# Patient Record
Sex: MALE | Race: WHITE | Employment: OTHER | URBAN - METROPOLITAN AREA
[De-identification: names, ages, dates, MRNs, and addresses within clinical notes are randomized per-mention and may not be internally consistent; named-entity substitution may affect disease eponyms.]

---

## 2017-01-05 RX ORDER — LANREOTIDE ACETATE 120 MG/.5ML
120 INJECTION SUBCUTANEOUS ONCE
Status: COMPLETED | OUTPATIENT
Start: 2017-01-11 | End: 2017-01-11

## 2017-01-11 ENCOUNTER — HOSPITAL ENCOUNTER (OUTPATIENT)
Dept: INFUSION THERAPY | Age: 72
Discharge: HOME | End: 2017-01-11
Payer: MEDICARE

## 2017-01-11 VITALS
OXYGEN SATURATION: 99 % | DIASTOLIC BLOOD PRESSURE: 79 MMHG | HEART RATE: 71 BPM | TEMPERATURE: 97.3 F | RESPIRATION RATE: 18 BRPM | SYSTOLIC BLOOD PRESSURE: 125 MMHG

## 2017-01-11 LAB
ALBUMIN SERPL BCP-MCNC: 3.6 G/DL (ref 3.5–5)
ALBUMIN/GLOB SERPL: 1.1 {RATIO} (ref 1.1–2.2)
ALP SERPL-CCNC: 55 U/L (ref 45–117)
ALT SERPL-CCNC: 21 U/L (ref 12–78)
ANION GAP BLD CALC-SCNC: 16 MMOL/L (ref 5–15)
AST SERPL W P-5'-P-CCNC: 19 U/L (ref 15–37)
BASOPHILS # BLD AUTO: 0 K/UL (ref 0–0.1)
BASOPHILS # BLD: 1 % (ref 0–1)
BILIRUB DIRECT SERPL-MCNC: 0.2 MG/DL (ref 0–0.2)
BILIRUB SERPL-MCNC: 1.4 MG/DL (ref 0.2–1)
BUN BLD-MCNC: 19 MG/DL (ref 9–20)
CA-I BLD-MCNC: 1.16 MMOL/L (ref 1.12–1.32)
CHLORIDE BLD-SCNC: 105 MMOL/L (ref 98–107)
CO2 BLD-SCNC: 27 MMOL/L (ref 21–32)
CREAT BLD-MCNC: 1.1 MG/DL (ref 0.6–1.3)
EOSINOPHIL # BLD: 0.3 K/UL (ref 0–0.4)
EOSINOPHIL NFR BLD: 5 % (ref 0–7)
ERYTHROCYTE [DISTWIDTH] IN BLOOD BY AUTOMATED COUNT: 13.3 % (ref 11.5–14.5)
GLOBULIN SER CALC-MCNC: 3.3 G/DL (ref 2–4)
GLUCOSE BLD-MCNC: 130 MG/DL (ref 75–110)
HCT VFR BLD AUTO: 43.1 % (ref 36.6–50.3)
HCT VFR BLD CALC: 43 % (ref 36.6–50.3)
HGB BLD-MCNC: 14.6 G/DL (ref 12.1–17)
HGB BLD-MCNC: 14.6 GM/DL (ref 12.1–17)
LYMPHOCYTES # BLD AUTO: 21 % (ref 12–49)
LYMPHOCYTES # BLD: 1.1 K/UL (ref 0.8–3.5)
MCH RBC QN AUTO: 29.6 PG (ref 26–34)
MCHC RBC AUTO-ENTMCNC: 33.9 G/DL (ref 30–36.5)
MCV RBC AUTO: 87.2 FL (ref 80–99)
MONOCYTES # BLD: 0.3 K/UL (ref 0–1)
MONOCYTES NFR BLD AUTO: 5 % (ref 5–13)
NEUTS SEG # BLD: 3.8 K/UL (ref 1.8–8)
NEUTS SEG NFR BLD AUTO: 68 % (ref 32–75)
PLATELET # BLD AUTO: 173 K/UL (ref 150–400)
POTASSIUM BLD-SCNC: 3.8 MMOL/L (ref 3.5–5.1)
PROT SERPL-MCNC: 6.9 G/DL (ref 6.4–8.2)
RBC # BLD AUTO: 4.94 M/UL (ref 4.1–5.7)
SERVICE CMNT-IMP: ABNORMAL
SODIUM BLD-SCNC: 144 MMOL/L (ref 136–145)
WBC # BLD AUTO: 5.5 K/UL (ref 4.1–11.1)

## 2017-01-11 PROCEDURE — 96402 CHEMO HORMON ANTINEOPL SQ/IM: CPT

## 2017-01-11 PROCEDURE — 36415 COLL VENOUS BLD VENIPUNCTURE: CPT | Performed by: INTERNAL MEDICINE

## 2017-01-11 PROCEDURE — 74011250636 HC RX REV CODE- 250/636: Performed by: INTERNAL MEDICINE

## 2017-01-11 PROCEDURE — 85025 COMPLETE CBC W/AUTO DIFF WBC: CPT | Performed by: INTERNAL MEDICINE

## 2017-01-11 PROCEDURE — 80076 HEPATIC FUNCTION PANEL: CPT | Performed by: INTERNAL MEDICINE

## 2017-01-11 PROCEDURE — 80047 BASIC METABLC PNL IONIZED CA: CPT

## 2017-01-11 PROCEDURE — 96401 CHEMO ANTI-NEOPL SQ/IM: CPT

## 2017-01-11 RX ADMIN — DENOSUMAB 120 MG: 120 INJECTION SUBCUTANEOUS at 09:19

## 2017-01-11 RX ADMIN — LANREOTIDE ACETATE 120 MG: 120 INJECTION SUBCUTANEOUS at 09:24

## 2017-01-11 NOTE — PROGRESS NOTES
Pt arrived to [facility] ambulatory in no acute distress at 0850 for Somatuline Depot/Xgeva.  Assessment unremarkable. Labs obtained- ionized calcium 1.16    Visit Vitals    /79 (BP 1 Location: Left arm, BP Patient Position: Sitting)    Pulse 71    Temp 97.3 °F (36.3 °C)    Resp 18    SpO2 99%       The following medications administered:  Xgeva 120 mg SQ injection to L arm  Somatuline Depot 120 mg deep SQ injection to R buttocks    Recent Results (from the past 8 hour(s))   CBC WITH AUTOMATED DIFF    Collection Time: 01/11/17  9:01 AM   Result Value Ref Range    WBC 5.5 4.1 - 11.1 K/uL    RBC 4.94 4.10 - 5.70 M/uL    HGB 14.6 12.1 - 17.0 g/dL    HCT 43.1 36.6 - 50.3 %    MCV 87.2 80.0 - 99.0 FL    MCH 29.6 26.0 - 34.0 PG    MCHC 33.9 30.0 - 36.5 g/dL    RDW 13.3 11.5 - 14.5 %    PLATELET 563 887 - 455 K/uL    NEUTROPHILS 68 32 - 75 %    LYMPHOCYTES 21 12 - 49 %    MONOCYTES 5 5 - 13 %    EOSINOPHILS 5 0 - 7 %    BASOPHILS 1 0 - 1 %    ABS. NEUTROPHILS 3.8 1.8 - 8.0 K/UL    ABS. LYMPHOCYTES 1.1 0.8 - 3.5 K/UL    ABS. MONOCYTES 0.3 0.0 - 1.0 K/UL    ABS. EOSINOPHILS 0.3 0.0 - 0.4 K/UL    ABS. BASOPHILS 0.0 0.0 - 0.1 K/UL   HEPATIC FUNCTION PANEL    Collection Time: 01/11/17  9:01 AM   Result Value Ref Range    Protein, total 6.9 6.4 - 8.2 g/dL    Albumin 3.6 3.5 - 5.0 g/dL    Globulin 3.3 2.0 - 4.0 g/dL    A-G Ratio 1.1 1.1 - 2.2      Bilirubin, total 1.4 (H) 0.2 - 1.0 MG/DL    Bilirubin, direct 0.2 0.0 - 0.2 MG/DL    Alk.  phosphatase 55 45 - 117 U/L    AST 19 15 - 37 U/L    ALT 21 12 - 78 U/L   POC CHEM8    Collection Time: 01/11/17  9:09 AM   Result Value Ref Range    Calcium, ionized (POC) 1.16 1.12 - 1.32 MMOL/L    Sodium (POC) 144 136 - 145 MMOL/L    Potassium (POC) 3.8 3.5 - 5.1 MMOL/L    Chloride (POC) 105 98 - 107 MMOL/L    CO2 (POC) 27 21 - 32 MMOL/L    Anion gap (POC) 16 (H) 5 - 15 mmol/L    Glucose (POC) 130 (H) 75 - 110 MG/DL    BUN (POC) 19 9 - 20 MG/DL    Creatinine (POC) 1.1 0.6 - 1.3 MG/DL    GFR-AA (POC) >60 >60 ml/min/1.73m2    GFR, non-AA (POC) >60 >60 ml/min/1.73m2    Hemoglobin (POC) 14.6 12.1 - 17.0 GM/DL    Hematocrit (POC) 43 36.6 - 50.3 %    Comment Notified RN or MD immediately by          Pt tolerated treatment well. Pt discharged ambulatory in no acute distress at 0930, accompanied by wife.   Next appointment 2/8/17 at 9 AM.

## 2017-01-23 ENCOUNTER — HOSPITAL ENCOUNTER (OUTPATIENT)
Dept: NUCLEAR MEDICINE | Age: 72
Discharge: HOME | End: 2017-01-23
Attending: INTERNAL MEDICINE
Payer: MEDICARE

## 2017-01-23 ENCOUNTER — HOSPITAL ENCOUNTER (OUTPATIENT)
Dept: CT IMAGING | Age: 72
Discharge: HOME | End: 2017-01-23
Attending: INTERNAL MEDICINE
Payer: MEDICARE

## 2017-01-23 DIAGNOSIS — C7A.8 NEUROENDOCRINE CARCINOMA METASTATIC TO BONE (HCC): ICD-10-CM

## 2017-01-23 DIAGNOSIS — C7B.8 NEUROENDOCRINE CARCINOMA METASTATIC TO BONE (HCC): ICD-10-CM

## 2017-01-23 PROCEDURE — 74177 CT ABD & PELVIS W/CONTRAST: CPT

## 2017-01-23 PROCEDURE — 78306 BONE IMAGING WHOLE BODY: CPT

## 2017-01-23 PROCEDURE — 74011636320 HC RX REV CODE- 636/320: Performed by: INTERNAL MEDICINE

## 2017-01-23 PROCEDURE — 74011250636 HC RX REV CODE- 250/636: Performed by: INTERNAL MEDICINE

## 2017-01-23 RX ORDER — SODIUM CHLORIDE 9 MG/ML
50 INJECTION, SOLUTION INTRAVENOUS
Status: COMPLETED | OUTPATIENT
Start: 2017-01-23 | End: 2017-01-23

## 2017-01-23 RX ORDER — SODIUM CHLORIDE 0.9 % (FLUSH) 0.9 %
10 SYRINGE (ML) INJECTION
Status: COMPLETED | OUTPATIENT
Start: 2017-01-23 | End: 2017-01-23

## 2017-01-23 RX ORDER — BARIUM SULFATE 20 MG/ML
900 SUSPENSION ORAL
Status: DISCONTINUED | OUTPATIENT
Start: 2017-01-23 | End: 2017-01-23

## 2017-01-23 RX ADMIN — SODIUM CHLORIDE 50 ML/HR: 900 INJECTION, SOLUTION INTRAVENOUS at 09:27

## 2017-01-23 RX ADMIN — IOPAMIDOL 100 ML: 755 INJECTION, SOLUTION INTRAVENOUS at 09:27

## 2017-01-23 RX ADMIN — Medication 10 ML: at 09:27

## 2017-01-30 ENCOUNTER — OFFICE VISIT (OUTPATIENT)
Dept: ONCOLOGY | Age: 72
End: 2017-01-30

## 2017-01-30 VITALS
OXYGEN SATURATION: 99 % | BODY MASS INDEX: 25.36 KG/M2 | SYSTOLIC BLOOD PRESSURE: 136 MMHG | DIASTOLIC BLOOD PRESSURE: 94 MMHG | TEMPERATURE: 97.6 F | HEART RATE: 100 BPM | RESPIRATION RATE: 18 BRPM | WEIGHT: 187 LBS

## 2017-01-30 DIAGNOSIS — C7B.8 NEUROENDOCRINE CARCINOMA METASTATIC TO BONE (HCC): Primary | ICD-10-CM

## 2017-01-30 DIAGNOSIS — C7A.8 NEUROENDOCRINE CARCINOMA METASTATIC TO BONE (HCC): Primary | ICD-10-CM

## 2017-01-30 NOTE — PROGRESS NOTES
Oncology Follow up Note        Patient: Frankie Pelletier MRN: [id number]  SSN: [id number]    YOB: 1945  Age: 80 y.o. Sex: male        Diagnosis:     1. 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 Somatuline Depot 120 mg SC  2. Xgeva 120mg SC     Subjective:      Frankie Pelletier is a 80 y.o. male with a diagnosis of metastatic neuroendocrine cancer. He started experiencing pain in both groins and the abdomen. A CT of the abdomen showed retroperitoneal adenopathy. CT guided biopsy of the retroperitoneal LN reveals a dx of well differentiated neuroendocrine carcinoma. Bone scan shows disease in the bone. Mr. Shaista Garduno is receiving Somatuline and scans show stable disease. He is here today with his wife and feels well with no complaints.     Review of Systems:    Constitutional: fatigue   Eyes: negative  Ears, Nose, Mouth, Throat, and Face: negative  Respiratory: negative  Cardiovascular: negative  Gastrointestinal: negative  Genitourinary:negative  Integument/Breast: negative  Hematologic/Lymphatic: negative  Musculoskeletal: negative  Neurological: negative      Past Medical History   Diagnosis Date    Adverse effect of anesthesia      low HR and very slow to wakeup    Cancer ([facility] [address] 5/2016     neuroendocrine, retroperitoneal LN involvement    Diarrhea     Frequent urination     Hypertension     Poor appetite     Unspecified adverse effect of anesthesia      \"hard to put to sleep and slow to wake up-heart rate dropped very low\"     Past Surgical History   Procedure Laterality Date    Hx other surgical  09-     excision of scalp cyst     Hx orthopaedic       knee scope left knee    Upper gi endoscopy,biopsy  5/13/2016          Colonoscopy,alejandro moya,snare  5/13/2016          Colonoscopy N/A 5/13/2016     COLONOSCOPY performed by Kaleb Pineda MD at [facility] ENDOSCOPY      Family History   Problem Relation Age of Onset    Cancer Mother    [facility] Stroke Sister      Social History   Substance Use Topics    Smoking status: Former Smoker     Packs/day: 1.00     Years: 8.00     Quit date: 5/12/1972    Smokeless tobacco: Never Used    Alcohol use No      Prior to Admission medications    Medication Sig Start Date End Date Taking? Authorizing Provider   lisinopril (PRINIVIL, ZESTRIL) 10 mg tablet Take  by mouth daily. Yes Historical Provider          No Known Allergies        Objective:     Vitals:    01/30/17 1106   BP: (!) 136/94   Pulse: 100   Resp: 18   Temp: 97.6 °F (36.4 °C)   SpO2: 99%   Weight: 187 lb (84.8 kg)            Physical Exam:    GENERAL: alert, cooperative  EYE: negative  LYMPHATIC: Cervical, supraclavicular, and axillary nodes normal.   THROAT & NECK: normal and no erythema or exudates noted. LUNG: clear to auscultation bilaterally  HEART: regular rate and rhythm  ABDOMEN: soft, non-tender  EXTREMITIES: no cyanosis or edema  SKIN: Normal.  NEUROLOGIC: negative        IMAGING:     I personally reviewed the images. Enlarged and abnormal retroperitoneal adenopathy. Bone metastasis. CT Results (most recent):    Results from Hospital Encounter encounter on 01/23/17   CT ABD PELV W CONT   Narrative INDICATION: metastatic neuroendocrine carcinoma    COMPARISON: 10/18/2016    TECHNIQUE:   Following the uneventful intravenous administration of 100 cc Isovue-370, thin  axial images were obtained through the abdomen and pelvis. Coronal and sagittal  reconstructions were generated. Oral contrast was not administered. CT dose  reduction was achieved through use of a standardized protocol tailored for this  examination and automatic exposure control for dose modulation. FINDINGS:   LUNG BASES: There is slight basilar atelectasis/scarring on the left  INCIDENTALLY IMAGED HEART AND MEDIASTINUM: Unremarkable. LIVER: No mass or biliary dilatation. GALLBLADDER: Unremarkable. SPLEEN: No mass. PANCREAS: No mass or ductal dilatation.   ADRENALS: Unremarkable. KIDNEYS: There is a tiny nonobstructing calculus right kidney. There is a tiny  low-density left kidney most consistent with a tiny cyst.  STOMACH: Unremarkable. SMALL BOWEL: No dilatation or wall thickening. COLON: No dilatation or wall thickening. APPENDIX: Unremarkable. PERITONEUM: There is a mesenteric mass which contains calcifications and  allowing for differences in technique has not changed significantly. RETROPERITONEUM: Retroperitoneal adenopathy is identified. There is a group of  periaortic nodes on the left measuring 2.8 x 2.3 cm which is stable. REPRODUCTIVE ORGANS:  URINARY BLADDER: History of bladder wall is slightly thickened. The prostate  gland is mildly enlarged. BONES: Multiple sclerotic lesions in visualized bony structures are stable. ADDITIONAL COMMENTS: N/A         Impression IMPRESSION:    1. No significant change in the mesenteric mass which contains calcifications  and in the retroperitoneal adenopathy. There is no significant change in the  bony sclerotic lesions. Assessment:     1. 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 Blood counts are acceptable. Results reviewed with the patient    Repeat CT and NM bone scans (1/23/17) show stable disease    Symptom management form reviewed with patient. 2. Bone metastasis    > Reduce Denosumab frequency to every 3 months      Plan:       > Continue Somatuline Depot  > Reduce Denosumab frequency to every 3 months  > Follow-up in 3 months        Signed by: Steven Brown MD                     January 30, 2017        CC.  Armani Beasley MD

## 2017-01-30 NOTE — PROGRESS NOTES
Pt is a 80 yr old Pt here for a follow up for neuroendocrine cancer. Pt is here to get scan results. Pt reports feeling well, eating well, no N/V/D., no pain. Mr. Machelle Guy has a reminder for a \"due or due soon\" health maintenance. I have asked that he contact his primary care provider for follow-up on this health maintenance.

## 2017-02-03 RX ORDER — LANREOTIDE ACETATE 120 MG/.5ML
120 INJECTION SUBCUTANEOUS ONCE
Status: COMPLETED | OUTPATIENT
Start: 2017-02-08 | End: 2017-02-08

## 2017-02-08 ENCOUNTER — HOSPITAL ENCOUNTER (OUTPATIENT)
Dept: INFUSION THERAPY | Age: 72
Discharge: HOME | End: 2017-02-08
Payer: MEDICARE

## 2017-02-08 VITALS
SYSTOLIC BLOOD PRESSURE: 152 MMHG | TEMPERATURE: 97.1 F | RESPIRATION RATE: 18 BRPM | DIASTOLIC BLOOD PRESSURE: 99 MMHG | OXYGEN SATURATION: 99 % | HEART RATE: 69 BPM

## 2017-02-08 LAB
ALBUMIN SERPL BCP-MCNC: 3.7 G/DL (ref 3.5–5)
ALBUMIN/GLOB SERPL: 1.3 {RATIO} (ref 1.1–2.2)
ALP SERPL-CCNC: 54 U/L (ref 45–117)
ALT SERPL-CCNC: 23 U/L (ref 12–78)
ANION GAP BLD CALC-SCNC: 6 MMOL/L (ref 5–15)
AST SERPL W P-5'-P-CCNC: 17 U/L (ref 15–37)
BASOPHILS # BLD AUTO: 0 K/UL (ref 0–0.1)
BASOPHILS # BLD: 1 % (ref 0–1)
BILIRUB SERPL-MCNC: 1 MG/DL (ref 0.2–1)
BUN SERPL-MCNC: 19 MG/DL (ref 6–20)
BUN/CREAT SERPL: 17 (ref 12–20)
CALCIUM SERPL-MCNC: 8.9 MG/DL (ref 8.5–10.1)
CHLORIDE SERPL-SCNC: 108 MMOL/L (ref 97–108)
CO2 SERPL-SCNC: 30 MMOL/L (ref 21–32)
CREAT SERPL-MCNC: 1.11 MG/DL (ref 0.7–1.3)
EOSINOPHIL # BLD: 0.4 K/UL (ref 0–0.4)
EOSINOPHIL NFR BLD: 8 % (ref 0–7)
ERYTHROCYTE [DISTWIDTH] IN BLOOD BY AUTOMATED COUNT: 13.4 % (ref 11.5–14.5)
GLOBULIN SER CALC-MCNC: 2.9 G/DL (ref 2–4)
GLUCOSE SERPL-MCNC: 109 MG/DL (ref 65–100)
HCT VFR BLD AUTO: 40.4 % (ref 36.6–50.3)
HGB BLD-MCNC: 13.4 G/DL (ref 12.1–17)
LYMPHOCYTES # BLD AUTO: 21 % (ref 12–49)
LYMPHOCYTES # BLD: 1 K/UL (ref 0.8–3.5)
MCH RBC QN AUTO: 28.7 PG (ref 26–34)
MCHC RBC AUTO-ENTMCNC: 33.2 G/DL (ref 30–36.5)
MCV RBC AUTO: 86.5 FL (ref 80–99)
MONOCYTES # BLD: 0.4 K/UL (ref 0–1)
MONOCYTES NFR BLD AUTO: 7 % (ref 5–13)
NEUTS SEG # BLD: 3.1 K/UL (ref 1.8–8)
NEUTS SEG NFR BLD AUTO: 63 % (ref 32–75)
PLATELET # BLD AUTO: 162 K/UL (ref 150–400)
POTASSIUM SERPL-SCNC: 3.6 MMOL/L (ref 3.5–5.1)
PROT SERPL-MCNC: 6.6 G/DL (ref 6.4–8.2)
RBC # BLD AUTO: 4.67 M/UL (ref 4.1–5.7)
SODIUM SERPL-SCNC: 144 MMOL/L (ref 136–145)
WBC # BLD AUTO: 4.9 K/UL (ref 4.1–11.1)

## 2017-02-08 PROCEDURE — 74011250636 HC RX REV CODE- 250/636: Performed by: INTERNAL MEDICINE

## 2017-02-08 PROCEDURE — 96402 CHEMO HORMON ANTINEOPL SQ/IM: CPT

## 2017-02-08 PROCEDURE — 36415 COLL VENOUS BLD VENIPUNCTURE: CPT | Performed by: INTERNAL MEDICINE

## 2017-02-08 PROCEDURE — 85025 COMPLETE CBC W/AUTO DIFF WBC: CPT | Performed by: INTERNAL MEDICINE

## 2017-02-08 PROCEDURE — 80053 COMPREHEN METABOLIC PANEL: CPT | Performed by: INTERNAL MEDICINE

## 2017-02-08 RX ADMIN — LANREOTIDE ACETATE 120 MG: 120 INJECTION SUBCUTANEOUS at 09:17

## 2017-02-08 NOTE — PROGRESS NOTES
Pt arrived to [facility] ambulatory in no acute distress at 0902 for Lantreotide.  Assessment unremarkable, pt voiced no complaints. Labs drawn peripherally and coban placed. Clarified with Tia Otero Pharmacist that pt,'s Kushal Pacer has been changed to every 3 months as of 1/11/17. Order to be faxed over today, Spoke with Kemar Mckeon RN at Dr. Antonio Draper office. Visit Vitals    BP (!) 152/99 (BP 1 Location: Left arm, BP Patient Position: At rest;Sitting)    Pulse 69    Temp 97.1 °F (36.2 °C)    Resp 18    SpO2 99%       Labs obtained:   Recent Results (from the past 12 hour(s))   CBC WITH AUTOMATED DIFF    Collection Time: 02/08/17  9:10 AM   Result Value Ref Range    WBC 4.9 4.1 - 11.1 K/uL    RBC 4.67 4.10 - 5.70 M/uL    HGB 13.4 12.1 - 17.0 g/dL    HCT 40.4 36.6 - 50.3 %    MCV 86.5 80.0 - 99.0 FL    MCH 28.7 26.0 - 34.0 PG    MCHC 33.2 30.0 - 36.5 g/dL    RDW 13.4 11.5 - 14.5 %    PLATELET 875 580 - 478 K/uL    NEUTROPHILS 63 32 - 75 %    LYMPHOCYTES 21 12 - 49 %    MONOCYTES 7 5 - 13 %    EOSINOPHILS 8 (H) 0 - 7 %    BASOPHILS 1 0 - 1 %    ABS. NEUTROPHILS 3.1 1.8 - 8.0 K/UL    ABS. LYMPHOCYTES 1.0 0.8 - 3.5 K/UL    ABS. MONOCYTES 0.4 0.0 - 1.0 K/UL    ABS. EOSINOPHILS 0.4 0.0 - 0.4 K/UL    ABS. BASOPHILS 0.0 0.0 - 0.1 K/UL   METABOLIC PANEL, COMPREHENSIVE    Collection Time: 02/08/17  9:10 AM   Result Value Ref Range    Sodium 144 136 - 145 mmol/L    Potassium 3.6 3.5 - 5.1 mmol/L    Chloride 108 97 - 108 mmol/L    CO2 30 21 - 32 mmol/L    Anion gap 6 5 - 15 mmol/L    Glucose 109 (H) 65 - 100 mg/dL    BUN 19 6 - 20 MG/DL    Creatinine 1.11 0.70 - 1.30 MG/DL    BUN/Creatinine ratio 17 12 - 20      GFR est AA >60 >60 ml/min/1.73m2    GFR est non-AA >60 >60 ml/min/1.73m2    Calcium 8.9 8.5 - 10.1 MG/DL    Bilirubin, total 1.0 0.2 - 1.0 MG/DL    ALT (SGPT) 23 12 - 78 U/L    AST (SGOT) 17 15 - 37 U/L    Alk.  phosphatase 54 45 - 117 U/L    Protein, total 6.6 6.4 - 8.2 g/dL    Albumin 3.7 3.5 - 5.0 g/dL Globulin 2.9 2.0 - 4.0 g/dL    A-G Ratio 1.3 1.1 - 2.2         The following medications administered:  Lantreotide 120 mg deep Sub-Q injection in R buttocks     Pt tolerated treatment well. No adverse reaction noted. Pt discharged ambulatory in no acute distress at 0924, accompanied by Spouse.   Next appointment 3/8/17 @ 9 am.

## 2017-03-02 RX ORDER — LANREOTIDE ACETATE 120 MG/.5ML
120 INJECTION SUBCUTANEOUS ONCE
Status: COMPLETED | OUTPATIENT
Start: 2017-03-08 | End: 2017-03-08

## 2017-03-08 ENCOUNTER — HOSPITAL ENCOUNTER (OUTPATIENT)
Dept: INFUSION THERAPY | Age: 72
Discharge: HOME | End: 2017-03-08
Payer: MEDICARE

## 2017-03-08 VITALS
OXYGEN SATURATION: 97 % | WEIGHT: 191.06 LBS | HEIGHT: 72 IN | DIASTOLIC BLOOD PRESSURE: 94 MMHG | RESPIRATION RATE: 18 BRPM | TEMPERATURE: 97.5 F | SYSTOLIC BLOOD PRESSURE: 161 MMHG | HEART RATE: 76 BPM | BODY MASS INDEX: 25.88 KG/M2

## 2017-03-08 LAB
ALBUMIN SERPL BCP-MCNC: 3.7 G/DL (ref 3.5–5)
ALBUMIN/GLOB SERPL: 1.1 {RATIO} (ref 1.1–2.2)
ALP SERPL-CCNC: 55 U/L (ref 45–117)
ALT SERPL-CCNC: 32 U/L (ref 12–78)
ANION GAP BLD CALC-SCNC: 6 MMOL/L (ref 5–15)
AST SERPL W P-5'-P-CCNC: 31 U/L (ref 15–37)
BASOPHILS # BLD AUTO: 0 K/UL (ref 0–0.1)
BASOPHILS # BLD: 1 % (ref 0–1)
BILIRUB SERPL-MCNC: 1.7 MG/DL (ref 0.2–1)
BUN SERPL-MCNC: 18 MG/DL (ref 6–20)
BUN/CREAT SERPL: 16 (ref 12–20)
CALCIUM SERPL-MCNC: 8.6 MG/DL (ref 8.5–10.1)
CHLORIDE SERPL-SCNC: 107 MMOL/L (ref 97–108)
CO2 SERPL-SCNC: 32 MMOL/L (ref 21–32)
CREAT SERPL-MCNC: 1.13 MG/DL (ref 0.7–1.3)
EOSINOPHIL # BLD: 0.3 K/UL (ref 0–0.4)
EOSINOPHIL NFR BLD: 6 % (ref 0–7)
ERYTHROCYTE [DISTWIDTH] IN BLOOD BY AUTOMATED COUNT: 13.3 % (ref 11.5–14.5)
GLOBULIN SER CALC-MCNC: 3.4 G/DL (ref 2–4)
GLUCOSE SERPL-MCNC: 96 MG/DL (ref 65–100)
HCT VFR BLD AUTO: 41.5 % (ref 36.6–50.3)
HGB BLD-MCNC: 14.3 G/DL (ref 12.1–17)
LYMPHOCYTES # BLD AUTO: 19 % (ref 12–49)
LYMPHOCYTES # BLD: 0.9 K/UL (ref 0.8–3.5)
MCH RBC QN AUTO: 29.7 PG (ref 26–34)
MCHC RBC AUTO-ENTMCNC: 34.5 G/DL (ref 30–36.5)
MCV RBC AUTO: 86.1 FL (ref 80–99)
MONOCYTES # BLD: 0.4 K/UL (ref 0–1)
MONOCYTES NFR BLD AUTO: 8 % (ref 5–13)
NEUTS SEG # BLD: 3.2 K/UL (ref 1.8–8)
NEUTS SEG NFR BLD AUTO: 66 % (ref 32–75)
PLATELET # BLD AUTO: 145 K/UL (ref 150–400)
POTASSIUM SERPL-SCNC: 3.6 MMOL/L (ref 3.5–5.1)
PROT SERPL-MCNC: 7.1 G/DL (ref 6.4–8.2)
RBC # BLD AUTO: 4.82 M/UL (ref 4.1–5.7)
SODIUM SERPL-SCNC: 145 MMOL/L (ref 136–145)
WBC # BLD AUTO: 4.8 K/UL (ref 4.1–11.1)

## 2017-03-08 PROCEDURE — 74011250636 HC RX REV CODE- 250/636: Performed by: INTERNAL MEDICINE

## 2017-03-08 PROCEDURE — 36415 COLL VENOUS BLD VENIPUNCTURE: CPT | Performed by: INTERNAL MEDICINE

## 2017-03-08 PROCEDURE — 96402 CHEMO HORMON ANTINEOPL SQ/IM: CPT

## 2017-03-08 PROCEDURE — 80053 COMPREHEN METABOLIC PANEL: CPT | Performed by: INTERNAL MEDICINE

## 2017-03-08 PROCEDURE — 85025 COMPLETE CBC W/AUTO DIFF WBC: CPT | Performed by: INTERNAL MEDICINE

## 2017-03-08 RX ADMIN — LANREOTIDE ACETATE 120 MG: 120 INJECTION SUBCUTANEOUS at 09:33

## 2017-03-08 NOTE — PROGRESS NOTES
Pt arrived to [facility] ambulatory in no acute distress at 0855 for Lanreotide.  Assessment unremarkable pt voiced no complaints. Visit Vitals    BP (!) 161/94 (BP 1 Location: Left arm, BP Patient Position: At rest;Sitting)    Pulse 76    Temp 97.5 °F (36.4 °C)    Resp 18    Ht 6' (1.829 m)    Wt 86.7 kg (191 lb 1 oz)    SpO2 97%    BMI 25.91 kg/m2        Labs obtained,   Recent Results (from the past 12 hour(s))   CBC WITH AUTOMATED DIFF    Collection Time: 03/08/17  9:07 AM   Result Value Ref Range    WBC 4.8 4.1 - 11.1 K/uL    RBC 4.82 4.10 - 5.70 M/uL    HGB 14.3 12.1 - 17.0 g/dL    HCT 41.5 36.6 - 50.3 %    MCV 86.1 80.0 - 99.0 FL    MCH 29.7 26.0 - 34.0 PG    MCHC 34.5 30.0 - 36.5 g/dL    RDW 13.3 11.5 - 14.5 %    PLATELET 275 (L) 507 - 400 K/uL    NEUTROPHILS 66 32 - 75 %    LYMPHOCYTES 19 12 - 49 %    MONOCYTES 8 5 - 13 %    EOSINOPHILS 6 0 - 7 %    BASOPHILS 1 0 - 1 %    ABS. NEUTROPHILS 3.2 1.8 - 8.0 K/UL    ABS. LYMPHOCYTES 0.9 0.8 - 3.5 K/UL    ABS. MONOCYTES 0.4 0.0 - 1.0 K/UL    ABS. EOSINOPHILS 0.3 0.0 - 0.4 K/UL    ABS. BASOPHILS 0.0 0.0 - 0.1 K/UL   METABOLIC PANEL, COMPREHENSIVE    Collection Time: 03/08/17  9:07 AM   Result Value Ref Range    Sodium 145 136 - 145 mmol/L    Potassium 3.6 3.5 - 5.1 mmol/L    Chloride 107 97 - 108 mmol/L    CO2 32 21 - 32 mmol/L    Anion gap 6 5 - 15 mmol/L    Glucose 96 65 - 100 mg/dL    BUN 18 6 - 20 MG/DL    Creatinine 1.13 0.70 - 1.30 MG/DL    BUN/Creatinine ratio 16 12 - 20      GFR est AA >60 >60 ml/min/1.73m2    GFR est non-AA >60 >60 ml/min/1.73m2    Calcium 8.6 8.5 - 10.1 MG/DL    Bilirubin, total 1.7 (H) 0.2 - 1.0 MG/DL    ALT (SGPT) 32 12 - 78 U/L    AST (SGOT) 31 15 - 37 U/L    Alk. phosphatase 55 45 - 117 U/L    Protein, total 7.1 6.4 - 8.2 g/dL    Albumin 3.7 3.5 - 5.0 g/dL    Globulin 3.4 2.0 - 4.0 g/dL    A-G Ratio 1.1 1.1 - 2.2     .     The following medications administered:  Lanreotide 120 mg Sub-Q injection in R Buttocks    Pt tolerated treatment well. No adverse reactions noted. Pt discharged ambulatory in no acute distress at 0940, accompanied by Spouse.   Next appointment 4/5/17 @ 9 am.

## 2017-03-31 RX ORDER — LANREOTIDE ACETATE 120 MG/.5ML
120 INJECTION SUBCUTANEOUS ONCE
Status: COMPLETED | OUTPATIENT
Start: 2017-04-05 | End: 2017-04-05

## 2017-04-05 ENCOUNTER — HOSPITAL ENCOUNTER (OUTPATIENT)
Dept: INFUSION THERAPY | Age: 72
Discharge: HOME | End: 2017-04-05
Payer: MEDICARE

## 2017-04-05 VITALS
RESPIRATION RATE: 18 BRPM | HEART RATE: 76 BPM | WEIGHT: 193.13 LBS | OXYGEN SATURATION: 100 % | BODY MASS INDEX: 26.16 KG/M2 | HEIGHT: 72 IN | TEMPERATURE: 97.1 F | DIASTOLIC BLOOD PRESSURE: 93 MMHG | SYSTOLIC BLOOD PRESSURE: 145 MMHG

## 2017-04-05 LAB
ALBUMIN SERPL BCP-MCNC: 3.7 G/DL (ref 3.5–5)
ALBUMIN/GLOB SERPL: 1.2 {RATIO} (ref 1.1–2.2)
ALP SERPL-CCNC: 51 U/L (ref 45–117)
ALT SERPL-CCNC: 28 U/L (ref 12–78)
ANION GAP BLD CALC-SCNC: 4 MMOL/L (ref 5–15)
AST SERPL W P-5'-P-CCNC: 26 U/L (ref 15–37)
BASO+EOS+MONOS # BLD AUTO: 0.6 K/UL (ref 0.2–1.2)
BASO+EOS+MONOS # BLD AUTO: 11 % (ref 3.2–16.9)
BILIRUB SERPL-MCNC: 1.3 MG/DL (ref 0.2–1)
BUN SERPL-MCNC: 21 MG/DL (ref 6–20)
BUN/CREAT SERPL: 16 (ref 12–20)
CALCIUM SERPL-MCNC: 9.1 MG/DL (ref 8.5–10.1)
CHLORIDE SERPL-SCNC: 109 MMOL/L (ref 97–108)
CO2 SERPL-SCNC: 31 MMOL/L (ref 21–32)
CREAT SERPL-MCNC: 1.29 MG/DL (ref 0.7–1.3)
DIFFERENTIAL METHOD BLD: NORMAL
ERYTHROCYTE [DISTWIDTH] IN BLOOD BY AUTOMATED COUNT: 13.4 % (ref 11.8–15.8)
GLOBULIN SER CALC-MCNC: 3.2 G/DL (ref 2–4)
GLUCOSE SERPL-MCNC: 100 MG/DL (ref 65–100)
HCT VFR BLD AUTO: 40.3 % (ref 36.6–50.3)
HGB BLD-MCNC: 14 G/DL (ref 12.1–17)
LYMPHOCYTES # BLD AUTO: 21 % (ref 12–49)
LYMPHOCYTES # BLD: 1.1 K/UL (ref 0.8–3.5)
MAGNESIUM SERPL-MCNC: 2.3 MG/DL (ref 1.6–2.4)
MCH RBC QN AUTO: 29.7 PG (ref 26–34)
MCHC RBC AUTO-ENTMCNC: 34.7 G/DL (ref 30–36.5)
MCV RBC AUTO: 85.4 FL (ref 80–99)
NEUTS SEG # BLD: 3.4 K/UL (ref 1.8–8)
NEUTS SEG NFR BLD AUTO: 69 % (ref 32–75)
PHOSPHATE SERPL-MCNC: 2.9 MG/DL (ref 2.6–4.7)
PLATELET # BLD AUTO: 175 K/UL (ref 150–400)
POTASSIUM SERPL-SCNC: 3.8 MMOL/L (ref 3.5–5.1)
PROT SERPL-MCNC: 6.9 G/DL (ref 6.4–8.2)
RBC # BLD AUTO: 4.72 M/UL (ref 4.1–5.7)
SODIUM SERPL-SCNC: 144 MMOL/L (ref 136–145)
WBC # BLD AUTO: 5.1 K/UL (ref 4.1–11.1)

## 2017-04-05 PROCEDURE — 74011250636 HC RX REV CODE- 250/636: Performed by: INTERNAL MEDICINE

## 2017-04-05 PROCEDURE — 80053 COMPREHEN METABOLIC PANEL: CPT | Performed by: INTERNAL MEDICINE

## 2017-04-05 PROCEDURE — 96372 THER/PROPH/DIAG INJ SC/IM: CPT

## 2017-04-05 PROCEDURE — 85025 COMPLETE CBC W/AUTO DIFF WBC: CPT | Performed by: INTERNAL MEDICINE

## 2017-04-05 PROCEDURE — 83735 ASSAY OF MAGNESIUM: CPT | Performed by: INTERNAL MEDICINE

## 2017-04-05 PROCEDURE — 84100 ASSAY OF PHOSPHORUS: CPT | Performed by: INTERNAL MEDICINE

## 2017-04-05 PROCEDURE — 96402 CHEMO HORMON ANTINEOPL SQ/IM: CPT

## 2017-04-05 PROCEDURE — 36415 COLL VENOUS BLD VENIPUNCTURE: CPT | Performed by: INTERNAL MEDICINE

## 2017-04-05 RX ADMIN — LANREOTIDE ACETATE 120 MG: 120 INJECTION SUBCUTANEOUS at 09:23

## 2017-04-05 RX ADMIN — DENOSUMAB 120 MG: 120 INJECTION SUBCUTANEOUS at 10:36

## 2017-04-05 NOTE — PROGRESS NOTES
Pt arrived to [facility] ambulatory in no acute distress at 8436 for  Lanreotide/Xgeva.  Assessment unremarkable except pt c/o blurred vision about a month ago, pt will follow up with PCP . Visit Vitals    BP (!) 145/93 (BP 1 Location: Left arm, BP Patient Position: At rest;Sitting)    Pulse 76    Temp 97.1 °F (36.2 °C)    Resp 18    Ht 6' (1.829 m)    Wt 87.6 kg (193 lb 2 oz)    SpO2 100%    BMI 26.19 kg/m2        Labs obtained,   Recent Results (from the past 12 hour(s))   MAGNESIUM    Collection Time: 04/05/17  9:15 AM   Result Value Ref Range    Magnesium 2.3 1.6 - 2.4 mg/dL   PHOSPHORUS    Collection Time: 04/05/17  9:15 AM   Result Value Ref Range    Phosphorus 2.9 2.6 - 4.7 MG/DL   CBC WITH 3 PART DIFF    Collection Time: 04/05/17  9:15 AM   Result Value Ref Range    WBC 5.1 4.1 - 11.1 K/uL    RBC 4.72 4.10 - 5.70 M/uL    HGB 14.0 12.1 - 17.0 g/dL    HCT 40.3 36.6 - 50.3 %    MCV 85.4 80.0 - 99.0 FL    MCH 29.7 26.0 - 34.0 PG    MCHC 34.7 30.0 - 36.5 g/dL    RDW 13.4 11.8 - 15.8 %    PLATELET 324 982 - 958 K/uL    NEUTROPHILS 69 32 - 75 %    MIXED CELLS 11 3.2 - 16.9 %    LYMPHOCYTES 21 12 - 49 %    ABS. NEUTROPHILS 3.4 1.8 - 8.0 K/UL    ABS. MIXED CELLS 0.6 0.2 - 1.2 K/uL    ABS. LYMPHOCYTES 1.1 0.8 - 3.5 K/UL    DF AUTOMATED     METABOLIC PANEL, COMPREHENSIVE    Collection Time: 04/05/17  9:15 AM   Result Value Ref Range    Sodium 144 136 - 145 mmol/L    Potassium 3.8 3.5 - 5.1 mmol/L    Chloride 109 (H) 97 - 108 mmol/L    CO2 31 21 - 32 mmol/L    Anion gap 4 (L) 5 - 15 mmol/L    Glucose 100 65 - 100 mg/dL    BUN 21 (H) 6 - 20 MG/DL    Creatinine 1.29 0.70 - 1.30 MG/DL    BUN/Creatinine ratio 16 12 - 20      GFR est AA >60 >60 ml/min/1.73m2    GFR est non-AA 55 (L) >60 ml/min/1.73m2    Calcium 9.1 8.5 - 10.1 MG/DL    Bilirubin, total 1.3 (H) 0.2 - 1.0 MG/DL    ALT (SGPT) 28 12 - 78 U/L    AST (SGOT) 26 15 - 37 U/L    Alk.  phosphatase 51 45 - 117 U/L    Protein, total 6.9 6.4 - 8.2 g/dL Albumin 3.7 3.5 - 5.0 g/dL    Globulin 3.2 2.0 - 4.0 g/dL    A-G Ratio 1.2 1.1 - 2.2         The following medications administered:  Lanreotide 120 mg Sub-Q injection in R buttocks  Xgeva 120 mg Sub-Q injection in Left arm    Pt tolerated treatment well. No adverse reactions noted. Pt discharged ambulatory in no acute distress at 1040, accompanied by Spouse.   Next appointment 5/3/17 @9 am.

## 2017-04-28 RX ORDER — LANREOTIDE ACETATE 120 MG/.5ML
120 INJECTION SUBCUTANEOUS ONCE
Status: COMPLETED | OUTPATIENT
Start: 2017-05-03 | End: 2017-05-03

## 2017-05-01 ENCOUNTER — OFFICE VISIT (OUTPATIENT)
Dept: ONCOLOGY | Age: 72
End: 2017-05-01

## 2017-05-01 VITALS
RESPIRATION RATE: 18 BRPM | OXYGEN SATURATION: 98 % | SYSTOLIC BLOOD PRESSURE: 142 MMHG | BODY MASS INDEX: 25.87 KG/M2 | TEMPERATURE: 97.7 F | HEIGHT: 72 IN | HEART RATE: 68 BPM | DIASTOLIC BLOOD PRESSURE: 82 MMHG | WEIGHT: 191 LBS

## 2017-05-01 DIAGNOSIS — C7B.8 NEUROENDOCRINE CARCINOMA METASTATIC TO BONE (HCC): Primary | ICD-10-CM

## 2017-05-01 DIAGNOSIS — C79.51 BONE METASTASIS (HCC): ICD-10-CM

## 2017-05-01 DIAGNOSIS — C7A.8 NEUROENDOCRINE CARCINOMA METASTATIC TO BONE (HCC): Primary | ICD-10-CM

## 2017-05-01 NOTE — PROGRESS NOTES
Shelby Blandon is a 80 y.o. male   had concerns including Follow-up. Neuroendocrine mets to bones, c/o mild numbness/tingling,mild headache,diarrhea,mild fatigue,mild cramping/pain #/10 to lower stomach    Mr. Esthela Morales has a reminder for a \"due or due soon\" health maintenance. I have asked that he contact his primary care provider for follow-up on this health maintenance.

## 2017-05-01 NOTE — PROGRESS NOTES
Oncology Follow up Note        Patient: Yakov Hameed MRN: [id number]  SSN: [id number]    YOB: 1945  Age: 80 y.o. Sex: male        Diagnosis:     1. 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 Somatuline Depot 120 mg SC  2. Xgeva 120mg SC     Subjective:      Yakov Hameed is a 80 y.o. male with a diagnosis of metastatic neuroendocrine cancer. He started experiencing pain in both groins and the abdomen. A CT of the abdomen showed retroperitoneal adenopathy. CT guided biopsy of the retroperitoneal LN reveals a dx of well differentiated neuroendocrine carcinoma. Bone scan shows disease in the bone. Mr. Arabella Yost is receiving Somatuline and scans show stable disease. He is here today with his wife and feels well with no complaints. He has been cleaning his koi pond at home.        Review of Systems:    Constitutional: fatigue   Eyes: negative  Ears, Nose, Mouth, Throat, and Face: negative  Respiratory: negative  Cardiovascular: negative  Gastrointestinal: negative  Genitourinary:negative  Integument/Breast: negative  Hematologic/Lymphatic: negative  Musculoskeletal: negative  Neurological: negative      Past Medical History:   Diagnosis Date    Adverse effect of anesthesia     low HR and very slow to wakeup    Cancer ([facility] [address] 5/2016    neuroendocrine, retroperitoneal LN involvement    Diarrhea     Frequent urination     Hypertension     Poor appetite     Unspecified adverse effect of anesthesia     \"hard to put to sleep and slow to wake up-heart rate dropped very low\"     Past Surgical History:   Procedure Laterality Date    COLONOSCOPY N/A 5/13/2016    COLONOSCOPY performed by Cheng Manriquez MD at [address] [place] [address]  5/13/2016         HX ORTHOPAEDIC      knee scope left knee    HX OTHER SURGICAL  09-    excision of scalp cyst     UPPER GI ENDOSCOPY,BIOPSY  5/13/2016           Family History   Problem Relation Age of Onset    Cancer Mother     Stroke Sister      Social History   Substance Use Topics    Smoking status: Former Smoker     Packs/day: 1.00     Years: 8.00     Quit date: 5/12/1972    Smokeless tobacco: Never Used    Alcohol use No      Prior to Admission medications    Medication Sig Start Date End Date Taking? Authorizing Provider   lisinopril (PRINIVIL, ZESTRIL) 10 mg tablet Take  by mouth daily. Yes Historical Provider          No Known Allergies        Objective:     Vitals:    05/01/17 1043   BP: 142/82   Pulse: 68   Resp: 18   Temp: 97.7 °F (36.5 °C)   SpO2: 98%   Weight: 191 lb (86.6 kg)   Height: 6' (1.829 m)            Physical Exam:    GENERAL: alert, cooperative  EYE: negative  LYMPHATIC: Cervical, supraclavicular, and axillary nodes normal.   THROAT & NECK: normal and no erythema or exudates noted. LUNG: clear to auscultation bilaterally  HEART: regular rate and rhythm  ABDOMEN: soft, non-tender  EXTREMITIES: no cyanosis or edema  SKIN: Normal.  NEUROLOGIC: negative        IMAGING:     I personally reviewed the images. Enlarged and abnormal retroperitoneal adenopathy. Bone metastasis. CT Results (most recent):    Results from Hospital Encounter encounter on 01/23/17   CT ABD PELV W CONT   Narrative INDICATION: metastatic neuroendocrine carcinoma    COMPARISON: 10/18/2016    TECHNIQUE:   Following the uneventful intravenous administration of 100 cc Isovue-370, thin  axial images were obtained through the abdomen and pelvis. Coronal and sagittal  reconstructions were generated. Oral contrast was not administered. CT dose  reduction was achieved through use of a standardized protocol tailored for this  examination and automatic exposure control for dose modulation. FINDINGS:   LUNG BASES: There is slight basilar atelectasis/scarring on the left  INCIDENTALLY IMAGED HEART AND MEDIASTINUM: Unremarkable. LIVER: No mass or biliary dilatation. GALLBLADDER: Unremarkable.   SPLEEN: No mass.  PANCREAS: No mass or ductal dilatation. ADRENALS: Unremarkable. KIDNEYS: There is a tiny nonobstructing calculus right kidney. There is a tiny  low-density left kidney most consistent with a tiny cyst.  STOMACH: Unremarkable. SMALL BOWEL: No dilatation or wall thickening. COLON: No dilatation or wall thickening. APPENDIX: Unremarkable. PERITONEUM: There is a mesenteric mass which contains calcifications and  allowing for differences in technique has not changed significantly. RETROPERITONEUM: Retroperitoneal adenopathy is identified. There is a group of  periaortic nodes on the left measuring 2.8 x 2.3 cm which is stable. REPRODUCTIVE ORGANS:  URINARY BLADDER: History of bladder wall is slightly thickened. The prostate  gland is mildly enlarged. BONES: Multiple sclerotic lesions in visualized bony structures are stable. ADDITIONAL COMMENTS: N/A         Impression IMPRESSION:    1. No significant change in the mesenteric mass which contains calcifications  and in the retroperitoneal adenopathy. There is no significant change in the  bony sclerotic lesions. Assessment:     1. 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 Blood counts are acceptable. Results reviewed with the patient  Repeat CT and NM bone scans (1/23/17) show stable disease    Symptom management form reviewed with patient. Some flushing - infrequent      2.  Bone metastasis    > Denosumab every 3 months  > Bone pain is minimal        Plan:       > Continue Somatuline Depot  > Denosumab  > CT abdomen, pelvis in 3 months  > Repeat hormone levels at the [address] [place] [address] visit.   > Follow-up in 3 months        Signed by: Shelia Ley MD                     May 1, 2017        CC. Mary Anne Resendiz MD  CC.  Tavares Delcid MD

## 2017-05-02 DIAGNOSIS — C7A.8 NEUROENDOCRINE CARCINOMA METASTATIC TO BONE (HCC): Primary | ICD-10-CM

## 2017-05-02 DIAGNOSIS — C7B.8 NEUROENDOCRINE CARCINOMA METASTATIC TO BONE (HCC): Primary | ICD-10-CM

## 2017-05-03 ENCOUNTER — HOSPITAL ENCOUNTER (OUTPATIENT)
Dept: INFUSION THERAPY | Age: 72
Discharge: HOME | End: 2017-05-03
Payer: MEDICARE

## 2017-05-03 VITALS
DIASTOLIC BLOOD PRESSURE: 82 MMHG | TEMPERATURE: 97.8 F | SYSTOLIC BLOOD PRESSURE: 132 MMHG | OXYGEN SATURATION: 100 % | HEART RATE: 66 BPM | HEIGHT: 72 IN | RESPIRATION RATE: 18 BRPM | BODY MASS INDEX: 25.95 KG/M2 | WEIGHT: 191.56 LBS

## 2017-05-03 LAB
ALBUMIN SERPL BCP-MCNC: 3.5 G/DL (ref 3.5–5)
ALBUMIN/GLOB SERPL: 1 {RATIO} (ref 1.1–2.2)
ALP SERPL-CCNC: 57 U/L (ref 45–117)
ALT SERPL-CCNC: 22 U/L (ref 12–78)
ANION GAP BLD CALC-SCNC: 8 MMOL/L (ref 5–15)
AST SERPL W P-5'-P-CCNC: 15 U/L (ref 15–37)
BASOPHILS # BLD AUTO: 0 K/UL (ref 0–0.1)
BASOPHILS # BLD: 1 % (ref 0–1)
BILIRUB SERPL-MCNC: 1.1 MG/DL (ref 0.2–1)
BUN SERPL-MCNC: 17 MG/DL (ref 6–20)
BUN/CREAT SERPL: 15 (ref 12–20)
CALCIUM SERPL-MCNC: 8.8 MG/DL (ref 8.5–10.1)
CHLORIDE SERPL-SCNC: 110 MMOL/L (ref 97–108)
CO2 SERPL-SCNC: 27 MMOL/L (ref 21–32)
CREAT SERPL-MCNC: 1.12 MG/DL (ref 0.7–1.3)
EOSINOPHIL # BLD: 0.3 K/UL (ref 0–0.4)
EOSINOPHIL NFR BLD: 5 % (ref 0–7)
ERYTHROCYTE [DISTWIDTH] IN BLOOD BY AUTOMATED COUNT: 13.1 % (ref 11.5–14.5)
GLOBULIN SER CALC-MCNC: 3.5 G/DL (ref 2–4)
GLUCOSE SERPL-MCNC: 104 MG/DL (ref 65–100)
HCT VFR BLD AUTO: 41.4 % (ref 36.6–50.3)
HGB BLD-MCNC: 14.1 G/DL (ref 12.1–17)
LYMPHOCYTES # BLD AUTO: 17 % (ref 12–49)
LYMPHOCYTES # BLD: 1.1 K/UL (ref 0.8–3.5)
MCH RBC QN AUTO: 29.2 PG (ref 26–34)
MCHC RBC AUTO-ENTMCNC: 34.1 G/DL (ref 30–36.5)
MCV RBC AUTO: 85.7 FL (ref 80–99)
MONOCYTES # BLD: 0.6 K/UL (ref 0–1)
MONOCYTES NFR BLD AUTO: 9 % (ref 5–13)
NEUTS SEG # BLD: 4.3 K/UL (ref 1.8–8)
NEUTS SEG NFR BLD AUTO: 68 % (ref 32–75)
PLATELET # BLD AUTO: 145 K/UL (ref 150–400)
POTASSIUM SERPL-SCNC: 3.9 MMOL/L (ref 3.5–5.1)
PROT SERPL-MCNC: 7 G/DL (ref 6.4–8.2)
RBC # BLD AUTO: 4.83 M/UL (ref 4.1–5.7)
SODIUM SERPL-SCNC: 145 MMOL/L (ref 136–145)
WBC # BLD AUTO: 6.2 K/UL (ref 4.1–11.1)

## 2017-05-03 PROCEDURE — 85025 COMPLETE CBC W/AUTO DIFF WBC: CPT | Performed by: INTERNAL MEDICINE

## 2017-05-03 PROCEDURE — 80053 COMPREHEN METABOLIC PANEL: CPT | Performed by: INTERNAL MEDICINE

## 2017-05-03 PROCEDURE — 96402 CHEMO HORMON ANTINEOPL SQ/IM: CPT

## 2017-05-03 PROCEDURE — 74011250636 HC RX REV CODE- 250/636: Performed by: INTERNAL MEDICINE

## 2017-05-03 PROCEDURE — 36415 COLL VENOUS BLD VENIPUNCTURE: CPT | Performed by: INTERNAL MEDICINE

## 2017-05-03 RX ADMIN — LANREOTIDE ACETATE 120 MG: 120 INJECTION SUBCUTANEOUS at 09:28

## 2017-05-03 NOTE — PROGRESS NOTES
0900 Pt arrived at [facility] ambulatory and in no distress for Lanreotide Injection. Assessment unremarkable, no new complaints voiced. Pt given Lab Nabeel slip and instructions for 24 hour urine test.    Patient Vitals for the past 12 hrs:   Temp Pulse Resp BP SpO2   05/03/17 0926 - 66 - 132/82 -   05/03/17 0905 97.8 °F (36.6 °C) 66 18 (!) 150/101 100 %           Medications received:  Lanreotide 120 mg deep Sub-Q injection in Right Gluteus Prudencio    Labs:   Recent Results (from the past 12 hour(s))   CBC WITH AUTOMATED DIFF    Collection Time: 05/03/17  9:20 AM   Result Value Ref Range    WBC 6.2 4.1 - 11.1 K/uL    RBC 4.83 4.10 - 5.70 M/uL    HGB 14.1 12.1 - 17.0 g/dL    HCT 41.4 36.6 - 50.3 %    MCV 85.7 80.0 - 99.0 FL    MCH 29.2 26.0 - 34.0 PG    MCHC 34.1 30.0 - 36.5 g/dL    RDW 13.1 11.5 - 14.5 %    PLATELET 062 (L) 953 - 400 K/uL    NEUTROPHILS 68 32 - 75 %    LYMPHOCYTES 17 12 - 49 %    MONOCYTES 9 5 - 13 %    EOSINOPHILS 5 0 - 7 %    BASOPHILS 1 0 - 1 %    ABS. NEUTROPHILS 4.3 1.8 - 8.0 K/UL    ABS. LYMPHOCYTES 1.1 0.8 - 3.5 K/UL    ABS. MONOCYTES 0.6 0.0 - 1.0 K/UL    ABS. EOSINOPHILS 0.3 0.0 - 0.4 K/UL    ABS. BASOPHILS 0.0 0.0 - 0.1 K/UL   METABOLIC PANEL, COMPREHENSIVE    Collection Time: 05/03/17  9:20 AM   Result Value Ref Range    Sodium 145 136 - 145 mmol/L    Potassium 3.9 3.5 - 5.1 mmol/L    Chloride 110 (H) 97 - 108 mmol/L    CO2 27 21 - 32 mmol/L    Anion gap 8 5 - 15 mmol/L    Glucose 104 (H) 65 - 100 mg/dL    BUN 17 6 - 20 MG/DL    Creatinine 1.12 0.70 - 1.30 MG/DL    BUN/Creatinine ratio 15 12 - 20      GFR est AA >60 >60 ml/min/1.73m2    GFR est non-AA >60 >60 ml/min/1.73m2    Calcium 8.8 8.5 - 10.1 MG/DL    Bilirubin, total 1.1 (H) 0.2 - 1.0 MG/DL    ALT (SGPT) 22 12 - 78 U/L    AST (SGOT) 15 15 - 37 U/L    Alk.  phosphatase 57 45 - 117 U/L    Protein, total 7.0 6.4 - 8.2 g/dL    Albumin 3.5 3.5 - 5.0 g/dL    Globulin 3.5 2.0 - 4.0 g/dL    A-G Ratio 1.0 (L) 1.1 - 2.2          0935 Tolerated treatment well, no adverse reaction noted. D/Cd from [facility] ambulatory and in no distress accompanied by Spouse.   Next appt 5/31/17

## 2017-05-25 RX ORDER — LANREOTIDE ACETATE 120 MG/.5ML
120 INJECTION SUBCUTANEOUS ONCE
Status: COMPLETED | OUTPATIENT
Start: 2017-05-31 | End: 2017-05-31

## 2017-05-31 ENCOUNTER — HOSPITAL ENCOUNTER (OUTPATIENT)
Dept: INFUSION THERAPY | Age: 72
Discharge: HOME | End: 2017-05-31
Payer: MEDICARE

## 2017-05-31 VITALS
OXYGEN SATURATION: 98 % | WEIGHT: 187.9 LBS | BODY MASS INDEX: 25.45 KG/M2 | SYSTOLIC BLOOD PRESSURE: 151 MMHG | HEART RATE: 55 BPM | DIASTOLIC BLOOD PRESSURE: 91 MMHG | RESPIRATION RATE: 18 BRPM | TEMPERATURE: 97.5 F | HEIGHT: 72 IN

## 2017-05-31 LAB
ALBUMIN SERPL BCP-MCNC: 3.5 G/DL (ref 3.5–5)
ALBUMIN/GLOB SERPL: 1.1 {RATIO} (ref 1.1–2.2)
ALP SERPL-CCNC: 51 U/L (ref 45–117)
ALT SERPL-CCNC: 20 U/L (ref 12–78)
ANION GAP BLD CALC-SCNC: 4 MMOL/L (ref 5–15)
AST SERPL W P-5'-P-CCNC: 21 U/L (ref 15–37)
BASOPHILS # BLD AUTO: 0 K/UL (ref 0–0.1)
BASOPHILS # BLD: 1 % (ref 0–1)
BILIRUB SERPL-MCNC: 1.7 MG/DL (ref 0.2–1)
BUN SERPL-MCNC: 20 MG/DL (ref 6–20)
BUN/CREAT SERPL: 17 (ref 12–20)
CALCIUM SERPL-MCNC: 8.7 MG/DL (ref 8.5–10.1)
CHLORIDE SERPL-SCNC: 114 MMOL/L (ref 97–108)
CO2 SERPL-SCNC: 29 MMOL/L (ref 21–32)
CREAT SERPL-MCNC: 1.17 MG/DL (ref 0.7–1.3)
EOSINOPHIL # BLD: 0.3 K/UL (ref 0–0.4)
EOSINOPHIL NFR BLD: 7 % (ref 0–7)
ERYTHROCYTE [DISTWIDTH] IN BLOOD BY AUTOMATED COUNT: 13.2 % (ref 11.5–14.5)
GLOBULIN SER CALC-MCNC: 3.3 G/DL (ref 2–4)
GLUCOSE SERPL-MCNC: 87 MG/DL (ref 65–100)
HCT VFR BLD AUTO: 40.2 % (ref 36.6–50.3)
HGB BLD-MCNC: 13.8 G/DL (ref 12.1–17)
LYMPHOCYTES # BLD AUTO: 22 % (ref 12–49)
LYMPHOCYTES # BLD: 0.9 K/UL (ref 0.8–3.5)
MCH RBC QN AUTO: 29.9 PG (ref 26–34)
MCHC RBC AUTO-ENTMCNC: 34.3 G/DL (ref 30–36.5)
MCV RBC AUTO: 87.2 FL (ref 80–99)
MONOCYTES # BLD: 0.4 K/UL (ref 0–1)
MONOCYTES NFR BLD AUTO: 10 % (ref 5–13)
NEUTS SEG # BLD: 2.5 K/UL (ref 1.8–8)
NEUTS SEG NFR BLD AUTO: 60 % (ref 32–75)
PLATELET # BLD AUTO: 158 K/UL (ref 150–400)
POTASSIUM SERPL-SCNC: 4 MMOL/L (ref 3.5–5.1)
PROT SERPL-MCNC: 6.8 G/DL (ref 6.4–8.2)
RBC # BLD AUTO: 4.61 M/UL (ref 4.1–5.7)
SODIUM SERPL-SCNC: 147 MMOL/L (ref 136–145)
WBC # BLD AUTO: 4 K/UL (ref 4.1–11.1)

## 2017-05-31 PROCEDURE — 36415 COLL VENOUS BLD VENIPUNCTURE: CPT | Performed by: INTERNAL MEDICINE

## 2017-05-31 PROCEDURE — 96402 CHEMO HORMON ANTINEOPL SQ/IM: CPT

## 2017-05-31 PROCEDURE — 85025 COMPLETE CBC W/AUTO DIFF WBC: CPT | Performed by: INTERNAL MEDICINE

## 2017-05-31 PROCEDURE — 74011250636 HC RX REV CODE- 250/636: Performed by: INTERNAL MEDICINE

## 2017-05-31 PROCEDURE — 80053 COMPREHEN METABOLIC PANEL: CPT | Performed by: INTERNAL MEDICINE

## 2017-05-31 RX ADMIN — LANREOTIDE ACETATE 120 MG: 120 INJECTION SUBCUTANEOUS at 09:18

## 2017-05-31 NOTE — PROGRESS NOTES
Pt arrived to [facility] ambulatory for Lantreotide in no acute distress at 0910.  Assessment unremarkable, no new concerns. Pt reports \"good days and bad days. \"  Labs obtained from right [facility] including CBC with diff and CMP. Visit Vitals    BP (!) 151/91 (BP 1 Location: Left arm, BP Patient Position: Sitting)    Pulse (!) 55    Temp 97.5 °F (36.4 °C)    Resp 18    Wt 85.2 kg (187 lb 14.4 oz)    SpO2 98%    BMI 25.48 kg/m2     Recent Results (from the past 12 hour(s))   CBC WITH AUTOMATED DIFF    Collection Time: 05/31/17  9:09 AM   Result Value Ref Range    WBC 4.0 (L) 4.1 - 11.1 K/uL    RBC 4.61 4.10 - 5.70 M/uL    HGB 13.8 12.1 - 17.0 g/dL    HCT 40.2 36.6 - 50.3 %    MCV 87.2 80.0 - 99.0 FL    MCH 29.9 26.0 - 34.0 PG    MCHC 34.3 30.0 - 36.5 g/dL    RDW 13.2 11.5 - 14.5 %    PLATELET 334 891 - 862 K/uL    NEUTROPHILS 60 32 - 75 %    LYMPHOCYTES 22 12 - 49 %    MONOCYTES 10 5 - 13 %    EOSINOPHILS 7 0 - 7 %    BASOPHILS 1 0 - 1 %    ABS. NEUTROPHILS 2.5 1.8 - 8.0 K/UL    ABS. LYMPHOCYTES 0.9 0.8 - 3.5 K/UL    ABS. MONOCYTES 0.4 0.0 - 1.0 K/UL    ABS. EOSINOPHILS 0.3 0.0 - 0.4 K/UL    ABS. BASOPHILS 0.0 0.0 - 0.1 K/UL   METABOLIC PANEL, COMPREHENSIVE    Collection Time: 05/31/17  9:09 AM   Result Value Ref Range    Sodium 147 (H) 136 - 145 mmol/L    Potassium 4.0 3.5 - 5.1 mmol/L    Chloride 114 (H) 97 - 108 mmol/L    CO2 29 21 - 32 mmol/L    Anion gap 4 (L) 5 - 15 mmol/L    Glucose 87 65 - 100 mg/dL    BUN 20 6 - 20 MG/DL    Creatinine 1.17 0.70 - 1.30 MG/DL    BUN/Creatinine ratio 17 12 - 20      GFR est AA >60 >60 ml/min/1.73m2    GFR est non-AA >60 >60 ml/min/1.73m2    Calcium 8.7 8.5 - 10.1 MG/DL    Bilirubin, total 1.7 (H) 0.2 - 1.0 MG/DL    ALT (SGPT) 20 12 - 78 U/L    AST (SGOT) 21 15 - 37 U/L    Alk.  phosphatase 51 45 - 117 U/L    Protein, total 6.8 6.4 - 8.2 g/dL    Albumin 3.5 3.5 - 5.0 g/dL    Globulin 3.3 2.0 - 4.0 g/dL    A-G Ratio 1.1 1.1 - 2.2         The following medications administered:  Lantreotide 120mg SQ to right gluteus solo    Pt tolerated treatment well.  No adverse reaction noted. Pt discharged ambulatory in no acute distress at 0925, accompanied by spouse. Next appointment 6/28/17 @ 0900.

## 2017-06-23 RX ORDER — LANREOTIDE ACETATE 120 MG/.5ML
120 INJECTION SUBCUTANEOUS ONCE
Status: COMPLETED | OUTPATIENT
Start: 2017-06-28 | End: 2017-06-28

## 2017-06-28 ENCOUNTER — HOSPITAL ENCOUNTER (OUTPATIENT)
Dept: INFUSION THERAPY | Age: 72
Discharge: HOME | End: 2017-06-28
Payer: MEDICARE

## 2017-06-28 VITALS
SYSTOLIC BLOOD PRESSURE: 157 MMHG | TEMPERATURE: 97.5 F | DIASTOLIC BLOOD PRESSURE: 98 MMHG | BODY MASS INDEX: 25.06 KG/M2 | HEIGHT: 72 IN | OXYGEN SATURATION: 100 % | RESPIRATION RATE: 18 BRPM | HEART RATE: 63 BPM | WEIGHT: 185 LBS

## 2017-06-28 LAB
ALBUMIN SERPL BCP-MCNC: 3.5 G/DL (ref 3.5–5)
ANION GAP BLD CALC-SCNC: 6 MMOL/L (ref 5–15)
BASO+EOS+MONOS # BLD AUTO: 0.5 K/UL (ref 0.2–1.2)
BASO+EOS+MONOS # BLD AUTO: 10 % (ref 3.2–16.9)
BUN SERPL-MCNC: 20 MG/DL (ref 6–20)
BUN/CREAT SERPL: 18 (ref 12–20)
CALCIUM SERPL-MCNC: 8.6 MG/DL (ref 8.5–10.1)
CHLORIDE SERPL-SCNC: 107 MMOL/L (ref 97–108)
CO2 SERPL-SCNC: 27 MMOL/L (ref 21–32)
CREAT SERPL-MCNC: 1.1 MG/DL (ref 0.7–1.3)
DIFFERENTIAL METHOD BLD: NORMAL
ERYTHROCYTE [DISTWIDTH] IN BLOOD BY AUTOMATED COUNT: 13.5 % (ref 11.8–15.8)
GLUCOSE SERPL-MCNC: 109 MG/DL (ref 65–100)
HCT VFR BLD AUTO: 41.3 % (ref 36.6–50.3)
HGB BLD-MCNC: 14.3 G/DL (ref 12.1–17)
LYMPHOCYTES # BLD AUTO: 20 % (ref 12–49)
LYMPHOCYTES # BLD: 1 K/UL (ref 0.8–3.5)
MAGNESIUM SERPL-MCNC: 2.3 MG/DL (ref 1.6–2.4)
MCH RBC QN AUTO: 29.8 PG (ref 26–34)
MCHC RBC AUTO-ENTMCNC: 34.6 G/DL (ref 30–36.5)
MCV RBC AUTO: 86 FL (ref 80–99)
NEUTS SEG # BLD: 3.6 K/UL (ref 1.8–8)
NEUTS SEG NFR BLD AUTO: 70 % (ref 32–75)
PHOSPHATE SERPL-MCNC: 2.3 MG/DL (ref 2.6–4.7)
PHOSPHATE SERPL-MCNC: 2.4 MG/DL (ref 2.6–4.7)
PLATELET # BLD AUTO: 166 K/UL (ref 150–400)
POTASSIUM SERPL-SCNC: 4 MMOL/L (ref 3.5–5.1)
RBC # BLD AUTO: 4.8 M/UL (ref 4.1–5.7)
SODIUM SERPL-SCNC: 140 MMOL/L (ref 136–145)
WBC # BLD AUTO: 5.1 K/UL (ref 4.1–11.1)

## 2017-06-28 PROCEDURE — 96402 CHEMO HORMON ANTINEOPL SQ/IM: CPT

## 2017-06-28 PROCEDURE — 96372 THER/PROPH/DIAG INJ SC/IM: CPT

## 2017-06-28 PROCEDURE — 96375 TX/PRO/DX INJ NEW DRUG ADDON: CPT

## 2017-06-28 PROCEDURE — 36415 COLL VENOUS BLD VENIPUNCTURE: CPT | Performed by: INTERNAL MEDICINE

## 2017-06-28 PROCEDURE — 83735 ASSAY OF MAGNESIUM: CPT | Performed by: INTERNAL MEDICINE

## 2017-06-28 PROCEDURE — 74011250636 HC RX REV CODE- 250/636: Performed by: INTERNAL MEDICINE

## 2017-06-28 PROCEDURE — 84100 ASSAY OF PHOSPHORUS: CPT | Performed by: INTERNAL MEDICINE

## 2017-06-28 PROCEDURE — 80069 RENAL FUNCTION PANEL: CPT | Performed by: INTERNAL MEDICINE

## 2017-06-28 PROCEDURE — 85025 COMPLETE CBC W/AUTO DIFF WBC: CPT | Performed by: INTERNAL MEDICINE

## 2017-06-28 RX ADMIN — LANREOTIDE ACETATE 120 MG: 120 INJECTION SUBCUTANEOUS at 09:51

## 2017-06-28 RX ADMIN — DENOSUMAB 120 MG: 120 INJECTION SUBCUTANEOUS at 10:13

## 2017-06-28 NOTE — PROGRESS NOTES
Problem: Patient Education: Go to Education Activity  Goal: Patient/Family Education  Outcome: Progressing Towards Goal  Patient is tolerating treatment well. Pt denies any issues or concerns.

## 2017-06-28 NOTE — PROGRESS NOTES
0845 Pt arrived at [facility] ambulatory and in no distress for Lanreotide/Xgeva. Assessment unremarkable, no new complaints voiced. Called Brian ROGERS NP and made her aware that pt's Phosphorus 2.3,verbal order given to tell pt to take OTC Phosphorus 1 tablet daily. Pt is aware     Visit Vitals    BP (!) 157/98 (BP 1 Location: Left arm, BP Patient Position: Sitting)    Pulse 63    Temp 97.5 °F (36.4 °C)    Resp 18    Ht 6' (1.829 m)    Wt 83.9 kg (185 lb)    SpO2 100%    BMI 25.09 kg/m2       Medications received:  Lanreotide 120 mg deep Sub- Q R GM  Xgeva 120 mg Sub-Q injection in L arm    Labs:   Recent Results (from the past 12 hour(s))   RENAL FUNCTION PANEL    Collection Time: 06/28/17  9:10 AM   Result Value Ref Range    Sodium 140 136 - 145 mmol/L    Potassium 4.0 3.5 - 5.1 mmol/L    Chloride 107 97 - 108 mmol/L    CO2 27 21 - 32 mmol/L    Anion gap 6 5 - 15 mmol/L    Glucose 109 (H) 65 - 100 mg/dL    BUN 20 6 - 20 MG/DL    Creatinine 1.10 0.70 - 1.30 MG/DL    BUN/Creatinine ratio 18 12 - 20      GFR est AA >60 >60 ml/min/1.73m2    GFR est non-AA >60 >60 ml/min/1.73m2    Calcium 8.6 8.5 - 10.1 MG/DL    Phosphorus 2.3 (L) 2.6 - 4.7 MG/DL    Albumin 3.5 3.5 - 5.0 g/dL   MAGNESIUM    Collection Time: 06/28/17  9:10 AM   Result Value Ref Range    Magnesium 2.3 1.6 - 2.4 mg/dL   PHOSPHORUS    Collection Time: 06/28/17  9:10 AM   Result Value Ref Range    Phosphorus 2.4 (L) 2.6 - 4.7 MG/DL   CBC WITH 3 PART DIFF    Collection Time: 06/28/17  9:10 AM   Result Value Ref Range    WBC 5.1 4.1 - 11.1 K/uL    RBC 4.80 4. 10 - 5.70 M/uL    HGB 14.3 12.1 - 17.0 g/dL    HCT 41.3 36.6 - 50.3 %    MCV 86.0 80.0 - 99.0 FL    MCH 29.8 26.0 - 34.0 PG    MCHC 34.6 30.0 - 36.5 g/dL    RDW 13.5 11.8 - 15.8 %    PLATELET 768 739 - 253 K/uL    NEUTROPHILS 70 32 - 75 %    MIXED CELLS 10 3.2 - 16.9 %    LYMPHOCYTES 20 12 - 49 %    ABS. NEUTROPHILS 3.6 1.8 - 8.0 K/UL    ABS. MIXED CELLS 0.5 0.2 - 1.2 K/uL    ABS.  LYMPHOCYTES 1.0 0.8 - 3.5 K/UL    DF AUTOMATED       1017 Tolerated treatment well, no adverse reaction noted. D/Cd from [facility] ambulatory and in no distress accompanied by Spouse.   Next appt 7/27/17

## 2017-07-24 RX ORDER — LANREOTIDE ACETATE 120 MG/.5ML
120 INJECTION SUBCUTANEOUS ONCE
Status: COMPLETED | OUTPATIENT
Start: 2017-07-27 | End: 2017-07-27

## 2017-07-26 ENCOUNTER — HOSPITAL ENCOUNTER (OUTPATIENT)
Dept: INFUSION THERAPY | Age: 72
End: 2017-07-26
Payer: MEDICARE

## 2017-07-27 ENCOUNTER — HOSPITAL ENCOUNTER (OUTPATIENT)
Dept: INFUSION THERAPY | Age: 72
Discharge: HOME | End: 2017-07-27
Payer: MEDICARE

## 2017-07-27 VITALS
WEIGHT: 183.31 LBS | DIASTOLIC BLOOD PRESSURE: 90 MMHG | RESPIRATION RATE: 18 BRPM | HEIGHT: 72 IN | TEMPERATURE: 97.6 F | HEART RATE: 71 BPM | BODY MASS INDEX: 24.83 KG/M2 | SYSTOLIC BLOOD PRESSURE: 143 MMHG | OXYGEN SATURATION: 99 %

## 2017-07-27 LAB
ALBUMIN SERPL BCP-MCNC: 3.6 G/DL (ref 3.5–5)
ANION GAP BLD CALC-SCNC: 5 MMOL/L (ref 5–15)
BASOPHILS # BLD AUTO: 0 K/UL (ref 0–0.1)
BASOPHILS # BLD: 1 % (ref 0–1)
BUN SERPL-MCNC: 22 MG/DL (ref 6–20)
BUN/CREAT SERPL: 19 (ref 12–20)
CALCIUM SERPL-MCNC: 8.7 MG/DL (ref 8.5–10.1)
CHLORIDE SERPL-SCNC: 108 MMOL/L (ref 97–108)
CO2 SERPL-SCNC: 28 MMOL/L (ref 21–32)
CREAT SERPL-MCNC: 1.17 MG/DL (ref 0.7–1.3)
EOSINOPHIL # BLD: 0.3 K/UL (ref 0–0.4)
EOSINOPHIL NFR BLD: 5 % (ref 0–7)
ERYTHROCYTE [DISTWIDTH] IN BLOOD BY AUTOMATED COUNT: 13.4 % (ref 11.5–14.5)
GLUCOSE SERPL-MCNC: 81 MG/DL (ref 65–100)
HCT VFR BLD AUTO: 40.6 % (ref 36.6–50.3)
HGB BLD-MCNC: 14 G/DL (ref 12.1–17)
LYMPHOCYTES # BLD AUTO: 16 % (ref 12–49)
LYMPHOCYTES # BLD: 0.9 K/UL (ref 0.8–3.5)
MAGNESIUM SERPL-MCNC: 2.3 MG/DL (ref 1.6–2.4)
MCH RBC QN AUTO: 30.1 PG (ref 26–34)
MCHC RBC AUTO-ENTMCNC: 34.5 G/DL (ref 30–36.5)
MCV RBC AUTO: 87.3 FL (ref 80–99)
MONOCYTES # BLD: 0.5 K/UL (ref 0–1)
MONOCYTES NFR BLD AUTO: 8 % (ref 5–13)
NEUTS SEG # BLD: 4.3 K/UL (ref 1.8–8)
NEUTS SEG NFR BLD AUTO: 70 % (ref 32–75)
PHOSPHATE SERPL-MCNC: 2.5 MG/DL (ref 2.6–4.7)
PLATELET # BLD AUTO: 173 K/UL (ref 150–400)
POTASSIUM SERPL-SCNC: 3.6 MMOL/L (ref 3.5–5.1)
RBC # BLD AUTO: 4.65 M/UL (ref 4.1–5.7)
SODIUM SERPL-SCNC: 141 MMOL/L (ref 136–145)
WBC # BLD AUTO: 6 K/UL (ref 4.1–11.1)

## 2017-07-27 PROCEDURE — 96402 CHEMO HORMON ANTINEOPL SQ/IM: CPT

## 2017-07-27 PROCEDURE — 83735 ASSAY OF MAGNESIUM: CPT | Performed by: INTERNAL MEDICINE

## 2017-07-27 PROCEDURE — 80069 RENAL FUNCTION PANEL: CPT | Performed by: INTERNAL MEDICINE

## 2017-07-27 PROCEDURE — 74011250636 HC RX REV CODE- 250/636: Performed by: INTERNAL MEDICINE

## 2017-07-27 PROCEDURE — 85025 COMPLETE CBC W/AUTO DIFF WBC: CPT | Performed by: INTERNAL MEDICINE

## 2017-07-27 PROCEDURE — 36415 COLL VENOUS BLD VENIPUNCTURE: CPT | Performed by: INTERNAL MEDICINE

## 2017-07-27 RX ADMIN — LANREOTIDE ACETATE 120 MG: 120 INJECTION SUBCUTANEOUS at 09:34

## 2017-07-27 NOTE — PROGRESS NOTES
Pt arrived to [facility] ambulatory in no acute distress at 0908 for Lantreotide .  Assessment unremarkable except pt has a tick bite to the R buttocks. Site of tick bite is red and slightly raised, no bullseye present. Pt denies any pain, pt will monitor site and call physician if he spikes a fever or bite appearance changes. Pt stated he believes he had the tick on him for 2 days. Labs drawn peripherally. Per pt he is has not begun taking OTC Phosphorus supplements and instructed a prior visit, his pharmacist came him a list of foods to eat that are high in phosphorus. Phosphorus level today is 2.5 Casey [place]ond[place] NP aware of level and order given to instruct the patient to began taking OTC phosphorus as previously instructed. Pt is aware and will begin taking OTC Phosphorus.        Visit Vitals    /90 (BP 1 Location: Left arm, BP Patient Position: At rest;Sitting)    Pulse 71    Temp 97.6 °F (36.4 °C)    Resp 18    Ht 6' (1.829 m)    Wt 83.2 kg (183 lb 5 oz)    SpO2 99%    BMI 24.86 kg/m2        Labs obtained,   Recent Results (from the past 12 hour(s))   RENAL FUNCTION PANEL    Collection Time: 07/27/17  9:18 AM   Result Value Ref Range    Sodium 141 136 - 145 mmol/L    Potassium 3.6 3.5 - 5.1 mmol/L    Chloride 108 97 - 108 mmol/L    CO2 28 21 - 32 mmol/L    Anion gap 5 5 - 15 mmol/L    Glucose 81 65 - 100 mg/dL    BUN 22 (H) 6 - 20 MG/DL    Creatinine 1.17 0.70 - 1.30 MG/DL    BUN/Creatinine ratio 19 12 - 20      GFR est AA >60 >60 ml/min/1.73m2    GFR est non-AA >60 >60 ml/min/1.73m2    Calcium 8.7 8.5 - 10.1 MG/DL    Phosphorus 2.5 (L) 2.6 - 4.7 MG/DL    Albumin 3.6 3.5 - 5.0 g/dL   CBC WITH AUTOMATED DIFF    Collection Time: 07/27/17  9:18 AM   Result Value Ref Range    WBC 6.0 4.1 - 11.1 K/uL    RBC 4.65 4.10 - 5.70 M/uL    HGB 14.0 12.1 - 17.0 g/dL    HCT 40.6 36.6 - 50.3 %    MCV 87.3 80.0 - 99.0 FL    MCH 30.1 26.0 - 34.0 PG    MCHC 34.5 30.0 - 36.5 g/dL    RDW 13.4 11.5 - 14.5 %    PLATELET 173 150 - 400 K/uL    NEUTROPHILS 70 32 - 75 %    LYMPHOCYTES 16 12 - 49 %    MONOCYTES 8 5 - 13 %    EOSINOPHILS 5 0 - 7 %    BASOPHILS 1 0 - 1 %    ABS. NEUTROPHILS 4.3 1.8 - 8.0 K/UL    ABS. LYMPHOCYTES 0.9 0.8 - 3.5 K/UL    ABS. MONOCYTES 0.5 0.0 - 1.0 K/UL    ABS. EOSINOPHILS 0.3 0.0 - 0.4 K/UL    ABS. BASOPHILS 0.0 0.0 - 0.1 K/UL   MAGNESIUM    Collection Time: 07/27/17  9:18 AM   Result Value Ref Range    Magnesium 2.3 1.6 - 2.4 mg/dL       The following medications administered:  Lantreotide 120 mg Deep Sub-Q injection in R Gluteal Prudencio    Pt tolerated treatment well. No adverse reactions noted. Pt discharged ambulatory in no acute distress at 0940, accompanied by Spouse.   Next appointment 8/24/17

## 2017-08-18 ENCOUNTER — HOSPITAL ENCOUNTER (OUTPATIENT)
Dept: CT IMAGING | Age: 72
Discharge: HOME | End: 2017-08-18
Attending: INTERNAL MEDICINE
Payer: MEDICARE

## 2017-08-18 DIAGNOSIS — C7B.8 NEUROENDOCRINE CARCINOMA METASTATIC TO BONE (HCC): ICD-10-CM

## 2017-08-18 DIAGNOSIS — C7A.8 NEUROENDOCRINE CARCINOMA METASTATIC TO BONE (HCC): ICD-10-CM

## 2017-08-18 PROCEDURE — 74011636320 HC RX REV CODE- 636/320: Performed by: INTERNAL MEDICINE

## 2017-08-18 PROCEDURE — 74177 CT ABD & PELVIS W/CONTRAST: CPT

## 2017-08-18 PROCEDURE — 74011250636 HC RX REV CODE- 250/636: Performed by: INTERNAL MEDICINE

## 2017-08-18 RX ORDER — BARIUM SULFATE 20 MG/ML
900 SUSPENSION ORAL
Status: DISCONTINUED | OUTPATIENT
Start: 2017-08-18 | End: 2017-08-18

## 2017-08-18 RX ORDER — SODIUM CHLORIDE 0.9 % (FLUSH) 0.9 %
10 SYRINGE (ML) INJECTION
Status: COMPLETED | OUTPATIENT
Start: 2017-08-18 | End: 2017-08-18

## 2017-08-18 RX ORDER — SODIUM CHLORIDE 9 MG/ML
50 INJECTION, SOLUTION INTRAVENOUS
Status: COMPLETED | OUTPATIENT
Start: 2017-08-18 | End: 2017-08-18

## 2017-08-18 RX ADMIN — IOPAMIDOL 100 ML: 755 INJECTION, SOLUTION INTRAVENOUS at 10:50

## 2017-08-18 RX ADMIN — SODIUM CHLORIDE 50 ML/HR: 900 INJECTION, SOLUTION INTRAVENOUS at 10:50

## 2017-08-18 RX ADMIN — Medication 10 ML: at 10:50

## 2017-08-21 RX ORDER — LANREOTIDE ACETATE 120 MG/.5ML
120 INJECTION SUBCUTANEOUS ONCE
Status: COMPLETED | OUTPATIENT
Start: 2017-08-24 | End: 2017-08-24

## 2017-08-24 ENCOUNTER — HOSPITAL ENCOUNTER (OUTPATIENT)
Dept: INFUSION THERAPY | Age: 72
Discharge: HOME | End: 2017-08-24
Payer: MEDICARE

## 2017-08-24 ENCOUNTER — OFFICE VISIT (OUTPATIENT)
Dept: ONCOLOGY | Age: 72
End: 2017-08-24

## 2017-08-24 VITALS
RESPIRATION RATE: 16 BRPM | HEART RATE: 61 BPM | DIASTOLIC BLOOD PRESSURE: 95 MMHG | SYSTOLIC BLOOD PRESSURE: 163 MMHG | HEIGHT: 72 IN | OXYGEN SATURATION: 97 % | TEMPERATURE: 99 F | BODY MASS INDEX: 25.49 KG/M2 | WEIGHT: 188.2 LBS

## 2017-08-24 VITALS
DIASTOLIC BLOOD PRESSURE: 92 MMHG | RESPIRATION RATE: 18 BRPM | SYSTOLIC BLOOD PRESSURE: 160 MMHG | WEIGHT: 185.3 LBS | HEIGHT: 72 IN | HEART RATE: 66 BPM | TEMPERATURE: 97.6 F | BODY MASS INDEX: 25.1 KG/M2 | OXYGEN SATURATION: 99 %

## 2017-08-24 DIAGNOSIS — C7A.8 NEUROENDOCRINE CARCINOMA METASTATIC TO BONE (HCC): Primary | ICD-10-CM

## 2017-08-24 DIAGNOSIS — C7B.8 NEUROENDOCRINE CARCINOMA METASTATIC TO BONE (HCC): Primary | ICD-10-CM

## 2017-08-24 DIAGNOSIS — C79.51 BONE METASTASIS (HCC): ICD-10-CM

## 2017-08-24 LAB
ANION GAP SERPL CALC-SCNC: 4 MMOL/L (ref 5–15)
BASOPHILS # BLD: 0 K/UL (ref 0–0.1)
BASOPHILS NFR BLD: 0 % (ref 0–1)
BUN SERPL-MCNC: 23 MG/DL (ref 6–20)
BUN/CREAT SERPL: 19 (ref 12–20)
CALCIUM SERPL-MCNC: 8.9 MG/DL (ref 8.5–10.1)
CHLORIDE SERPL-SCNC: 107 MMOL/L (ref 97–108)
CO2 SERPL-SCNC: 29 MMOL/L (ref 21–32)
CREAT SERPL-MCNC: 1.18 MG/DL (ref 0.7–1.3)
EOSINOPHIL # BLD: 0.2 K/UL (ref 0–0.4)
EOSINOPHIL NFR BLD: 5 % (ref 0–7)
ERYTHROCYTE [DISTWIDTH] IN BLOOD BY AUTOMATED COUNT: 13.5 % (ref 11.5–14.5)
GLUCOSE SERPL-MCNC: 109 MG/DL (ref 65–100)
HCT VFR BLD AUTO: 40.8 % (ref 36.6–50.3)
HGB BLD-MCNC: 13.6 G/DL (ref 12.1–17)
LYMPHOCYTES # BLD: 1.1 K/UL (ref 0.8–3.5)
LYMPHOCYTES NFR BLD: 21 % (ref 12–49)
MCH RBC QN AUTO: 29.1 PG (ref 26–34)
MCHC RBC AUTO-ENTMCNC: 33.3 G/DL (ref 30–36.5)
MCV RBC AUTO: 87.4 FL (ref 80–99)
MONOCYTES # BLD: 0.3 K/UL (ref 0–1)
MONOCYTES NFR BLD: 6 % (ref 5–13)
NEUTS SEG # BLD: 3.5 K/UL (ref 1.8–8)
NEUTS SEG NFR BLD: 68 % (ref 32–75)
PLATELET # BLD AUTO: 166 K/UL (ref 150–400)
POTASSIUM SERPL-SCNC: 3.9 MMOL/L (ref 3.5–5.1)
RBC # BLD AUTO: 4.67 M/UL (ref 4.1–5.7)
SODIUM SERPL-SCNC: 140 MMOL/L (ref 136–145)
WBC # BLD AUTO: 5.1 K/UL (ref 4.1–11.1)

## 2017-08-24 PROCEDURE — 36415 COLL VENOUS BLD VENIPUNCTURE: CPT | Performed by: INTERNAL MEDICINE

## 2017-08-24 PROCEDURE — 74011250636 HC RX REV CODE- 250/636: Performed by: INTERNAL MEDICINE

## 2017-08-24 PROCEDURE — 85025 COMPLETE CBC W/AUTO DIFF WBC: CPT | Performed by: INTERNAL MEDICINE

## 2017-08-24 PROCEDURE — 96402 CHEMO HORMON ANTINEOPL SQ/IM: CPT

## 2017-08-24 PROCEDURE — 80048 BASIC METABOLIC PNL TOTAL CA: CPT | Performed by: INTERNAL MEDICINE

## 2017-08-24 PROCEDURE — 96372 THER/PROPH/DIAG INJ SC/IM: CPT

## 2017-08-24 RX ADMIN — LANREOTIDE ACETATE 120 MG: 120 INJECTION SUBCUTANEOUS at 09:19

## 2017-08-24 NOTE — PROGRESS NOTES
Oncology Follow up Note        Patient: Troy Dose MRN: [id number]  SSN: [id number]    YOB: 1945  Age: 80 y.o. Sex: male        Diagnosis:     1. 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 Somatuline Depot 120 mg SC  2. Xgeva 120mg SC     Subjective:      Troy Dose is a 80 y.o. male with a diagnosis of metastatic neuroendocrine cancer. He started experiencing pain in both groins and the abdomen. A CT of the abdomen showed retroperitoneal adenopathy. CT guided biopsy of the retroperitoneal LN reveals a dx of well differentiated neuroendocrine carcinoma. Bone scan shows disease in the bone. Mr. Bijal Hernandez is receiving Somatuline and scans show stable disease. He is here today with his wife and says he has lost a few pounds this summer. He says he is intermittently fatigued, but continues to work in his back yard.     Review of Systems:    Constitutional: fatigue   Eyes: negative  Ears, Nose, Mouth, Throat, and Face: negative  Respiratory: negative  Cardiovascular: negative  Gastrointestinal: negative  Genitourinary:negative  Integument/Breast: negative  Hematologic/Lymphatic: negative  Musculoskeletal: negative  Neurological: negative      Past Medical History:   Diagnosis Date    Adverse effect of anesthesia     low HR and very slow to wakeup    Cancer ([facility] [address] 5/2016    neuroendocrine, retroperitoneal LN involvement    Diarrhea     Frequent urination     Hypertension     Poor appetite     Unspecified adverse effect of anesthesia     \"hard to put to sleep and slow to wake up-heart rate dropped very low\"     Past Surgical History:   Procedure Laterality Date    COLONOSCOPY N/A 5/13/2016    COLONOSCOPY performed by Marisabel Mojica MD at [address] [place] [address]  5/13/2016         HX ORTHOPAEDIC      knee scope left knee    HX OTHER SURGICAL  09-    excision of scalp cyst     UPPER GI ENDOSCOPY,BIOPSY 5/13/2016           Family History   Problem Relation Age of Onset    Cancer Mother     Stroke Sister      Social History   Substance Use Topics    Smoking status: Former Smoker     Packs/day: 1.00     Years: 8.00     Quit date: 5/12/1972    Smokeless tobacco: Never Used    Alcohol use No      Prior to Admission medications    Medication Sig Start Date End Date Taking? Authorizing Provider   lisinopril (PRINIVIL, ZESTRIL) 10 mg tablet Take  by mouth daily. Yes Historical Provider          No Known Allergies        Objective:     Vitals:    08/24/17 1304   BP: (!) 163/95   Pulse: 61   Resp: 16   Temp: 99 °F (37.2 °C)   TempSrc: Oral   SpO2: 97%   Weight: 188 lb 3.2 oz (85.4 kg)   Height: 6' (1.829 m)            Physical Exam:    GENERAL: alert, cooperative  EYE: negative  LYMPHATIC: Cervical, supraclavicular, and axillary nodes normal.   THROAT & NECK: normal and no erythema or exudates noted. LUNG: clear to auscultation bilaterally  HEART: regular rate and rhythm  ABDOMEN: soft, non-tender  EXTREMITIES: no cyanosis or edema  SKIN: Normal.  NEUROLOGIC: negative        IMAGING:     I personally reviewed the images. Enlarged and abnormal retroperitoneal adenopathy. Bone metastasis. CT Results (most recent):    Results from Hospital Encounter encounter on 08/18/17   CT ABD PELV W CONT   Narrative INDICATION: restaging disease    COMPARISON: January 23, 2017    TECHNIQUE:   Following the uneventful intravenous administration of 100 cc Isovue-370, thin  axial images were obtained through the abdomen and pelvis. Coronal and sagittal  reconstructions were generated. Oral contrast was not administered. CT dose  reduction was achieved through use of a standardized protocol tailored for this  examination and automatic exposure control for dose modulation. FINDINGS:   LUNG BASES: No abnormality. LIVER: No mass or biliary dilatation. GALLBLADDER: Unremarkable. SPLEEN: No enlargement or lesion.   PANCREAS: No mass or ductal dilatation. ADRENALS: No mass. KIDNEYS: No mass, calculus, or hydronephrosis. GI TRACT:  There is a nonobstructing intussusception involving small bowel loops  in the left midabdomen. No bowel obstruction. Oral contrast was not  administered. PERITONEUM: No free air or free fluid. APPENDIX: Unremarkable. RETROPERITONEUM: Left periaortic lymph nodes have not changed significantly,  with left periaortic lymph node at the level left kidney measuring 2.1 x 1.8 cm,  conglomerate lymph node at the mid left kidney level measuring 2.6 x 2.3 cm, and  distal left para-aortic lymph node measuring 1.9 x 1.6 cm. The partly calcified  mesenteric mass in the central abdomen is without definite change allowing for  slight different scan plane, by my measurement up to 4.3 x 1.6 cm (previously up  to 4.2 x 1.5 cm). ADDITIONAL COMMENTS: N/A.    URINARY BLADDER: Unremarkable. REPRODUCTIVE ORGANS: Unremarkable. LYMPH NODES:  None enlarged. FREE FLUID:  None. BONES: Multiple sclerotic osseous lesions in the spine and pelvis without  significant interval change. No definite new lesion. ADDITIONAL COMMENTS: N/A. Impression IMPRESSION:    1. No significant interval change in partly calcified mesenteric mass and  retroperitoneal adenopathy. 2. Multiple sclerotic bone lesions without significant change. 3. Nonobstructing small bowel intussusception left midabdomen with no evidence  of mass or inflammation likely of no clinical significance. Assessment:     1. 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 Blood counts are acceptable.  Results reviewed with the patient  Repeat NM bone scans (1/23/17) show stable disease  Repeat CT scan shows (8/2017) shows stable disease    Symptom management form reviewed with patient. 2. Bone metastasis    > Denosumab every 3 months  > No Bone pain        Plan:       > Continue Somatuline Depot  > Denosumab  > Repeat hormone levels at the [facility] visit.   > Follow-up in 3 months      I saw the patient in conjunction with NILDA Feliciano. Signed by: Yesy Whitley MD                     August 25, 2017        CC. Pelon Potts MD  CC.  Shea Ragsdale MD

## 2017-08-24 NOTE — PROGRESS NOTES
Taniya Ballesteros is a 80 y.o. male here today for Neuroendocrine carcinoma metastatic to bone f/u. Patient denies pain. Increase temp; pt drinking coffee.

## 2017-09-26 RX ORDER — LANREOTIDE ACETATE 120 MG/.5ML
120 INJECTION SUBCUTANEOUS ONCE
Status: COMPLETED | OUTPATIENT
Start: 2017-09-28 | End: 2017-09-28

## 2017-09-28 ENCOUNTER — HOSPITAL ENCOUNTER (OUTPATIENT)
Dept: INFUSION THERAPY | Age: 72
Discharge: HOME | End: 2017-09-28
Payer: MEDICARE

## 2017-09-28 VITALS
HEART RATE: 66 BPM | TEMPERATURE: 97 F | DIASTOLIC BLOOD PRESSURE: 84 MMHG | SYSTOLIC BLOOD PRESSURE: 128 MMHG | RESPIRATION RATE: 18 BRPM

## 2017-09-28 LAB
ALBUMIN SERPL-MCNC: 3.9 G/DL (ref 3.5–5)
ANION GAP SERPL CALC-SCNC: 6 MMOL/L (ref 5–15)
BASOPHILS # BLD: 0.1 K/UL (ref 0–0.1)
BASOPHILS NFR BLD: 1 % (ref 0–1)
BUN SERPL-MCNC: 29 MG/DL (ref 6–20)
BUN/CREAT SERPL: 22 (ref 12–20)
CALCIUM SERPL-MCNC: 8.8 MG/DL (ref 8.5–10.1)
CHLORIDE SERPL-SCNC: 109 MMOL/L (ref 97–108)
CO2 SERPL-SCNC: 26 MMOL/L (ref 21–32)
CREAT SERPL-MCNC: 1.3 MG/DL (ref 0.7–1.3)
EOSINOPHIL # BLD: 0.3 K/UL (ref 0–0.4)
EOSINOPHIL NFR BLD: 6 % (ref 0–7)
ERYTHROCYTE [DISTWIDTH] IN BLOOD BY AUTOMATED COUNT: 13.3 % (ref 11.5–14.5)
GLUCOSE SERPL-MCNC: 154 MG/DL (ref 65–100)
HCT VFR BLD AUTO: 40.5 % (ref 36.6–50.3)
HGB BLD-MCNC: 13.7 G/DL (ref 12.1–17)
LYMPHOCYTES # BLD: 1.2 K/UL (ref 0.8–3.5)
LYMPHOCYTES NFR BLD: 22 % (ref 12–49)
MAGNESIUM SERPL-MCNC: 2.2 MG/DL (ref 1.6–2.4)
MCH RBC QN AUTO: 29.1 PG (ref 26–34)
MCHC RBC AUTO-ENTMCNC: 33.8 G/DL (ref 30–36.5)
MCV RBC AUTO: 86.2 FL (ref 80–99)
MONOCYTES # BLD: 0.5 K/UL (ref 0–1)
MONOCYTES NFR BLD: 8 % (ref 5–13)
NEUTS SEG # BLD: 3.5 K/UL (ref 1.8–8)
NEUTS SEG NFR BLD: 63 % (ref 32–75)
PHOSPHATE SERPL-MCNC: 3.3 MG/DL (ref 2.6–4.7)
PLATELET # BLD AUTO: 168 K/UL (ref 150–400)
POTASSIUM SERPL-SCNC: 4.1 MMOL/L (ref 3.5–5.1)
RBC # BLD AUTO: 4.7 M/UL (ref 4.1–5.7)
SODIUM SERPL-SCNC: 141 MMOL/L (ref 136–145)
WBC # BLD AUTO: 5.6 K/UL (ref 4.1–11.1)

## 2017-09-28 PROCEDURE — 74011250636 HC RX REV CODE- 250/636: Performed by: INTERNAL MEDICINE

## 2017-09-28 PROCEDURE — 80069 RENAL FUNCTION PANEL: CPT | Performed by: INTERNAL MEDICINE

## 2017-09-28 PROCEDURE — 96402 CHEMO HORMON ANTINEOPL SQ/IM: CPT

## 2017-09-28 PROCEDURE — 85025 COMPLETE CBC W/AUTO DIFF WBC: CPT | Performed by: INTERNAL MEDICINE

## 2017-09-28 PROCEDURE — 36415 COLL VENOUS BLD VENIPUNCTURE: CPT | Performed by: INTERNAL MEDICINE

## 2017-09-28 PROCEDURE — 83735 ASSAY OF MAGNESIUM: CPT | Performed by: INTERNAL MEDICINE

## 2017-09-28 PROCEDURE — 96372 THER/PROPH/DIAG INJ SC/IM: CPT

## 2017-09-28 RX ADMIN — DENOSUMAB 120 MG: 120 INJECTION SUBCUTANEOUS at 09:41

## 2017-09-28 RX ADMIN — LANREOTIDE ACETATE 120 MG: 120 INJECTION SUBCUTANEOUS at 09:00

## 2017-09-28 NOTE — PROGRESS NOTES
Pt arrived to [facility] ambulatory in no acute distress at 0845 for Lanreotide/Xgeva.  Assessment unremarkable. Labs obtained, Renal Panel, Mag, CBC. Visit Vitals    /84 (BP 1 Location: Left arm, BP Patient Position: Sitting)    Pulse 66    Temp 97 °F (36.1 °C)    Resp 18     Recent Results (from the past 12 hour(s))   MAGNESIUM    Collection Time: 09/28/17  8:53 AM   Result Value Ref Range    Magnesium 2.2 1.6 - 2.4 mg/dL   RENAL FUNCTION PANEL    Collection Time: 09/28/17  8:53 AM   Result Value Ref Range    Sodium 141 136 - 145 mmol/L    Potassium 4.1 3.5 - 5.1 mmol/L    Chloride 109 (H) 97 - 108 mmol/L    CO2 26 21 - 32 mmol/L    Anion gap 6 5 - 15 mmol/L    Glucose 154 (H) 65 - 100 mg/dL    BUN 29 (H) 6 - 20 MG/DL    Creatinine 1.30 0.70 - 1.30 MG/DL    BUN/Creatinine ratio 22 (H) 12 - 20      GFR est AA >60 >60 ml/min/1.73m2    GFR est non-AA 55 (L) >60 ml/min/1.73m2    Calcium 8.8 8.5 - 10.1 MG/DL    Phosphorus 3.3 2.6 - 4.7 MG/DL    Albumin 3.9 3.5 - 5.0 g/dL   CBC WITH AUTOMATED DIFF    Collection Time: 09/28/17  8:53 AM   Result Value Ref Range    WBC 5.6 4.1 - 11.1 K/uL    RBC 4.70 4. 10 - 5.70 M/uL    HGB 13.7 12.1 - 17.0 g/dL    HCT 40.5 36.6 - 50.3 %    MCV 86.2 80.0 - 99.0 FL    MCH 29.1 26.0 - 34.0 PG    MCHC 33.8 30.0 - 36.5 g/dL    RDW 13.3 11.5 - 14.5 %    PLATELET 393 973 - 350 K/uL    NEUTROPHILS 63 32 - 75 %    LYMPHOCYTES 22 12 - 49 %    MONOCYTES 8 5 - 13 %    EOSINOPHILS 6 0 - 7 %    BASOPHILS 1 0 - 1 %    ABS. NEUTROPHILS 3.5 1.8 - 8.0 K/UL    ABS. LYMPHOCYTES 1.2 0.8 - 3.5 K/UL    ABS. MONOCYTES 0.5 0.0 - 1.0 K/UL    ABS. EOSINOPHILS 0.3 0.0 - 0.4 K/UL    ABS. BASOPHILS 0.1 0.0 - 0.1 K/UL     The following medications administered:  Lanreotide 120mg SQ in R hip  Xgeva 120mg SQ in L arm    Pt tolerated treatment well. Pt discharged ambulatory in no acute distress at 0945, accompanied by self. Next appointment 10/26/17 at 0900.

## 2017-10-24 RX ORDER — LANREOTIDE ACETATE 120 MG/.5ML
120 INJECTION SUBCUTANEOUS ONCE
Status: COMPLETED | OUTPATIENT
Start: 2017-10-26 | End: 2017-10-26

## 2017-10-26 ENCOUNTER — HOSPITAL ENCOUNTER (OUTPATIENT)
Dept: INFUSION THERAPY | Age: 72
Discharge: HOME | End: 2017-10-26
Payer: MEDICARE

## 2017-10-26 VITALS
HEART RATE: 65 BPM | DIASTOLIC BLOOD PRESSURE: 105 MMHG | TEMPERATURE: 97.5 F | RESPIRATION RATE: 18 BRPM | SYSTOLIC BLOOD PRESSURE: 172 MMHG

## 2017-10-26 LAB
ANION GAP SERPL CALC-SCNC: 6 MMOL/L (ref 5–15)
BASOPHILS # BLD: 0 K/UL (ref 0–0.1)
BASOPHILS NFR BLD: 1 % (ref 0–1)
BUN SERPL-MCNC: 15 MG/DL (ref 6–20)
BUN/CREAT SERPL: 14 (ref 12–20)
CALCIUM SERPL-MCNC: 8.9 MG/DL (ref 8.5–10.1)
CHLORIDE SERPL-SCNC: 106 MMOL/L (ref 97–108)
CO2 SERPL-SCNC: 29 MMOL/L (ref 21–32)
CREAT SERPL-MCNC: 1.06 MG/DL (ref 0.7–1.3)
EOSINOPHIL # BLD: 0.2 K/UL (ref 0–0.4)
EOSINOPHIL NFR BLD: 4 % (ref 0–7)
ERYTHROCYTE [DISTWIDTH] IN BLOOD BY AUTOMATED COUNT: 13.1 % (ref 11.5–14.5)
GLUCOSE SERPL-MCNC: 109 MG/DL (ref 65–100)
HCT VFR BLD AUTO: 44 % (ref 36.6–50.3)
HGB BLD-MCNC: 15 G/DL (ref 12.1–17)
LYMPHOCYTES # BLD: 1.2 K/UL (ref 0.8–3.5)
LYMPHOCYTES NFR BLD: 20 % (ref 12–49)
MCH RBC QN AUTO: 29.6 PG (ref 26–34)
MCHC RBC AUTO-ENTMCNC: 34.1 G/DL (ref 30–36.5)
MCV RBC AUTO: 86.8 FL (ref 80–99)
MONOCYTES # BLD: 0.6 K/UL (ref 0–1)
MONOCYTES NFR BLD: 9 % (ref 5–13)
NEUTS SEG # BLD: 4.1 K/UL (ref 1.8–8)
NEUTS SEG NFR BLD: 66 % (ref 32–75)
PLATELET # BLD AUTO: 184 K/UL (ref 150–400)
POTASSIUM SERPL-SCNC: 4 MMOL/L (ref 3.5–5.1)
RBC # BLD AUTO: 5.07 M/UL (ref 4.1–5.7)
SODIUM SERPL-SCNC: 141 MMOL/L (ref 136–145)
WBC # BLD AUTO: 6.1 K/UL (ref 4.1–11.1)

## 2017-10-26 PROCEDURE — 36415 COLL VENOUS BLD VENIPUNCTURE: CPT | Performed by: INTERNAL MEDICINE

## 2017-10-26 PROCEDURE — 74011250636 HC RX REV CODE- 250/636: Performed by: INTERNAL MEDICINE

## 2017-10-26 PROCEDURE — 80048 BASIC METABOLIC PNL TOTAL CA: CPT | Performed by: INTERNAL MEDICINE

## 2017-10-26 PROCEDURE — 85025 COMPLETE CBC W/AUTO DIFF WBC: CPT | Performed by: INTERNAL MEDICINE

## 2017-10-26 PROCEDURE — 96402 CHEMO HORMON ANTINEOPL SQ/IM: CPT

## 2017-10-26 RX ADMIN — LANREOTIDE ACETATE 120 MG: 120 INJECTION SUBCUTANEOUS at 09:15

## 2017-10-26 NOTE — PROGRESS NOTES
Pt arrived to [facility] for Lantreotide in no acute distress at 0905.  Assessment unremarkable, except elevated BP. No new concerns voiced. Labs obtained- CBC with diff and BMP. Visit Vitals    BP (!) 172/105 (BP 1 Location: Left arm, BP Patient Position: Sitting)    Pulse 65    Temp 97.5 °F (36.4 °C)    Resp 18     Recent Results (from the past 12 hour(s))   METABOLIC PANEL, BASIC    Collection Time: 10/26/17  9:29 AM   Result Value Ref Range    Sodium 141 136 - 145 mmol/L    Potassium 4.0 3.5 - 5.1 mmol/L    Chloride 106 97 - 108 mmol/L    CO2 29 21 - 32 mmol/L    Anion gap 6 5 - 15 mmol/L    Glucose 109 (H) 65 - 100 mg/dL    BUN 15 6 - 20 MG/DL    Creatinine 1.06 0.70 - 1.30 MG/DL    BUN/Creatinine ratio 14 12 - 20      GFR est AA >60 >60 ml/min/1.73m2    GFR est non-AA >60 >60 ml/min/1.73m2    Calcium 8.9 8.5 - 10.1 MG/DL   CBC WITH AUTOMATED DIFF    Collection Time: 10/26/17  9:29 AM   Result Value Ref Range    WBC 6.1 4.1 - 11.1 K/uL    RBC 5.07 4. 10 - 5.70 M/uL    HGB 15.0 12.1 - 17.0 g/dL    HCT 44.0 36.6 - 50.3 %    MCV 86.8 80.0 - 99.0 FL    MCH 29.6 26.0 - 34.0 PG    MCHC 34.1 30.0 - 36.5 g/dL    RDW 13.1 11.5 - 14.5 %    PLATELET 899 713 - 192 K/uL    NEUTROPHILS 66 32 - 75 %    LYMPHOCYTES 20 12 - 49 %    MONOCYTES 9 5 - 13 %    EOSINOPHILS 4 0 - 7 %    BASOPHILS 1 0 - 1 %    ABS. NEUTROPHILS 4.1 1.8 - 8.0 K/UL    ABS. LYMPHOCYTES 1.2 0.8 - 3.5 K/UL    ABS. MONOCYTES 0.6 0.0 - 1.0 K/UL    ABS. EOSINOPHILS 0.2 0.0 - 0.4 K/UL    ABS. BASOPHILS 0.0 0.0 - 0.1 K/UL       The following medications administered:  Lantreotide 120mg SQ in left buttock    Pt tolerated treatment well.  No adverse reaction noted. Pt discharged ambulatory in no acute distress at 0920, accompanied by spouse. Next appointment 11/22/17 @ 0800.

## 2017-11-17 RX ORDER — LANREOTIDE ACETATE 120 MG/.5ML
120 INJECTION SUBCUTANEOUS ONCE
Status: COMPLETED | OUTPATIENT
Start: 2017-11-22 | End: 2017-11-22

## 2017-11-22 ENCOUNTER — HOSPITAL ENCOUNTER (OUTPATIENT)
Dept: INFUSION THERAPY | Age: 72
Discharge: HOME | End: 2017-11-22
Payer: MEDICARE

## 2017-11-22 VITALS
HEART RATE: 69 BPM | OXYGEN SATURATION: 98 % | DIASTOLIC BLOOD PRESSURE: 89 MMHG | RESPIRATION RATE: 18 BRPM | SYSTOLIC BLOOD PRESSURE: 139 MMHG | TEMPERATURE: 97.9 F

## 2017-11-22 LAB
ANION GAP SERPL CALC-SCNC: 7 MMOL/L (ref 5–15)
BASOPHILS # BLD: 0 K/UL (ref 0–0.1)
BASOPHILS NFR BLD: 1 % (ref 0–1)
BUN SERPL-MCNC: 18 MG/DL (ref 6–20)
BUN/CREAT SERPL: 15 (ref 12–20)
CALCIUM SERPL-MCNC: 8.8 MG/DL (ref 8.5–10.1)
CHLORIDE SERPL-SCNC: 108 MMOL/L (ref 97–108)
CO2 SERPL-SCNC: 27 MMOL/L (ref 21–32)
CREAT SERPL-MCNC: 1.18 MG/DL (ref 0.7–1.3)
EOSINOPHIL # BLD: 0.2 K/UL (ref 0–0.4)
EOSINOPHIL NFR BLD: 4 % (ref 0–7)
ERYTHROCYTE [DISTWIDTH] IN BLOOD BY AUTOMATED COUNT: 13.2 % (ref 11.5–14.5)
GLUCOSE SERPL-MCNC: 81 MG/DL (ref 65–100)
HCT VFR BLD AUTO: 40.3 % (ref 36.6–50.3)
HGB BLD-MCNC: 13.5 G/DL (ref 12.1–17)
LYMPHOCYTES # BLD: 1.2 K/UL (ref 0.8–3.5)
LYMPHOCYTES NFR BLD: 17 % (ref 12–49)
MCH RBC QN AUTO: 28.8 PG (ref 26–34)
MCHC RBC AUTO-ENTMCNC: 33.5 G/DL (ref 30–36.5)
MCV RBC AUTO: 86.1 FL (ref 80–99)
MONOCYTES # BLD: 0.5 K/UL (ref 0–1)
MONOCYTES NFR BLD: 7 % (ref 5–13)
NEUTS SEG # BLD: 4.9 K/UL (ref 1.8–8)
NEUTS SEG NFR BLD: 71 % (ref 32–75)
PLATELET # BLD AUTO: 168 K/UL (ref 150–400)
POTASSIUM SERPL-SCNC: 3.7 MMOL/L (ref 3.5–5.1)
RBC # BLD AUTO: 4.68 M/UL (ref 4.1–5.7)
SODIUM SERPL-SCNC: 142 MMOL/L (ref 136–145)
WBC # BLD AUTO: 6.8 K/UL (ref 4.1–11.1)

## 2017-11-22 PROCEDURE — 80048 BASIC METABOLIC PNL TOTAL CA: CPT | Performed by: INTERNAL MEDICINE

## 2017-11-22 PROCEDURE — 36415 COLL VENOUS BLD VENIPUNCTURE: CPT | Performed by: INTERNAL MEDICINE

## 2017-11-22 PROCEDURE — 96402 CHEMO HORMON ANTINEOPL SQ/IM: CPT

## 2017-11-22 PROCEDURE — 85025 COMPLETE CBC W/AUTO DIFF WBC: CPT | Performed by: INTERNAL MEDICINE

## 2017-11-22 PROCEDURE — 74011250636 HC RX REV CODE- 250/636: Performed by: INTERNAL MEDICINE

## 2017-11-22 RX ADMIN — LANREOTIDE ACETATE 120 MG: 120 INJECTION SUBCUTANEOUS at 09:01

## 2017-11-22 NOTE — PROGRESS NOTES
Pt arrived to [facility] ambulatory for Lantreotide in no acute distress at 0810.  Assessment unremarkable, no new concerns voiced. Labs obtained- CBC with diff and BMP. Visit Vitals    /89 (BP 1 Location: Left arm, BP Patient Position: Sitting)    Pulse 69    Temp 97.9 °F (36.6 °C)    Resp 18    SpO2 98%     Recent Results (from the past 12 hour(s))   CBC WITH AUTOMATED DIFF    Collection Time: 11/22/17  8:21 AM   Result Value Ref Range    WBC 6.8 4.1 - 11.1 K/uL    RBC 4.68 4.10 - 5.70 M/uL    HGB 13.5 12.1 - 17.0 g/dL    HCT 40.3 36.6 - 50.3 %    MCV 86.1 80.0 - 99.0 FL    MCH 28.8 26.0 - 34.0 PG    MCHC 33.5 30.0 - 36.5 g/dL    RDW 13.2 11.5 - 14.5 %    PLATELET 258 865 - 504 K/uL    NEUTROPHILS 71 32 - 75 %    LYMPHOCYTES 17 12 - 49 %    MONOCYTES 7 5 - 13 %    EOSINOPHILS 4 0 - 7 %    BASOPHILS 1 0 - 1 %    ABS. NEUTROPHILS 4.9 1.8 - 8.0 K/UL    ABS. LYMPHOCYTES 1.2 0.8 - 3.5 K/UL    ABS. MONOCYTES 0.5 0.0 - 1.0 K/UL    ABS. EOSINOPHILS 0.2 0.0 - 0.4 K/UL    ABS. BASOPHILS 0.0 0.0 - 0.1 K/UL   METABOLIC PANEL, BASIC    Collection Time: 11/22/17  8:21 AM   Result Value Ref Range    Sodium 142 136 - 145 mmol/L    Potassium 3.7 3.5 - 5.1 mmol/L    Chloride 108 97 - 108 mmol/L    CO2 27 21 - 32 mmol/L    Anion gap 7 5 - 15 mmol/L    Glucose 81 65 - 100 mg/dL    BUN 18 6 - 20 MG/DL    Creatinine 1.18 0.70 - 1.30 MG/DL    BUN/Creatinine ratio 15 12 - 20      GFR est AA >60 >60 ml/min/1.73m2    GFR est non-AA >60 >60 ml/min/1.73m2    Calcium 8.8 8.5 - 10.1 MG/DL       The following medications administered:  Lantreotide 120mg SC to right buttock    Pt tolerated treatment well.  No adverse reaction noted. Pt discharged ambulatory in no acute distress at 0905, accompanied by self. Next appointment 12/21/17 @ 0900.

## 2017-11-29 ENCOUNTER — OFFICE VISIT (OUTPATIENT)
Dept: ONCOLOGY | Age: 72
End: 2017-11-29

## 2017-11-29 VITALS
OXYGEN SATURATION: 98 % | HEART RATE: 72 BPM | TEMPERATURE: 98.2 F | HEIGHT: 72 IN | RESPIRATION RATE: 18 BRPM | WEIGHT: 186.4 LBS | SYSTOLIC BLOOD PRESSURE: 142 MMHG | DIASTOLIC BLOOD PRESSURE: 91 MMHG | BODY MASS INDEX: 25.25 KG/M2

## 2017-11-29 DIAGNOSIS — C7B.8 NEUROENDOCRINE CARCINOMA METASTATIC TO BONE (HCC): Primary | ICD-10-CM

## 2017-11-29 DIAGNOSIS — R53.83 FATIGUE, UNSPECIFIED TYPE: ICD-10-CM

## 2017-11-29 DIAGNOSIS — C7A.8 NEUROENDOCRINE CARCINOMA METASTATIC TO BONE (HCC): Primary | ICD-10-CM

## 2017-11-29 DIAGNOSIS — C79.51 BONE METASTASIS (HCC): ICD-10-CM

## 2017-11-29 NOTE — PROGRESS NOTES
Reanna Marquez is a 80 y.o. male here today for met neuroendocrine cancer f/u. Elevated B/P. Patient denies pain. Patient denies N/V/D and constipation. Patient states sometimes he feels like someone is sticking and pricking him with a needle on different parts of his body; pt states it goes away once he rubs the area.

## 2017-11-29 NOTE — PROGRESS NOTES
Oncology Follow up Note        Patient: Sheila Villagomez MRN: [id number]  SSN: [id number]    YOB: 1945  Age: 80 y.o. Sex: male        Diagnosis:     1. 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 Somatuline Depot 120 mg SC  2. Xgeva 120mg SC     Subjective:      Sheila Villagomez is a 80 y.o. male with a diagnosis of metastatic neuroendocrine cancer. He started experiencing pain in both groins and the abdomen. A CT of the abdomen showed retroperitoneal adenopathy. CT guided biopsy of the retroperitoneal LN reveals a dx of well differentiated neuroendocrine carcinoma. Bone scan shows disease in the bone. Mr. Srikanth Jimenez is receiving Somatuline and scans show stable disease. He is here today in follow up and does not verbalize any new complaints.      Review of Systems:    Constitutional: fatigue   Eyes: negative  Ears, Nose, Mouth, Throat, and Face: negative  Respiratory: negative  Cardiovascular: negative  Gastrointestinal: negative  Genitourinary:negative  Integument/Breast: negative  Hematologic/Lymphatic: negative  Musculoskeletal: negative  Neurological: negative      Past Medical History:   Diagnosis Date    Adverse effect of anesthesia     low HR and very slow to wakeup    Cancer ([address] 5/2016    neuroendocrine, retroperitoneal LN involvement    Diarrhea     Frequent urination     Hypertension     Poor appetite     Unspecified adverse effect of anesthesia     \"hard to put to sleep and slow to wake up-heart rate dropped very low\"     Past Surgical History:   Procedure Laterality Date    COLONOSCOPY N/A 5/13/2016    COLONOSCOPY performed by Karen Bryson MD at [address] [place] [address]  5/13/2016         HX ORTHOPAEDIC      knee scope left knee    HX OTHER SURGICAL  09-    excision of scalp cyst     UPPER GI ENDOSCOPY,BIOPSY  5/13/2016           Family History   Problem Relation Age of Onset    Cancer Mother     Stroke Sister      Social History   Substance Use Topics    Smoking status: Former Smoker     Packs/day: 1.00     Years: 8.00     Quit date: 5/12/1972    Smokeless tobacco: Never Used    Alcohol use No      Prior to Admission medications    Medication Sig Start Date End Date Taking? Authorizing Provider   lisinopril (PRINIVIL, ZESTRIL) 10 mg tablet Take  by mouth daily. Yes Historical Provider          No Known Allergies        Objective:     Vitals:    11/29/17 1130   BP: (!) 142/97   Pulse: 72   Resp: 18   Temp: 98.2 °F (36.8 °C)   TempSrc: Oral   SpO2: 98%   Weight: 186 lb 6.4 oz (84.6 kg)   Height: 6' (1.829 m)            Physical Exam:    GENERAL: alert, cooperative  EYE: negative  LYMPHATIC: Cervical, supraclavicular, and axillary nodes normal.   THROAT & NECK: normal and no erythema or exudates noted. LUNG: clear to auscultation bilaterally  HEART: regular rate and rhythm  ABDOMEN: soft, non-tender  EXTREMITIES: no cyanosis or edema  SKIN: Normal.  NEUROLOGIC: negative        IMAGING:     I personally reviewed the images. Enlarged and abnormal retroperitoneal adenopathy. Bone metastasis. CT Results (most recent):    Results from Hospital Encounter encounter on 08/18/17   CT ABD PELV W CONT   Narrative INDICATION: restaging disease    COMPARISON: January 23, 2017    TECHNIQUE:   Following the uneventful intravenous administration of 100 cc Isovue-370, thin  axial images were obtained through the abdomen and pelvis. Coronal and sagittal  reconstructions were generated. Oral contrast was not administered. CT dose  reduction was achieved through use of a standardized protocol tailored for this  examination and automatic exposure control for dose modulation. FINDINGS:   LUNG BASES: No abnormality. LIVER: No mass or biliary dilatation. GALLBLADDER: Unremarkable. SPLEEN: No enlargement or lesion. PANCREAS: No mass or ductal dilatation. ADRENALS: No mass.   KIDNEYS: No mass, calculus, or hydronephrosis. GI TRACT:  There is a nonobstructing intussusception involving small bowel loops  in the left midabdomen. No bowel obstruction. Oral contrast was not  administered. PERITONEUM: No free air or free fluid. APPENDIX: Unremarkable. RETROPERITONEUM: Left periaortic lymph nodes have not changed significantly,  with left periaortic lymph node at the level left kidney measuring 2.1 x 1.8 cm,  conglomerate lymph node at the mid left kidney level measuring 2.6 x 2.3 cm, and  distal left para-aortic lymph node measuring 1.9 x 1.6 cm. The partly calcified  mesenteric mass in the central abdomen is without definite change allowing for  slight different scan plane, by my measurement up to 4.3 x 1.6 cm (previously up  to 4.2 x 1.5 cm). ADDITIONAL COMMENTS: N/A.    URINARY BLADDER: Unremarkable. REPRODUCTIVE ORGANS: Unremarkable. LYMPH NODES:  None enlarged. FREE FLUID:  None. BONES: Multiple sclerotic osseous lesions in the spine and pelvis without  significant interval change. No definite new lesion. ADDITIONAL COMMENTS: N/A. Impression IMPRESSION:    1. No significant interval change in partly calcified mesenteric mass and  retroperitoneal adenopathy. 2. Multiple sclerotic bone lesions without significant change. 3. Nonobstructing small bowel intussusception left midabdomen with no evidence  of mass or inflammation likely of no clinical significance. Assessment:     1. 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 Blood counts are acceptable.  Results reviewed with the patient  Repeat NM bone scans (1/23/17) show stable disease  Repeat CT scan shows (8/2017) shows stable disease    Symptom management form reviewed with patient. 2. Bone metastasis    > Denosumab every 3 months  > No Bone pain        Plan:       > Continue Somatuline Depot  > Continue Denosumab  > repeat NM bone scan in Jan/Feb 2017  > repeat CT abdomen/ pelvis in in Jan/Feb 2017  > Follow-up in Jan/Feb 2017      I saw the patient in conjunction with NILDA Santos. Signed by: Fadia Paz MD                     November 29, 2017        CC. Francine Engel MD  CC.  Daija Pacheco MD

## 2017-12-18 RX ORDER — LANREOTIDE ACETATE 120 MG/.5ML
120 INJECTION SUBCUTANEOUS ONCE
Status: COMPLETED | OUTPATIENT
Start: 2017-12-21 | End: 2017-12-21

## 2017-12-21 ENCOUNTER — HOSPITAL ENCOUNTER (OUTPATIENT)
Dept: INFUSION THERAPY | Age: 72
Discharge: HOME | End: 2017-12-21
Payer: MEDICARE

## 2017-12-21 VITALS
DIASTOLIC BLOOD PRESSURE: 91 MMHG | SYSTOLIC BLOOD PRESSURE: 158 MMHG | TEMPERATURE: 97.6 F | RESPIRATION RATE: 18 BRPM | HEART RATE: 70 BPM

## 2017-12-21 LAB
ANION GAP BLD CALC-SCNC: 19 MMOL/L (ref 5–15)
BASOPHILS # BLD: 0 K/UL (ref 0–0.1)
BASOPHILS NFR BLD: 1 % (ref 0–1)
BUN BLD-MCNC: 17 MG/DL (ref 9–20)
CA-I BLD-MCNC: 1.24 MMOL/L (ref 1.12–1.32)
CHLORIDE BLD-SCNC: 101 MMOL/L (ref 98–107)
CO2 BLD-SCNC: 29 MMOL/L (ref 21–32)
CREAT BLD-MCNC: 1 MG/DL (ref 0.6–1.3)
EOSINOPHIL # BLD: 0.3 K/UL (ref 0–0.4)
EOSINOPHIL NFR BLD: 5 % (ref 0–7)
ERYTHROCYTE [DISTWIDTH] IN BLOOD BY AUTOMATED COUNT: 13.4 % (ref 11.5–14.5)
GLUCOSE BLD-MCNC: 103 MG/DL (ref 65–100)
HCT VFR BLD AUTO: 42.5 % (ref 36.6–50.3)
HCT VFR BLD CALC: 43 % (ref 36.6–50.3)
HGB BLD-MCNC: 14.4 G/DL (ref 12.1–17)
HGB BLD-MCNC: 14.6 GM/DL (ref 12.1–17)
LYMPHOCYTES # BLD: 1.2 K/UL (ref 0.8–3.5)
LYMPHOCYTES NFR BLD: 22 % (ref 12–49)
MAGNESIUM SERPL-MCNC: 2.1 MG/DL (ref 1.6–2.4)
MCH RBC QN AUTO: 29.1 PG (ref 26–34)
MCHC RBC AUTO-ENTMCNC: 33.9 G/DL (ref 30–36.5)
MCV RBC AUTO: 85.9 FL (ref 80–99)
MONOCYTES # BLD: 0.3 K/UL (ref 0–1)
MONOCYTES NFR BLD: 6 % (ref 5–13)
NEUTS SEG # BLD: 3.6 K/UL (ref 1.8–8)
NEUTS SEG NFR BLD: 66 % (ref 32–75)
PHOSPHATE SERPL-MCNC: 3.1 MG/DL (ref 2.6–4.7)
PLATELET # BLD AUTO: 168 K/UL (ref 150–400)
POTASSIUM BLD-SCNC: 3.7 MMOL/L (ref 3.5–5.1)
RBC # BLD AUTO: 4.95 M/UL (ref 4.1–5.7)
SERVICE CMNT-IMP: ABNORMAL
SODIUM BLD-SCNC: 144 MMOL/L (ref 136–145)
WBC # BLD AUTO: 5.5 K/UL (ref 4.1–11.1)

## 2017-12-21 PROCEDURE — 96372 THER/PROPH/DIAG INJ SC/IM: CPT

## 2017-12-21 PROCEDURE — 74011250636 HC RX REV CODE- 250/636: Performed by: INTERNAL MEDICINE

## 2017-12-21 PROCEDURE — 85025 COMPLETE CBC W/AUTO DIFF WBC: CPT | Performed by: INTERNAL MEDICINE

## 2017-12-21 PROCEDURE — 80047 BASIC METABLC PNL IONIZED CA: CPT

## 2017-12-21 PROCEDURE — 84100 ASSAY OF PHOSPHORUS: CPT | Performed by: INTERNAL MEDICINE

## 2017-12-21 PROCEDURE — 96402 CHEMO HORMON ANTINEOPL SQ/IM: CPT

## 2017-12-21 PROCEDURE — 83735 ASSAY OF MAGNESIUM: CPT | Performed by: INTERNAL MEDICINE

## 2017-12-21 PROCEDURE — 36415 COLL VENOUS BLD VENIPUNCTURE: CPT | Performed by: INTERNAL MEDICINE

## 2017-12-21 RX ADMIN — DENOSUMAB 120 MG: 120 INJECTION SUBCUTANEOUS at 09:24

## 2017-12-21 RX ADMIN — LANREOTIDE ACETATE 120 MG: 120 INJECTION SUBCUTANEOUS at 09:27

## 2017-12-21 NOTE — PROGRESS NOTES
Pt arrived to [facility] ambulatory for Xgeva/Somatuline in no acute distress at 0900.  Assessment unremarkable except elevated BP. Labs obtained- CBC with diff, Chem 8 I-stat, Mag and Phos. Visit Vitals    BP (!) 158/91 (BP 1 Location: Left arm, BP Patient Position: Sitting)    Pulse 70    Temp 97.6 °F (36.4 °C)    Resp 18       Recent Results (from the past 12 hour(s))   POC CHEM8    Collection Time: 12/21/17  9:10 AM   Result Value Ref Range    Calcium, ionized (POC) 1.24 1.12 - 1.32 MMOL/L    Sodium (POC) 144 136 - 145 MMOL/L    Potassium (POC) 3.7 3.5 - 5.1 MMOL/L    Chloride (POC) 101 98 - 107 MMOL/L    CO2 (POC) 29 21 - 32 MMOL/L    Anion gap (POC) 19 (H) 5 - 15 mmol/L    Glucose (POC) 103 (H) 65 - 100 MG/DL    BUN (POC) 17 9 - 20 MG/DL    Creatinine (POC) 1.0 0.6 - 1.3 MG/DL    GFRAA, POC >60 >60 ml/min/1.73m2    GFRNA, POC >60 >60 ml/min/1.73m2    Hemoglobin (POC) 14.6 12.1 - 17.0 GM/DL    Hematocrit (POC) 43 36.6 - 50.3 %    Comment Notified RN or MD immediately by      CBC WITH AUTOMATED DIFF    Collection Time: 12/21/17  9:11 AM   Result Value Ref Range    WBC 5.5 4.1 - 11.1 K/uL    RBC 4.95 4.10 - 5.70 M/uL    HGB 14.4 12.1 - 17.0 g/dL    HCT 42.5 36.6 - 50.3 %    MCV 85.9 80.0 - 99.0 FL    MCH 29.1 26.0 - 34.0 PG    MCHC 33.9 30.0 - 36.5 g/dL    RDW 13.4 11.5 - 14.5 %    PLATELET 632 660 - 111 K/uL    NEUTROPHILS 66 32 - 75 %    LYMPHOCYTES 22 12 - 49 %    MONOCYTES 6 5 - 13 %    EOSINOPHILS 5 0 - 7 %    BASOPHILS 1 0 - 1 %    ABS. NEUTROPHILS 3.6 1.8 - 8.0 K/UL    ABS. LYMPHOCYTES 1.2 0.8 - 3.5 K/UL    ABS. MONOCYTES 0.3 0.0 - 1.0 K/UL    ABS. EOSINOPHILS 0.3 0.0 - 0.4 K/UL    ABS.  BASOPHILS 0.0 0.0 - 0.1 K/UL   MAGNESIUM    Collection Time: 12/21/17  9:11 AM   Result Value Ref Range    Magnesium 2.1 1.6 - 2.4 mg/dL   PHOSPHORUS    Collection Time: 12/21/17  9:11 AM   Result Value Ref Range    Phosphorus 3.1 2.6 - 4.7 MG/DL     Ionized Calcium 1.24    The following medications administered:  Xgeva 120mg SQ to left arm  Somatuline 120mg SQ to left buttock    Pt tolerated treatment well. No adverse reaction noted. Pt discharged ambulatory in no acute distress at 0930, accompanied by spouse. Next appointment 1/18/18 @ 0900.

## 2017-12-26 ENCOUNTER — APPOINTMENT (OUTPATIENT)
Dept: GENERAL RADIOLOGY | Age: 72
End: 2017-12-26
Payer: COMMERCIAL

## 2017-12-26 ENCOUNTER — APPOINTMENT (OUTPATIENT)
Dept: CT IMAGING | Age: 72
End: 2017-12-26
Attending: PHYSICIAN ASSISTANT
Payer: COMMERCIAL

## 2017-12-26 ENCOUNTER — HOSPITAL ENCOUNTER (EMERGENCY)
Age: 72
Discharge: HOME | End: 2017-12-26
Attending: EMERGENCY MEDICINE
Payer: COMMERCIAL

## 2017-12-26 VITALS
SYSTOLIC BLOOD PRESSURE: 174 MMHG | RESPIRATION RATE: 16 BRPM | WEIGHT: 186.07 LBS | HEIGHT: 72 IN | OXYGEN SATURATION: 100 % | BODY MASS INDEX: 25.2 KG/M2 | TEMPERATURE: 98 F | HEART RATE: 84 BPM | DIASTOLIC BLOOD PRESSURE: 114 MMHG

## 2017-12-26 DIAGNOSIS — S16.1XXA STRAIN OF NECK MUSCLE, INITIAL ENCOUNTER: ICD-10-CM

## 2017-12-26 DIAGNOSIS — S50.02XA CONTUSION OF LEFT ELBOW, INITIAL ENCOUNTER: ICD-10-CM

## 2017-12-26 DIAGNOSIS — S09.90XA CLOSED HEAD INJURY, INITIAL ENCOUNTER: Primary | ICD-10-CM
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
## 2017-12-26 LAB
APPEARANCE UR: CLEAR
BACTERIA URNS QL MICRO: NEGATIVE /HPF
BILIRUB UR QL: NEGATIVE
COLOR UR: NORMAL
EPITH CASTS URNS QL MICRO: NORMAL /LPF
GLUCOSE UR STRIP.AUTO-MCNC: NEGATIVE MG/DL
HGB UR QL STRIP: NEGATIVE
HYALINE CASTS URNS QL MICRO: NORMAL /LPF (ref 0–5)
KETONES UR QL STRIP.AUTO: NEGATIVE MG/DL
LEUKOCYTE ESTERASE UR QL STRIP.AUTO: NEGATIVE
NITRITE UR QL STRIP.AUTO: NEGATIVE
PH UR STRIP: 5 [PH] (ref 5–8)
PROT UR STRIP-MCNC: NEGATIVE MG/DL
RBC #/AREA URNS HPF: NORMAL /HPF (ref 0–5)
SP GR UR REFRACTOMETRY: 1.01 (ref 1–1.03)
UROBILINOGEN UR QL STRIP.AUTO: 0.2 EU/DL (ref 0.2–1)
WBC URNS QL MICRO: NORMAL /HPF (ref 0–4)

## 2017-12-26 PROCEDURE — 99284 EMERGENCY DEPT VISIT MOD MDM: CPT

## 2017-12-26 PROCEDURE — 73080 X-RAY EXAM OF ELBOW: CPT

## 2017-12-26 PROCEDURE — 81001 URINALYSIS AUTO W/SCOPE: CPT | Performed by: PHYSICIAN ASSISTANT

## 2017-12-26 PROCEDURE — 72125 CT NECK SPINE W/O DYE: CPT

## 2017-12-26 PROCEDURE — 70450 CT HEAD/BRAIN W/O DYE: CPT

## 2017-12-26 PROCEDURE — 74011250637 HC RX REV CODE- 250/637: Performed by: PHYSICIAN ASSISTANT

## 2017-12-26 RX ORDER — LISINOPRIL 5 MG/1
10 TABLET ORAL
Status: COMPLETED | OUTPATIENT
Start: 2017-12-26 | End: 2017-12-26

## 2017-12-26 RX ORDER — METHOCARBAMOL 750 MG/1
750 TABLET, FILM COATED ORAL 4 TIMES DAILY
Qty: 20 TAB | Refills: 0 | Status: SHIPPED | OUTPATIENT
Start: 2017-12-26 | End: 2020-02-06

## 2017-12-26 RX ADMIN — LISINOPRIL 10 MG: 5 TABLET ORAL at 21:49

## 2017-12-27 NOTE — DISCHARGE INSTRUCTIONS
Neck Strain: Care Instructions  Your Care Instructions    You have strained the muscles and ligaments in your neck. A sudden, awkward movement can strain the neck. This often occurs with falls or car accidents or during certain sports. Everyday activities like working on a computer or sleeping can also cause neck strain if they force you to hold your neck in an awkward position for a long time. It is common for neck pain to get worse for a day or two after an injury, but it should start to feel better after that. You may have more pain and stiffness for several days before it gets better. This is expected. It may take a few weeks or longer for it to heal completely. Good home treatment can help you get better faster and avoid future neck problems. Follow-up care is a key part of your treatment and safety. Be sure to make and go to all appointments, and call your doctor if you are having problems. It's also a good idea to know your test results and keep a list of the medicines you take. How can you care for yourself at home? · If you were given a neck brace (cervical collar) to limit neck motion, wear it as instructed for as many days as your doctor tells you to. Do not wear it longer than you were told to. Wearing a brace for too long can make neck stiffness worse and weaken the neck muscles. · You can try using heat or ice to see if it helps. ¨ Try using a heating pad on a low or medium setting for 15 to 20 minutes every 2 to 3 hours. Try a warm shower in place of one session with the heating pad. You can also buy single-use heat wraps that last up to 8 hours. ¨ You can also try an ice pack for 10 to 15 minutes every 2 to 3 hours. · Take pain medicines exactly as directed. ¨ If the doctor gave you a prescription medicine for pain, take it as prescribed. ¨ If you are not taking a prescription pain medicine, ask your doctor if you can take an over-the-counter medicine.   · Gently rub the area to relieve pain and help with blood flow. Do not massage the area if it hurts to do so. · Do not do anything that makes the pain worse. Take it easy for a couple of days. You can do your usual activities if they do not hurt your neck or put it at risk for more stress or injury. · Try sleeping on a special neck pillow. Place it under your neck, not under your head. Placing a tightly rolled-up towel under your neck while you sleep will also work. If you use a neck pillow or rolled towel, do not use your regular pillow at the same time. · To prevent future neck pain, do exercises to stretch and strengthen your neck and back. Learn how to use good posture, safe lifting techniques, and proper body mechanics. When should you call for help? Call 911 anytime you think you may need emergency care. For example, call if:  ? · You are unable to move an arm or a leg at all. ?Call your doctor now or seek immediate medical care if:  ? · You have new or worse symptoms in your arms, legs, chest, belly, or buttocks. Symptoms may include:  ¨ Numbness or tingling. ¨ Weakness. ¨ Pain. ? · You lose bladder or bowel control. ? Watch closely for changes in your health, and be sure to contact your doctor if:  ? · You are not getting better as expected. Where can you learn more? Go to http://mary lou-jimmy.info/. Enter M253 in the search box to learn more about \"Neck Strain: Care Instructions. \"  Current as of: March 21, 2017  Content Version: 11.4  © 8740-3352 [employer]. Care instructions adapted under license by [employer] (which disclaims liability or warranty for this information). If you have questions about a medical condition or this instruction, always ask your healthcare professional. [place] [address] any warranty or liability for your use of this information. Closed Head Injury: After Your Visit  Your Care Instructions  You have had a head injury.  Often, people cannot remember what happened right before or right after a head injury. Some head injuries can make you pass out, or lose consciousness, for a few seconds or minutes right after the injury. You need to have someone watch you closely for the next 24 hours. Contact your regular doctor to discuss follow-up care. Follow-up care is a key part of your treatment and safety. Be sure to make and go to all appointments, and call your doctor if you are having problems. It's also a good idea to know your test results and keep a list of the medicines you take. How can you care for yourself at home? · Have another adult watch you closely for the next 24 hours. That person should check for signs that your head injury is getting worse. · Put ice or a cold pack on the sore area for 10 to 20 minutes at a time. Put a thin cloth between the ice and your skin. · Take an over-the-counter pain medicine, such as acetaminophen (Tylenol), ibuprofen (Advil, Motrin), or naproxen (Aleve). Read and follow all instructions on the label. · You may sleep. If your doctor tells you to, have another adult check you at the suggested times to make sure you are able to wake up, recognize the other adult, and act normally. · Take it easy for the next few days or longer if you are not feeling well. · Do not drink any alcohol for at least the next 24 hours. What is postconcussive syndrome? If you have had a mild concussion, you may have a mild headache or just feel \"not quite right. \" These symptoms are common and usually go away on their own over a few days to 4 weeks. Sometimes after a concussion you may feel as if you are not functioning as well as you did before the injury, and you may develop new symptoms. This is called postconcussive syndrome. You may:  · Have changes in your ability to solve problems, think, concentrate, or remember. · Have headaches.   · Have changes in your sleep patterns, such as not being able to sleep or sleeping all the time. · Have changes in your personality. · Lack interest in your daily activities. · Become easily angered or anxious for no clear reason. · Have changes in your sex drive. · Lose your sense of taste or smell. · Be dizzy, lightheaded, or unsteady and find it hard to stand or walk. When should you call for help? Call 911 anytime you think you may need emergency care. For example, call if:  · You have twitching, jerking, or a seizure. · You suddenly cannot walk or stand. · You passed out (lost consciousness). · You are confused, do not know where you are, or are very sleepy or hard to wake up. Call your doctor now or seek immediate medical care if:  · You continue to vomit after 2 hours, or you have new vomiting. · You have a new watery (not like mucus from a cold) or bloody fluid coming from your nose or ears. · You have new weakness or numbness in any part of your body. · You have trouble walking. · Your headaches get worse. · Your vision changes. Watch closely for changes in your health, and be sure to contact your doctor if:  · You do not get better as expected. Where can you learn more? Go to [employer]  Enter B594 in the search box to learn more about \"Closed Head Injury: After Your Visit. \"   © 1028-7167 [employer]. Care instructions adapted under license by Lalita Treviño (which disclaims liability or warranty for this information). This care instruction is for use with your licensed healthcare professional. If you have questions about a medical condition or this instruction, always ask your healthcare professional. [address] any warranty or liability for your use of this information.   Content Version: 2.4.014229; Last Revised: June 27, 2012
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
## 2017-12-27 NOTE — ED NOTES
Ema Perez PA-C reviewed discharge instructions with the patient. The patient verbalized understanding. All questions and concerns were addressed. The patient declined a wheelchair and is discharged ambulatory in the care of family members with instructions and prescriptions in hand. Pt is alert and oriented x 4. Respirations are clear and unlabored.
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
## 2018-01-18 ENCOUNTER — HOSPITAL ENCOUNTER (OUTPATIENT)
Dept: INFUSION THERAPY | Age: 73
Discharge: HOME | End: 2018-01-18
Payer: MEDICARE

## 2018-01-18 VITALS
OXYGEN SATURATION: 100 % | HEART RATE: 72 BPM | SYSTOLIC BLOOD PRESSURE: 145 MMHG | DIASTOLIC BLOOD PRESSURE: 100 MMHG | TEMPERATURE: 97.5 F | RESPIRATION RATE: 18 BRPM

## 2018-01-18 PROCEDURE — 96402 CHEMO HORMON ANTINEOPL SQ/IM: CPT

## 2018-01-18 PROCEDURE — 74011250636 HC RX REV CODE- 250/636: Performed by: INTERNAL MEDICINE

## 2018-01-18 RX ORDER — LANREOTIDE ACETATE 120 MG/.5ML
120 INJECTION SUBCUTANEOUS ONCE
Status: COMPLETED | OUTPATIENT
Start: 2018-01-18 | End: 2018-01-18

## 2018-01-18 RX ADMIN — LANREOTIDE ACETATE 120 MG: 120 INJECTION SUBCUTANEOUS at 09:29

## 2018-01-18 NOTE — PROGRESS NOTES
Problem: Patient Education:  Go to Education Activity  Goal: Patient/Family Education  Outcome: Progressing Towards Goal  Patient educated on monitoring his blood pressure and to follow up with his physician if his new medication dosage adjustment doesn't help decrease his blood pressure. Pt voices understanding.
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
## 2018-02-12 RX ORDER — LANREOTIDE ACETATE 120 MG/.5ML
120 INJECTION SUBCUTANEOUS ONCE
Status: COMPLETED | OUTPATIENT
Start: 2018-02-15 | End: 2018-02-15

## 2018-02-15 ENCOUNTER — HOSPITAL ENCOUNTER (OUTPATIENT)
Dept: INFUSION THERAPY | Age: 73
Discharge: HOME | End: 2018-02-15
Payer: MEDICARE

## 2018-02-15 VITALS
DIASTOLIC BLOOD PRESSURE: 74 MMHG | TEMPERATURE: 97.8 F | RESPIRATION RATE: 18 BRPM | SYSTOLIC BLOOD PRESSURE: 115 MMHG | OXYGEN SATURATION: 99 % | HEART RATE: 73 BPM

## 2018-02-15 LAB
ANION GAP SERPL CALC-SCNC: 4 MMOL/L (ref 5–15)
BASOPHILS # BLD: 0 K/UL (ref 0–0.1)
BASOPHILS NFR BLD: 1 % (ref 0–1)
BUN SERPL-MCNC: 20 MG/DL (ref 6–20)
BUN/CREAT SERPL: 18 (ref 12–20)
CALCIUM SERPL-MCNC: 9 MG/DL (ref 8.5–10.1)
CHLORIDE SERPL-SCNC: 105 MMOL/L (ref 97–108)
CO2 SERPL-SCNC: 32 MMOL/L (ref 21–32)
CREAT SERPL-MCNC: 1.14 MG/DL (ref 0.7–1.3)
DIFFERENTIAL METHOD BLD: NORMAL
EOSINOPHIL # BLD: 0.3 K/UL (ref 0–0.4)
EOSINOPHIL NFR BLD: 5 % (ref 0–7)
ERYTHROCYTE [DISTWIDTH] IN BLOOD BY AUTOMATED COUNT: 12.7 % (ref 11.5–14.5)
GLUCOSE SERPL-MCNC: 73 MG/DL (ref 65–100)
HCT VFR BLD AUTO: 41 % (ref 36.6–50.3)
HGB BLD-MCNC: 14.1 G/DL (ref 12.1–17)
IMM GRANULOCYTES # BLD: 0 K/UL (ref 0–0.04)
IMM GRANULOCYTES NFR BLD AUTO: 0 % (ref 0–0.5)
LYMPHOCYTES # BLD: 1.1 K/UL (ref 0.8–3.5)
LYMPHOCYTES NFR BLD: 17 % (ref 12–49)
MCH RBC QN AUTO: 29.5 PG (ref 26–34)
MCHC RBC AUTO-ENTMCNC: 34.4 G/DL (ref 30–36.5)
MCV RBC AUTO: 85.8 FL (ref 80–99)
MONOCYTES # BLD: 0.6 K/UL (ref 0–1)
MONOCYTES NFR BLD: 9 % (ref 5–13)
NEUTS SEG # BLD: 4.6 K/UL (ref 1.8–8)
NEUTS SEG NFR BLD: 69 % (ref 32–75)
NRBC # BLD: 0 K/UL (ref 0–0.01)
NRBC BLD-RTO: 0 PER 100 WBC
PLATELET # BLD AUTO: 194 K/UL (ref 150–400)
PMV BLD AUTO: 9.7 FL (ref 8.9–12.9)
POTASSIUM SERPL-SCNC: 4 MMOL/L (ref 3.5–5.1)
RBC # BLD AUTO: 4.78 M/UL (ref 4.1–5.7)
SODIUM SERPL-SCNC: 141 MMOL/L (ref 136–145)
WBC # BLD AUTO: 6.6 K/UL (ref 4.1–11.1)

## 2018-02-15 PROCEDURE — 36415 COLL VENOUS BLD VENIPUNCTURE: CPT | Performed by: INTERNAL MEDICINE

## 2018-02-15 PROCEDURE — 96402 CHEMO HORMON ANTINEOPL SQ/IM: CPT

## 2018-02-15 PROCEDURE — 85025 COMPLETE CBC W/AUTO DIFF WBC: CPT | Performed by: INTERNAL MEDICINE

## 2018-02-15 PROCEDURE — 80048 BASIC METABOLIC PNL TOTAL CA: CPT | Performed by: INTERNAL MEDICINE

## 2018-02-15 PROCEDURE — 74011250636 HC RX REV CODE- 250/636: Performed by: INTERNAL MEDICINE

## 2018-02-15 RX ADMIN — LANREOTIDE ACETATE 120 MG: 120 INJECTION SUBCUTANEOUS at 09:20

## 2018-02-15 NOTE — PROGRESS NOTES
Patient arrived to [place] [address] at 1028 for Lanreotide. Assessment unremarkable except pt c/o of dizziness, hot flashes and generalized fatigue. Visit Vitals    /74 (BP 1 Location: Left arm, BP Patient Position: At rest;Sitting)    Pulse 73    Temp 97.8 °F (36.6 °C)    Resp 18    SpO2 99%       Labs Obtained: Cbc w/ diff and BMP were drawn. Please follow up in [facility] for results    Medications given: Lanreotide 120 mg deep Sub-Q injection in Left GM        Patient tolerated treatment with no adverse reaction noted. Discharged at [facility] accompanied by Spouse.  Next appointment is 3/15/18

## 2018-02-26 ENCOUNTER — HOSPITAL ENCOUNTER (OUTPATIENT)
Dept: NUCLEAR MEDICINE | Age: 73
Discharge: HOME | End: 2018-02-26
Attending: INTERNAL MEDICINE
Payer: MEDICARE

## 2018-02-26 ENCOUNTER — HOSPITAL ENCOUNTER (OUTPATIENT)
Dept: CT IMAGING | Age: 73
Discharge: HOME | End: 2018-02-26
Attending: INTERNAL MEDICINE
Payer: MEDICARE

## 2018-02-26 DIAGNOSIS — C7A.8 NEUROENDOCRINE CARCINOMA METASTATIC TO BONE (HCC): ICD-10-CM

## 2018-02-26 DIAGNOSIS — C7B.8 NEUROENDOCRINE CARCINOMA METASTATIC TO BONE (HCC): ICD-10-CM

## 2018-02-26 PROCEDURE — 74177 CT ABD & PELVIS W/CONTRAST: CPT

## 2018-02-26 PROCEDURE — 78306 BONE IMAGING WHOLE BODY: CPT

## 2018-02-26 PROCEDURE — 74011636320 HC RX REV CODE- 636/320: Performed by: INTERNAL MEDICINE

## 2018-02-26 PROCEDURE — 74011250636 HC RX REV CODE- 250/636: Performed by: INTERNAL MEDICINE

## 2018-02-26 RX ORDER — SODIUM CHLORIDE 9 MG/ML
50 INJECTION, SOLUTION INTRAVENOUS
Status: COMPLETED | OUTPATIENT
Start: 2018-02-26 | End: 2018-02-26

## 2018-02-26 RX ORDER — SODIUM CHLORIDE 0.9 % (FLUSH) 0.9 %
10 SYRINGE (ML) INJECTION
Status: COMPLETED | OUTPATIENT
Start: 2018-02-26 | End: 2018-02-26

## 2018-02-26 RX ORDER — BARIUM SULFATE 20 MG/ML
900 SUSPENSION ORAL
Status: DISCONTINUED | OUTPATIENT
Start: 2018-02-26 | End: 2018-02-26

## 2018-02-26 RX ADMIN — Medication 10 ML: at 09:31

## 2018-02-26 RX ADMIN — IOPAMIDOL 100 ML: 755 INJECTION, SOLUTION INTRAVENOUS at 09:31

## 2018-02-26 RX ADMIN — SODIUM CHLORIDE 50 ML/HR: 900 INJECTION, SOLUTION INTRAVENOUS at 09:31

## 2018-03-07 ENCOUNTER — OFFICE VISIT (OUTPATIENT)
Dept: ONCOLOGY | Age: 73
End: 2018-03-07

## 2018-03-07 VITALS
OXYGEN SATURATION: 98 % | SYSTOLIC BLOOD PRESSURE: 172 MMHG | WEIGHT: 189.2 LBS | RESPIRATION RATE: 16 BRPM | DIASTOLIC BLOOD PRESSURE: 90 MMHG | HEART RATE: 63 BPM | HEIGHT: 72 IN | TEMPERATURE: 97.4 F | BODY MASS INDEX: 25.63 KG/M2

## 2018-03-07 DIAGNOSIS — R23.2 FACIAL FLUSHING: ICD-10-CM

## 2018-03-07 DIAGNOSIS — C79.51 BONE METASTASIS (HCC): ICD-10-CM

## 2018-03-07 DIAGNOSIS — C7B.8 NEUROENDOCRINE CARCINOMA METASTATIC TO BONE (HCC): Primary | ICD-10-CM

## 2018-03-07 DIAGNOSIS — C7A.8 NEUROENDOCRINE CARCINOMA METASTATIC TO BONE (HCC): Primary | ICD-10-CM

## 2018-03-07 RX ORDER — LISINOPRIL 20 MG/1
TABLET ORAL
COMMUNITY
Start: 2018-02-16 | End: 2019-06-19 | Stop reason: DRUGHIGH

## 2018-03-07 NOTE — PROGRESS NOTES
Oncology Follow up Note        Patient: Cayla Villa MRN: [id number]  SSN: [id number]    YOB: 1945  Age: 71 y.o. Sex: male        Diagnosis:     1. 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 Somatuline Depot 120 mg SC  2. Xgeva 120mg SC     Subjective:      Cayla Villa is a 71 y.o. male with a diagnosis of metastatic neuroendocrine cancer. He started experiencing pain in both groins and the abdomen. A CT of the abdomen showed retroperitoneal adenopathy. CT guided biopsy of the retroperitoneal LN reveals a dx of well differentiated neuroendocrine carcinoma. Bone scan shows disease in the bone. Mr. Adela Comer is receiving Somatuline and scans show stable disease. He is here today with his wife to discuss his scan results. He does not have any new complaints except facial flushing which is not frequent. He also experienced occasional abdominal cramps.         Review of Systems:    Constitutional: fatigue, facial flushing  Eyes: negative  Ears, Nose, Mouth, Throat, and Face: negative  Respiratory: negative  Cardiovascular: negative  Gastrointestinal: negative  Genitourinary:negative  Integument/Breast: negative  Hematologic/Lymphatic: negative  Musculoskeletal: negative  Neurological: negative      Past Medical History:   Diagnosis Date    Adverse effect of anesthesia     low HR and very slow to wakeup    Cancer ([facility] [address] 5/2016    neuroendocrine, retroperitoneal LN involvement    Diarrhea     Frequent urination     Hypertension     Poor appetite     Unspecified adverse effect of anesthesia     \"hard to put to sleep and slow to wake up-heart rate dropped very low\"     Past Surgical History:   Procedure Laterality Date    COLONOSCOPY N/A 5/13/2016    COLONOSCOPY performed by Claudeen Dana, MD at [address] [place] [address]  5/13/2016         HX ORTHOPAEDIC      knee scope left knee    HX OTHER SURGICAL 09-    excision of scalp cyst     UPPER GI ENDOSCOPY,BIOPSY  5/13/2016           Family History   Problem Relation Age of Onset    Cancer Mother     Stroke Sister      Social History   Substance Use Topics    Smoking status: Former Smoker     Packs/day: 1.00     Years: 8.00     Quit date: 5/12/1972    Smokeless tobacco: Never Used    Alcohol use No      Prior to Admission medications    Medication Sig Start Date End Date Taking? Authorizing Provider   lisinopril (PRINIVIL, ZESTRIL) 20 mg tablet  2/16/18  Yes Historical Provider   methocarbamol (ROBAXIN-750) 750 mg tablet Take 1 Tab by mouth four (4) times daily. 12/26/17   HUMA Santa   lisinopril (PRINIVIL, ZESTRIL) 10 mg tablet Take 20 mg by mouth daily. Historical Provider          No Known Allergies        Objective:     Vitals:    03/07/18 1039   BP: 172/90   Pulse: 63   Resp: 16   Temp: 97.4 °F (36.3 °C)   TempSrc: Oral   SpO2: 98%   Weight: 189 lb 3.2 oz (85.8 kg)   Height: 6' (1.829 m)            Physical Exam:    GENERAL: alert, cooperative  EYE: negative  LYMPHATIC: Cervical, supraclavicular, and axillary nodes normal.   THROAT & NECK: normal and no erythema or exudates noted. LUNG: clear to auscultation bilaterally  HEART: regular rate and rhythm  ABDOMEN: soft, non-tender  EXTREMITIES: no cyanosis or edema  SKIN: Normal.  NEUROLOGIC: negative        IMAGING:     I personally reviewed the images. Enlarged and abnormal retroperitoneal adenopathy. Bone metastasis. CT Results (most recent):    Results from Hospital Encounter encounter on 02/26/18   CT ABD PELV W CONT   Narrative EXAM:  CT ABD PELV W CONT    INDICATION: neuroendocrine carcinoma    COMPARISON: 8/18/2017    CONTRAST:  100 mL of Isovue-370. TECHNIQUE:   Following the uneventful intravenous administration of contrast, thin axial  images were obtained through the abdomen and pelvis. Coronal and sagittal  reconstructions were generated.  Oral contrast was not administered. CT dose  reduction was achieved through use of a standardized protocol tailored for this  examination and automatic exposure control for dose modulation. FINDINGS:    Liver remains normal. Pancreas and adrenals and spleen are unremarkable. Kidneys  are normal.    Multiple retroperitoneal enlarged lymph nodes appear stable. The calcified mass at the root of the mesentery remains stable. There is no pelvic mass or adenopathy. There is chronic thickening of the wall  of the urinary bladder. Lung bases clear. Scattered sclerotic lesions in bones are stable. Impression IMPRESSION:  1. Stable retroperitoneal adenopathy. 2. Stable partially calcified mass at the root of the mesentery. 3. Stable sclerotic lesions in the skeleton. Assessment:     1. 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 Somatostatin analogues should continue to help. Tolerating treatment very well  A detailed system by system evaluation of side effect was performed to assess chemotherapy related toxicity. Blood counts are acceptable. Results reviewed with the patient  Repeat NM bone scans (2/26/2018) show stable disease  Repeat CT scan shows (2/26/2018 shows stable disease    Symptom management form reviewed with patient. 2. Bone metastasis    > Denosumab every 3 months  > No Bone pain        Plan:       > Continue Somatuline Depot  > Continue Denosumab  > Follow-up in 3/4 months      I saw the patient in conjunction with NILDA Oliva. Signed by: Balbir Barrow MD                     March 7, 2018        CC. You Villaseñor MD  CC.  Kim Narayan MD

## 2018-03-07 NOTE — PROGRESS NOTES
Wen Kang is a 71 y.o. male here today for Met. Neuroendocrine Cancer f/u. Elevated B/P; pt seeing cardiologist. Patient denies pain at this time; pt has intermittent stomach lower stomach pain; pt states pain subside on it's own. Patient states she has a H/A everyday; pt denies taking pain medication; pt states the pain subside on it's own. Patient denies N/V/D and constipation. Patient denies numbness and tingling. Patient denies falls. Visit Vitals    /90 (BP 1 Location: Left arm, BP Patient Position: Sitting)    Pulse 63    Temp 97.4 °F (36.3 °C) (Oral)    Resp 16    Ht 6' (1.829 m)    Wt 189 lb 3.2 oz (85.8 kg)    SpO2 98%    BMI 25.66 kg/m2     Health Maintenance Review: Patient reminded of \"due or due soon\" health maintenance. I have asked the patient to contact his/her primary care provider (PCP) for follow-up on his/her health maintenance.

## 2018-03-13 RX ORDER — LANREOTIDE ACETATE 120 MG/.5ML
120 INJECTION SUBCUTANEOUS ONCE
Status: COMPLETED | OUTPATIENT
Start: 2018-03-15 | End: 2018-03-15

## 2018-03-15 ENCOUNTER — HOSPITAL ENCOUNTER (OUTPATIENT)
Dept: INFUSION THERAPY | Age: 73
Discharge: HOME | End: 2018-03-15
Payer: MEDICARE

## 2018-03-15 VITALS
SYSTOLIC BLOOD PRESSURE: 146 MMHG | HEART RATE: 69 BPM | RESPIRATION RATE: 18 BRPM | TEMPERATURE: 97.6 F | OXYGEN SATURATION: 99 % | DIASTOLIC BLOOD PRESSURE: 95 MMHG

## 2018-03-15 LAB
ANION GAP BLD CALC-SCNC: 17 MMOL/L (ref 10–20)
BASOPHILS # BLD: 0 K/UL (ref 0–0.1)
BASOPHILS NFR BLD: 0 % (ref 0–1)
BUN BLD-MCNC: 20 MG/DL (ref 9–20)
CA-I BLD-MCNC: 1.19 MMOL/L (ref 1.12–1.32)
CHLORIDE BLD-SCNC: 104 MMOL/L (ref 98–107)
CO2 BLD-SCNC: 27 MMOL/L (ref 21–32)
CREAT BLD-MCNC: 1.2 MG/DL (ref 0.6–1.3)
DIFFERENTIAL METHOD BLD: NORMAL
EOSINOPHIL # BLD: 0.2 K/UL (ref 0–0.4)
EOSINOPHIL NFR BLD: 5 % (ref 0–7)
ERYTHROCYTE [DISTWIDTH] IN BLOOD BY AUTOMATED COUNT: 13 % (ref 11.5–14.5)
GLUCOSE BLD-MCNC: 156 MG/DL (ref 65–100)
HCT VFR BLD AUTO: 41.2 % (ref 36.6–50.3)
HCT VFR BLD CALC: 41 % (ref 36.6–50.3)
HGB BLD-MCNC: 13.7 G/DL (ref 12.1–17)
IMM GRANULOCYTES # BLD: 0 K/UL (ref 0–0.04)
IMM GRANULOCYTES NFR BLD AUTO: 0 % (ref 0–0.5)
LYMPHOCYTES # BLD: 1 K/UL (ref 0.8–3.5)
LYMPHOCYTES NFR BLD: 20 % (ref 12–49)
MAGNESIUM SERPL-MCNC: 2 MG/DL (ref 1.6–2.4)
MCH RBC QN AUTO: 28.9 PG (ref 26–34)
MCHC RBC AUTO-ENTMCNC: 33.3 G/DL (ref 30–36.5)
MCV RBC AUTO: 86.9 FL (ref 80–99)
MONOCYTES # BLD: 0.3 K/UL (ref 0–1)
MONOCYTES NFR BLD: 7 % (ref 5–13)
NEUTS SEG # BLD: 3.2 K/UL (ref 1.8–8)
NEUTS SEG NFR BLD: 67 % (ref 32–75)
NRBC # BLD: 0 K/UL (ref 0–0.01)
NRBC BLD-RTO: 0 PER 100 WBC
PHOSPHATE SERPL-MCNC: 2.9 MG/DL (ref 2.6–4.7)
PLATELET # BLD AUTO: 166 K/UL (ref 150–400)
PMV BLD AUTO: 10 FL (ref 8.9–12.9)
POTASSIUM BLD-SCNC: 4 MMOL/L (ref 3.5–5.1)
RBC # BLD AUTO: 4.74 M/UL (ref 4.1–5.7)
SERVICE CMNT-IMP: ABNORMAL
SODIUM BLD-SCNC: 144 MMOL/L (ref 136–145)
WBC # BLD AUTO: 4.8 K/UL (ref 4.1–11.1)

## 2018-03-15 PROCEDURE — 96372 THER/PROPH/DIAG INJ SC/IM: CPT

## 2018-03-15 PROCEDURE — 85025 COMPLETE CBC W/AUTO DIFF WBC: CPT | Performed by: INTERNAL MEDICINE

## 2018-03-15 PROCEDURE — 84100 ASSAY OF PHOSPHORUS: CPT | Performed by: INTERNAL MEDICINE

## 2018-03-15 PROCEDURE — 83735 ASSAY OF MAGNESIUM: CPT | Performed by: INTERNAL MEDICINE

## 2018-03-15 PROCEDURE — 74011250636 HC RX REV CODE- 250/636: Performed by: INTERNAL MEDICINE

## 2018-03-15 PROCEDURE — 80047 BASIC METABLC PNL IONIZED CA: CPT

## 2018-03-15 PROCEDURE — 36415 COLL VENOUS BLD VENIPUNCTURE: CPT | Performed by: INTERNAL MEDICINE

## 2018-03-15 RX ADMIN — DENOSUMAB 120 MG: 120 INJECTION SUBCUTANEOUS at 09:19

## 2018-03-15 RX ADMIN — LANREOTIDE ACETATE 120 MG: 120 INJECTION SUBCUTANEOUS at 09:19

## 2018-03-15 NOTE — PROGRESS NOTES
Pt arrived to [facility] ambulatory in no acute distress at 0900 for Somatuline Depot/Xgeva.  Assessment unremarkable except mild nausea. Labs obtained, CBC, Mag, Phos, Chem8. Visit Vitals    BP (!) 146/95 (BP 1 Location: Left arm, BP Patient Position: Sitting)    Pulse 69    Temp 97.6 °F (36.4 °C)    Resp 18    SpO2 99%     Recent Results (from the past 12 hour(s))   POC CHEM8    Collection Time: 03/15/18  9:09 AM   Result Value Ref Range    Calcium, ionized (POC) 1.19 1.12 - 1.32 MMOL/L    Sodium (POC) 144 136 - 145 MMOL/L    Potassium (POC) 4.0 3.5 - 5.1 MMOL/L    Chloride (POC) 104 98 - 107 MMOL/L    CO2 (POC) 27 21 - 32 MMOL/L    Anion gap (POC) 17 10 - 20 mmol/L    Glucose (POC) 156 (H) 65 - 100 MG/DL    BUN (POC) 20 9 - 20 MG/DL    Creatinine (POC) 1.2 0.6 - 1.3 MG/DL    GFRAA, POC >60 >60 ml/min/1.73m2    GFRNA, POC 60 (L) >60 ml/min/1.73m2    Hematocrit (POC) 41 36.6 - 50.3 %    Comment Comment Not Indicated. See [facility] for pending results. The following medications administered:  Xgeva 120mg SQ in L arm  Somatuline Depot 120mg SQ in R GM    Pt tolerated treatment well. Pt discharged ambulatory in no acute distress at 0925, accompanied by spouse. Next appointment 4/12/19 at 0900.

## 2018-04-09 RX ORDER — LANREOTIDE ACETATE 120 MG/.5ML
120 INJECTION SUBCUTANEOUS ONCE
Status: COMPLETED | OUTPATIENT
Start: 2018-04-12 | End: 2018-04-12

## 2018-04-12 ENCOUNTER — HOSPITAL ENCOUNTER (OUTPATIENT)
Dept: INFUSION THERAPY | Age: 73
Discharge: HOME | End: 2018-04-12
Payer: MEDICARE

## 2018-04-12 VITALS
TEMPERATURE: 98.5 F | HEART RATE: 108 BPM | SYSTOLIC BLOOD PRESSURE: 147 MMHG | DIASTOLIC BLOOD PRESSURE: 91 MMHG | RESPIRATION RATE: 18 BRPM

## 2018-04-12 LAB
ANION GAP SERPL CALC-SCNC: 4 MMOL/L (ref 5–15)
BASOPHILS # BLD: 0.1 K/UL (ref 0–0.1)
BASOPHILS NFR BLD: 1 % (ref 0–1)
BUN SERPL-MCNC: 17 MG/DL (ref 6–20)
BUN/CREAT SERPL: 14 (ref 12–20)
CALCIUM SERPL-MCNC: 8.7 MG/DL (ref 8.5–10.1)
CHLORIDE SERPL-SCNC: 109 MMOL/L (ref 97–108)
CO2 SERPL-SCNC: 29 MMOL/L (ref 21–32)
CREAT SERPL-MCNC: 1.2 MG/DL (ref 0.7–1.3)
DIFFERENTIAL METHOD BLD: NORMAL
EOSINOPHIL # BLD: 0.3 K/UL (ref 0–0.4)
EOSINOPHIL NFR BLD: 5 % (ref 0–7)
ERYTHROCYTE [DISTWIDTH] IN BLOOD BY AUTOMATED COUNT: 13.2 % (ref 11.5–14.5)
GLUCOSE SERPL-MCNC: 101 MG/DL (ref 65–100)
HCT VFR BLD AUTO: 42.7 % (ref 36.6–50.3)
HGB BLD-MCNC: 14.2 G/DL (ref 12.1–17)
IMM GRANULOCYTES # BLD: 0 K/UL (ref 0–0.04)
IMM GRANULOCYTES NFR BLD AUTO: 0 % (ref 0–0.5)
LYMPHOCYTES # BLD: 0.9 K/UL (ref 0.8–3.5)
LYMPHOCYTES NFR BLD: 17 % (ref 12–49)
MCH RBC QN AUTO: 29.2 PG (ref 26–34)
MCHC RBC AUTO-ENTMCNC: 33.3 G/DL (ref 30–36.5)
MCV RBC AUTO: 87.9 FL (ref 80–99)
MONOCYTES # BLD: 0.4 K/UL (ref 0–1)
MONOCYTES NFR BLD: 7 % (ref 5–13)
NEUTS SEG # BLD: 3.9 K/UL (ref 1.8–8)
NEUTS SEG NFR BLD: 70 % (ref 32–75)
NRBC # BLD: 0 K/UL (ref 0–0.01)
NRBC BLD-RTO: 0 PER 100 WBC
PLATELET # BLD AUTO: 166 K/UL (ref 150–400)
PMV BLD AUTO: 10 FL (ref 8.9–12.9)
POTASSIUM SERPL-SCNC: 4 MMOL/L (ref 3.5–5.1)
RBC # BLD AUTO: 4.86 M/UL (ref 4.1–5.7)
SODIUM SERPL-SCNC: 142 MMOL/L (ref 136–145)
WBC # BLD AUTO: 5.5 K/UL (ref 4.1–11.1)

## 2018-04-12 PROCEDURE — 36415 COLL VENOUS BLD VENIPUNCTURE: CPT | Performed by: INTERNAL MEDICINE

## 2018-04-12 PROCEDURE — 80048 BASIC METABOLIC PNL TOTAL CA: CPT | Performed by: INTERNAL MEDICINE

## 2018-04-12 PROCEDURE — 96402 CHEMO HORMON ANTINEOPL SQ/IM: CPT

## 2018-04-12 PROCEDURE — 85025 COMPLETE CBC W/AUTO DIFF WBC: CPT | Performed by: INTERNAL MEDICINE

## 2018-04-12 PROCEDURE — 74011250636 HC RX REV CODE- 250/636: Performed by: INTERNAL MEDICINE

## 2018-04-12 RX ADMIN — LANREOTIDE ACETATE 120 MG: 120 INJECTION SUBCUTANEOUS at 09:13

## 2018-04-12 NOTE — PROGRESS NOTES
Pt arrived to [facility] ambulatory for Lantreotide in no acute distress at 0900.  Assessment unremarkable except pt reports more frequent hot flashes. Labs obtained- CBC with diff and BMP. Visit Vitals    BP (!) 147/91 (BP 1 Location: Right arm, BP Patient Position: Sitting)    Pulse (!) 108    Temp 98.5 °F (36.9 °C)    Resp 18       Recent Results (from the past 12 hour(s))   CBC WITH AUTOMATED DIFF    Collection Time: 04/12/18  9:17 AM   Result Value Ref Range    WBC 5.5 4.1 - 11.1 K/uL    RBC 4.86 4.10 - 5.70 M/uL    HGB 14.2 12.1 - 17.0 g/dL    HCT 42.7 36.6 - 50.3 %    MCV 87.9 80.0 - 99.0 FL    MCH 29.2 26.0 - 34.0 PG    MCHC 33.3 30.0 - 36.5 g/dL    RDW 13.2 11.5 - 14.5 %    PLATELET 928 421 - 427 K/uL    MPV 10.0 8.9 - 12.9 FL    NRBC 0.0 0  WBC    ABSOLUTE NRBC 0.00 0.00 - 0.01 K/uL    NEUTROPHILS 70 32 - 75 %    LYMPHOCYTES 17 12 - 49 %    MONOCYTES 7 5 - 13 %    EOSINOPHILS 5 0 - 7 %    BASOPHILS 1 0 - 1 %    IMMATURE GRANULOCYTES 0 0.0 - 0.5 %    ABS. NEUTROPHILS 3.9 1.8 - 8.0 K/UL    ABS. LYMPHOCYTES 0.9 0.8 - 3.5 K/UL    ABS. MONOCYTES 0.4 0.0 - 1.0 K/UL    ABS. EOSINOPHILS 0.3 0.0 - 0.4 K/UL    ABS. BASOPHILS 0.1 0.0 - 0.1 K/UL    ABS. IMM. GRANS. 0.0 0.00 - 0.04 K/UL    DF AUTOMATED     METABOLIC PANEL, BASIC    Collection Time: 04/12/18  9:17 AM   Result Value Ref Range    Sodium 142 136 - 145 mmol/L    Potassium 4.0 3.5 - 5.1 mmol/L    Chloride 109 (H) 97 - 108 mmol/L    CO2 29 21 - 32 mmol/L    Anion gap 4 (L) 5 - 15 mmol/L    Glucose 101 (H) 65 - 100 mg/dL    BUN 17 6 - 20 MG/DL    Creatinine 1.20 0.70 - 1.30 MG/DL    BUN/Creatinine ratio 14 12 - 20      GFR est AA >60 >60 ml/min/1.73m2    GFR est non-AA 60 (L) >60 ml/min/1.73m2    Calcium 8.7 8.5 - 10.1 MG/DL       The following medications administered:  Lantreotide 120 mg SQ in left buttock    Pt tolerated treatment well.  No adverse reaction noted.   Pt discharged ambulatory in no acute distress at 0915, accompanied by spouse. Next appointment 5/10/18 @ 0900.

## 2018-05-08 RX ORDER — LANREOTIDE ACETATE 120 MG/.5ML
120 INJECTION SUBCUTANEOUS ONCE
Status: COMPLETED | OUTPATIENT
Start: 2018-05-10 | End: 2018-05-10

## 2018-05-10 ENCOUNTER — HOSPITAL ENCOUNTER (OUTPATIENT)
Dept: INFUSION THERAPY | Age: 73
Discharge: HOME | End: 2018-05-10
Payer: MEDICARE

## 2018-05-10 VITALS
DIASTOLIC BLOOD PRESSURE: 85 MMHG | HEART RATE: 64 BPM | RESPIRATION RATE: 18 BRPM | SYSTOLIC BLOOD PRESSURE: 133 MMHG | TEMPERATURE: 97.7 F | OXYGEN SATURATION: 99 %

## 2018-05-10 LAB
ANION GAP BLD CALC-SCNC: 17 MMOL/L (ref 10–20)
BASOPHILS # BLD: 0 K/UL (ref 0–0.1)
BASOPHILS NFR BLD: 1 % (ref 0–1)
BUN BLD-MCNC: 20 MG/DL (ref 9–20)
CA-I BLD-MCNC: 1.19 MMOL/L (ref 1.12–1.32)
CHLORIDE BLD-SCNC: 104 MMOL/L (ref 98–107)
CO2 BLD-SCNC: 26 MMOL/L (ref 21–32)
CREAT BLD-MCNC: 1.2 MG/DL (ref 0.6–1.3)
DIFFERENTIAL METHOD BLD: NORMAL
EOSINOPHIL # BLD: 0.3 K/UL (ref 0–0.4)
EOSINOPHIL NFR BLD: 6 % (ref 0–7)
ERYTHROCYTE [DISTWIDTH] IN BLOOD BY AUTOMATED COUNT: 13.1 % (ref 11.5–14.5)
GLUCOSE BLD-MCNC: 137 MG/DL (ref 65–100)
HCT VFR BLD AUTO: 43.3 % (ref 36.6–50.3)
HCT VFR BLD CALC: 43 % (ref 36.6–50.3)
HGB BLD-MCNC: 14.6 G/DL (ref 12.1–17)
IMM GRANULOCYTES # BLD: 0 K/UL (ref 0–0.04)
IMM GRANULOCYTES NFR BLD AUTO: 0 % (ref 0–0.5)
LYMPHOCYTES # BLD: 0.8 K/UL (ref 0.8–3.5)
LYMPHOCYTES NFR BLD: 17 % (ref 12–49)
MAGNESIUM SERPL-MCNC: 2.2 MG/DL (ref 1.6–2.4)
MCH RBC QN AUTO: 29.6 PG (ref 26–34)
MCHC RBC AUTO-ENTMCNC: 33.7 G/DL (ref 30–36.5)
MCV RBC AUTO: 87.7 FL (ref 80–99)
MONOCYTES # BLD: 0.4 K/UL (ref 0–1)
MONOCYTES NFR BLD: 7 % (ref 5–13)
NEUTS SEG # BLD: 3.5 K/UL (ref 1.8–8)
NEUTS SEG NFR BLD: 69 % (ref 32–75)
NRBC # BLD: 0 K/UL (ref 0–0.01)
NRBC BLD-RTO: 0 PER 100 WBC
PHOSPHATE SERPL-MCNC: 2.7 MG/DL (ref 2.6–4.7)
PLATELET # BLD AUTO: 169 K/UL (ref 150–400)
PMV BLD AUTO: 10.2 FL (ref 8.9–12.9)
POTASSIUM BLD-SCNC: 4 MMOL/L (ref 3.5–5.1)
RBC # BLD AUTO: 4.94 M/UL (ref 4.1–5.7)
SERVICE CMNT-IMP: ABNORMAL
SODIUM BLD-SCNC: 142 MMOL/L (ref 136–145)
WBC # BLD AUTO: 5 K/UL (ref 4.1–11.1)

## 2018-05-10 PROCEDURE — 74011250636 HC RX REV CODE- 250/636: Performed by: INTERNAL MEDICINE

## 2018-05-10 PROCEDURE — 96402 CHEMO HORMON ANTINEOPL SQ/IM: CPT

## 2018-05-10 PROCEDURE — 83735 ASSAY OF MAGNESIUM: CPT | Performed by: INTERNAL MEDICINE

## 2018-05-10 PROCEDURE — 84100 ASSAY OF PHOSPHORUS: CPT | Performed by: INTERNAL MEDICINE

## 2018-05-10 PROCEDURE — 36415 COLL VENOUS BLD VENIPUNCTURE: CPT | Performed by: INTERNAL MEDICINE

## 2018-05-10 PROCEDURE — 85025 COMPLETE CBC W/AUTO DIFF WBC: CPT | Performed by: INTERNAL MEDICINE

## 2018-05-10 PROCEDURE — 80047 BASIC METABLC PNL IONIZED CA: CPT

## 2018-05-10 PROCEDURE — 96372 THER/PROPH/DIAG INJ SC/IM: CPT

## 2018-05-10 RX ADMIN — DENOSUMAB 120 MG: 120 INJECTION SUBCUTANEOUS at 09:25

## 2018-05-10 RX ADMIN — LANREOTIDE ACETATE 120 MG: 120 INJECTION SUBCUTANEOUS at 09:27

## 2018-05-10 NOTE — PROGRESS NOTES
Pt arrived to [facility] ambulatory for Xgeva/Lantreotide in no acute distress at 0905.  Assessment unremarkable except increasing hot flashes. Labs obtained- CBC, Mag, Phos, and Chem 8 I-stat. Visit Vitals    /85 (BP 1 Location: Left arm, BP Patient Position: Sitting)    Pulse 64    Temp 97.7 °F (36.5 °C)    Resp 18    SpO2 99%     Recent Results (from the past 12 hour(s))   CBC WITH AUTOMATED DIFF    Collection Time: 05/10/18  9:10 AM   Result Value Ref Range    WBC 5.0 4.1 - 11.1 K/uL    RBC 4.94 4.10 - 5.70 M/uL    HGB 14.6 12.1 - 17.0 g/dL    HCT 43.3 36.6 - 50.3 %    MCV 87.7 80.0 - 99.0 FL    MCH 29.6 26.0 - 34.0 PG    MCHC 33.7 30.0 - 36.5 g/dL    RDW 13.1 11.5 - 14.5 %    PLATELET 522 187 - 603 K/uL    MPV 10.2 8.9 - 12.9 FL    NRBC 0.0 0  WBC    ABSOLUTE NRBC 0.00 0.00 - 0.01 K/uL    NEUTROPHILS 69 32 - 75 %    LYMPHOCYTES 17 12 - 49 %    MONOCYTES 7 5 - 13 %    EOSINOPHILS 6 0 - 7 %    BASOPHILS 1 0 - 1 %    IMMATURE GRANULOCYTES 0 0.0 - 0.5 %    ABS. NEUTROPHILS 3.5 1.8 - 8.0 K/UL    ABS. LYMPHOCYTES 0.8 0.8 - 3.5 K/UL    ABS. MONOCYTES 0.4 0.0 - 1.0 K/UL    ABS. EOSINOPHILS 0.3 0.0 - 0.4 K/UL    ABS. BASOPHILS 0.0 0.0 - 0.1 K/UL    ABS. IMM.  GRANS. 0.0 0.00 - 0.04 K/UL    DF AUTOMATED     PHOSPHORUS    Collection Time: 05/10/18  9:10 AM   Result Value Ref Range    Phosphorus 2.7 2.6 - 4.7 MG/DL   MAGNESIUM    Collection Time: 05/10/18  9:10 AM   Result Value Ref Range    Magnesium 2.2 1.6 - 2.4 mg/dL   POC CHEM8    Collection Time: 05/10/18  9:15 AM   Result Value Ref Range    Calcium, ionized (POC) 1.19 1.12 - 1.32 mmol/L    Sodium (POC) 142 136 - 145 mmol/L    Potassium (POC) 4.0 3.5 - 5.1 mmol/L    Chloride (POC) 104 98 - 107 mmol/L    CO2 (POC) 26 21 - 32 mmol/L    Anion gap (POC) 17 10 - 20 mmol/L    Glucose (POC) 137 (H) 65 - 100 mg/dL    BUN (POC) 20 9 - 20 mg/dL    Creatinine (POC) 1.2 0.6 - 1.3 mg/dL    GFRAA, POC >60 >60 ml/min/1.73m2    GFRNA, POC 60 (L) >60 ml/min/1.73m2    Hematocrit (POC) 43 36.6 - 50.3 %    Comment Notified RN or MD immediately by        Ionized Calcium 1.19, within range for Xgeva today. The following medications administered:  Xgeva 120 mg SQ in left arm  Lantreotide 120 mg SQ in right buttock    Pt tolerated treatment well.  No adverse reaction noted. Pt discharged ambulatory in no acute distress at 0930, accompanied by spouse. Next appointment 6/7/18 @ 0900.

## 2018-06-04 RX ORDER — LANREOTIDE ACETATE 120 MG/.5ML
120 INJECTION SUBCUTANEOUS ONCE
Status: COMPLETED | OUTPATIENT
Start: 2018-06-07 | End: 2018-06-07

## 2018-06-07 ENCOUNTER — HOSPITAL ENCOUNTER (OUTPATIENT)
Dept: INFUSION THERAPY | Age: 73
Discharge: HOME | End: 2018-06-07
Payer: MEDICARE

## 2018-06-07 VITALS
OXYGEN SATURATION: 99 % | RESPIRATION RATE: 18 BRPM | TEMPERATURE: 97.4 F | SYSTOLIC BLOOD PRESSURE: 142 MMHG | HEART RATE: 61 BPM | DIASTOLIC BLOOD PRESSURE: 98 MMHG

## 2018-06-07 LAB
ANION GAP SERPL CALC-SCNC: 5 MMOL/L (ref 5–15)
BASOPHILS # BLD: 0 K/UL (ref 0–0.1)
BASOPHILS NFR BLD: 1 % (ref 0–1)
BUN SERPL-MCNC: 15 MG/DL (ref 6–20)
BUN/CREAT SERPL: 14 (ref 12–20)
CALCIUM SERPL-MCNC: 8.9 MG/DL (ref 8.5–10.1)
CHLORIDE SERPL-SCNC: 109 MMOL/L (ref 97–108)
CO2 SERPL-SCNC: 29 MMOL/L (ref 21–32)
CREAT SERPL-MCNC: 1.11 MG/DL (ref 0.7–1.3)
DIFFERENTIAL METHOD BLD: ABNORMAL
EOSINOPHIL # BLD: 0.3 K/UL (ref 0–0.4)
EOSINOPHIL NFR BLD: 6 % (ref 0–7)
ERYTHROCYTE [DISTWIDTH] IN BLOOD BY AUTOMATED COUNT: 12.9 % (ref 11.5–14.5)
GLUCOSE SERPL-MCNC: 106 MG/DL (ref 65–100)
HCT VFR BLD AUTO: 41 % (ref 36.6–50.3)
HGB BLD-MCNC: 14 G/DL (ref 12.1–17)
IMM GRANULOCYTES # BLD: 0 K/UL (ref 0–0.04)
IMM GRANULOCYTES NFR BLD AUTO: 1 % (ref 0–0.5)
LYMPHOCYTES # BLD: 0.8 K/UL (ref 0.8–3.5)
LYMPHOCYTES NFR BLD: 19 % (ref 12–49)
MCH RBC QN AUTO: 30 PG (ref 26–34)
MCHC RBC AUTO-ENTMCNC: 34.1 G/DL (ref 30–36.5)
MCV RBC AUTO: 88 FL (ref 80–99)
MONOCYTES # BLD: 0.4 K/UL (ref 0–1)
MONOCYTES NFR BLD: 9 % (ref 5–13)
NEUTS SEG # BLD: 2.9 K/UL (ref 1.8–8)
NEUTS SEG NFR BLD: 66 % (ref 32–75)
NRBC # BLD: 0 K/UL (ref 0–0.01)
NRBC BLD-RTO: 0 PER 100 WBC
PLATELET # BLD AUTO: 159 K/UL (ref 150–400)
PMV BLD AUTO: 10 FL (ref 8.9–12.9)
POTASSIUM SERPL-SCNC: 3.8 MMOL/L (ref 3.5–5.1)
RBC # BLD AUTO: 4.66 M/UL (ref 4.1–5.7)
SODIUM SERPL-SCNC: 143 MMOL/L (ref 136–145)
WBC # BLD AUTO: 4.4 K/UL (ref 4.1–11.1)

## 2018-06-07 PROCEDURE — 36415 COLL VENOUS BLD VENIPUNCTURE: CPT | Performed by: INTERNAL MEDICINE

## 2018-06-07 PROCEDURE — 80048 BASIC METABOLIC PNL TOTAL CA: CPT | Performed by: INTERNAL MEDICINE

## 2018-06-07 PROCEDURE — 85025 COMPLETE CBC W/AUTO DIFF WBC: CPT | Performed by: INTERNAL MEDICINE

## 2018-06-07 PROCEDURE — 96402 CHEMO HORMON ANTINEOPL SQ/IM: CPT

## 2018-06-07 PROCEDURE — 74011250636 HC RX REV CODE- 250/636: Performed by: INTERNAL MEDICINE

## 2018-06-07 RX ADMIN — LANREOTIDE ACETATE 120 MG: 120 INJECTION SUBCUTANEOUS at 09:13

## 2018-06-07 NOTE — PROGRESS NOTES
0900 Pt arrived at [facility] ambulatory and in no distress for Lantreotide. Assessment completed, no new complaints voiced. Pt's BP elevated- pt reports taking BP medication 30 minutes before appointment. Visit Vitals    BP (!) 142/98 (BP 1 Location: Right arm, BP Patient Position: Sitting)    Pulse 61    Temp 97.4 °F (36.3 °C)    Resp 18    SpO2 99%     CBC with diff and BMP drawn from right AC. Recent Results (from the past 12 hour(s))   CBC WITH AUTOMATED DIFF    Collection Time: 06/07/18  9:10 AM   Result Value Ref Range    WBC 4.4 4.1 - 11.1 K/uL    RBC 4.66 4.10 - 5.70 M/uL    HGB 14.0 12.1 - 17.0 g/dL    HCT 41.0 36.6 - 50.3 %    MCV 88.0 80.0 - 99.0 FL    MCH 30.0 26.0 - 34.0 PG    MCHC 34.1 30.0 - 36.5 g/dL    RDW 12.9 11.5 - 14.5 %    PLATELET 435 135 - 588 K/uL    MPV 10.0 8.9 - 12.9 FL    NRBC 0.0 0  WBC    ABSOLUTE NRBC 0.00 0.00 - 0.01 K/uL    NEUTROPHILS 66 32 - 75 %    LYMPHOCYTES 19 12 - 49 %    MONOCYTES 9 5 - 13 %    EOSINOPHILS 6 0 - 7 %    BASOPHILS 1 0 - 1 %    IMMATURE GRANULOCYTES 1 (H) 0.0 - 0.5 %    ABS. NEUTROPHILS 2.9 1.8 - 8.0 K/UL    ABS. LYMPHOCYTES 0.8 0.8 - 3.5 K/UL    ABS. MONOCYTES 0.4 0.0 - 1.0 K/UL    ABS. EOSINOPHILS 0.3 0.0 - 0.4 K/UL    ABS. BASOPHILS 0.0 0.0 - 0.1 K/UL    ABS. IMM. GRANS. 0.0 0.00 - 0.04 K/UL    DF AUTOMATED       BMP pending at time of note. See [facility] for results. Medications received:  Lantreotide 120 mg SQ to left buttock    0915 Tolerated treatment well, no adverse reaction noted. D/Cd from [facility] ambulatory and in no distress accompanied by spouse. Next appt 7/5/18 @ 0900.

## 2018-07-05 ENCOUNTER — HOSPITAL ENCOUNTER (OUTPATIENT)
Dept: INFUSION THERAPY | Age: 73
Discharge: HOME | End: 2018-07-05
Payer: MEDICARE

## 2018-07-05 VITALS
HEART RATE: 61 BPM | RESPIRATION RATE: 18 BRPM | SYSTOLIC BLOOD PRESSURE: 149 MMHG | DIASTOLIC BLOOD PRESSURE: 93 MMHG | OXYGEN SATURATION: 99 % | TEMPERATURE: 97.4 F

## 2018-07-05 LAB
ANION GAP SERPL CALC-SCNC: 5 MMOL/L (ref 5–15)
BASOPHILS # BLD: 0 K/UL (ref 0–0.1)
BASOPHILS NFR BLD: 1 % (ref 0–1)
BUN SERPL-MCNC: 20 MG/DL (ref 6–20)
BUN/CREAT SERPL: 18 (ref 12–20)
CALCIUM SERPL-MCNC: 8.6 MG/DL (ref 8.5–10.1)
CHLORIDE SERPL-SCNC: 108 MMOL/L (ref 97–108)
CO2 SERPL-SCNC: 29 MMOL/L (ref 21–32)
CREAT SERPL-MCNC: 1.14 MG/DL (ref 0.7–1.3)
DIFFERENTIAL METHOD BLD: NORMAL
EOSINOPHIL # BLD: 0.2 K/UL (ref 0–0.4)
EOSINOPHIL NFR BLD: 5 % (ref 0–7)
ERYTHROCYTE [DISTWIDTH] IN BLOOD BY AUTOMATED COUNT: 12.8 % (ref 11.5–14.5)
GLUCOSE SERPL-MCNC: 103 MG/DL (ref 65–100)
HCT VFR BLD AUTO: 41.7 % (ref 36.6–50.3)
HGB BLD-MCNC: 14.1 G/DL (ref 12.1–17)
IMM GRANULOCYTES # BLD: 0 K/UL (ref 0–0.04)
IMM GRANULOCYTES NFR BLD AUTO: 0 % (ref 0–0.5)
LYMPHOCYTES # BLD: 1 K/UL (ref 0.8–3.5)
LYMPHOCYTES NFR BLD: 19 % (ref 12–49)
MCH RBC QN AUTO: 29.7 PG (ref 26–34)
MCHC RBC AUTO-ENTMCNC: 33.8 G/DL (ref 30–36.5)
MCV RBC AUTO: 87.8 FL (ref 80–99)
MONOCYTES # BLD: 0.4 K/UL (ref 0–1)
MONOCYTES NFR BLD: 9 % (ref 5–13)
NEUTS SEG # BLD: 3.4 K/UL (ref 1.8–8)
NEUTS SEG NFR BLD: 67 % (ref 32–75)
NRBC # BLD: 0 K/UL (ref 0–0.01)
NRBC BLD-RTO: 0 PER 100 WBC
PLATELET # BLD AUTO: 152 K/UL (ref 150–400)
PMV BLD AUTO: 10.2 FL (ref 8.9–12.9)
POTASSIUM SERPL-SCNC: 3.8 MMOL/L (ref 3.5–5.1)
RBC # BLD AUTO: 4.75 M/UL (ref 4.1–5.7)
SODIUM SERPL-SCNC: 142 MMOL/L (ref 136–145)
WBC # BLD AUTO: 5.1 K/UL (ref 4.1–11.1)

## 2018-07-05 PROCEDURE — 80048 BASIC METABOLIC PNL TOTAL CA: CPT | Performed by: INTERNAL MEDICINE

## 2018-07-05 PROCEDURE — 74011250636 HC RX REV CODE- 250/636: Performed by: INTERNAL MEDICINE

## 2018-07-05 PROCEDURE — 85025 COMPLETE CBC W/AUTO DIFF WBC: CPT | Performed by: INTERNAL MEDICINE

## 2018-07-05 PROCEDURE — 36415 COLL VENOUS BLD VENIPUNCTURE: CPT | Performed by: INTERNAL MEDICINE

## 2018-07-05 PROCEDURE — 96402 CHEMO HORMON ANTINEOPL SQ/IM: CPT

## 2018-07-05 RX ORDER — LANREOTIDE ACETATE 120 MG/.5ML
120 INJECTION SUBCUTANEOUS ONCE
Status: COMPLETED | OUTPATIENT
Start: 2018-07-05 | End: 2018-07-05

## 2018-07-05 RX ADMIN — LANREOTIDE ACETATE 120 MG: 120 INJECTION SUBCUTANEOUS at 09:05

## 2018-07-05 NOTE — PROGRESS NOTES
0830 Pt arrived at [facility] ambulatory and in no distress for Lantreotide. Assessment completed. Pt reports increased tingling in hands/feet. Visit Vitals    BP (!) 149/93 (BP 1 Location: Right arm, BP Patient Position: Sitting)    Pulse 61    Temp 97.4 °F (36.3 °C)    Resp 18    SpO2 99%     CBC with diff and BMP drawn from right AC. Recent Results (from the past 12 hour(s))   CBC WITH AUTOMATED DIFF    Collection Time: 07/05/18  8:42 AM   Result Value Ref Range    WBC 5.1 4.1 - 11.1 K/uL    RBC 4.75 4.10 - 5.70 M/uL    HGB 14.1 12.1 - 17.0 g/dL    HCT 41.7 36.6 - 50.3 %    MCV 87.8 80.0 - 99.0 FL    MCH 29.7 26.0 - 34.0 PG    MCHC 33.8 30.0 - 36.5 g/dL    RDW 12.8 11.5 - 14.5 %    PLATELET 753 407 - 566 K/uL    MPV 10.2 8.9 - 12.9 FL    NRBC 0.0 0  WBC    ABSOLUTE NRBC 0.00 0.00 - 0.01 K/uL    NEUTROPHILS 67 32 - 75 %    LYMPHOCYTES 19 12 - 49 %    MONOCYTES 9 5 - 13 %    EOSINOPHILS 5 0 - 7 %    BASOPHILS 1 0 - 1 %    IMMATURE GRANULOCYTES 0 0.0 - 0.5 %    ABS. NEUTROPHILS 3.4 1.8 - 8.0 K/UL    ABS. LYMPHOCYTES 1.0 0.8 - 3.5 K/UL    ABS. MONOCYTES 0.4 0.0 - 1.0 K/UL    ABS. EOSINOPHILS 0.2 0.0 - 0.4 K/UL    ABS. BASOPHILS 0.0 0.0 - 0.1 K/UL    ABS. IMM. GRANS. 0.0 0.00 - 0.04 K/UL    DF AUTOMATED     METABOLIC PANEL, BASIC    Collection Time: 07/05/18  8:42 AM   Result Value Ref Range    Sodium 142 136 - 145 mmol/L    Potassium 3.8 3.5 - 5.1 mmol/L    Chloride 108 97 - 108 mmol/L    CO2 29 21 - 32 mmol/L    Anion gap 5 5 - 15 mmol/L    Glucose 103 (H) 65 - 100 mg/dL    BUN 20 6 - 20 MG/DL    Creatinine 1.14 0.70 - 1.30 MG/DL    BUN/Creatinine ratio 18 12 - 20      GFR est AA >60 >60 ml/min/1.73m2    GFR est non-AA >60 >60 ml/min/1.73m2    Calcium 8.6 8.5 - 10.1 MG/DL       Medications received:  Lantreotide 120 mg SQ to right buttock    0910 Tolerated treatment well, no adverse reaction noted. D/Cd from [facility] ambulatory and in no distress accompanied by spouse. Next appt 8/2/18 @ 0900.

## 2018-07-13 ENCOUNTER — OFFICE VISIT (OUTPATIENT)
Dept: ONCOLOGY | Age: 73
End: 2018-07-13

## 2018-07-13 VITALS
WEIGHT: 190 LBS | RESPIRATION RATE: 16 BRPM | OXYGEN SATURATION: 95 % | HEART RATE: 73 BPM | SYSTOLIC BLOOD PRESSURE: 145 MMHG | HEIGHT: 72 IN | DIASTOLIC BLOOD PRESSURE: 82 MMHG | TEMPERATURE: 97.6 F | BODY MASS INDEX: 25.73 KG/M2

## 2018-07-13 DIAGNOSIS — C79.51 BONE METASTASIS (HCC): ICD-10-CM

## 2018-07-13 DIAGNOSIS — C7A.8 NEUROENDOCRINE CARCINOMA METASTATIC TO BONE (HCC): Primary | ICD-10-CM

## 2018-07-13 DIAGNOSIS — C7B.8 NEUROENDOCRINE CARCINOMA METASTATIC TO BONE (HCC): Primary | ICD-10-CM

## 2018-07-13 NOTE — PROGRESS NOTES
Oncology Follow up Note        Patient: Samantha Ortiz MRN: [id number]  SSN: [id number]    YOB: 1945  Age: 71 y.o. Sex: male        Diagnosis:     1. 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 Somatuline Depot 120 mg SC  2. Xgeva 120mg SC     Subjective:      Samantha Ortiz is a 71 y.o. male with a diagnosis of metastatic neuroendocrine cancer. He started experiencing pain in both groins and the abdomen. A CT of the abdomen showed retroperitoneal adenopathy. CT guided biopsy of the retroperitoneal LN reveals a dx of well differentiated neuroendocrine carcinoma. Bone scan shows disease in the bone. Mr. Amaury Foley is receiving Somatuline and scans show stable disease. He continues to complain about side effects of fatigue + diarrhea and swelling in his hands. Denies any significant bone pains.       Review of Systems:    Constitutional: fatigue, facial flushing  Eyes: negative  Ears, Nose, Mouth, Throat, and Face: negative  Respiratory: negative  Cardiovascular: negative  Gastrointestinal: diarrhea  Genitourinary:negative  Integument/Breast: negative  Hematologic/Lymphatic: negative  Musculoskeletal: negative  Neurological: negative      Past Medical History:   Diagnosis Date    Adverse effect of anesthesia     low HR and very slow to wakeup    Cancer ([facility] [address] 5/2016    neuroendocrine, retroperitoneal LN involvement    Diarrhea     Frequent urination     Hypertension     Poor appetite     Unspecified adverse effect of anesthesia     \"hard to put to sleep and slow to wake up-heart rate dropped very low\"     Past Surgical History:   Procedure Laterality Date    COLONOSCOPY N/A 5/13/2016    COLONOSCOPY performed by Terrence Franz MD at [address] [place] [address]  5/13/2016         HX ORTHOPAEDIC      knee scope left knee    HX OTHER SURGICAL  09-    excision of scalp cyst     UPPER GI ENDOSCOPY,BIOPSY 5/13/2016           Family History   Problem Relation Age of Onset    Cancer Mother     Stroke Sister      Social History   Substance Use Topics    Smoking status: Former Smoker     Packs/day: 1.00     Years: 8.00     Quit date: 5/12/1972    Smokeless tobacco: Never Used    Alcohol use No      Prior to Admission medications    Medication Sig Start Date End Date Taking? Authorizing Provider   lisinopril (PRINIVIL, ZESTRIL) 20 mg tablet  2/16/18  Yes Historical Provider   methocarbamol (ROBAXIN-750) 750 mg tablet Take 1 Tab by mouth four (4) times daily. 12/26/17   HUMA Cooper   lisinopril (PRINIVIL, ZESTRIL) 10 mg tablet Take 20 mg by mouth daily. Historical Provider          No Known Allergies        Objective:     Vitals:    07/13/18 1048   BP: 145/82   Pulse: 73   Resp: 16   Temp: 97.6 °F (36.4 °C)   TempSrc: Oral   SpO2: 95%   Weight: 190 lb (86.2 kg)   Height: 6' (1.829 m)            Physical Exam:    GENERAL: alert, cooperative  EYE: negative  LYMPHATIC: Cervical, supraclavicular, and axillary nodes normal.   THROAT & NECK: normal and no erythema or exudates noted. LUNG: clear to auscultation bilaterally  HEART: regular rate and rhythm  ABDOMEN: soft, non-tender  EXTREMITIES: no cyanosis or edema  SKIN: Normal.  NEUROLOGIC: negative        IMAGING:     I personally reviewed the images. Enlarged and abnormal retroperitoneal adenopathy. Bone metastasis. CT Results (most recent):    Results from Hospital Encounter encounter on 02/26/18   CT ABD PELV W CONT   Narrative EXAM:  CT ABD PELV W CONT    INDICATION: neuroendocrine carcinoma    COMPARISON: 8/18/2017    CONTRAST:  100 mL of Isovue-370. TECHNIQUE:   Following the uneventful intravenous administration of contrast, thin axial  images were obtained through the abdomen and pelvis. Coronal and sagittal  reconstructions were generated. Oral contrast was not administered.  CT dose  reduction was achieved through use of a standardized protocol tailored for this  examination and automatic exposure control for dose modulation. FINDINGS:    Liver remains normal. Pancreas and adrenals and spleen are unremarkable. Kidneys  are normal.    Multiple retroperitoneal enlarged lymph nodes appear stable. The calcified mass at the root of the mesentery remains stable. There is no pelvic mass or adenopathy. There is chronic thickening of the wall  of the urinary bladder. Lung bases clear. Scattered sclerotic lesions in bones are stable. Impression IMPRESSION:  1. Stable retroperitoneal adenopathy. 2. Stable partially calcified mass at the root of the mesentery. 3. Stable sclerotic lesions in the skeleton. Assessment:     1. 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 Tolerating treatment   A detailed system by system evaluation of side effect was performed to assess chemotherapy related toxicity. Blood counts are acceptable. Results reviewed with the patient  Repeat NM bone scans (2/26/2018) show stable disease  Repeat CT scan shows (2/26/2018 shows stable disease    Patient would like to try Sandostatin to see if he has improvements in his side effects. Plan to discontinue Somatuline and order Sandostatin  Symptom management form reviewed with patient. 2. Bone metastasis    > Denosumab every 3 months  > No Bone pain        Plan:       > discontinue Somatuline Depot -d/t side effects   > Plan to start Sandostatin LAR  > Continue Denosumab  > Plan to rescan in Feb 2019  > Follow-up in 3/4 months        Signed by: Caleb Medeiros MD                     July 13, 2018        CC. Diego Carias MD  CC.  Nano Sibley MD

## 2018-07-13 NOTE — PROGRESS NOTES
Mandie Cheng is a 71 y.o. male here today for metastatic neuroendocrine carcinoma f/u. 7/5/18 labs WNL. Somatuline Depot 120 mg SC. Pateint getting Xgeva 120mg SC. VS stable. Patient denies pain. Good appetite. Patient denies N/V and constipation. Patient has diarrhea at times; pt denies taking anything for the diarrhea. Patient states he has redness, swelling, numbness and tingling in his hands. Patient denies mouth ulcers. Patient denies cough. Patient denies SOB. Patient denies falls. Patient states sometimes in the morning he has a rapid heart rate; HR WNL today. Hot flashes.      Visit Vitals    /82 (BP 1 Location: Right arm, BP Patient Position: Sitting)    Pulse 73    Temp 97.6 °F (36.4 °C) (Oral)    Resp 16    Ht 6' (1.829 m)    Wt 190 lb (86.2 kg)    SpO2 95%    BMI 25.77 kg/m2

## 2018-08-02 ENCOUNTER — APPOINTMENT (OUTPATIENT)
Dept: INFUSION THERAPY | Age: 73
End: 2018-08-02
Payer: MEDICARE

## 2018-08-30 ENCOUNTER — HOSPITAL ENCOUNTER (OUTPATIENT)
Dept: INFUSION THERAPY | Age: 73
Discharge: HOME | End: 2018-08-30
Payer: MEDICARE

## 2018-08-30 VITALS
SYSTOLIC BLOOD PRESSURE: 115 MMHG | RESPIRATION RATE: 16 BRPM | HEART RATE: 60 BPM | BODY MASS INDEX: 24.57 KG/M2 | WEIGHT: 181.38 LBS | HEIGHT: 72 IN | DIASTOLIC BLOOD PRESSURE: 64 MMHG | TEMPERATURE: 97.5 F | OXYGEN SATURATION: 100 %

## 2018-08-30 DIAGNOSIS — C7B.8 NEUROENDOCRINE CARCINOMA METASTATIC TO BONE (HCC): ICD-10-CM

## 2018-08-30 DIAGNOSIS — C7A.8 NEUROENDOCRINE CARCINOMA METASTATIC TO BONE (HCC): ICD-10-CM

## 2018-08-30 LAB
ALBUMIN SERPL-MCNC: 3.6 G/DL (ref 3.5–5)
ALBUMIN/GLOB SERPL: 1.2 {RATIO} (ref 1.1–2.2)
ALP SERPL-CCNC: 55 U/L (ref 45–117)
ALT SERPL-CCNC: 25 U/L (ref 12–78)
ANION GAP SERPL CALC-SCNC: 9 MMOL/L (ref 5–15)
AST SERPL-CCNC: 22 U/L (ref 15–37)
BASOPHILS # BLD: 0 K/UL (ref 0–0.1)
BASOPHILS NFR BLD: 1 % (ref 0–1)
BILIRUB SERPL-MCNC: 1.2 MG/DL (ref 0.2–1)
BUN SERPL-MCNC: 22 MG/DL (ref 6–20)
BUN/CREAT SERPL: 21 (ref 12–20)
CALCIUM SERPL-MCNC: 8.4 MG/DL (ref 8.5–10.1)
CHLORIDE SERPL-SCNC: 108 MMOL/L (ref 97–108)
CO2 SERPL-SCNC: 27 MMOL/L (ref 21–32)
CREAT SERPL-MCNC: 1.07 MG/DL (ref 0.7–1.3)
DIFFERENTIAL METHOD BLD: NORMAL
EOSINOPHIL # BLD: 0.2 K/UL (ref 0–0.4)
EOSINOPHIL NFR BLD: 3 % (ref 0–7)
ERYTHROCYTE [DISTWIDTH] IN BLOOD BY AUTOMATED COUNT: 13 % (ref 11.5–14.5)
GLOBULIN SER CALC-MCNC: 3 G/DL (ref 2–4)
GLUCOSE SERPL-MCNC: 94 MG/DL (ref 65–100)
HCT VFR BLD AUTO: 40.1 % (ref 36.6–50.3)
HGB BLD-MCNC: 13.5 G/DL (ref 12.1–17)
IMM GRANULOCYTES # BLD: 0 K/UL (ref 0–0.04)
IMM GRANULOCYTES NFR BLD AUTO: 0 % (ref 0–0.5)
LYMPHOCYTES # BLD: 0.8 K/UL (ref 0.8–3.5)
LYMPHOCYTES NFR BLD: 14 % (ref 12–49)
MCH RBC QN AUTO: 29.3 PG (ref 26–34)
MCHC RBC AUTO-ENTMCNC: 33.7 G/DL (ref 30–36.5)
MCV RBC AUTO: 87.2 FL (ref 80–99)
MONOCYTES # BLD: 0.5 K/UL (ref 0–1)
MONOCYTES NFR BLD: 9 % (ref 5–13)
NEUTS SEG # BLD: 4.3 K/UL (ref 1.8–8)
NEUTS SEG NFR BLD: 73 % (ref 32–75)
NRBC # BLD: 0 K/UL (ref 0–0.01)
NRBC BLD-RTO: 0 PER 100 WBC
PLATELET # BLD AUTO: 162 K/UL (ref 150–400)
PMV BLD AUTO: 10.2 FL (ref 8.9–12.9)
POTASSIUM SERPL-SCNC: 4.2 MMOL/L (ref 3.5–5.1)
PROT SERPL-MCNC: 6.6 G/DL (ref 6.4–8.2)
RBC # BLD AUTO: 4.6 M/UL (ref 4.1–5.7)
SODIUM SERPL-SCNC: 144 MMOL/L (ref 136–145)
WBC # BLD AUTO: 5.8 K/UL (ref 4.1–11.1)

## 2018-08-30 PROCEDURE — 96375 TX/PRO/DX INJ NEW DRUG ADDON: CPT

## 2018-08-30 PROCEDURE — 36415 COLL VENOUS BLD VENIPUNCTURE: CPT | Performed by: INTERNAL MEDICINE

## 2018-08-30 PROCEDURE — 80053 COMPREHEN METABOLIC PANEL: CPT | Performed by: INTERNAL MEDICINE

## 2018-08-30 PROCEDURE — 85025 COMPLETE CBC W/AUTO DIFF WBC: CPT | Performed by: INTERNAL MEDICINE

## 2018-08-30 PROCEDURE — 74011636637 HC RX REV CODE- 636/637: Performed by: INTERNAL MEDICINE

## 2018-08-30 PROCEDURE — 96372 THER/PROPH/DIAG INJ SC/IM: CPT

## 2018-08-30 PROCEDURE — 74011250636 HC RX REV CODE- 250/636: Performed by: INTERNAL MEDICINE

## 2018-08-30 RX ADMIN — DENOSUMAB 120 MG: 120 INJECTION SUBCUTANEOUS at 10:03

## 2018-08-30 RX ADMIN — OCTREOTIDE ACETATE 30 MG: KIT at 09:51

## 2018-08-30 NOTE — PROGRESS NOTES
0900 Pt arrived at [facility] ambulatory and in no distress for 0900 for Sandostatin/Xgeva. Assessment unremarkable except pt c/o of hot flashes and fatigue. Calcium 8.7 and Albumin 3.6. Corrected Calcium 8.7, ok for treatment. Visit Vitals  /64 (BP 1 Location: Left arm, BP Patient Position: At rest)  Pulse 60  Temp 97.5 °F (36.4 °C)  Resp 16  
 Ht 6' (1.829 m)  Wt 82.3 kg (181 lb 6 oz)  SpO2 100%  BMI 24.6 kg/m2 Labs:  
Recent Results (from the past 12 hour(s)) CBC WITH AUTOMATED DIFF Collection Time: 08/30/18  9:11 AM  
Result Value Ref Range WBC 5.8 4.1 - 11.1 K/uL  
 RBC 4.60 4.10 - 5.70 M/uL  
 HGB 13.5 12.1 - 17.0 g/dL HCT 40.1 36.6 - 50.3 % MCV 87.2 80.0 - 99.0 FL  
 MCH 29.3 26.0 - 34.0 PG  
 MCHC 33.7 30.0 - 36.5 g/dL  
 RDW 13.0 11.5 - 14.5 % PLATELET 083 642 - 487 K/uL MPV 10.2 8.9 - 12.9 FL  
 NRBC 0.0 0  WBC ABSOLUTE NRBC 0.00 0.00 - 0.01 K/uL NEUTROPHILS 73 32 - 75 % LYMPHOCYTES 14 12 - 49 % MONOCYTES 9 5 - 13 % EOSINOPHILS 3 0 - 7 % BASOPHILS 1 0 - 1 % IMMATURE GRANULOCYTES 0 0.0 - 0.5 % ABS. NEUTROPHILS 4.3 1.8 - 8.0 K/UL  
 ABS. LYMPHOCYTES 0.8 0.8 - 3.5 K/UL  
 ABS. MONOCYTES 0.5 0.0 - 1.0 K/UL  
 ABS. EOSINOPHILS 0.2 0.0 - 0.4 K/UL  
 ABS. BASOPHILS 0.0 0.0 - 0.1 K/UL  
 ABS. IMM. GRANS. 0.0 0.00 - 0.04 K/UL  
 DF AUTOMATED METABOLIC PANEL, COMPREHENSIVE Collection Time: 08/30/18  9:11 AM  
Result Value Ref Range Sodium 144 136 - 145 mmol/L Potassium 4.2 3.5 - 5.1 mmol/L Chloride 108 97 - 108 mmol/L  
 CO2 27 21 - 32 mmol/L Anion gap 9 5 - 15 mmol/L Glucose 94 65 - 100 mg/dL BUN 22 (H) 6 - 20 MG/DL Creatinine 1.07 0.70 - 1.30 MG/DL  
 BUN/Creatinine ratio 21 (H) 12 - 20 GFR est AA >60 >60 ml/min/1.73m2 GFR est non-AA >60 >60 ml/min/1.73m2 Calcium 8.4 (L) 8.5 - 10.1 MG/DL  Bilirubin, total 1.2 (H) 0.2 - 1.0 MG/DL  
 ALT (SGPT) 25 12 - 78 U/L  
 AST (SGOT) 22 15 - 37 U/L  
 Alk. phosphatase 55 45 - 117 U/L Protein, total 6.6 6.4 - 8.2 g/dL Albumin 3.6 3.5 - 5.0 g/dL Globulin 3.0 2.0 - 4.0 g/dL A-G Ratio 1.2 1.1 - 2.2 Medications received: 
Xgeva 120 mg Sub-Q injection in Left arm Sandostatin LAR Depot 30 mg IM injection in Left GM 
 
1007 Tolerated treatment well, no adverse reaction noted. D/Cd from [facility] ambulatory and in no distress accompanied by Spouse.   Next appt 9/27/18 @ 9 am.

## 2018-09-06 ENCOUNTER — APPOINTMENT (OUTPATIENT)
Dept: INFUSION THERAPY | Age: 73
End: 2018-09-06
Payer: MEDICARE

## 2018-09-27 ENCOUNTER — HOSPITAL ENCOUNTER (OUTPATIENT)
Dept: INFUSION THERAPY | Age: 73
Discharge: HOME | End: 2018-09-27
Payer: MEDICARE

## 2018-09-27 VITALS
SYSTOLIC BLOOD PRESSURE: 118 MMHG | RESPIRATION RATE: 18 BRPM | TEMPERATURE: 97.3 F | WEIGHT: 179.06 LBS | HEIGHT: 72 IN | HEART RATE: 67 BPM | BODY MASS INDEX: 24.25 KG/M2 | DIASTOLIC BLOOD PRESSURE: 74 MMHG

## 2018-09-27 DIAGNOSIS — C7B.8 NEUROENDOCRINE CARCINOMA METASTATIC TO BONE (HCC): ICD-10-CM

## 2018-09-27 DIAGNOSIS — C7A.8 NEUROENDOCRINE CARCINOMA METASTATIC TO BONE (HCC): ICD-10-CM

## 2018-09-27 LAB
ALBUMIN SERPL-MCNC: 3.7 G/DL (ref 3.5–5)
ALBUMIN/GLOB SERPL: 1.1 {RATIO} (ref 1.1–2.2)
ALP SERPL-CCNC: 56 U/L (ref 45–117)
ALT SERPL-CCNC: 18 U/L (ref 12–78)
ANION GAP SERPL CALC-SCNC: 8 MMOL/L (ref 5–15)
AST SERPL-CCNC: 14 U/L (ref 15–37)
BASOPHILS # BLD: 0.1 K/UL (ref 0–0.1)
BASOPHILS NFR BLD: 1 % (ref 0–1)
BILIRUB SERPL-MCNC: 1.7 MG/DL (ref 0.2–1)
BUN SERPL-MCNC: 20 MG/DL (ref 6–20)
BUN/CREAT SERPL: 18 (ref 12–20)
CALCIUM SERPL-MCNC: 8.6 MG/DL (ref 8.5–10.1)
CHLORIDE SERPL-SCNC: 107 MMOL/L (ref 97–108)
CO2 SERPL-SCNC: 28 MMOL/L (ref 21–32)
CREAT SERPL-MCNC: 1.14 MG/DL (ref 0.7–1.3)
DIFFERENTIAL METHOD BLD: ABNORMAL
EOSINOPHIL # BLD: 0.6 K/UL (ref 0–0.4)
EOSINOPHIL NFR BLD: 9 % (ref 0–7)
ERYTHROCYTE [DISTWIDTH] IN BLOOD BY AUTOMATED COUNT: 12.9 % (ref 11.5–14.5)
GLOBULIN SER CALC-MCNC: 3.3 G/DL (ref 2–4)
GLUCOSE SERPL-MCNC: 103 MG/DL (ref 65–100)
HCT VFR BLD AUTO: 41 % (ref 36.6–50.3)
HGB BLD-MCNC: 13.8 G/DL (ref 12.1–17)
IMM GRANULOCYTES # BLD: 0 K/UL (ref 0–0.04)
IMM GRANULOCYTES NFR BLD AUTO: 0 % (ref 0–0.5)
LYMPHOCYTES # BLD: 1 K/UL (ref 0.8–3.5)
LYMPHOCYTES NFR BLD: 16 % (ref 12–49)
MCH RBC QN AUTO: 29.4 PG (ref 26–34)
MCHC RBC AUTO-ENTMCNC: 33.7 G/DL (ref 30–36.5)
MCV RBC AUTO: 87.2 FL (ref 80–99)
MONOCYTES # BLD: 0.5 K/UL (ref 0–1)
MONOCYTES NFR BLD: 8 % (ref 5–13)
NEUTS SEG # BLD: 4.1 K/UL (ref 1.8–8)
NEUTS SEG NFR BLD: 66 % (ref 32–75)
NRBC # BLD: 0 K/UL (ref 0–0.01)
NRBC BLD-RTO: 0 PER 100 WBC
PLATELET # BLD AUTO: 164 K/UL (ref 150–400)
PMV BLD AUTO: 10 FL (ref 8.9–12.9)
POTASSIUM SERPL-SCNC: 3.8 MMOL/L (ref 3.5–5.1)
PROT SERPL-MCNC: 7 G/DL (ref 6.4–8.2)
RBC # BLD AUTO: 4.7 M/UL (ref 4.1–5.7)
SODIUM SERPL-SCNC: 143 MMOL/L (ref 136–145)
WBC # BLD AUTO: 6.2 K/UL (ref 4.1–11.1)

## 2018-09-27 PROCEDURE — 85025 COMPLETE CBC W/AUTO DIFF WBC: CPT | Performed by: NURSE PRACTITIONER

## 2018-09-27 PROCEDURE — 96372 THER/PROPH/DIAG INJ SC/IM: CPT

## 2018-09-27 PROCEDURE — 36415 COLL VENOUS BLD VENIPUNCTURE: CPT | Performed by: NURSE PRACTITIONER

## 2018-09-27 PROCEDURE — 74011636637 HC RX REV CODE- 636/637: Performed by: INTERNAL MEDICINE

## 2018-09-27 PROCEDURE — 80053 COMPREHEN METABOLIC PANEL: CPT | Performed by: NURSE PRACTITIONER

## 2018-09-27 RX ADMIN — OCTREOTIDE ACETATE 30 MG: KIT at 09:46

## 2018-09-27 NOTE — PROGRESS NOTES
Pt arrived to [facility] in no acute distress at 0900 for Sandostatin.  Assessment unremarkable no new complaints or concerns voiced. Labs obtained peripherally. Visit Vitals    /74 (BP 1 Location: Left arm, BP Patient Position: At rest)    Pulse 67    Temp 97.3 °F (36.3 °C)    Resp 18    Ht 6' (1.829 m)    Wt 81.2 kg (179 lb 1 oz)    BMI 24.29 kg/m2       Labs obtained:   Recent Results (from the past 12 hour(s))   CBC WITH AUTOMATED DIFF    Collection Time: 09/27/18  9:13 AM   Result Value Ref Range    WBC 6.2 4.1 - 11.1 K/uL    RBC 4.70 4. 10 - 5.70 M/uL    HGB 13.8 12.1 - 17.0 g/dL    HCT 41.0 36.6 - 50.3 %    MCV 87.2 80.0 - 99.0 FL    MCH 29.4 26.0 - 34.0 PG    MCHC 33.7 30.0 - 36.5 g/dL    RDW 12.9 11.5 - 14.5 %    PLATELET 709 348 - 145 K/uL    MPV 10.0 8.9 - 12.9 FL    NRBC 0.0 0  WBC    ABSOLUTE NRBC 0.00 0.00 - 0.01 K/uL    NEUTROPHILS 66 32 - 75 %    LYMPHOCYTES 16 12 - 49 %    MONOCYTES 8 5 - 13 %    EOSINOPHILS 9 (H) 0 - 7 %    BASOPHILS 1 0 - 1 %    IMMATURE GRANULOCYTES 0 0.0 - 0.5 %    ABS. NEUTROPHILS 4.1 1.8 - 8.0 K/UL    ABS. LYMPHOCYTES 1.0 0.8 - 3.5 K/UL    ABS. MONOCYTES 0.5 0.0 - 1.0 K/UL    ABS. EOSINOPHILS 0.6 (H) 0.0 - 0.4 K/UL    ABS. BASOPHILS 0.1 0.0 - 0.1 K/UL    ABS. IMM. GRANS. 0.0 0.00 - 0.04 K/UL    DF AUTOMATED     METABOLIC PANEL, COMPREHENSIVE    Collection Time: 09/27/18  9:13 AM   Result Value Ref Range    Sodium 143 136 - 145 mmol/L    Potassium 3.8 3.5 - 5.1 mmol/L    Chloride 107 97 - 108 mmol/L    CO2 28 21 - 32 mmol/L    Anion gap 8 5 - 15 mmol/L    Glucose 103 (H) 65 - 100 mg/dL    BUN 20 6 - 20 MG/DL    Creatinine 1.14 0.70 - 1.30 MG/DL    BUN/Creatinine ratio 18 12 - 20      GFR est AA >60 >60 ml/min/1.73m2    GFR est non-AA >60 >60 ml/min/1.73m2    Calcium 8.6 8.5 - 10.1 MG/DL    Bilirubin, total 1.7 (H) 0.2 - 1.0 MG/DL    ALT (SGPT) 18 12 - 78 U/L    AST (SGOT) 14 (L) 15 - 37 U/L    Alk.  phosphatase 56 45 - 117 U/L    Protein, total 7.0 6.4 - 8.2 g/dL    Albumin 3.7 3.5 - 5.0 g/dL    Globulin 3.3 2.0 - 4.0 g/dL    A-G Ratio 1.1 1.1 - 2.2         The following medications administered:  Sandostatin LAR Depot 30 mg IM injection in R GM    Pt tolerated treatment well. No adverse reactions noted. Pt discharged ambulatory in no acute distress at 0952, accompanied by Spouse.   Next appointment 10/25/18 @ 9 am.

## 2018-10-04 ENCOUNTER — APPOINTMENT (OUTPATIENT)
Dept: INFUSION THERAPY | Age: 73
End: 2018-10-04
Payer: MEDICARE

## 2018-10-25 ENCOUNTER — TELEPHONE (OUTPATIENT)
Dept: ONCOLOGY | Age: 73
End: 2018-10-25

## 2018-10-25 ENCOUNTER — HOSPITAL ENCOUNTER (OUTPATIENT)
Dept: INFUSION THERAPY | Age: 73
Discharge: HOME | End: 2018-10-25
Payer: MEDICARE

## 2018-10-25 VITALS
OXYGEN SATURATION: 97 % | RESPIRATION RATE: 18 BRPM | TEMPERATURE: 97.4 F | SYSTOLIC BLOOD PRESSURE: 130 MMHG | DIASTOLIC BLOOD PRESSURE: 80 MMHG | HEART RATE: 56 BPM

## 2018-10-25 DIAGNOSIS — C7A.8 NEUROENDOCRINE CARCINOMA METASTATIC TO BONE (HCC): Primary | ICD-10-CM

## 2018-10-25 DIAGNOSIS — C7B.8 NEUROENDOCRINE CARCINOMA METASTATIC TO BONE (HCC): Primary | ICD-10-CM

## 2018-10-25 LAB
ALBUMIN SERPL-MCNC: 3.8 G/DL (ref 3.5–5)
ALBUMIN/GLOB SERPL: 1.1 {RATIO} (ref 1.1–2.2)
ALP SERPL-CCNC: 51 U/L (ref 45–117)
ALT SERPL-CCNC: 23 U/L (ref 12–78)
ANION GAP SERPL CALC-SCNC: 4 MMOL/L (ref 5–15)
AST SERPL-CCNC: 18 U/L (ref 15–37)
BASOPHILS # BLD: 0.1 K/UL (ref 0–0.1)
BASOPHILS NFR BLD: 1 % (ref 0–1)
BILIRUB SERPL-MCNC: 1.2 MG/DL (ref 0.2–1)
BUN SERPL-MCNC: 18 MG/DL (ref 6–20)
BUN/CREAT SERPL: 16 (ref 12–20)
CALCIUM SERPL-MCNC: 8.9 MG/DL (ref 8.5–10.1)
CHLORIDE SERPL-SCNC: 107 MMOL/L (ref 97–108)
CO2 SERPL-SCNC: 30 MMOL/L (ref 21–32)
CREAT SERPL-MCNC: 1.1 MG/DL (ref 0.7–1.3)
DIFFERENTIAL METHOD BLD: ABNORMAL
EOSINOPHIL # BLD: 0.8 K/UL (ref 0–0.4)
EOSINOPHIL NFR BLD: 13 % (ref 0–7)
ERYTHROCYTE [DISTWIDTH] IN BLOOD BY AUTOMATED COUNT: 13.1 % (ref 11.5–14.5)
GLOBULIN SER CALC-MCNC: 3.4 G/DL (ref 2–4)
GLUCOSE SERPL-MCNC: 124 MG/DL (ref 65–100)
HCT VFR BLD AUTO: 42.3 % (ref 36.6–50.3)
HGB BLD-MCNC: 14.4 G/DL (ref 12.1–17)
IMM GRANULOCYTES # BLD: 0 K/UL (ref 0–0.04)
IMM GRANULOCYTES NFR BLD AUTO: 0 % (ref 0–0.5)
LYMPHOCYTES # BLD: 1.1 K/UL (ref 0.8–3.5)
LYMPHOCYTES NFR BLD: 18 % (ref 12–49)
MCH RBC QN AUTO: 29.6 PG (ref 26–34)
MCHC RBC AUTO-ENTMCNC: 34 G/DL (ref 30–36.5)
MCV RBC AUTO: 86.9 FL (ref 80–99)
MONOCYTES # BLD: 0.4 K/UL (ref 0–1)
MONOCYTES NFR BLD: 7 % (ref 5–13)
NEUTS SEG # BLD: 3.9 K/UL (ref 1.8–8)
NEUTS SEG NFR BLD: 62 % (ref 32–75)
NRBC # BLD: 0 K/UL (ref 0–0.01)
NRBC BLD-RTO: 0 PER 100 WBC
PLATELET # BLD AUTO: 165 K/UL (ref 150–400)
PMV BLD AUTO: 10.1 FL (ref 8.9–12.9)
POTASSIUM SERPL-SCNC: 4.3 MMOL/L (ref 3.5–5.1)
PROT SERPL-MCNC: 7.2 G/DL (ref 6.4–8.2)
RBC # BLD AUTO: 4.87 M/UL (ref 4.1–5.7)
SODIUM SERPL-SCNC: 141 MMOL/L (ref 136–145)
WBC # BLD AUTO: 6.3 K/UL (ref 4.1–11.1)

## 2018-10-25 PROCEDURE — 85025 COMPLETE CBC W/AUTO DIFF WBC: CPT | Performed by: NURSE PRACTITIONER

## 2018-10-25 PROCEDURE — 74011250636 HC RX REV CODE- 250/636: Performed by: INTERNAL MEDICINE

## 2018-10-25 PROCEDURE — 36415 COLL VENOUS BLD VENIPUNCTURE: CPT | Performed by: NURSE PRACTITIONER

## 2018-10-25 PROCEDURE — 96372 THER/PROPH/DIAG INJ SC/IM: CPT

## 2018-10-25 PROCEDURE — 80053 COMPREHEN METABOLIC PANEL: CPT | Performed by: NURSE PRACTITIONER

## 2018-10-25 RX ADMIN — OCTREOTIDE ACETATE 30 MG: KIT at 09:28

## 2018-10-25 NOTE — TELEPHONE ENCOUNTER
Patient was switched to a different injection due to side effects. Patient is experiencing side effects with current injection that are worse than the original injection. Pt is wondering could he switch back to the original injection.   Please call

## 2018-10-25 NOTE — PROGRESS NOTES
0905 Pt arrived at [facility] ambulatory and in no distress for Sandostatin. Assessment completed- pt reports nausea, joint pain, hot flashes, and urinary leakage. CBC with diff and CMP drawn. Visit Vitals  /80 (BP 1 Location: Left arm, BP Patient Position: Sitting)   Pulse (!) 56   Temp 97.4 °F (36.3 °C)   Resp 18   SpO2 97%     Recent Results (from the past 12 hour(s))   CBC WITH AUTOMATED DIFF    Collection Time: 10/25/18  9:13 AM   Result Value Ref Range    WBC 6.3 4.1 - 11.1 K/uL    RBC 4.87 4.10 - 5.70 M/uL    HGB 14.4 12.1 - 17.0 g/dL    HCT 42.3 36.6 - 50.3 %    MCV 86.9 80.0 - 99.0 FL    MCH 29.6 26.0 - 34.0 PG    MCHC 34.0 30.0 - 36.5 g/dL    RDW 13.1 11.5 - 14.5 %    PLATELET 567 122 - 397 K/uL    MPV 10.1 8.9 - 12.9 FL    NRBC 0.0 0  WBC    ABSOLUTE NRBC 0.00 0.00 - 0.01 K/uL    NEUTROPHILS 62 32 - 75 %    LYMPHOCYTES 18 12 - 49 %    MONOCYTES 7 5 - 13 %    EOSINOPHILS 13 (H) 0 - 7 %    BASOPHILS 1 0 - 1 %    IMMATURE GRANULOCYTES 0 0.0 - 0.5 %    ABS. NEUTROPHILS 3.9 1.8 - 8.0 K/UL    ABS. LYMPHOCYTES 1.1 0.8 - 3.5 K/UL    ABS. MONOCYTES 0.4 0.0 - 1.0 K/UL    ABS. EOSINOPHILS 0.8 (H) 0.0 - 0.4 K/UL    ABS. BASOPHILS 0.1 0.0 - 0.1 K/UL    ABS. IMM. GRANS. 0.0 0.00 - 0.04 K/UL    DF AUTOMATED     METABOLIC PANEL, COMPREHENSIVE    Collection Time: 10/25/18  9:13 AM   Result Value Ref Range    Sodium 141 136 - 145 mmol/L    Potassium 4.3 3.5 - 5.1 mmol/L    Chloride 107 97 - 108 mmol/L    CO2 30 21 - 32 mmol/L    Anion gap 4 (L) 5 - 15 mmol/L    Glucose 124 (H) 65 - 100 mg/dL    BUN 18 6 - 20 MG/DL    Creatinine 1.10 0.70 - 1.30 MG/DL    BUN/Creatinine ratio 16 12 - 20      GFR est AA >60 >60 ml/min/1.73m2    GFR est non-AA >60 >60 ml/min/1.73m2    Calcium 8.9 8.5 - 10.1 MG/DL    Bilirubin, total 1.2 (H) 0.2 - 1.0 MG/DL    ALT (SGPT) 23 12 - 78 U/L    AST (SGOT) 18 15 - 37 U/L    Alk.  phosphatase 51 45 - 117 U/L    Protein, total 7.2 6.4 - 8.2 g/dL    Albumin 3.8 3.5 - 5.0 g/dL    Globulin 3.4 2.0 - 4.0 g/dL    A-G Ratio 1.1 1.1 - 2.2         Medications received:  Sandostatin 30 mg IM to left gluteus solo    0935 Tolerated treatment well, no adverse reaction noted. D/Cd from [facility] ambulatory and in no distress accompanied by spouse. Next appt 11/21/18 @ 0900.

## 2018-10-31 RX ORDER — LANREOTIDE ACETATE 120 MG/.5ML
120 INJECTION SUBCUTANEOUS ONCE
Status: CANCELLED
Start: 2018-11-21 | End: 2018-11-21

## 2018-11-16 ENCOUNTER — OFFICE VISIT (OUTPATIENT)
Dept: ONCOLOGY | Age: 73
End: 2018-11-16

## 2018-11-16 VITALS
SYSTOLIC BLOOD PRESSURE: 152 MMHG | OXYGEN SATURATION: 97 % | TEMPERATURE: 97.5 F | DIASTOLIC BLOOD PRESSURE: 82 MMHG | HEIGHT: 72 IN | HEART RATE: 70 BPM | RESPIRATION RATE: 18 BRPM | WEIGHT: 180 LBS | BODY MASS INDEX: 24.38 KG/M2

## 2018-11-16 DIAGNOSIS — C7A.8 NEUROENDOCRINE CARCINOMA METASTATIC TO BONE (HCC): Primary | ICD-10-CM

## 2018-11-16 DIAGNOSIS — C7B.8 NEUROENDOCRINE CARCINOMA METASTATIC TO BONE (HCC): Primary | ICD-10-CM

## 2018-11-16 DIAGNOSIS — C79.51 BONE METASTASIS (HCC): ICD-10-CM

## 2018-11-16 NOTE — PROGRESS NOTES
Pt is a 71 yr old here for Met Neuroendocrine Ca, he is currently on Octreotide, he is wanting to switch back to Somatuline. He reports not feeling well, lots of hot flashes and night sweats. He reports not having much apatite, but is trying to eat anyway. He reports nausea first thing in the AM that gets better as the day goes on. Pt is here with his wife and daughter today. Blood pressure 152/82, pulse 70, temperature 97.5 °F (36.4 °C), resp. rate 18, height 6' (1.829 m), weight 180 lb (81.6 kg), SpO2 97 %.

## 2018-11-16 NOTE — PROGRESS NOTES
[address], [address] [place] [address], [address] [facility]  [phone]       Oncology Follow up Note        Patient: Beulah Chanel MRN: [id number]  SSN: [id number]    YOB: 1945  Age: 71 y.o. Sex: male        Diagnosis:     1. 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 Somatuline Depot 120 mg SC - discontinued d/t side effects - 11/16/2018  2. Xgeva 120mg SC     Subjective:      Beulah Chanel is a 71 y.o. male with a diagnosis of metastatic neuroendocrine cancer. He started experiencing pain in both groins and the abdomen. A CT of the abdomen showed retroperitoneal adenopathy. CT guided biopsy of the retroperitoneal LN reveals a dx of well differentiated neuroendocrine carcinoma. Bone scan shows disease in the bone. Mr. King Rodriguez has had numerous side effects of Somatuline. He remains fatigued and continues to have hot flashes and flushing. He is planning on going on an extended vacation in February to [facility].        Review of Systems:    Constitutional: fatigue, hot flashes  Eyes: negative  Ears, Nose, Mouth, Throat, and Face: negative  Respiratory: negative  Cardiovascular: negative  Gastrointestinal: diarrhea  Genitourinary:negative  Integument/Breast: negative  Hematologic/Lymphatic: negative  Musculoskeletal: negative  Neurological: negative      Past Medical History:   Diagnosis Date    Adverse effect of anesthesia     low HR and very slow to wakeup    Cancer ([address] 5/2016    neuroendocrine, retroperitoneal LN involvement    Diarrhea     Frequent urination     Hypertension     Poor appetite     Unspecified adverse effect of anesthesia     \"hard to put to sleep and slow to wake up-heart rate dropped very low\"     Past Surgical History:   Procedure Laterality Date   Elizabeth Marie  5/13/2016         HX ORTHOPAEDIC      knee scope left knee    HX OTHER SURGICAL  2013    excision of scalp cyst     UPPER GI ENDOSCOPY,BIOPSY  2016           Family History   Problem Relation Age of Onset    Cancer Mother     Stroke Sister      Social History     Tobacco Use    Smoking status: Former Smoker     Packs/day: 1.00     Years: 8.00     Pack years: 8.00     Last attempt to quit: 1972     Years since quittin.5    Smokeless tobacco: Never Used   Substance Use Topics    Alcohol use: No      Prior to Admission medications    Medication Sig Start Date End Date Taking? Authorizing Provider   lisinopril (PRINIVIL, ZESTRIL) 20 mg tablet  18   Provider, Historical   methocarbamol (ROBAXIN-750) 750 mg tablet Take 1 Tab by mouth four (4) times daily. 17   HUMA Avila   lisinopril (PRINIVIL, ZESTRIL) 10 mg tablet Take 20 mg by mouth daily. Provider, Historical          No Known Allergies        Objective:     Vitals:    18 0911   BP: 152/82   Pulse: 70   Resp: 18   Temp: 97.5 °F (36.4 °C)   SpO2: 97%   Weight: 180 lb (81.6 kg)   Height: 6' (1.829 m)          Physical Exam:    GENERAL: alert, cooperative  EYE: negative  LYMPHATIC: Cervical, supraclavicular, and axillary nodes normal.   THROAT & NECK: normal and no erythema or exudates noted. LUNG: clear to auscultation bilaterally  HEART: regular rate and rhythm  ABDOMEN: soft, non-tender  EXTREMITIES: no cyanosis or edema  SKIN: Normal.  NEUROLOGIC: negative        IMAGING:     I personally reviewed the images. Enlarged and abnormal retroperitoneal adenopathy. Bone metastasis. CT Results (most recent):  Results from Hospital Encounter encounter on 18   CT ABD PELV W CONT    Narrative EXAM:  CT ABD PELV W CONT    INDICATION: neuroendocrine carcinoma    COMPARISON: 2017    CONTRAST:  100 mL of Isovue-370.     TECHNIQUE:   Following the uneventful intravenous administration of contrast, thin axial  images were obtained through the abdomen and pelvis. Coronal and sagittal  reconstructions were generated. Oral contrast was not administered. CT dose  reduction was achieved through use of a standardized protocol tailored for this  examination and automatic exposure control for dose modulation. FINDINGS:    Liver remains normal. Pancreas and adrenals and spleen are unremarkable. Kidneys  are normal.    Multiple retroperitoneal enlarged lymph nodes appear stable. The calcified mass at the root of the mesentery remains stable. There is no pelvic mass or adenopathy. There is chronic thickening of the wall  of the urinary bladder. Lung bases clear. Scattered sclerotic lesions in bones are stable. Impression IMPRESSION:  1. Stable retroperitoneal adenopathy. 2. Stable partially calcified mass at the root of the mesentery. 3. Stable sclerotic lesions in the skeleton. Assessment:     1. 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 Since there is no OS benefit with SSA, and the fact that he is having numerous side effects, I recommend he d/c SSA. Upon disease progression or development of symptoms, we shall resume therapy. Plan to hold off at this time restarting any therapy    A detailed system by system evaluation of side effect was performed to assess chemotherapy related toxicity. Blood counts are acceptable. Results reviewed with the patient  Repeat NM bone scans (2/26/2018) show stable disease  Repeat CT scan shows (2/26/2018 shows stable disease      2.  Bone metastasis    > Denosumab every 3 months  > No Bone pain      Plan:       > Continue Denosumab  > Plan to restaging CT and bone scan in Feb 2019  > Follow-up in February after scans        Signed by: Wilmer Reddy MD                     November 16, 2018          CC. Carmelo Reilly MD  CC.  Mary Sibley MD

## 2018-11-21 ENCOUNTER — HOSPITAL ENCOUNTER (OUTPATIENT)
Dept: INFUSION THERAPY | Age: 73
Discharge: HOME | End: 2018-11-21
Payer: MEDICARE

## 2018-11-21 VITALS
DIASTOLIC BLOOD PRESSURE: 88 MMHG | SYSTOLIC BLOOD PRESSURE: 148 MMHG | RESPIRATION RATE: 18 BRPM | HEART RATE: 68 BPM | TEMPERATURE: 97.5 F

## 2018-11-21 DIAGNOSIS — C7A.8 NEUROENDOCRINE CARCINOMA METASTATIC TO BONE (HCC): Primary | ICD-10-CM

## 2018-11-21 DIAGNOSIS — C7B.8 NEUROENDOCRINE CARCINOMA METASTATIC TO BONE (HCC): Primary | ICD-10-CM

## 2018-11-21 LAB
ALBUMIN SERPL-MCNC: 3.8 G/DL (ref 3.5–5)
ALBUMIN/GLOB SERPL: 1.2 {RATIO} (ref 1.1–2.2)
ALP SERPL-CCNC: 55 U/L (ref 45–117)
ALT SERPL-CCNC: 26 U/L (ref 12–78)
ANION GAP BLD CALC-SCNC: 17 MMOL/L (ref 10–20)
AST SERPL-CCNC: 22 U/L (ref 15–37)
BASOPHILS # BLD: 0.1 K/UL (ref 0–0.1)
BASOPHILS NFR BLD: 1 % (ref 0–1)
BILIRUB DIRECT SERPL-MCNC: 0.2 MG/DL (ref 0–0.2)
BILIRUB SERPL-MCNC: 1.3 MG/DL (ref 0.2–1)
BUN BLD-MCNC: 17 MG/DL (ref 9–20)
CA-I BLD-MCNC: 1.21 MMOL/L (ref 1.12–1.32)
CHLORIDE BLD-SCNC: 104 MMOL/L (ref 98–107)
CO2 BLD-SCNC: 27 MMOL/L (ref 21–32)
CREAT BLD-MCNC: 1 MG/DL (ref 0.6–1.3)
DIFFERENTIAL METHOD BLD: ABNORMAL
EOSINOPHIL # BLD: 0.7 K/UL (ref 0–0.4)
EOSINOPHIL NFR BLD: 12 % (ref 0–7)
ERYTHROCYTE [DISTWIDTH] IN BLOOD BY AUTOMATED COUNT: 13.1 % (ref 11.5–14.5)
GLOBULIN SER CALC-MCNC: 3.2 G/DL (ref 2–4)
GLUCOSE BLD-MCNC: 94 MG/DL (ref 65–100)
HCT VFR BLD AUTO: 42.2 % (ref 36.6–50.3)
HCT VFR BLD CALC: 43 % (ref 36.6–50.3)
HGB BLD-MCNC: 14.3 G/DL (ref 12.1–17)
IMM GRANULOCYTES # BLD: 0 K/UL (ref 0–0.04)
IMM GRANULOCYTES NFR BLD AUTO: 0 % (ref 0–0.5)
LYMPHOCYTES # BLD: 1.2 K/UL (ref 0.8–3.5)
LYMPHOCYTES NFR BLD: 19 % (ref 12–49)
MCH RBC QN AUTO: 29.9 PG (ref 26–34)
MCHC RBC AUTO-ENTMCNC: 33.9 G/DL (ref 30–36.5)
MCV RBC AUTO: 88.1 FL (ref 80–99)
MONOCYTES # BLD: 0.5 K/UL (ref 0–1)
MONOCYTES NFR BLD: 7 % (ref 5–13)
NEUTS SEG # BLD: 3.8 K/UL (ref 1.8–8)
NEUTS SEG NFR BLD: 61 % (ref 32–75)
NRBC # BLD: 0 K/UL (ref 0–0.01)
NRBC BLD-RTO: 0 PER 100 WBC
PLATELET # BLD AUTO: 177 K/UL (ref 150–400)
PMV BLD AUTO: 10.5 FL (ref 8.9–12.9)
POTASSIUM BLD-SCNC: 3.8 MMOL/L (ref 3.5–5.1)
PROT SERPL-MCNC: 7 G/DL (ref 6.4–8.2)
RBC # BLD AUTO: 4.79 M/UL (ref 4.1–5.7)
SERVICE CMNT-IMP: NORMAL
SODIUM BLD-SCNC: 144 MMOL/L (ref 136–145)
WBC # BLD AUTO: 6.3 K/UL (ref 4.1–11.1)

## 2018-11-21 PROCEDURE — 96372 THER/PROPH/DIAG INJ SC/IM: CPT

## 2018-11-21 PROCEDURE — 36415 COLL VENOUS BLD VENIPUNCTURE: CPT

## 2018-11-21 PROCEDURE — 80076 HEPATIC FUNCTION PANEL: CPT

## 2018-11-21 PROCEDURE — 80047 BASIC METABLC PNL IONIZED CA: CPT

## 2018-11-21 PROCEDURE — 85025 COMPLETE CBC W/AUTO DIFF WBC: CPT

## 2018-11-21 PROCEDURE — 74011250636 HC RX REV CODE- 250/636: Performed by: INTERNAL MEDICINE

## 2018-11-21 RX ADMIN — DENOSUMAB 120 MG: 120 INJECTION SUBCUTANEOUS at 09:10

## 2018-11-23 ENCOUNTER — APPOINTMENT (OUTPATIENT)
Dept: INFUSION THERAPY | Age: 73
End: 2018-11-23
Payer: MEDICARE

## 2018-12-20 ENCOUNTER — APPOINTMENT (OUTPATIENT)
Dept: INFUSION THERAPY | Age: 73
End: 2018-12-20
Payer: MEDICARE

## 2019-01-17 ENCOUNTER — APPOINTMENT (OUTPATIENT)
Dept: INFUSION THERAPY | Age: 74
End: 2019-01-17
Payer: MEDICARE

## 2019-02-25 ENCOUNTER — HOSPITAL ENCOUNTER (OUTPATIENT)
Dept: NUCLEAR MEDICINE | Age: 74
Discharge: HOME | End: 2019-02-25
Attending: INTERNAL MEDICINE
Payer: MEDICARE

## 2019-02-25 ENCOUNTER — HOSPITAL ENCOUNTER (OUTPATIENT)
Dept: CT IMAGING | Age: 74
Discharge: HOME | End: 2019-02-25
Attending: INTERNAL MEDICINE
Payer: MEDICARE

## 2019-02-25 DIAGNOSIS — C7A.8 NEUROENDOCRINE CARCINOMA METASTATIC TO BONE (HCC): ICD-10-CM

## 2019-02-25 DIAGNOSIS — C7B.8 NEUROENDOCRINE CARCINOMA METASTATIC TO BONE (HCC): ICD-10-CM

## 2019-02-25 LAB — CREAT BLD-MCNC: 1.1 MG/DL (ref 0.6–1.3)

## 2019-02-25 PROCEDURE — 74011636320 HC RX REV CODE- 636/320: Performed by: INTERNAL MEDICINE

## 2019-02-25 PROCEDURE — 74177 CT ABD & PELVIS W/CONTRAST: CPT

## 2019-02-25 PROCEDURE — 82565 ASSAY OF CREATININE: CPT

## 2019-02-25 PROCEDURE — 78306 BONE IMAGING WHOLE BODY: CPT

## 2019-02-25 RX ORDER — SODIUM CHLORIDE 0.9 % (FLUSH) 0.9 %
10 SYRINGE (ML) INJECTION
Status: COMPLETED | OUTPATIENT
Start: 2019-02-25 | End: 2019-02-25

## 2019-02-25 RX ADMIN — IOPAMIDOL 100 ML: 755 INJECTION, SOLUTION INTRAVENOUS at 10:06

## 2019-02-25 RX ADMIN — Medication 10 ML: at 10:06

## 2019-02-25 RX ADMIN — IOHEXOL 20 ML: 240 INJECTION, SOLUTION INTRATHECAL; INTRAVASCULAR; INTRAVENOUS; ORAL at 10:06

## 2019-02-26 NOTE — PROGRESS NOTES
Appt scheduled for 2/27; pt will be made aware of progression and the need to go back on therapy at appt w/ provider.

## 2019-02-27 ENCOUNTER — OFFICE VISIT (OUTPATIENT)
Dept: ONCOLOGY | Age: 74
End: 2019-02-27

## 2019-02-27 VITALS
OXYGEN SATURATION: 97 % | RESPIRATION RATE: 18 BRPM | DIASTOLIC BLOOD PRESSURE: 70 MMHG | BODY MASS INDEX: 24.24 KG/M2 | TEMPERATURE: 98.5 F | WEIGHT: 179 LBS | HEIGHT: 72 IN | SYSTOLIC BLOOD PRESSURE: 130 MMHG | HEART RATE: 81 BPM

## 2019-02-27 DIAGNOSIS — C7B.8 NEUROENDOCRINE CARCINOMA METASTATIC TO BONE (HCC): Primary | ICD-10-CM

## 2019-02-27 DIAGNOSIS — K13.79 MOUTH SORES: ICD-10-CM

## 2019-02-27 DIAGNOSIS — C7A.8 NEUROENDOCRINE CARCINOMA METASTATIC TO BONE (HCC): Primary | ICD-10-CM

## 2019-02-27 DIAGNOSIS — C79.51 BONE METASTASIS (HCC): ICD-10-CM

## 2019-02-27 RX ORDER — DEXAMETHASONE 0.5 MG/5ML
1 ELIXIR ORAL 4 TIMES DAILY
Qty: 500 ML | Refills: 6 | Status: SHIPPED | OUTPATIENT
Start: 2019-02-27 | End: 2019-03-29

## 2019-02-27 NOTE — PROGRESS NOTES
Chief Complaint   Patient presents with    Cancer     follow up     1. Have you been to the ER, urgent care clinic since your last visit? No   Hospitalized since your last visit? No     2. Have you seen or consulted any other health care providers outside of the [address] [place] [address] since your last visit?    No

## 2019-02-27 NOTE — PROGRESS NOTES
====Isaac Hdz Invitation====    Patient was invited to [facility] on this date and given the information folder for review. Recommended appointment with Isaac Hdz facilitator for ACP conversation regarding advance directives. [] Yes  [] No  Referral sent to [facility] Choices team member or Coordinator for follow-up    [] Yes  [x] No  Patient scheduled an appointment. Site of Referral (type here):  SW will follow up at pt next appt.

## 2019-02-27 NOTE — PROGRESS NOTES
[facility]  Social Work Navigator Encounter     Patient Name:  Sang Titus    Medical History: dx neuroendocrine cancer to bone     Advance Directives:  Not on File, SW inquired about knowledge of a medical adv directive. SW went over the form and provided JORGE Cherry. SW will f/u with pt. Narrative: PT and wife present and pt a little nervous as he took a 3 month break from treatment. Barriers to Care:     Plan:   1. Continue to provide support as needed.

## 2019-02-28 ENCOUNTER — HOSPITAL ENCOUNTER (OUTPATIENT)
Dept: INFUSION THERAPY | Age: 74
Discharge: HOME | End: 2019-02-28
Payer: MEDICARE

## 2019-02-28 VITALS
DIASTOLIC BLOOD PRESSURE: 80 MMHG | HEART RATE: 75 BPM | RESPIRATION RATE: 18 BRPM | TEMPERATURE: 97.9 F | OXYGEN SATURATION: 97 % | SYSTOLIC BLOOD PRESSURE: 124 MMHG

## 2019-02-28 DIAGNOSIS — C7A.8 NEUROENDOCRINE CARCINOMA METASTATIC TO BONE (HCC): Primary | ICD-10-CM

## 2019-02-28 DIAGNOSIS — C7B.8 NEUROENDOCRINE CARCINOMA METASTATIC TO BONE (HCC): Primary | ICD-10-CM

## 2019-02-28 LAB
ALBUMIN SERPL-MCNC: 3.7 G/DL (ref 3.5–5)
ALBUMIN/GLOB SERPL: 1.2 {RATIO} (ref 1.1–2.2)
ALP SERPL-CCNC: 77 U/L (ref 45–117)
ALT SERPL-CCNC: 27 U/L (ref 12–78)
ANION GAP BLD CALC-SCNC: 16 MMOL/L (ref 10–20)
AST SERPL-CCNC: 23 U/L (ref 15–37)
BASOPHILS # BLD: 0 K/UL (ref 0–0.1)
BASOPHILS NFR BLD: 1 % (ref 0–1)
BILIRUB DIRECT SERPL-MCNC: 0.2 MG/DL (ref 0–0.2)
BILIRUB SERPL-MCNC: 1 MG/DL (ref 0.2–1)
BUN BLD-MCNC: 14 MG/DL (ref 9–20)
CA-I BLD-MCNC: 1.14 MMOL/L (ref 1.12–1.32)
CHLORIDE BLD-SCNC: 101 MMOL/L (ref 98–107)
CO2 BLD-SCNC: 28 MMOL/L (ref 21–32)
CREAT BLD-MCNC: 1.1 MG/DL (ref 0.6–1.3)
DIFFERENTIAL METHOD BLD: ABNORMAL
EOSINOPHIL # BLD: 0.4 K/UL (ref 0–0.4)
EOSINOPHIL NFR BLD: 9 % (ref 0–7)
ERYTHROCYTE [DISTWIDTH] IN BLOOD BY AUTOMATED COUNT: 12.5 % (ref 11.5–14.5)
GLOBULIN SER CALC-MCNC: 3.1 G/DL (ref 2–4)
GLUCOSE BLD-MCNC: 110 MG/DL (ref 65–100)
HCT VFR BLD AUTO: 40.4 % (ref 36.6–50.3)
HCT VFR BLD CALC: 40 % (ref 36.6–50.3)
HGB BLD-MCNC: 13.7 G/DL (ref 12.1–17)
IMM GRANULOCYTES # BLD AUTO: 0 K/UL (ref 0–0.04)
IMM GRANULOCYTES NFR BLD AUTO: 0 % (ref 0–0.5)
LYMPHOCYTES # BLD: 0.7 K/UL (ref 0.8–3.5)
LYMPHOCYTES NFR BLD: 16 % (ref 12–49)
MAGNESIUM SERPL-MCNC: 2.2 MG/DL (ref 1.6–2.4)
MCH RBC QN AUTO: 29 PG (ref 26–34)
MCHC RBC AUTO-ENTMCNC: 33.9 G/DL (ref 30–36.5)
MCV RBC AUTO: 85.6 FL (ref 80–99)
MONOCYTES # BLD: 0.5 K/UL (ref 0–1)
MONOCYTES NFR BLD: 12 % (ref 5–13)
NEUTS SEG # BLD: 2.6 K/UL (ref 1.8–8)
NEUTS SEG NFR BLD: 62 % (ref 32–75)
NRBC # BLD: 0 K/UL (ref 0–0.01)
NRBC BLD-RTO: 0 PER 100 WBC
PHOSPHATE SERPL-MCNC: 3.1 MG/DL (ref 2.6–4.7)
PLATELET # BLD AUTO: 149 K/UL (ref 150–400)
PMV BLD AUTO: 9.8 FL (ref 8.9–12.9)
POTASSIUM BLD-SCNC: 3.3 MMOL/L (ref 3.5–5.1)
PROT SERPL-MCNC: 6.8 G/DL (ref 6.4–8.2)
RBC # BLD AUTO: 4.72 M/UL (ref 4.1–5.7)
RBC MORPH BLD: ABNORMAL
SERVICE CMNT-IMP: ABNORMAL
SODIUM BLD-SCNC: 140 MMOL/L (ref 136–145)
WBC # BLD AUTO: 4.2 K/UL (ref 4.1–11.1)

## 2019-02-28 PROCEDURE — 74011250636 HC RX REV CODE- 250/636: Performed by: INTERNAL MEDICINE

## 2019-02-28 PROCEDURE — 84100 ASSAY OF PHOSPHORUS: CPT

## 2019-02-28 PROCEDURE — 80076 HEPATIC FUNCTION PANEL: CPT

## 2019-02-28 PROCEDURE — 80047 BASIC METABLC PNL IONIZED CA: CPT

## 2019-02-28 PROCEDURE — 83735 ASSAY OF MAGNESIUM: CPT

## 2019-02-28 PROCEDURE — 96372 THER/PROPH/DIAG INJ SC/IM: CPT

## 2019-02-28 PROCEDURE — 85025 COMPLETE CBC W/AUTO DIFF WBC: CPT

## 2019-02-28 PROCEDURE — 36415 COLL VENOUS BLD VENIPUNCTURE: CPT

## 2019-02-28 RX ORDER — LANREOTIDE ACETATE 120 MG/.5ML
120 INJECTION SUBCUTANEOUS ONCE
Status: CANCELLED
Start: 2019-05-30 | End: 2019-05-30

## 2019-02-28 RX ORDER — LANREOTIDE ACETATE 120 MG/.5ML
120 INJECTION SUBCUTANEOUS ONCE
Status: CANCELLED
Start: 2019-03-07 | End: 2019-03-07

## 2019-02-28 RX ORDER — LANREOTIDE ACETATE 120 MG/.5ML
120 INJECTION SUBCUTANEOUS ONCE
Status: CANCELLED
Start: 2019-05-02 | End: 2019-05-02

## 2019-02-28 RX ORDER — LANREOTIDE ACETATE 120 MG/.5ML
120 INJECTION SUBCUTANEOUS ONCE
Status: CANCELLED
Start: 2019-04-04 | End: 2019-04-04

## 2019-02-28 RX ADMIN — DENOSUMAB 120 MG: 120 INJECTION SUBCUTANEOUS at 10:55

## 2019-02-28 NOTE — PROGRESS NOTES
7899 Pt arrived at [facility] ambulatory and in no distress for Xgeva. Assessment completed, no new complaints voiced. Labs drawn. Patient Vitals for the past 12 hrs:   Temp Pulse Resp BP SpO2   02/28/19 0905 97.9 °F (36.6 °C) 75 18 124/80 97 %     Recent Results (from the past 12 hour(s))   CBC WITH AUTOMATED DIFF    Collection Time: 02/28/19  9:14 AM   Result Value Ref Range    WBC 4.2 4.1 - 11.1 K/uL    RBC 4.72 4.10 - 5.70 M/uL    HGB 13.7 12.1 - 17.0 g/dL    HCT 40.4 36.6 - 50.3 %    MCV 85.6 80.0 - 99.0 FL    MCH 29.0 26.0 - 34.0 PG    MCHC 33.9 30.0 - 36.5 g/dL    RDW 12.5 11.5 - 14.5 %    PLATELET 015 (L) 554 - 400 K/uL    MPV 9.8 8.9 - 12.9 FL    NRBC 0.0 0  WBC    ABSOLUTE NRBC 0.00 0.00 - 0.01 K/uL    NEUTROPHILS 62 32 - 75 %    LYMPHOCYTES 16 12 - 49 %    MONOCYTES 12 5 - 13 %    EOSINOPHILS 9 (H) 0 - 7 %    BASOPHILS 1 0 - 1 %    IMMATURE GRANULOCYTES 0 0.0 - 0.5 %    ABS. NEUTROPHILS 2.6 1.8 - 8.0 K/UL    ABS. LYMPHOCYTES 0.7 (L) 0.8 - 3.5 K/UL    ABS. MONOCYTES 0.5 0.0 - 1.0 K/UL    ABS. EOSINOPHILS 0.4 0.0 - 0.4 K/UL    ABS. BASOPHILS 0.0 0.0 - 0.1 K/UL    ABS. IMM. GRANS. 0.0 0.00 - 0.04 K/UL    DF AUTOMATED      RBC COMMENTS NORMOCYTIC, NORMOCHROMIC     HEPATIC FUNCTION PANEL    Collection Time: 02/28/19  9:14 AM   Result Value Ref Range    Protein, total 6.8 6.4 - 8.2 g/dL    Albumin 3.7 3.5 - 5.0 g/dL    Globulin 3.1 2.0 - 4.0 g/dL    A-G Ratio 1.2 1.1 - 2.2      Bilirubin, total 1.0 0.2 - 1.0 MG/DL    Bilirubin, direct 0.2 0.0 - 0.2 MG/DL    Alk.  phosphatase 77 45 - 117 U/L    AST (SGOT) 23 15 - 37 U/L    ALT (SGPT) 27 12 - 78 U/L   POC CHEM8    Collection Time: 02/28/19  9:23 AM   Result Value Ref Range    Calcium, ionized (POC) 1.14 1.12 - 1.32 mmol/L    Sodium (POC) 140 136 - 145 mmol/L    Potassium (POC) 3.3 (L) 3.5 - 5.1 mmol/L    Chloride (POC) 101 98 - 107 mmol/L    CO2 (POC) 28 21 - 32 mmol/L    Anion gap (POC) 16 10 - 20 mmol/L    Glucose (POC) 110 (H) 65 - 100 mg/dL    BUN (POC) 14 9 - 20 mg/dL    Creatinine (POC) 1.1 0.6 - 1.3 mg/dL    GFRAA, POC >60 >60 ml/min/1.73m2    GFRNA, POC >60 >60 ml/min/1.73m2    Hematocrit (POC) 40 36.6 - 50.3 %    Comment Notified RN or MD immediately by        Mag and Phos added. Results pending. See [facility]. Medications received:  Xgeva  Lanreotide order added today but not authorized. Med held. Pt returning next Thursday for this. 1055 Tolerated treatment well, no adverse reaction noted. D/Cd from [place] ambulatory and in no distress accompanied by self. Next appt 3/7.

## 2019-03-05 DIAGNOSIS — C7B.8 NEUROENDOCRINE CARCINOMA METASTATIC TO BONE (HCC): Primary | ICD-10-CM

## 2019-03-05 DIAGNOSIS — C7A.8 NEUROENDOCRINE CARCINOMA METASTATIC TO BONE (HCC): Primary | ICD-10-CM

## 2019-03-05 RX ORDER — EVEROLIMUS 10 MG/1
10 TABLET ORAL DAILY
Qty: 30 TAB | Refills: 6 | Status: SHIPPED | OUTPATIENT
Start: 2019-03-05 | End: 2020-02-03 | Stop reason: SDUPTHER

## 2019-03-07 ENCOUNTER — HOSPITAL ENCOUNTER (OUTPATIENT)
Dept: INFUSION THERAPY | Age: 74
Discharge: HOME | End: 2019-03-07
Payer: MEDICARE

## 2019-03-07 VITALS
SYSTOLIC BLOOD PRESSURE: 123 MMHG | TEMPERATURE: 97.7 F | RESPIRATION RATE: 18 BRPM | OXYGEN SATURATION: 98 % | HEART RATE: 73 BPM | DIASTOLIC BLOOD PRESSURE: 76 MMHG

## 2019-03-07 DIAGNOSIS — C7B.8 NEUROENDOCRINE CARCINOMA METASTATIC TO BONE (HCC): Primary | ICD-10-CM

## 2019-03-07 DIAGNOSIS — C7A.8 NEUROENDOCRINE CARCINOMA METASTATIC TO BONE (HCC): Primary | ICD-10-CM

## 2019-03-07 PROCEDURE — 96372 THER/PROPH/DIAG INJ SC/IM: CPT

## 2019-03-07 PROCEDURE — 74011250636 HC RX REV CODE- 250/636: Performed by: INTERNAL MEDICINE

## 2019-03-07 RX ORDER — LANREOTIDE ACETATE 120 MG/.5ML
120 INJECTION SUBCUTANEOUS ONCE
Status: COMPLETED | OUTPATIENT
Start: 2019-03-07 | End: 2019-03-07

## 2019-03-07 RX ADMIN — LANREOTIDE ACETATE 120 MG: 120 INJECTION SUBCUTANEOUS at 09:08

## 2019-03-07 NOTE — PROGRESS NOTES
0900 Pt arrived at [facility] ambulatory and in no distress for Lanreotide. Assessment completed, no new complaints voiced. Visit Vitals  /76 (BP 1 Location: Left arm, BP Patient Position: Sitting)   Pulse 73   Temp 97.7 °F (36.5 °C)   Resp 18   SpO2 98%       Medications received:  Lantreotide 120 mg SQ to right GM    0915 Tolerated treatment well, no adverse reaction noted. D/Cd from [facility] ambulatory and in no distress accompanied by spouse. Next appt 4/4/19 @ 0900.

## 2019-03-11 ENCOUNTER — DOCUMENTATION ONLY (OUTPATIENT)
Dept: ONCOLOGY | Age: 74
End: 2019-03-11

## 2019-03-11 NOTE — PROGRESS NOTES
Received call from [employer] about patient's PA. PA was approved, but copay for medication is over $2000. No drake funding available for patient's type of disease, so [employer] Patient assistance application was completed. Contacted patient to get financial information, and faxed to Dr Buster Salinas office. Patient to come and sign papers in office on 03/11/2019. Dr Russell Branch also to sign.

## 2019-03-27 ENCOUNTER — OFFICE VISIT (OUTPATIENT)
Dept: ONCOLOGY | Age: 74
End: 2019-03-27

## 2019-03-27 VITALS
BODY MASS INDEX: 24.24 KG/M2 | DIASTOLIC BLOOD PRESSURE: 88 MMHG | WEIGHT: 179 LBS | HEART RATE: 63 BPM | RESPIRATION RATE: 16 BRPM | HEIGHT: 72 IN | SYSTOLIC BLOOD PRESSURE: 142 MMHG | TEMPERATURE: 97.5 F

## 2019-03-27 DIAGNOSIS — C79.51 BONE METASTASIS (HCC): ICD-10-CM

## 2019-03-27 DIAGNOSIS — N39.498 OTHER URINARY INCONTINENCE: ICD-10-CM

## 2019-03-27 DIAGNOSIS — C7A.8 NEUROENDOCRINE CARCINOMA METASTATIC TO BONE (HCC): Primary | ICD-10-CM

## 2019-03-27 DIAGNOSIS — C7B.8 NEUROENDOCRINE CARCINOMA METASTATIC TO BONE (HCC): Primary | ICD-10-CM

## 2019-03-27 DIAGNOSIS — R23.2 FACIAL FLUSHING: ICD-10-CM

## 2019-03-27 NOTE — PROGRESS NOTES
[address], [address] [place] [address] Domo Mullins, [address] [facility]  [phone]       Oncology Follow up Note        Patient: Rikki Claude MRN: [id number]  SSN: [id number]    YOB: 1945  Age: 68 y.o. Sex: male        Diagnosis:     1. 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 Somatuline Depot 120 mg SC - 05/2016 - 11/2018 Restarted 3/4/2019 -   (treatment held per patient request since11/16/2018)  2. Xgeva 120mg SC    Subjective:      Rikki Claude is a 68 y.o. male with a diagnosis of metastatic neuroendocrine cancer. He started experiencing pain in both groins and the abdomen. A CT of the abdomen showed retroperitoneal adenopathy. CT guided biopsy of the retroperitoneal LN reveals a dx of well differentiated neuroendocrine carcinoma. Bone scan shows disease in the bone. Mr. Harsha Mueller had numerous but mild side effects of Somatuline. He remained fatigued. He took a break from therapy in Nov 2018. He went on an extended vacation the beginning of February to [facility]. He returns today to discuss treatment options. CT shows progression of disease in the nodes. Mr. Harsha Mueller resumed therapy with Somatuline. He is waiting to receive Afinitor. Bone pains are better. Appetite has improved. Diarrhea and flushing has also improved. He has been experiencing intermittent urinary incontinence over the past few weeks.        Review of Systems:    Constitutional: fatigue, hot flashes, weight loss  Eyes: negative  Ears, Nose, Mouth, Throat, and Face: negative  Respiratory: negative  Cardiovascular: negative  Gastrointestinal: diarrhea, nausea  Genitourinary: urinary incontinence  Integument/Breast: negative  Hematologic/Lymphatic: negative  Musculoskeletal: back pain  Neurological: negative      Past Medical History:   Diagnosis Date    Adverse effect of anesthesia     low HR and very slow to wakeup    [address] [address] 2016    neuroendocrine, retroperitoneal LN involvement    Diarrhea     Frequent urination     Hypertension     Poor appetite     Unspecified adverse effect of anesthesia     \"hard to put to sleep and slow to wake up-heart rate dropped very low\"     Past Surgical History:   Procedure Laterality Date    COLONOSCOPY N/A 2016    COLONOSCOPY performed by aTye Jaime MD at [address] [place] [address]  2016         HX ORTHOPAEDIC      knee scope left knee    HX OTHER SURGICAL  2013    excision of scalp cyst     UPPER GI ENDOSCOPY,BIOPSY  2016           Family History   Problem Relation Age of Onset    Cancer Mother     Stroke Sister      Social History     Tobacco Use    Smoking status: Former Smoker     Packs/day: 1.00     Years: 8.00     Pack years: 8.00     Last attempt to quit: 1972     Years since quittin.9    Smokeless tobacco: Never Used   Substance Use Topics    Alcohol use: No      Prior to Admission medications    Medication Sig Start Date End Date Taking? Authorizing Provider   lisinopril (PRINIVIL, ZESTRIL) 20 mg tablet  18  Yes Provider, Historical   lisinopril (PRINIVIL, ZESTRIL) 10 mg tablet Take 20 mg by mouth daily. Yes Provider, Historical   everolimus (AFINITOR) 10 mg tab Take 1 Tab by mouth daily. 3/5/19   Jossue Hong, NP   dexamethasone (DECADRON) 0.5 mg/5 mL elixir Take 10 mL by mouth four (4) times daily for 30 days. 2/27/19 3/29/19  Chapo Godoy, SARAH   methocarbamol (ROBAXIN-750) 750 mg tablet Take 1 Tab by mouth four (4) times daily.  17   HUMA Ronquillo          No Known Allergies        Objective:     Vitals:    19 0933   BP: 142/88   Pulse: 63   Resp: 16   Temp: 97.5 °F (36.4 °C)   TempSrc: Oral   Weight: 179 lb (81.2 kg)   Height: 6' (1.829 m)      Pain Scale: 0 - No pain/10      Physical Exam:    GENERAL: alert, cooperative  EYE: negative  LYMPHATIC: Cervical, supraclavicular, and axillary nodes normal.   THROAT & NECK: normal and no erythema or exudates noted. LUNG: clear to auscultation bilaterally  HEART: regular rate and rhythm  ABDOMEN: soft, non-tender  EXTREMITIES: no cyanosis or edema  SKIN: Normal.  NEUROLOGIC: negative        IMAGING:     I personally reviewed the images. Enlarged and abnormal retroperitoneal adenopathy. Bone metastasis. CT Results (most recent):  Results from Hospital Encounter encounter on 02/25/19   CT ABD PELV W CONT    Narrative CT ABDOMEN AND PELVIS WITH CONTRAST. 2/25/2019 10:03 AM     INDICATION: Metastatic neuroendocrine carcinoma. HISTORY (per electronic medical record): Unknown primary, likely GI tract. Diagnosed in 2016 by retroperitoneal lymph node biopsy. Osseous metastases. COMPARISON: 2/26/2018, 4/27/2016. TECHNIQUE: CT of the abdomen and pelvis was performed after the administration  100 cc IV Isovue-370 and oral contrast. CT dose reduction was achieved through  use of a standardized protocol tailored for this examination and automatic  exposure control for dose modulation. FINDINGS:  Neuroendocrine carcinoma: A right para-aortic lymph node in the inferior  posterior mediastinum has increased slightly, and now measures 11 mm in short  axis, versus 9 mm in short axis on 2/26/2018. Left para-aortic, retroperitoneal  lymph nodes extend from the left diaphragmatic pleura to the left common iliac  artery. These have increased. The largest now measures 22 mm in short axis on  image 2-30, versus 18 mm on 2/26/2018. Left retroperitoneal adenopathy has  become more confluent in the interval as well: compare image [id number] today to  image [id number] on 2/26/2018. Though difficult to measure, the infiltrative soft tissue mass with  calcifications in the right upper quadrant root of the mesentery has probably  increased from 2/26/2018.     Diffuse sclerotic osseous metastases are stable in size and number. Abdomen: Aside from subsegmental atelectasis in the right base, the lung bases  are clear. Incidental note is made of a noncalcified gallstone and focal fatty  infiltration along the fissure for the falciform ligament. No concerning hepatic  masses. Subcentimeter hypodense bilateral renal lesions are too small to  characterize, but likely represent cysts. The distal esophagus, stomach,  duodenum, gallbladder, pancreas, spleen, adrenals, and kidneys are otherwise  normal.    The heart size is normal. The lack of a celiac axis is a normal variant; the  splenic and left hepatic arteries arise directly from the aorta. The left  gastric artery arises from the splenic artery. The right hepatic artery is  replaced from the superior mesenteric artery. Pelvis: The prostate is mildly enlarged and indents the bladder base. The  bladder is dilated, diverticulated, and trabeculated. Descending and sigmoid  diverticulosis are mild. The small bowel, ileocecal junction, appendix, and  colon are otherwise normal. No free air or fluid. Impression IMPRESSION:   1. Increased retroperitoneal and inferior mediastinal lori metastases. 2. Probably increased mesenteric mass. 3. Stable osseous metastases. I reviewed the imaging personally. Disease progression in the abdomen. Assessment:     1. 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 Had excellent disease control. He experienced a number of side effects including hot flashes, weight loss etc  He decided to take a break from therapy. Recent CT shows disease progression in retroperitoneum and abdomen. Restarted Somatuline   Flushing, appetite, diarrhea, back pain are all better. I will add Everolimus. I will plan on keeping it on for 3 months or so.    I am hoping to improve response with addition of Everolimus. He received assistance from [employer] and will receive his Everolimus on Friday 3/29. Start mouth rinse at that time. He understands the potential side effects including ILD, infections, diarrhea, abnormal LFT, etc. He is agreeable to taking the medicine. 2. Bone metastasis    > Denosumab monthly      3. Urinary incontinence    Over the past few weeks he has experienced urinary incontinence   Referral to Dr. Laure Woodard:       > Continue Denosumab  > Continue Somatuline  > Start Afinitor 10 mg once he receives the medication on Friday 3/29 - labs next week while in infusion  > Follow up with urology regarding urinary incontinence  > will add labs to be done in the [facility] next week  > Follow up on 5/30 at 9:30 am        Signed by: Silvia Chahal MD                     March 27, 2019        CC. Patel Garcia MD  CC. Tia Griffin MD  CC.  Alexandre Reed MD

## 2019-03-27 NOTE — PROGRESS NOTES
Richard Dyson is a 68 y.o. male here today for Metastatic neuroendocrine carcinoma f/u. VS stable. Patient denies pain. Good appetite. Patient denies N/V/D and constipation. Patient denies numbness and tingling. Patient denies mouth ulcers. Patient denies cough. Patient denies SOB. Patient will not receive the Afinitor until Friday 3/29 per pt. Visit Vitals  /88 (BP 1 Location: Left arm, BP Patient Position: Sitting)   Pulse 63   Temp 97.5 °F (36.4 °C) (Oral)   Resp 16   Ht 6' (1.829 m)   Wt 179 lb (81.2 kg)   BMI 24.28 kg/m²       Pain Scale: 0 - No pain/10  Pain Location:     Health Maintenance Review: Patient reminded of \"due or due soon\" health maintenance. I have asked the patient to contact his/her primary care provider (PCP) for follow-up on his/her health maintenance.

## 2019-04-01 ENCOUNTER — TELEPHONE (OUTPATIENT)
Dept: ONCOLOGY | Age: 74
End: 2019-04-01

## 2019-04-01 NOTE — TELEPHONE ENCOUNTER
Pt states he was told to give us a call a call when he started his Afinitor.      Would like a call back at [phone]

## 2019-04-04 ENCOUNTER — HOSPITAL ENCOUNTER (OUTPATIENT)
Dept: INFUSION THERAPY | Age: 74
Discharge: HOME | End: 2019-04-04
Payer: MEDICARE

## 2019-04-04 VITALS
SYSTOLIC BLOOD PRESSURE: 147 MMHG | OXYGEN SATURATION: 100 % | TEMPERATURE: 97.1 F | HEART RATE: 69 BPM | DIASTOLIC BLOOD PRESSURE: 86 MMHG | RESPIRATION RATE: 18 BRPM

## 2019-04-04 DIAGNOSIS — C7A.8 NEUROENDOCRINE CARCINOMA METASTATIC TO BONE (HCC): Primary | ICD-10-CM

## 2019-04-04 DIAGNOSIS — C7B.8 NEUROENDOCRINE CARCINOMA METASTATIC TO BONE (HCC): Primary | ICD-10-CM

## 2019-04-04 LAB
ALBUMIN SERPL-MCNC: 3.7 G/DL (ref 3.5–5)
ALBUMIN/GLOB SERPL: 1.1 {RATIO} (ref 1.1–2.2)
ALP SERPL-CCNC: 56 U/L (ref 45–117)
ALT SERPL-CCNC: 24 U/L (ref 12–78)
ANION GAP BLD CALC-SCNC: 16 MMOL/L (ref 10–20)
APPEARANCE UR: CLEAR
AST SERPL-CCNC: 21 U/L (ref 15–37)
BACTERIA URNS QL MICRO: NEGATIVE /HPF
BASOPHILS # BLD: 0.1 K/UL (ref 0–0.1)
BASOPHILS NFR BLD: 1 % (ref 0–1)
BILIRUB DIRECT SERPL-MCNC: 0.3 MG/DL (ref 0–0.2)
BILIRUB SERPL-MCNC: 1.6 MG/DL (ref 0.2–1)
BILIRUB UR QL: NEGATIVE
BUN BLD-MCNC: 17 MG/DL (ref 9–20)
CA-I BLD-MCNC: 1.16 MMOL/L (ref 1.12–1.32)
CHLORIDE BLD-SCNC: 103 MMOL/L (ref 98–107)
CHOLEST SERPL-MCNC: 178 MG/DL
CO2 BLD-SCNC: 29 MMOL/L (ref 21–32)
COLOR UR: NORMAL
CREAT BLD-MCNC: 1 MG/DL (ref 0.6–1.3)
DIFFERENTIAL METHOD BLD: NORMAL
EOSINOPHIL # BLD: 0.3 K/UL (ref 0–0.4)
EOSINOPHIL NFR BLD: 6 % (ref 0–7)
EPITH CASTS URNS QL MICRO: NORMAL /LPF
ERYTHROCYTE [DISTWIDTH] IN BLOOD BY AUTOMATED COUNT: 13.7 % (ref 11.5–14.5)
EST. AVERAGE GLUCOSE BLD GHB EST-MCNC: 126 MG/DL
GLOBULIN SER CALC-MCNC: 3.5 G/DL (ref 2–4)
GLUCOSE BLD-MCNC: 120 MG/DL (ref 65–100)
GLUCOSE UR STRIP.AUTO-MCNC: NEGATIVE MG/DL
HBA1C MFR BLD: 6 % (ref 4.2–6.3)
HCT VFR BLD AUTO: 42 % (ref 36.6–50.3)
HCT VFR BLD CALC: 42 % (ref 36.6–50.3)
HDLC SERPL-MCNC: 48 MG/DL
HDLC SERPL: 3.7 {RATIO} (ref 0–5)
HGB BLD-MCNC: 14 G/DL (ref 12.1–17)
HGB UR QL STRIP: NEGATIVE
HYALINE CASTS URNS QL MICRO: NORMAL /LPF (ref 0–5)
IMM GRANULOCYTES # BLD AUTO: 0 K/UL (ref 0–0.04)
IMM GRANULOCYTES NFR BLD AUTO: 0 % (ref 0–0.5)
KETONES UR QL STRIP.AUTO: NEGATIVE MG/DL
LDLC SERPL CALC-MCNC: 101.6 MG/DL (ref 0–100)
LEUKOCYTE ESTERASE UR QL STRIP.AUTO: NEGATIVE
LIPID PROFILE,FLP: ABNORMAL
LYMPHOCYTES # BLD: 0.9 K/UL (ref 0.8–3.5)
LYMPHOCYTES NFR BLD: 22 % (ref 12–49)
MAGNESIUM SERPL-MCNC: 2.2 MG/DL (ref 1.6–2.4)
MCH RBC QN AUTO: 29.2 PG (ref 26–34)
MCHC RBC AUTO-ENTMCNC: 33.3 G/DL (ref 30–36.5)
MCV RBC AUTO: 87.7 FL (ref 80–99)
MONOCYTES # BLD: 0.4 K/UL (ref 0–1)
MONOCYTES NFR BLD: 8 % (ref 5–13)
NEUTS SEG # BLD: 2.7 K/UL (ref 1.8–8)
NEUTS SEG NFR BLD: 63 % (ref 32–75)
NITRITE UR QL STRIP.AUTO: NEGATIVE
NRBC # BLD: 0 K/UL (ref 0–0.01)
NRBC BLD-RTO: 0 PER 100 WBC
PH UR STRIP: 6 [PH] (ref 5–8)
PHOSPHATE SERPL-MCNC: 3.1 MG/DL (ref 2.6–4.7)
PLATELET # BLD AUTO: 167 K/UL (ref 150–400)
PMV BLD AUTO: 10.1 FL (ref 8.9–12.9)
POTASSIUM BLD-SCNC: 3.5 MMOL/L (ref 3.5–5.1)
PROT SERPL-MCNC: 7.2 G/DL (ref 6.4–8.2)
PROT UR STRIP-MCNC: NEGATIVE MG/DL
RBC # BLD AUTO: 4.79 M/UL (ref 4.1–5.7)
RBC #/AREA URNS HPF: NORMAL /HPF (ref 0–5)
SERVICE CMNT-IMP: ABNORMAL
SODIUM BLD-SCNC: 143 MMOL/L (ref 136–145)
SP GR UR REFRACTOMETRY: 1.01 (ref 1–1.03)
TRIGL SERPL-MCNC: 142 MG/DL (ref ?–150)
UA: UC IF INDICATED,UAUC: NORMAL
UROBILINOGEN UR QL STRIP.AUTO: 0.2 EU/DL (ref 0.2–1)
VLDLC SERPL CALC-MCNC: 28.4 MG/DL
WBC # BLD AUTO: 4.3 K/UL (ref 4.1–11.1)
WBC URNS QL MICRO: NORMAL /HPF (ref 0–4)

## 2019-04-04 PROCEDURE — 83036 HEMOGLOBIN GLYCOSYLATED A1C: CPT

## 2019-04-04 PROCEDURE — 80047 BASIC METABLC PNL IONIZED CA: CPT

## 2019-04-04 PROCEDURE — 80076 HEPATIC FUNCTION PANEL: CPT

## 2019-04-04 PROCEDURE — 36415 COLL VENOUS BLD VENIPUNCTURE: CPT

## 2019-04-04 PROCEDURE — 80061 LIPID PANEL: CPT

## 2019-04-04 PROCEDURE — 74011250636 HC RX REV CODE- 250/636: Performed by: INTERNAL MEDICINE

## 2019-04-04 PROCEDURE — 83735 ASSAY OF MAGNESIUM: CPT

## 2019-04-04 PROCEDURE — 84100 ASSAY OF PHOSPHORUS: CPT

## 2019-04-04 PROCEDURE — 81001 URINALYSIS AUTO W/SCOPE: CPT

## 2019-04-04 PROCEDURE — 96372 THER/PROPH/DIAG INJ SC/IM: CPT

## 2019-04-04 PROCEDURE — 85025 COMPLETE CBC W/AUTO DIFF WBC: CPT

## 2019-04-04 RX ORDER — LANREOTIDE ACETATE 120 MG/.5ML
120 INJECTION SUBCUTANEOUS ONCE
Status: COMPLETED | OUTPATIENT
Start: 2019-04-04 | End: 2019-04-04

## 2019-04-04 RX ADMIN — LANREOTIDE ACETATE 120 MG: 120 INJECTION SUBCUTANEOUS at 09:26

## 2019-04-04 RX ADMIN — DENOSUMAB 120 MG: 120 INJECTION SUBCUTANEOUS at 09:25

## 2019-04-04 NOTE — PROGRESS NOTES
0900 Pt arrived at [facility] ambulatory and in no distress for Lanreotide/Xgeva/labs. Assessment completed- pt reports fatigue and hot flashes. Pt started PO chemo tablets on 4/1/19. Visit Vitals /86 (BP 1 Location: Left arm, BP Patient Position: Sitting) Pulse 69 Temp 97.1 °F (36.2 °C) Resp 18 SpO2 100% Labs obtained- CBC with diff, Hepatic Function Panel, Chem 8 I-stat, Magnesium, Phosphorus, Lipid Profile, HgbA1C, and UA. Recent Results (from the past 12 hour(s)) POC CHEM8 Collection Time: 04/04/19  9:16 AM  
Result Value Ref Range Calcium, ionized (POC) 1.16 1.12 - 1.32 mmol/L Sodium (POC) 143 136 - 145 mmol/L Potassium (POC) 3.5 3.5 - 5.1 mmol/L Chloride (POC) 103 98 - 107 mmol/L  
 CO2 (POC) 29 21 - 32 mmol/L Anion gap (POC) 16 10 - 20 mmol/L Glucose (POC) 120 (H) 65 - 100 mg/dL BUN (POC) 17 9 - 20 mg/dL Creatinine (POC) 1.0 0.6 - 1.3 mg/dL GFRAA, POC >60 >60 ml/min/1.73m2 GFRNA, POC >60 >60 ml/min/1.73m2 Hematocrit (POC) 42 36.6 - 50.3 % Comment Notified RN or MD immediately by  See [facility] for pending lab results. Ionized Calcium 1.16 Medications received: 
Xgeva 120 mg SQ in left upper arm Lanreotide 120 mg SQ in left buttock 0940 Tolerated treatment well, no adverse reaction noted. D/Cd from [facility] ambulatory and in no distress accompanied by spouse. Next appt 5/2/19 @ 0900.

## 2019-05-02 ENCOUNTER — OFFICE VISIT (OUTPATIENT)
Dept: ONCOLOGY | Age: 74
End: 2019-05-02

## 2019-05-02 ENCOUNTER — HOSPITAL ENCOUNTER (OUTPATIENT)
Dept: INFUSION THERAPY | Age: 74
Discharge: HOME | End: 2019-05-02
Payer: MEDICARE

## 2019-05-02 VITALS
RESPIRATION RATE: 16 BRPM | DIASTOLIC BLOOD PRESSURE: 90 MMHG | SYSTOLIC BLOOD PRESSURE: 121 MMHG | OXYGEN SATURATION: 98 % | HEART RATE: 71 BPM | TEMPERATURE: 97.8 F

## 2019-05-02 VITALS
HEIGHT: 72 IN | OXYGEN SATURATION: 96 % | TEMPERATURE: 97 F | BODY MASS INDEX: 24.16 KG/M2 | RESPIRATION RATE: 18 BRPM | HEART RATE: 75 BPM | SYSTOLIC BLOOD PRESSURE: 142 MMHG | DIASTOLIC BLOOD PRESSURE: 90 MMHG | WEIGHT: 178.4 LBS

## 2019-05-02 DIAGNOSIS — C7B.8 NEUROENDOCRINE CARCINOMA METASTATIC TO BONE (HCC): Primary | ICD-10-CM

## 2019-05-02 DIAGNOSIS — C7A.8 NEUROENDOCRINE CARCINOMA METASTATIC TO BONE (HCC): Primary | ICD-10-CM

## 2019-05-02 DIAGNOSIS — C79.51 BONE METASTASIS (HCC): ICD-10-CM

## 2019-05-02 DIAGNOSIS — R53.83 FATIGUE, UNSPECIFIED TYPE: ICD-10-CM

## 2019-05-02 LAB
ALBUMIN SERPL-MCNC: 3.6 G/DL (ref 3.5–5)
ALBUMIN/GLOB SERPL: 1.1 {RATIO} (ref 1.1–2.2)
ALP SERPL-CCNC: 60 U/L (ref 45–117)
ALT SERPL-CCNC: 24 U/L (ref 12–78)
ANION GAP BLD CALC-SCNC: 19 MMOL/L (ref 10–20)
AST SERPL-CCNC: 26 U/L (ref 15–37)
BASOPHILS # BLD: 0 K/UL (ref 0–0.1)
BASOPHILS NFR BLD: 1 % (ref 0–1)
BILIRUB DIRECT SERPL-MCNC: 0.2 MG/DL (ref 0–0.2)
BILIRUB SERPL-MCNC: 1.2 MG/DL (ref 0.2–1)
BUN BLD-MCNC: 15 MG/DL (ref 9–20)
CA-I BLD-MCNC: 1.14 MMOL/L (ref 1.12–1.32)
CHLORIDE BLD-SCNC: 104 MMOL/L (ref 98–107)
CO2 BLD-SCNC: 25 MMOL/L (ref 21–32)
CREAT BLD-MCNC: 1.1 MG/DL (ref 0.6–1.3)
DIFFERENTIAL METHOD BLD: ABNORMAL
EOSINOPHIL # BLD: 0.2 K/UL (ref 0–0.4)
EOSINOPHIL NFR BLD: 5 % (ref 0–7)
ERYTHROCYTE [DISTWIDTH] IN BLOOD BY AUTOMATED COUNT: 12.8 % (ref 11.5–14.5)
GLOBULIN SER CALC-MCNC: 3.2 G/DL (ref 2–4)
GLUCOSE BLD-MCNC: 118 MG/DL (ref 65–100)
HCT VFR BLD AUTO: 38.1 % (ref 36.6–50.3)
HCT VFR BLD CALC: 36 % (ref 36.6–50.3)
HGB BLD-MCNC: 12.7 G/DL (ref 12.1–17)
IMM GRANULOCYTES # BLD AUTO: 0 K/UL (ref 0–0.04)
IMM GRANULOCYTES NFR BLD AUTO: 0 % (ref 0–0.5)
LYMPHOCYTES # BLD: 0.7 K/UL (ref 0.8–3.5)
LYMPHOCYTES NFR BLD: 19 % (ref 12–49)
MAGNESIUM SERPL-MCNC: 2.1 MG/DL (ref 1.6–2.4)
MCH RBC QN AUTO: 27.7 PG (ref 26–34)
MCHC RBC AUTO-ENTMCNC: 33.3 G/DL (ref 30–36.5)
MCV RBC AUTO: 83.2 FL (ref 80–99)
MONOCYTES # BLD: 0.4 K/UL (ref 0–1)
MONOCYTES NFR BLD: 10 % (ref 5–13)
NEUTS SEG # BLD: 2.5 K/UL (ref 1.8–8)
NEUTS SEG NFR BLD: 65 % (ref 32–75)
NRBC # BLD: 0 K/UL (ref 0–0.01)
NRBC BLD-RTO: 0 PER 100 WBC
PHOSPHATE SERPL-MCNC: 2.9 MG/DL (ref 2.6–4.7)
PLATELET # BLD AUTO: 128 K/UL (ref 150–400)
PMV BLD AUTO: 10.5 FL (ref 8.9–12.9)
POTASSIUM BLD-SCNC: 3.2 MMOL/L (ref 3.5–5.1)
PROT SERPL-MCNC: 6.8 G/DL (ref 6.4–8.2)
RBC # BLD AUTO: 4.58 M/UL (ref 4.1–5.7)
RBC MORPH BLD: ABNORMAL
SERVICE CMNT-IMP: ABNORMAL
SODIUM BLD-SCNC: 144 MMOL/L (ref 136–145)
WBC # BLD AUTO: 3.8 K/UL (ref 4.1–11.1)

## 2019-05-02 PROCEDURE — 36415 COLL VENOUS BLD VENIPUNCTURE: CPT

## 2019-05-02 PROCEDURE — 83735 ASSAY OF MAGNESIUM: CPT

## 2019-05-02 PROCEDURE — 96402 CHEMO HORMON ANTINEOPL SQ/IM: CPT

## 2019-05-02 PROCEDURE — 80047 BASIC METABLC PNL IONIZED CA: CPT

## 2019-05-02 PROCEDURE — 74011250636 HC RX REV CODE- 250/636: Performed by: INTERNAL MEDICINE

## 2019-05-02 PROCEDURE — 84100 ASSAY OF PHOSPHORUS: CPT

## 2019-05-02 PROCEDURE — 85025 COMPLETE CBC W/AUTO DIFF WBC: CPT

## 2019-05-02 PROCEDURE — 96372 THER/PROPH/DIAG INJ SC/IM: CPT

## 2019-05-02 PROCEDURE — 80076 HEPATIC FUNCTION PANEL: CPT

## 2019-05-02 RX ORDER — LANREOTIDE ACETATE 120 MG/.5ML
120 INJECTION SUBCUTANEOUS ONCE
Status: COMPLETED | OUTPATIENT
Start: 2019-05-02 | End: 2019-05-02

## 2019-05-02 RX ORDER — TAMSULOSIN HYDROCHLORIDE 0.4 MG/1
0.4 CAPSULE ORAL DAILY
COMMUNITY
End: 2021-01-01

## 2019-05-02 RX ADMIN — DENOSUMAB 120 MG: 120 INJECTION SUBCUTANEOUS at 09:29

## 2019-05-02 RX ADMIN — LANREOTIDE ACETATE 120 MG: 120 INJECTION SUBCUTANEOUS at 09:30

## 2019-05-02 NOTE — PROGRESS NOTES
0855 Pt arrived at [facility] ambulatory and in no distress for Lanreotide/Xgeva. Assessment unremarkable, no new complaints voiced. Labs drawn peripherally. Visit Vitals /90 (BP 1 Location: Left arm, BP Patient Position: At rest) Pulse 71 Temp 97.8 °F (36.6 °C) Resp 16 SpO2 98% Labs:  
Recent Results (from the past 12 hour(s)) CBC WITH AUTOMATED DIFF Collection Time: 05/02/19  9:16 AM  
Result Value Ref Range WBC 3.8 (L) 4.1 - 11.1 K/uL  
 RBC 4.58 4.10 - 5.70 M/uL  
 HGB 12.7 12.1 - 17.0 g/dL HCT 38.1 36.6 - 50.3 % MCV 83.2 80.0 - 99.0 FL  
 MCH 27.7 26.0 - 34.0 PG  
 MCHC 33.3 30.0 - 36.5 g/dL  
 RDW 12.8 11.5 - 14.5 % PLATELET 930 (L) 340 - 400 K/uL MPV 10.5 8.9 - 12.9 FL  
 NRBC 0.0 0  WBC ABSOLUTE NRBC 0.00 0.00 - 0.01 K/uL NEUTROPHILS 65 32 - 75 % LYMPHOCYTES 19 12 - 49 % MONOCYTES 10 5 - 13 % EOSINOPHILS 5 0 - 7 % BASOPHILS 1 0 - 1 % IMMATURE GRANULOCYTES 0 0.0 - 0.5 % ABS. NEUTROPHILS 2.5 1.8 - 8.0 K/UL  
 ABS. LYMPHOCYTES 0.7 (L) 0.8 - 3.5 K/UL  
 ABS. MONOCYTES 0.4 0.0 - 1.0 K/UL  
 ABS. EOSINOPHILS 0.2 0.0 - 0.4 K/UL  
 ABS. BASOPHILS 0.0 0.0 - 0.1 K/UL  
 ABS. IMM. GRANS. 0.0 0.00 - 0.04 K/UL  
 DF AUTOMATED    
 RBC COMMENTS NORMOCYTIC, NORMOCHROMIC MAGNESIUM Collection Time: 05/02/19  9:16 AM  
Result Value Ref Range Magnesium 2.1 1.6 - 2.4 mg/dL PHOSPHORUS Collection Time: 05/02/19  9:16 AM  
Result Value Ref Range Phosphorus 2.9 2.6 - 4.7 MG/DL  
HEPATIC FUNCTION PANEL Collection Time: 05/02/19  9:16 AM  
Result Value Ref Range Protein, total 6.8 6.4 - 8.2 g/dL Albumin 3.6 3.5 - 5.0 g/dL Globulin 3.2 2.0 - 4.0 g/dL A-G Ratio 1.1 1.1 - 2.2 Bilirubin, total 1.2 (H) 0.2 - 1.0 MG/DL Bilirubin, direct 0.2 0.0 - 0.2 MG/DL Alk. phosphatase 60 45 - 117 U/L  
 AST (SGOT) 26 15 - 37 U/L  
 ALT (SGPT) 24 12 - 78 U/L  
POC CHEM8 Collection Time: 05/02/19  9:20 AM  
Result Value Ref Range Calcium, ionized (POC) 1.14 1.12 - 1.32 mmol/L Sodium (POC) 144 136 - 145 mmol/L Potassium (POC) 3.2 (L) 3.5 - 5.1 mmol/L Chloride (POC) 104 98 - 107 mmol/L  
 CO2 (POC) 25 21 - 32 mmol/L Anion gap (POC) 19 10 - 20 mmol/L Glucose (POC) 118 (H) 65 - 100 mg/dL BUN (POC) 15 9 - 20 mg/dL Creatinine (POC) 1.1 0.6 - 1.3 mg/dL GFRAA, POC >60 >60 ml/min/1.73m2 GFRNA, POC >60 >60 ml/min/1.73m2 Hematocrit (POC) 36 (L) 36.6 - 50.3 % Comment Notified RN or MD immediately by  Medications received: 
Lanreotide 120 mg Sub-Q injection to R GM Xgeva 120 mg Sub-Q injection in L arm 
 
0940 Tolerated treatment well, no adverse reaction noted. D/Cd from [facility] ambulatory and in no distress accompanied by Spouse.   Next appt 5/30/19 @ 9 am.

## 2019-05-02 NOTE — PROGRESS NOTES
[address], [address] [place] [address], 200 [facility]  [phone]       Oncology Follow up Note        Patient: Ronal White MRN: [id number]  SSN: [id number]    YOB: 1945  Age: 68 y.o. Sex: male        Diagnosis:     1. 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 Afinitor 10 mg + Somatuline Depot 120 mg SC - started Afinitor 3/30/2019  1. Somatuline Depot 120 mg SC - 05/2016 - 11/2018 Restarted 3/4/2019 -   (treatment held per patient request since11/16/2018)   2. Xgeva 120mg SC    Subjective:      Ronal White is a 68 y.o. male with a diagnosis of metastatic neuroendocrine cancer. He started experiencing pain in both groins and the abdomen. A CT of the abdomen showed retroperitoneal adenopathy. CT guided biopsy of the retroperitoneal LN reveals a dx of well differentiated neuroendocrine carcinoma. Bone scan shows disease in the bone. Mr. Bharat Calderón had numerous but mild side effects of Somatuline. He remained fatigued. He took a break from therapy in Nov 2018. He went on an extended vacation the beginning of February to [facility]. He returns today to discuss treatment options. CT shows progression of disease in the nodes. Mr. Bharat Calderón resumed therapy with Somatuline with the addition of Afinitor. Bone pains are better. Appetite has improved. Diarrhea and flushing has also improved.     Review of Systems:    Constitutional: fatigue, hot flashes, weight loss  Eyes: negative  Ears, Nose, Mouth, Throat, and Face: negative  Respiratory: negative  Cardiovascular: negative  Gastrointestinal: diarrhea, nausea  Genitourinary: urinary incontinence  Integument/Breast: negative  Hematologic/Lymphatic: negative  Musculoskeletal: back pain  Neurological: negative      Past Medical History:   Diagnosis Date    Adverse effect of anesthesia low HR and very slow to wakeup    Cancer ([facility] [address] 2016    neuroendocrine, retroperitoneal LN involvement    Diarrhea     Frequent urination     Hypertension     Poor appetite     Unspecified adverse effect of anesthesia     \"hard to put to sleep and slow to wake up-heart rate dropped very low\"     Past Surgical History:   Procedure Laterality Date    COLONOSCOPY N/A 2016    COLONOSCOPY performed by Vilinda Duverney, MD at [address] [place] [address]  2016         HX ORTHOPAEDIC      knee scope left knee    HX OTHER SURGICAL  2013    excision of scalp cyst     UPPER GI ENDOSCOPY,BIOPSY  2016           Family History   Problem Relation Age of Onset    Cancer Mother     Stroke Sister      Social History     Tobacco Use    Smoking status: Former Smoker     Packs/day: 1.00     Years: 8.00     Pack years: 8.00     Last attempt to quit: 1972     Years since quittin.0    Smokeless tobacco: Never Used   Substance Use Topics    Alcohol use: No      Prior to Admission medications    Medication Sig Start Date End Date Taking? Authorizing Provider   everolimus (AFINITOR) 10 mg tab Take 1 Tab by mouth daily. 3/5/19  Yes Amairani ROGERS NP   lisinopril (PRINIVIL, ZESTRIL) 20 mg tablet  18  Yes Provider, Historical   lisinopril (PRINIVIL, ZESTRIL) 10 mg tablet Take 20 mg by mouth daily. Yes Provider, Historical   tamsulosin (FLOMAX) 0.4 mg capsule Take 0.4 mg by mouth daily. Provider, Historical   methocarbamol (ROBAXIN-750) 750 mg tablet Take 1 Tab by mouth four (4) times daily.  17   HUMA Barragan          No Known Allergies        Objective:     Vitals:    19 1327   BP: 142/90   Pulse: 75   Resp: 18   Temp: 97 °F (36.1 °C)   TempSrc: Oral   SpO2: 96%   Weight: 178 lb 6.4 oz (80.9 kg)   Height: 6' (1.829 m)      Pain Scale: 0 - No pain/10      Physical Exam:    GENERAL: alert, cooperative  EYE: negative  LYMPHATIC: Cervical, supraclavicular, and axillary nodes normal.   THROAT & NECK: normal and no erythema or exudates noted. LUNG: clear to auscultation bilaterally  HEART: regular rate and rhythm  ABDOMEN: soft, non-tender  EXTREMITIES: no cyanosis or edema  SKIN: Normal.  NEUROLOGIC: negative      IMAGING:     I personally reviewed the images. Enlarged and abnormal retroperitoneal adenopathy. Bone metastasis. CT Results (most recent):  Results from Hospital Encounter encounter on 02/25/19   CT ABD PELV W CONT    Narrative CT ABDOMEN AND PELVIS WITH CONTRAST. 2/25/2019 10:03 AM     INDICATION: Metastatic neuroendocrine carcinoma. HISTORY (per electronic medical record): Unknown primary, likely GI tract. Diagnosed in 2016 by retroperitoneal lymph node biopsy. Osseous metastases. COMPARISON: 2/26/2018, 4/27/2016. TECHNIQUE: CT of the abdomen and pelvis was performed after the administration  100 cc IV Isovue-370 and oral contrast. CT dose reduction was achieved through  use of a standardized protocol tailored for this examination and automatic  exposure control for dose modulation. FINDINGS:  Neuroendocrine carcinoma: A right para-aortic lymph node in the inferior  posterior mediastinum has increased slightly, and now measures 11 mm in short  axis, versus 9 mm in short axis on 2/26/2018. Left para-aortic, retroperitoneal  lymph nodes extend from the left diaphragmatic pleura to the left common iliac  artery. These have increased. The largest now measures 22 mm in short axis on  image 2-30, versus 18 mm on 2/26/2018. Left retroperitoneal adenopathy has  become more confluent in the interval as well: compare image [id number] today to  image [id number] on 2/26/2018. Though difficult to measure, the infiltrative soft tissue mass with  calcifications in the right upper quadrant root of the mesentery has probably  increased from 2/26/2018. Diffuse sclerotic osseous metastases are stable in size and number.     Abdomen: Aside from subsegmental atelectasis in the right base, the lung bases  are clear. Incidental note is made of a noncalcified gallstone and focal fatty  infiltration along the fissure for the falciform ligament. No concerning hepatic  masses. Subcentimeter hypodense bilateral renal lesions are too small to  characterize, but likely represent cysts. The distal esophagus, stomach,  duodenum, gallbladder, pancreas, spleen, adrenals, and kidneys are otherwise  normal.    The heart size is normal. The lack of a celiac axis is a normal variant; the  splenic and left hepatic arteries arise directly from the aorta. The left  gastric artery arises from the splenic artery. The right hepatic artery is  replaced from the superior mesenteric artery. Pelvis: The prostate is mildly enlarged and indents the bladder base. The  bladder is dilated, diverticulated, and trabeculated. Descending and sigmoid  diverticulosis are mild. The small bowel, ileocecal junction, appendix, and  colon are otherwise normal. No free air or fluid. Impression IMPRESSION:   1. Increased retroperitoneal and inferior mediastinal lori metastases. 2. Probably increased mesenteric mass. 3. Stable osseous metastases. I reviewed the imaging personally. Disease progression in the abdomen.        Lab Results   Component Value Date/Time    WBC 3.8 (L) 05/02/2019 09:16 AM    Hemoglobin (POC) 14.6 12/21/2017 09:10 AM    HGB 12.7 05/02/2019 09:16 AM    Hematocrit (POC) 36 (L) 05/02/2019 09:20 AM    HCT 38.1 05/02/2019 09:16 AM    PLATELET 339 (L) 29/98/1835 09:16 AM    MCV 83.2 05/02/2019 09:16 AM       Lab Results   Component Value Date/Time    Sodium 141 10/25/2018 09:13 AM    Potassium 4.3 10/25/2018 09:13 AM    Chloride 107 10/25/2018 09:13 AM    CO2 30 10/25/2018 09:13 AM    Anion gap 4 (L) 10/25/2018 09:13 AM    Glucose 124 (H) 10/25/2018 09:13 AM    BUN 18 10/25/2018 09:13 AM    Creatinine 1.10 10/25/2018 09:13 AM    BUN/Creatinine ratio 16 10/25/2018 09:13 AM    GFR est AA >60 10/25/2018 09:13 AM    GFR est non-AA >60 10/25/2018 09:13 AM    Calcium 8.9 10/25/2018 09:13 AM    Bilirubin, total 1.2 (H) 05/02/2019 09:16 AM    AST (SGOT) 26 05/02/2019 09:16 AM    Alk. phosphatase 60 05/02/2019 09:16 AM    Protein, total 6.8 05/02/2019 09:16 AM    Albumin 3.6 05/02/2019 09:16 AM    Globulin 3.2 05/02/2019 09:16 AM    A-G Ratio 1.1 05/02/2019 09:16 AM    ALT (SGPT) 24 05/02/2019 09:16 AM           Assessment:     1. 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 Had excellent disease control. He experienced a number of side effects including hot flashes, weight loss etc  He decided to take a break from therapy. Recent CT shows disease progression in retroperitoneum and abdomen. Restarted Somatuline   Flushing, appetite, diarrhea, back pain are all better. Currently on Everolimus 10 mg in addition to Somatuline. Tolerating treatment   A detailed system by system evaluation of side effect was performed to assess chemotherapy related toxicity. Blood counts are acceptable. Results reviewed with the patient. Symptom management form reviewed and scanned into the EMR under Media. 2. Bone metastasis    > Denosumab monthly      3. Urinary incontinence    BPH  Managed by Dr. Wilbur Pyle:       > Continue Denosumab  > Continue Somatuline  > Continue Afinitor 10 mg   > follow up in 2 months        Signed by: Shelli Eng MD                     May 3, 2019        CC. Julio Irving MD  CC. Thomes Spatz, MD  CC.  Cali Long MD

## 2019-05-02 NOTE — PROGRESS NOTES
Chief Complaint   Patient presents with    Cancer     Patient states he has some of the side effects of the medications that he would like to discuss, questioning if he is going to be put on Flomax. 1. Have you been to the ER, urgent care clinic since your last visit? Hospitalized since your last visit? No    2. Have you seen or consulted any other health care providers outside of the [address] [place] [address] since your last visit? Include any pap smears or colon screening.   Yes Dr. Tanisha Wagner Urologist 4/29/2019    Visit Vitals  /90 (BP 1 Location: Left arm, BP Patient Position: Sitting)   Pulse 75   Temp 97 °F (36.1 °C) (Oral)   Resp 18   Ht 6' (1.829 m)   Wt 178 lb 6.4 oz (80.9 kg)   SpO2 96%   BMI 24.20 kg/m²

## 2019-05-23 ENCOUNTER — APPOINTMENT (OUTPATIENT)
Dept: INFUSION THERAPY | Age: 74
End: 2019-05-23
Payer: MEDICARE

## 2019-05-30 ENCOUNTER — HOSPITAL ENCOUNTER (OUTPATIENT)
Dept: INFUSION THERAPY | Age: 74
Discharge: HOME | End: 2019-05-30
Payer: MEDICARE

## 2019-05-30 VITALS
HEART RATE: 64 BPM | RESPIRATION RATE: 18 BRPM | DIASTOLIC BLOOD PRESSURE: 78 MMHG | SYSTOLIC BLOOD PRESSURE: 134 MMHG | BODY MASS INDEX: 23.72 KG/M2 | OXYGEN SATURATION: 100 % | TEMPERATURE: 97.9 F | WEIGHT: 175.13 LBS | HEIGHT: 72 IN

## 2019-05-30 DIAGNOSIS — C7B.8 NEUROENDOCRINE CARCINOMA METASTATIC TO BONE (HCC): Primary | ICD-10-CM

## 2019-05-30 DIAGNOSIS — C7A.8 NEUROENDOCRINE CARCINOMA METASTATIC TO BONE (HCC): Primary | ICD-10-CM

## 2019-05-30 LAB
ALBUMIN SERPL-MCNC: 3.3 G/DL (ref 3.5–5)
ALBUMIN SERPL-MCNC: 3.3 G/DL (ref 3.5–5)
ALBUMIN/GLOB SERPL: 0.9 {RATIO} (ref 1.1–2.2)
ALP SERPL-CCNC: 81 U/L (ref 45–117)
ALT SERPL-CCNC: 23 U/L (ref 12–78)
ANION GAP BLD CALC-SCNC: 15 MMOL/L (ref 10–20)
ANION GAP SERPL CALC-SCNC: 4 MMOL/L (ref 5–15)
AST SERPL-CCNC: 24 U/L (ref 15–37)
BASOPHILS # BLD: 0 K/UL (ref 0–0.1)
BASOPHILS NFR BLD: 0 % (ref 0–1)
BILIRUB DIRECT SERPL-MCNC: 0.2 MG/DL (ref 0–0.2)
BILIRUB SERPL-MCNC: 1 MG/DL (ref 0.2–1)
BUN BLD-MCNC: 18 MG/DL (ref 9–20)
BUN SERPL-MCNC: 14 MG/DL (ref 6–20)
BUN/CREAT SERPL: 13 (ref 12–20)
CA-I BLD-MCNC: 1.1 MMOL/L (ref 1.12–1.32)
CALCIUM SERPL-MCNC: 8.1 MG/DL (ref 8.5–10.1)
CHLORIDE BLD-SCNC: 105 MMOL/L (ref 98–107)
CHLORIDE SERPL-SCNC: 108 MMOL/L (ref 97–108)
CO2 BLD-SCNC: 26 MMOL/L (ref 21–32)
CO2 SERPL-SCNC: 28 MMOL/L (ref 21–32)
CREAT BLD-MCNC: 1 MG/DL (ref 0.6–1.3)
CREAT SERPL-MCNC: 1.07 MG/DL (ref 0.7–1.3)
DIFFERENTIAL METHOD BLD: ABNORMAL
EOSINOPHIL # BLD: 0.4 K/UL (ref 0–0.4)
EOSINOPHIL NFR BLD: 8 % (ref 0–7)
ERYTHROCYTE [DISTWIDTH] IN BLOOD BY AUTOMATED COUNT: 12.8 % (ref 11.5–14.5)
GLOBULIN SER CALC-MCNC: 3.8 G/DL (ref 2–4)
GLUCOSE BLD-MCNC: 150 MG/DL (ref 65–100)
GLUCOSE SERPL-MCNC: 154 MG/DL (ref 65–100)
HCT VFR BLD AUTO: 37.7 % (ref 36.6–50.3)
HCT VFR BLD CALC: 37 % (ref 36.6–50.3)
HGB BLD-MCNC: 12.6 G/DL (ref 12.1–17)
IMM GRANULOCYTES # BLD AUTO: 0 K/UL (ref 0–0.04)
IMM GRANULOCYTES NFR BLD AUTO: 0 % (ref 0–0.5)
LYMPHOCYTES # BLD: 0.5 K/UL (ref 0.8–3.5)
LYMPHOCYTES NFR BLD: 10 % (ref 12–49)
MAGNESIUM SERPL-MCNC: 1.9 MG/DL (ref 1.6–2.4)
MCH RBC QN AUTO: 27.1 PG (ref 26–34)
MCHC RBC AUTO-ENTMCNC: 33.4 G/DL (ref 30–36.5)
MCV RBC AUTO: 81.1 FL (ref 80–99)
MONOCYTES # BLD: 0.5 K/UL (ref 0–1)
MONOCYTES NFR BLD: 11 % (ref 5–13)
NEUTS BAND NFR BLD MANUAL: 7 %
NEUTS SEG # BLD: 3.3 K/UL (ref 1.8–8)
NEUTS SEG NFR BLD: 64 % (ref 32–75)
NRBC # BLD: 0 K/UL (ref 0–0.01)
NRBC BLD-RTO: 0 PER 100 WBC
PHOSPHATE SERPL-MCNC: 2.2 MG/DL (ref 2.6–4.7)
PLATELET # BLD AUTO: 161 K/UL (ref 150–400)
PMV BLD AUTO: 9.9 FL (ref 8.9–12.9)
POTASSIUM BLD-SCNC: 4.2 MMOL/L (ref 3.5–5.1)
POTASSIUM SERPL-SCNC: 3.3 MMOL/L (ref 3.5–5.1)
PROT SERPL-MCNC: 7.1 G/DL (ref 6.4–8.2)
RBC # BLD AUTO: 4.65 M/UL (ref 4.1–5.7)
RBC MORPH BLD: ABNORMAL
SERVICE CMNT-IMP: ABNORMAL
SODIUM BLD-SCNC: 141 MMOL/L (ref 136–145)
SODIUM SERPL-SCNC: 140 MMOL/L (ref 136–145)
WBC # BLD AUTO: 4.7 K/UL (ref 4.1–11.1)

## 2019-05-30 PROCEDURE — 96374 THER/PROPH/DIAG INJ IV PUSH: CPT

## 2019-05-30 PROCEDURE — 80047 BASIC METABLC PNL IONIZED CA: CPT

## 2019-05-30 PROCEDURE — 96402 CHEMO HORMON ANTINEOPL SQ/IM: CPT

## 2019-05-30 PROCEDURE — 85025 COMPLETE CBC W/AUTO DIFF WBC: CPT

## 2019-05-30 PROCEDURE — 74011000258 HC RX REV CODE- 258: Performed by: INTERNAL MEDICINE

## 2019-05-30 PROCEDURE — 74011250636 HC RX REV CODE- 250/636: Performed by: INTERNAL MEDICINE

## 2019-05-30 PROCEDURE — 74011250637 HC RX REV CODE- 250/637: Performed by: NURSE PRACTITIONER

## 2019-05-30 PROCEDURE — 36415 COLL VENOUS BLD VENIPUNCTURE: CPT

## 2019-05-30 PROCEDURE — 83735 ASSAY OF MAGNESIUM: CPT

## 2019-05-30 PROCEDURE — 74011250636 HC RX REV CODE- 250/636: Performed by: NURSE PRACTITIONER

## 2019-05-30 PROCEDURE — 80076 HEPATIC FUNCTION PANEL: CPT

## 2019-05-30 PROCEDURE — 96375 TX/PRO/DX INJ NEW DRUG ADDON: CPT

## 2019-05-30 PROCEDURE — 80069 RENAL FUNCTION PANEL: CPT

## 2019-05-30 RX ORDER — SODIUM CHLORIDE 0.9 % (FLUSH) 0.9 %
10-40 SYRINGE (ML) INJECTION AS NEEDED
Status: DISCONTINUED | OUTPATIENT
Start: 2019-05-30 | End: 2019-05-31 | Stop reason: HOSPADM

## 2019-05-30 RX ORDER — ZOLEDRONIC ACID 4 MG/100ML
4 SOLUTION INTRAVENOUS ONCE
Status: CANCELLED
Start: 2019-05-30

## 2019-05-30 RX ORDER — SODIUM CHLORIDE 9 MG/ML
25 INJECTION, SOLUTION INTRAVENOUS AS NEEDED
Status: DISCONTINUED | OUTPATIENT
Start: 2019-05-30 | End: 2019-05-31 | Stop reason: HOSPADM

## 2019-05-30 RX ORDER — LANREOTIDE ACETATE 120 MG/.5ML
120 INJECTION SUBCUTANEOUS ONCE
Status: COMPLETED | OUTPATIENT
Start: 2019-05-30 | End: 2019-05-30

## 2019-05-30 RX ORDER — POTASSIUM CHLORIDE 750 MG/1
20 TABLET, FILM COATED, EXTENDED RELEASE ORAL
Status: COMPLETED | OUTPATIENT
Start: 2019-05-30 | End: 2019-05-30

## 2019-05-30 RX ORDER — LANREOTIDE ACETATE 120 MG/.5ML
INJECTION SUBCUTANEOUS
Status: DISPENSED
Start: 2019-05-30 | End: 2019-05-30

## 2019-05-30 RX ORDER — POTASSIUM CHLORIDE 750 MG/1
TABLET, FILM COATED, EXTENDED RELEASE ORAL
Status: DISPENSED
Start: 2019-05-30 | End: 2019-05-30

## 2019-05-30 RX ORDER — ZOLEDRONIC ACID 4 MG/100ML
4 SOLUTION INTRAVENOUS ONCE
Status: COMPLETED | OUTPATIENT
Start: 2019-05-30 | End: 2019-05-30

## 2019-05-30 RX ADMIN — SODIUM CHLORIDE 25 ML/HR: 900 INJECTION, SOLUTION INTRAVENOUS at 10:43

## 2019-05-30 RX ADMIN — POTASSIUM CHLORIDE 20 MEQ: 750 TABLET, FILM COATED, EXTENDED RELEASE ORAL at 10:43

## 2019-05-30 RX ADMIN — ZOLEDRONIC ACID 4 MG: 4 SOLUTION INTRAVENOUS at 10:48

## 2019-05-30 RX ADMIN — LANREOTIDE ACETATE 120 MG: 120 INJECTION SUBCUTANEOUS at 10:35

## 2019-05-30 RX ADMIN — Medication 10 ML: at 11:14

## 2019-05-30 RX ADMIN — Medication 10 ML: at 09:22

## 2019-05-30 NOTE — PROGRESS NOTES
Pt arrived to [facility] ambulatory in no acute distress at 0900 for Lanreotide/Zometa.  Assessment unremarkable no complaints or concerns voiced. IV established in R AC site WNL and positive blood return noted.      Visit Vitals  /78 (BP 1 Location: Left arm, BP Patient Position: At rest)   Pulse 64   Temp 97.9 °F (36.6 °C)   Resp 18   Ht 6' (1.829 m)   Wt 79.4 kg (175 lb 2 oz)   SpO2 100%   BMI 23.75 kg/m²     Labs obtained:   Recent Results (from the past 12 hour(s))   CBC WITH AUTOMATED DIFF    Collection Time: 05/30/19  9:21 AM   Result Value Ref Range    WBC 4.7 4.1 - 11.1 K/uL    RBC 4.65 4.10 - 5.70 M/uL    HGB 12.6 12.1 - 17.0 g/dL    HCT 37.7 36.6 - 50.3 %    MCV 81.1 80.0 - 99.0 FL    MCH 27.1 26.0 - 34.0 PG    MCHC 33.4 30.0 - 36.5 g/dL    RDW 12.8 11.5 - 14.5 %    PLATELET 910 166 - 448 K/uL    MPV 9.9 8.9 - 12.9 FL    NRBC 0.0 0  WBC    ABSOLUTE NRBC 0.00 0.00 - 0.01 K/uL    NEUTROPHILS 64 32 - 75 %    BAND NEUTROPHILS 7 %    LYMPHOCYTES 10 (L) 12 - 49 %    MONOCYTES 11 5 - 13 %    EOSINOPHILS 8 (H) 0 - 7 %    BASOPHILS 0 0 - 1 %    IMMATURE GRANULOCYTES 0 0.0 - 0.5 %    ABS. NEUTROPHILS 3.3 1.8 - 8.0 K/UL    ABS. LYMPHOCYTES 0.5 (L) 0.8 - 3.5 K/UL    ABS. MONOCYTES 0.5 0.0 - 1.0 K/UL    ABS. EOSINOPHILS 0.4 0.0 - 0.4 K/UL    ABS. BASOPHILS 0.0 0.0 - 0.1 K/UL    ABS. IMM. GRANS. 0.0 0.00 - 0.04 K/UL    DF MANUAL      RBC COMMENTS NORMOCYTIC, NORMOCHROMIC     MAGNESIUM    Collection Time: 05/30/19  9:21 AM   Result Value Ref Range    Magnesium 1.9 1.6 - 2.4 mg/dL   HEPATIC FUNCTION PANEL    Collection Time: 05/30/19  9:21 AM   Result Value Ref Range    Protein, total 7.1 6.4 - 8.2 g/dL    Albumin 3.3 (L) 3.5 - 5.0 g/dL    Globulin 3.8 2.0 - 4.0 g/dL    A-G Ratio 0.9 (L) 1.1 - 2.2      Bilirubin, total 1.0 0.2 - 1.0 MG/DL    Bilirubin, direct 0.2 0.0 - 0.2 MG/DL    Alk.  phosphatase 81 45 - 117 U/L    AST (SGOT) 24 15 - 37 U/L    ALT (SGPT) 23 12 - 78 U/L   RENAL FUNCTION PANEL Collection Time: 05/30/19  9:21 AM   Result Value Ref Range    Sodium 140 136 - 145 mmol/L    Potassium 3.3 (L) 3.5 - 5.1 mmol/L    Chloride 108 97 - 108 mmol/L    CO2 28 21 - 32 mmol/L    Anion gap 4 (L) 5 - 15 mmol/L    Glucose 154 (H) 65 - 100 mg/dL    BUN 14 6 - 20 MG/DL    Creatinine 1.07 0.70 - 1.30 MG/DL    BUN/Creatinine ratio 13 12 - 20      GFR est AA >60 >60 ml/min/1.73m2    GFR est non-AA >60 >60 ml/min/1.73m2    Calcium 8.1 (L) 8.5 - 10.1 MG/DL    Phosphorus 2.2 (L) 2.6 - 4.7 MG/DL    Albumin 3.3 (L) 3.5 - 5.0 g/dL   POC CHEM8    Collection Time: 05/30/19  9:26 AM   Result Value Ref Range    Calcium, ionized (POC) 1.10 (L) 1.12 - 1.32 mmol/L    Sodium (POC) 141 136 - 145 mmol/L    Potassium (POC) 4.2 3.5 - 5.1 mmol/L    Chloride (POC) 105 98 - 107 mmol/L    CO2 (POC) 26 21 - 32 mmol/L    Anion gap (POC) 15 10 - 20 mmol/L    Glucose (POC) 150 (H) 65 - 100 mg/dL    BUN (POC) 18 9 - 20 mg/dL    Creatinine (POC) 1.0 0.6 - 1.3 mg/dL    GFRAA, POC >60 >60 ml/min/1.73m2    GFRNA, POC >60 >60 ml/min/1.73m2    Hematocrit (POC) 37 36.6 - 50.3 %    Comment Notified RN or MD immediately by          The following medications administered:  NS KVO  Zometa 4 mg IVP  NS Flush  Lanreotide 120 mg Sub-Q injection to Left GM     Pt tolerated treatment well. No adverse reactions noted. IV flushed per policy and removed, 2x2 and coban placed.  Pt discharged ambulatory in no acute distress at 1115, accompanied by Spouse.   Next appointment 6/27/19 @ 9 am.

## 2019-05-30 NOTE — DISCHARGE INSTRUCTIONS
OUTPATIENT INFUSION CENTER    DISCHARGE INSTRUCTIONS FOR:  ZOMETA (Zoledronic Acid):    1. Be sure to inform your Dentist that you are taking this drug prior to invasive dental   procedures. You should avoid major dental work while you are being treated with this     medicine. Report any signs/symptoms of jaw pain to your physician immediately. 2.  This medicine needs to be taken on a fixed schedule. If you miss a dose, make sure your doctor is informed. You will also need to have frequent lab work done; try to keep all of your appointments. Your doctor may also prescribe Vitamin D and calcium supplements. 3.  Make sure your doctor knows if you are taking fluid pills, arthritis medicines or antibiotics,  or if you have a history of problems with your gums, mouth or teeth. 4. Drink some extra fluids during the 12-hour period before and after you receive Zometa. Report any changes in urinary patterns (example: a decrease in how often you urinate). Also report rapid weight gain, swelling of the hands, ankles or feet, unusual tiredness or weakness or unusual bleeding or bruising. 5.  You may resume your normal activities after your infusion. Some people may have mild side effects from Zometa. These side effects usually improve after the first infusion. They may include:  Mild nausea, diarrhea, constipation or upset stomach; Red or irritated eyes;  redness or itching at IV site;  Mild skin rash, mild numbness, tingling, or burning in extremities; Headache, dizziness, mild muscle or joint pain, trouble sleeping;  Nasal congestion, runny nose, sneezing    6. Signs/Symptoms of an allergic reaction may require immediate medical attention. These are rare, but may include one or more of the following:     Skin - Swelling, blistering, weeping, crusting, rash, itching or;   Wheezing, cough, sore throat, chest tightness, chest pain or shortness of breath, irregular heart beat;  Swelling of the face, eyelids, lips, tongue, or throat; dry mouth; Severe red (bloodshot), itchy, swollen, watery or painful eyes;  Stomach pain, loss of appetite, nausea, vomiting, or bloody diarrhea;  Severe headache, sleepiness or changes in personality;  Numbness, tingling or pain around the mouth, teeth, or jaw. Contact your physician if you have questions or concerns, or experience any of the above symptoms. Thai Hameed, Signature: _______________________________ 5/30/2019  Reid Antonio RN    Patient Education      Zoledronic Acid (Novaplus Zoledronic Acid, Reclast, Zometa) - (By injection)   Why this medicine is used:   Treats high blood calcium levels, osteoporosis, and bone damage caused by Paget disease, multiple myeloma, and bone cancers. Contact a nurse or doctor right away if you have:  · Decrease in how much or how often you urinate, blood in the urine, burning or painful urination  · Lower back or side pain  · Severe muscle, bone, or joint pain (especially in your thigh, groin, or hip)  · Pain, swelling, or numbness in the mouth or jaw, loose teeth or other teeth problems  · Muscle, itching, numbness, tingling in your fingers, feet, or around your mouth     Common side effects:  · Fever, chills, cough, sore throat, body aches  · Mild nausea, constipation, diarrhea, stomach pain or upset  · Headache  © 2017 [facility] [place][note 2] Information is for End User's use only and may not be sold, redistributed or otherwise used for commercial purposes.

## 2019-06-04 ENCOUNTER — TELEPHONE (OUTPATIENT)
Dept: ONCOLOGY | Age: 74
End: 2019-06-04

## 2019-06-04 NOTE — TELEPHONE ENCOUNTER
Was getting a shot (couldn't remember the name) but insurance company said they wanted him to try and get it in his IV. Was supposed to let us know if he was having any symptoms. Said hewa having Nausea, Headaches hand swelling and other symptom.      Please call     6/3/19 kelley

## 2019-06-06 NOTE — TELEPHONE ENCOUNTER
Return call placed to pt. Patient report he continues to have symptoms from the injection he received; H/A, nauseous in the morning, lower stomach cramping, diarrhea x 3 in 2 hours; pt denies taking Imodium. Provider notified. Provider advised pt to stop the Afinitor; pt wants to continue the Afinitor and spot the shot he received or start back taking the other shot Shantie Lambert). Patient advised to make an appt w/ provider to discuss. Patient transferred to [facility] to make an appt.

## 2019-06-06 NOTE — TELEPHONE ENCOUNTER
Patient called and stated that he is still having bad side effects and needs to know what can be done.     # [phone]

## 2019-06-10 ENCOUNTER — OFFICE VISIT (OUTPATIENT)
Dept: ONCOLOGY | Age: 74
End: 2019-06-10

## 2019-06-10 VITALS
HEIGHT: 72 IN | HEART RATE: 69 BPM | TEMPERATURE: 98.7 F | SYSTOLIC BLOOD PRESSURE: 124 MMHG | BODY MASS INDEX: 23.57 KG/M2 | DIASTOLIC BLOOD PRESSURE: 78 MMHG | WEIGHT: 174 LBS | OXYGEN SATURATION: 96 % | RESPIRATION RATE: 18 BRPM

## 2019-06-10 DIAGNOSIS — C7A.8 NEUROENDOCRINE CARCINOMA METASTATIC TO BONE (HCC): Primary | ICD-10-CM

## 2019-06-10 DIAGNOSIS — C7B.8 NEUROENDOCRINE CARCINOMA METASTATIC TO BONE (HCC): Primary | ICD-10-CM

## 2019-06-10 DIAGNOSIS — C79.51 BONE METASTASIS (HCC): ICD-10-CM

## 2019-06-10 NOTE — PROGRESS NOTES
Identified pt with two pt identifiers(name and ). Reviewed record in preparation for visit and have obtained necessary documentation. Chief Complaint   Patient presents with    Other     Neuroendocrine mets ro bone      Visit Vitals  /78 (BP 1 Location: Left arm, BP Patient Position: Sitting)   Pulse 69   Temp 98.7 °F (37.1 °C) (Oral)   Resp 18   Ht 6' (1.829 m)   Wt 174 lb (78.9 kg)   SpO2 96%   BMI 23.60 kg/m²       Health Maintenance Due   Topic    Hepatitis C Screening     DTaP/Tdap/Td series (1 - Tdap)    Shingrix Vaccine Age 50> (1 of 2)    FOBT Q 1 YEAR AGE 54-65     GLAUCOMA SCREENING Q2Y     AAA Screening 73-69 YO Male Smoking Patients     Pneumococcal 65+ years (1 of 2 - PCV13)    MEDICARE YEARLY EXAM        Coordination of Care Questionnaire:  :   1) Have you been to an emergency room, urgent care, or hospitalized since your last visit? If yes, where when, and reason for visit? no       2. Have seen or consulted any other health care provider since your last visit? If yes, where when, and reason for visit? NO      3) Do you have an Advanced Directive/ Living Will in place? NO  If yes, do we have a copy on file NO  If no, would you like information NO    Patient is accompanied by wife I have received verbal consent from Genie Beavers to discuss any/all medical information while they are present in the room.

## 2019-06-15 NOTE — PROGRESS NOTES
2001 [facility]  [address], [address] [facility], [address] [place] [address]  [phone]       Oncology Follow up Note        Patient: Alanson Apley MRN: [id number]  SSN: [id number]    YOB: 1945  Age: 68 y.o. Sex: male        Diagnosis:     1. 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 Afinitor 10 mg + Somatuline Depot 120 mg SC - started Afinitor 3/30/2019  1. Somatuline Depot 120 mg SC - 05/2016 - 11/2018 Restarted 3/4/2019 -   (treatment held per patient request since11/16/2018)   2. Zoledronic acid (per insurance preference), previously received Xgeva 120mg SC     Subjective:      Alanson Apley is a 68 y.o. male with a diagnosis of metastatic neuroendocrine cancer. He started experiencing pain in both groins and the abdomen. A CT of the abdomen showed retroperitoneal adenopathy. CT guided biopsy of the retroperitoneal LN reveals a dx of well differentiated neuroendocrine carcinoma. Bone scan shows disease in the bone. Mr. Rosario Chandler received Somatuline and had good control of disease. He took a break from therapy in Nov 2018. He went on an extended vacation until the beginning of February to [facility]. CT showed progression of disease in the nodes. Mr. Rosario Chandler resumed therapy with Somatuline with the addition of Afinitor. Symptoms such as bone pains, diarrhea and fatigue improved  Due to insurance preference we had to switch zoledronic acid from denosumab. After the first infusion of zoledronic acid he developed bone pains, fatigue etc. The symptoms are better but persisting.          Review of Systems:    Constitutional: fatigue, hot flashes, weight loss  Eyes: negative  Ears, Nose, Mouth, Throat, and Face: negative  Respiratory: negative  Cardiovascular: negative  Gastrointestinal: diarrhea, nausea  Genitourinary: urinary incontinence  Integument/Breast: negative  Hematologic/Lymphatic: negative  Musculoskeletal: back pain  Neurological: negative      Past Medical History:   Diagnosis Date    Adverse effect of anesthesia     low HR and very slow to wakeup    Cancer ([address] 2016    neuroendocrine, retroperitoneal LN involvement    Diarrhea     Frequent urination     Hypertension     Poor appetite     Unspecified adverse effect of anesthesia     \"hard to put to sleep and slow to wake up-heart rate dropped very low\"     Past Surgical History:   Procedure Laterality Date    COLONOSCOPY N/A 2016    COLONOSCOPY performed by Belle Morse MD at [address] [place] [address]  2016         HX ORTHOPAEDIC      knee scope left knee    HX OTHER SURGICAL  2013    excision of scalp cyst     UPPER GI ENDOSCOPY,BIOPSY  2016           Family History   Problem Relation Age of Onset    Cancer Mother     Stroke Sister      Social History     Tobacco Use    Smoking status: Former Smoker     Packs/day: 1.00     Years: 8.00     Pack years: 8.00     Last attempt to quit: 1972     Years since quittin.1    Smokeless tobacco: Never Used   Substance Use Topics    Alcohol use: No      Prior to Admission medications    Medication Sig Start Date End Date Taking? Authorizing Provider   tamsulosin (FLOMAX) 0.4 mg capsule Take 0.4 mg by mouth daily. Yes Provider, Historical   everolimus (AFINITOR) 10 mg tab Take 1 Tab by mouth daily. 3/5/19  Yes Savana ROGERS NP   lisinopril (PRINIVIL, ZESTRIL) 20 mg tablet  18  Yes Provider, Historical   methocarbamol (ROBAXIN-750) 750 mg tablet Take 1 Tab by mouth four (4) times daily. 17  Yes HUMA Mata   lisinopril (PRINIVIL, ZESTRIL) 10 mg tablet Take 20 mg by mouth daily.    Yes Provider, Historical          No Known Allergies        Objective:     Vitals:    06/10/19 1302   BP: 124/78   Pulse: 69   Resp: 18   Temp: 98.7 °F (37.1 °C)   TempSrc: Oral   SpO2: 96%   Weight: 174 lb (78.9 kg)   Height: 6' (1.829 m)      Pain Scale: 5/10  Generalized bone pains      Physical Exam:    GENERAL: alert, cooperative  EYE: negative  LYMPHATIC: Cervical, supraclavicular, and axillary nodes normal.   THROAT & NECK: normal and no erythema or exudates noted. LUNG: clear to auscultation bilaterally  HEART: regular rate and rhythm  ABDOMEN: soft, non-tender  EXTREMITIES: no cyanosis or edema  SKIN: Normal.  NEUROLOGIC: negative      IMAGING:     I personally reviewed the images. Enlarged and abnormal retroperitoneal adenopathy. Bone metastasis. CT Results (most recent):  Results from Hospital Encounter encounter on 02/25/19   CT ABD PELV W CONT    Narrative CT ABDOMEN AND PELVIS WITH CONTRAST. 2/25/2019 10:03 AM     INDICATION: Metastatic neuroendocrine carcinoma. HISTORY (per electronic medical record): Unknown primary, likely GI tract. Diagnosed in 2016 by retroperitoneal lymph node biopsy. Osseous metastases. COMPARISON: 2/26/2018, 4/27/2016. TECHNIQUE: CT of the abdomen and pelvis was performed after the administration  100 cc IV Isovue-370 and oral contrast. CT dose reduction was achieved through  use of a standardized protocol tailored for this examination and automatic  exposure control for dose modulation. FINDINGS:  Neuroendocrine carcinoma: A right para-aortic lymph node in the inferior  posterior mediastinum has increased slightly, and now measures 11 mm in short  axis, versus 9 mm in short axis on 2/26/2018. Left para-aortic, retroperitoneal  lymph nodes extend from the left diaphragmatic pleura to the left common iliac  artery. These have increased. The largest now measures 22 mm in short axis on  image 2-30, versus 18 mm on 2/26/2018. Left retroperitoneal adenopathy has  become more confluent in the interval as well: compare image [id number] today to  image [id number] on 2/26/2018.     Though difficult to measure, the infiltrative soft tissue mass with  calcifications in the right upper quadrant root of the mesentery has probably  increased from 2/26/2018. Diffuse sclerotic osseous metastases are stable in size and number. Abdomen: Aside from subsegmental atelectasis in the right base, the lung bases  are clear. Incidental note is made of a noncalcified gallstone and focal fatty  infiltration along the fissure for the falciform ligament. No concerning hepatic  masses. Subcentimeter hypodense bilateral renal lesions are too small to  characterize, but likely represent cysts. The distal esophagus, stomach,  duodenum, gallbladder, pancreas, spleen, adrenals, and kidneys are otherwise  normal.    The heart size is normal. The lack of a celiac axis is a normal variant; the  splenic and left hepatic arteries arise directly from the aorta. The left  gastric artery arises from the splenic artery. The right hepatic artery is  replaced from the superior mesenteric artery. Pelvis: The prostate is mildly enlarged and indents the bladder base. The  bladder is dilated, diverticulated, and trabeculated. Descending and sigmoid  diverticulosis are mild. The small bowel, ileocecal junction, appendix, and  colon are otherwise normal. No free air or fluid. Impression IMPRESSION:   1. Increased retroperitoneal and inferior mediastinal lori metastases. 2. Probably increased mesenteric mass. 3. Stable osseous metastases. I reviewed the imaging personally. Disease progression in the abdomen.        Lab Results   Component Value Date/Time    WBC 4.7 05/30/2019 09:21 AM    Hemoglobin (POC) 14.6 12/21/2017 09:10 AM    HGB 12.6 05/30/2019 09:21 AM    Hematocrit (POC) 37 05/30/2019 09:26 AM    HCT 37.7 05/30/2019 09:21 AM    PLATELET 054 46/62/3598 09:21 AM    MCV 81.1 05/30/2019 09:21 AM       Lab Results   Component Value Date/Time    Sodium 140 05/30/2019 09:21 AM    Potassium 3.3 (L) 05/30/2019 09:21 AM    Chloride 108 05/30/2019 09:21 AM    CO2 28 05/30/2019 09:21 AM    Anion gap 4 (L) 05/30/2019 09:21 AM    Glucose 154 (H) 05/30/2019 09:21 AM    BUN 14 05/30/2019 09:21 AM    Creatinine 1.07 05/30/2019 09:21 AM    BUN/Creatinine ratio 13 05/30/2019 09:21 AM    GFR est AA >60 05/30/2019 09:21 AM    GFR est non-AA >60 05/30/2019 09:21 AM    Calcium 8.1 (L) 05/30/2019 09:21 AM    Bilirubin, total 1.0 05/30/2019 09:21 AM    AST (SGOT) 24 05/30/2019 09:21 AM    Alk. phosphatase 81 05/30/2019 09:21 AM    Protein, total 7.1 05/30/2019 09:21 AM    Albumin 3.3 (L) 05/30/2019 09:21 AM    Albumin 3.3 (L) 05/30/2019 09:21 AM    Globulin 3.8 05/30/2019 09:21 AM    A-G Ratio 0.9 (L) 05/30/2019 09:21 AM    ALT (SGPT) 23 05/30/2019 09:21 AM           Assessment:     1. 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 Had excellent disease control. He experienced a number of side effects including hot flashes, weight loss etc  He decided to take a break from therapy. Last CT showed disease progression in retroperitoneum and abdomen. Restarted Somatuline   Flushing, appetite, diarrhea, back pain are all better. Currently on Everolimus 10 mg in addition to Somatuline. Tolerating treatment   A detailed system by system evaluation of side effect was performed to assess chemotherapy related toxicity. Blood counts are acceptable. Results reviewed with the patient. Symptom management form reviewed and scanned into the EMR under Media. 2. Bone metastasis    > Last month he received Zoledronic acid per insurance company preference. He mentioned a plethora of side effects including bone pains, fatigue etc  D/C zoledronic acid  Resume Denosumab      3. Urinary incontinence    BPH  Managed by Dr. Srikanth Hodges        Plan:       > Hold Zometa.  Resume Denosumab  > Continue Somatuline  > Continue Afinitor 10 mg   > follow up in 3 months  > Imaging before the next visit. Signed by: Josie Matute MD                     Maxine 15, 2019        CC. Josh White MD  CC. Chon Moore MD  CC.  Jonah Poole MD

## 2019-06-17 DIAGNOSIS — C7B.8 NEUROENDOCRINE CARCINOMA METASTATIC TO BONE (HCC): Primary | ICD-10-CM

## 2019-06-17 DIAGNOSIS — C7A.8 NEUROENDOCRINE CARCINOMA METASTATIC TO BONE (HCC): Primary | ICD-10-CM

## 2019-06-19 RX ORDER — LISINOPRIL 20 MG/1
20 TABLET ORAL DAILY
Qty: 30 TAB | Refills: 0 | Status: SHIPPED | OUTPATIENT
Start: 2019-06-19 | End: 2021-01-01

## 2019-06-19 NOTE — TELEPHONE ENCOUNTER
PER REVA FROM DR MICHAEL LISINOPRIL 20 MG TAB. TAKE 1- 20 MG TAB BY MOUTH DAILY.  DISP 30 REFILL 0    PATIENT NEEDS TO SEE HIS PCP TO MANAGE THIS MEDICATION AFTER THIS REFILL PER PROVIDERS

## 2019-06-25 RX ORDER — LANREOTIDE ACETATE 120 MG/.5ML
120 INJECTION SUBCUTANEOUS ONCE
Status: CANCELLED
Start: 2019-06-27

## 2019-06-27 ENCOUNTER — HOSPITAL ENCOUNTER (OUTPATIENT)
Dept: INFUSION THERAPY | Age: 74
Discharge: HOME | End: 2019-06-27
Payer: MEDICARE

## 2019-06-27 VITALS
SYSTOLIC BLOOD PRESSURE: 127 MMHG | DIASTOLIC BLOOD PRESSURE: 62 MMHG | RESPIRATION RATE: 18 BRPM | TEMPERATURE: 97.5 F | HEART RATE: 66 BPM | HEIGHT: 72 IN | WEIGHT: 174.44 LBS | BODY MASS INDEX: 23.63 KG/M2 | OXYGEN SATURATION: 100 %

## 2019-06-27 DIAGNOSIS — C7A.8 NEUROENDOCRINE CARCINOMA METASTATIC TO BONE (HCC): Primary | ICD-10-CM

## 2019-06-27 DIAGNOSIS — E87.6 HYPOKALEMIA: Primary | ICD-10-CM

## 2019-06-27 DIAGNOSIS — C7B.8 NEUROENDOCRINE CARCINOMA METASTATIC TO BONE (HCC): Primary | ICD-10-CM

## 2019-06-27 LAB
ALBUMIN SERPL-MCNC: 3.7 G/DL (ref 3.5–5)
ALBUMIN/GLOB SERPL: 1 {RATIO} (ref 1.1–2.2)
ALP SERPL-CCNC: 78 U/L (ref 45–117)
ALT SERPL-CCNC: 28 U/L (ref 12–78)
ANION GAP BLD CALC-SCNC: 16 MMOL/L (ref 10–20)
ANION GAP SERPL CALC-SCNC: 6 MMOL/L (ref 5–15)
AST SERPL-CCNC: 28 U/L (ref 15–37)
BASOPHILS # BLD: 0.1 K/UL (ref 0–0.1)
BASOPHILS NFR BLD: 1 % (ref 0–1)
BILIRUB SERPL-MCNC: 1.4 MG/DL (ref 0.2–1)
BUN BLD-MCNC: 18 MG/DL (ref 9–20)
BUN SERPL-MCNC: 18 MG/DL (ref 6–20)
BUN/CREAT SERPL: 14 (ref 12–20)
CA-I BLD-MCNC: 1.16 MMOL/L (ref 1.12–1.32)
CALCIUM SERPL-MCNC: 8.4 MG/DL (ref 8.5–10.1)
CHLORIDE BLD-SCNC: 104 MMOL/L (ref 98–107)
CHLORIDE SERPL-SCNC: 108 MMOL/L (ref 97–108)
CHOLEST SERPL-MCNC: 249 MG/DL
CO2 BLD-SCNC: 27 MMOL/L (ref 21–32)
CO2 SERPL-SCNC: 28 MMOL/L (ref 21–32)
CREAT BLD-MCNC: 1.2 MG/DL (ref 0.6–1.3)
CREAT SERPL-MCNC: 1.26 MG/DL (ref 0.7–1.3)
DIFFERENTIAL METHOD BLD: ABNORMAL
EOSINOPHIL # BLD: 0.3 K/UL (ref 0–0.4)
EOSINOPHIL NFR BLD: 5 % (ref 0–7)
ERYTHROCYTE [DISTWIDTH] IN BLOOD BY AUTOMATED COUNT: 13.2 % (ref 11.5–14.5)
EST. AVERAGE GLUCOSE BLD GHB EST-MCNC: 143 MG/DL
GLOBULIN SER CALC-MCNC: 3.6 G/DL (ref 2–4)
GLUCOSE BLD-MCNC: 132 MG/DL (ref 65–100)
GLUCOSE SERPL-MCNC: 133 MG/DL (ref 65–100)
HBA1C MFR BLD: 6.6 % (ref 4.2–6.3)
HCT VFR BLD AUTO: 37.2 % (ref 36.6–50.3)
HCT VFR BLD CALC: 37 % (ref 36.6–50.3)
HDLC SERPL-MCNC: 50 MG/DL
HDLC SERPL: 5 {RATIO} (ref 0–5)
HGB BLD-MCNC: 12.4 G/DL (ref 12.1–17)
IMM GRANULOCYTES # BLD AUTO: 0 K/UL (ref 0–0.04)
IMM GRANULOCYTES NFR BLD AUTO: 0 % (ref 0–0.5)
LDLC SERPL CALC-MCNC: 158 MG/DL (ref 0–100)
LIPID PROFILE,FLP: ABNORMAL
LYMPHOCYTES # BLD: 0.8 K/UL (ref 0.8–3.5)
LYMPHOCYTES NFR BLD: 16 % (ref 12–49)
MAGNESIUM SERPL-MCNC: 2 MG/DL (ref 1.6–2.4)
MCH RBC QN AUTO: 26.7 PG (ref 26–34)
MCHC RBC AUTO-ENTMCNC: 33.3 G/DL (ref 30–36.5)
MCV RBC AUTO: 80 FL (ref 80–99)
MONOCYTES # BLD: 0.5 K/UL (ref 0–1)
MONOCYTES NFR BLD: 9 % (ref 5–13)
NEUTS SEG # BLD: 3.3 K/UL (ref 1.8–8)
NEUTS SEG NFR BLD: 69 % (ref 32–75)
NRBC # BLD: 0 K/UL (ref 0–0.01)
NRBC BLD-RTO: 0 PER 100 WBC
PHOSPHATE SERPL-MCNC: 2.6 MG/DL (ref 2.6–4.7)
PLATELET # BLD AUTO: 144 K/UL (ref 150–400)
PMV BLD AUTO: 10.6 FL (ref 8.9–12.9)
POTASSIUM BLD-SCNC: 3.1 MMOL/L (ref 3.5–5.1)
POTASSIUM SERPL-SCNC: 3.1 MMOL/L (ref 3.5–5.1)
PROT SERPL-MCNC: 7.3 G/DL (ref 6.4–8.2)
PROT UR-MCNC: 14 MG/DL (ref 0–11.9)
RBC # BLD AUTO: 4.65 M/UL (ref 4.1–5.7)
RBC MORPH BLD: ABNORMAL
SERVICE CMNT-IMP: ABNORMAL
SODIUM BLD-SCNC: 143 MMOL/L (ref 136–145)
SODIUM SERPL-SCNC: 142 MMOL/L (ref 136–145)
TRIGL SERPL-MCNC: 205 MG/DL (ref ?–150)
VLDLC SERPL CALC-MCNC: 41 MG/DL
WBC # BLD AUTO: 5 K/UL (ref 4.1–11.1)

## 2019-06-27 PROCEDURE — 36415 COLL VENOUS BLD VENIPUNCTURE: CPT

## 2019-06-27 PROCEDURE — 84100 ASSAY OF PHOSPHORUS: CPT

## 2019-06-27 PROCEDURE — 80053 COMPREHEN METABOLIC PANEL: CPT

## 2019-06-27 PROCEDURE — 85025 COMPLETE CBC W/AUTO DIFF WBC: CPT

## 2019-06-27 PROCEDURE — 74011250636 HC RX REV CODE- 250/636: Performed by: NURSE PRACTITIONER

## 2019-06-27 PROCEDURE — 80047 BASIC METABLC PNL IONIZED CA: CPT

## 2019-06-27 PROCEDURE — 84156 ASSAY OF PROTEIN URINE: CPT

## 2019-06-27 PROCEDURE — 96402 CHEMO HORMON ANTINEOPL SQ/IM: CPT

## 2019-06-27 PROCEDURE — 83036 HEMOGLOBIN GLYCOSYLATED A1C: CPT

## 2019-06-27 PROCEDURE — 80061 LIPID PANEL: CPT

## 2019-06-27 PROCEDURE — 83735 ASSAY OF MAGNESIUM: CPT

## 2019-06-27 PROCEDURE — 96372 THER/PROPH/DIAG INJ SC/IM: CPT

## 2019-06-27 RX ORDER — LANREOTIDE ACETATE 120 MG/.5ML
120 INJECTION SUBCUTANEOUS ONCE
Status: COMPLETED | OUTPATIENT
Start: 2019-06-27 | End: 2019-06-27

## 2019-06-27 RX ORDER — POTASSIUM CHLORIDE 750 MG/1
10 TABLET, EXTENDED RELEASE ORAL 2 TIMES DAILY
Qty: 60 TAB | Refills: 2 | Status: SHIPPED | OUTPATIENT
Start: 2019-06-27 | End: 2020-02-06

## 2019-06-27 RX ADMIN — LANREOTIDE ACETATE 120 MG: 120 INJECTION SUBCUTANEOUS at 09:47

## 2019-06-27 RX ADMIN — DENOSUMAB 120 MG: 120 INJECTION SUBCUTANEOUS at 09:47

## 2019-06-27 NOTE — PROGRESS NOTES
0900Pt arrived at [facility] ambulatory and in no distress for Lanreotide/Xgeva. Assessment unremarkable except pt reports extreme joint pain post first Zometa injection, no new complaints voiced. Pt Potassium  3.1 Jada Cancel NP notified per [place] a prescription for Potassium will be called into pt's pharmacy. Pt and spouse aware of prescription. All order labs were sent, at the time of this note some labs have not yet resulted. Visit Vitals  /62 (BP 1 Location: Left arm, BP Patient Position: At rest;Sitting)   Pulse 66   Temp 97.5 °F (36.4 °C)   Resp 18   Ht 6' (1.829 m)   Wt 79.1 kg (174 lb 7 oz)   SpO2 100%   BMI 23.66 kg/m²     Labs:   Recent Results (from the past 12 hour(s))   CBC WITH AUTOMATED DIFF    Collection Time: 06/27/19  9:09 AM   Result Value Ref Range    WBC 5.0 4.1 - 11.1 K/uL    RBC 4.65 4.10 - 5.70 M/uL    HGB 12.4 12.1 - 17.0 g/dL    HCT 37.2 36.6 - 50.3 %    MCV 80.0 80.0 - 99.0 FL    MCH 26.7 26.0 - 34.0 PG    MCHC 33.3 30.0 - 36.5 g/dL    RDW 13.2 11.5 - 14.5 %    PLATELET 540 (L) 511 - 400 K/uL    MPV 10.6 8.9 - 12.9 FL    NRBC 0.0 0  WBC    ABSOLUTE NRBC 0.00 0.00 - 0.01 K/uL    NEUTROPHILS 69 32 - 75 %    LYMPHOCYTES 16 12 - 49 %    MONOCYTES 9 5 - 13 %    EOSINOPHILS 5 0 - 7 %    BASOPHILS 1 0 - 1 %    IMMATURE GRANULOCYTES 0 0.0 - 0.5 %    ABS. NEUTROPHILS 3.3 1.8 - 8.0 K/UL    ABS. LYMPHOCYTES 0.8 0.8 - 3.5 K/UL    ABS. MONOCYTES 0.5 0.0 - 1.0 K/UL    ABS. EOSINOPHILS 0.3 0.0 - 0.4 K/UL    ABS. BASOPHILS 0.1 0.0 - 0.1 K/UL    ABS. IMM.  GRANS. 0.0 0.00 - 0.04 K/UL    DF AUTOMATED      RBC COMMENTS NORMOCYTIC, NORMOCHROMIC     METABOLIC PANEL, COMPREHENSIVE    Collection Time: 06/27/19  9:09 AM   Result Value Ref Range    Sodium 142 136 - 145 mmol/L    Potassium 3.1 (L) 3.5 - 5.1 mmol/L    Chloride 108 97 - 108 mmol/L    CO2 28 21 - 32 mmol/L    Anion gap 6 5 - 15 mmol/L    Glucose 133 (H) 65 - 100 mg/dL    BUN 18 6 - 20 MG/DL    Creatinine 1.26 0.70 - 1.30 MG/DL    BUN/Creatinine ratio 14 12 - 20      GFR est AA >60 >60 ml/min/1.73m2    GFR est non-AA 56 (L) >60 ml/min/1.73m2    Calcium 8.4 (L) 8.5 - 10.1 MG/DL    Bilirubin, total 1.4 (H) 0.2 - 1.0 MG/DL    ALT (SGPT) 28 12 - 78 U/L    AST (SGOT) 28 15 - 37 U/L    Alk.  phosphatase 78 45 - 117 U/L    Protein, total 7.3 6.4 - 8.2 g/dL    Albumin 3.7 3.5 - 5.0 g/dL    Globulin 3.6 2.0 - 4.0 g/dL    A-G Ratio 1.0 (L) 1.1 - 2.2     MAGNESIUM    Collection Time: 06/27/19  9:09 AM   Result Value Ref Range    Magnesium 2.0 1.6 - 2.4 mg/dL   PHOSPHORUS    Collection Time: 06/27/19  9:09 AM   Result Value Ref Range    Phosphorus 2.6 2.6 - 4.7 MG/DL   PROTEIN URINE, RANDOM    Collection Time: 06/27/19  9:09 AM   Result Value Ref Range    Protein, urine random 14 (H) 0.0 - 11.9 mg/dL   HEMOGLOBIN A1C WITH EAG    Collection Time: 06/27/19  9:09 AM   Result Value Ref Range    Hemoglobin A1c 6.6 (H) 4.2 - 6.3 %    Est. average glucose 143 mg/dL   LIPID PANEL    Collection Time: 06/27/19  9:09 AM   Result Value Ref Range    LIPID PROFILE          Cholesterol, total 249 (H) <200 MG/DL    Triglyceride 205 (H) <150 MG/DL    HDL Cholesterol 50 MG/DL    LDL, calculated 158 (H) 0 - 100 MG/DL    VLDL, calculated 41 MG/DL    CHOL/HDL Ratio 5.0 0.0 - 5.0     POC CHEM8    Collection Time: 06/27/19  9:20 AM   Result Value Ref Range    Calcium, ionized (POC) 1.16 1.12 - 1.32 mmol/L    Sodium (POC) 143 136 - 145 mmol/L    Potassium (POC) 3.1 (L) 3.5 - 5.1 mmol/L    Chloride (POC) 104 98 - 107 mmol/L    CO2 (POC) 27 21 - 32 mmol/L    Anion gap (POC) 16 10 - 20 mmol/L    Glucose (POC) 132 (H) 65 - 100 mg/dL    BUN (POC) 18 9 - 20 mg/dL    Creatinine (POC) 1.2 0.6 - 1.3 mg/dL    GFRAA, POC >60 >60 ml/min/1.73m2    GFRNA, POC 60 (L) >60 ml/min/1.73m2    Hematocrit (POC) 37 36.6 - 50.3 %    Comment Notified RN or MD immediately by        Medications received:  Xgeva Sub-Q injection to Left arm  Lanreotide Sub-Q injection to R GM    1000 Tolerated treatment well, no adverse reaction noted. D/Cd from [facility] ambulatory and in no distress accompanied by Spouse.   Next appt 7/25/19 @ 9 am.

## 2019-07-02 ENCOUNTER — HOSPITAL ENCOUNTER (OUTPATIENT)
Dept: NUCLEAR MEDICINE | Age: 74
Discharge: HOME | End: 2019-07-02
Attending: INTERNAL MEDICINE
Payer: MEDICARE

## 2019-07-02 ENCOUNTER — HOSPITAL ENCOUNTER (OUTPATIENT)
Dept: CT IMAGING | Age: 74
Discharge: HOME | End: 2019-07-02
Attending: INTERNAL MEDICINE
Payer: MEDICARE

## 2019-07-02 DIAGNOSIS — C7A.8 NEUROENDOCRINE CARCINOMA METASTATIC TO BONE (HCC): ICD-10-CM

## 2019-07-02 DIAGNOSIS — C7B.8 NEUROENDOCRINE CARCINOMA METASTATIC TO BONE (HCC): ICD-10-CM

## 2019-07-02 PROCEDURE — 74177 CT ABD & PELVIS W/CONTRAST: CPT

## 2019-07-02 PROCEDURE — 74011636320 HC RX REV CODE- 636/320: Performed by: INTERNAL MEDICINE

## 2019-07-02 PROCEDURE — 78306 BONE IMAGING WHOLE BODY: CPT

## 2019-07-02 RX ORDER — SODIUM CHLORIDE 0.9 % (FLUSH) 0.9 %
10 SYRINGE (ML) INJECTION
Status: DISPENSED | OUTPATIENT
Start: 2019-07-02 | End: 2019-07-02

## 2019-07-02 RX ADMIN — IOPAMIDOL 100 ML: 755 INJECTION, SOLUTION INTRAVENOUS at 12:19

## 2019-07-02 NOTE — PROGRESS NOTES
Improving disease. He needs to have his Urologist review the film as well. They can access this through the [facility].

## 2019-07-23 RX ORDER — LANREOTIDE ACETATE 120 MG/.5ML
120 INJECTION SUBCUTANEOUS ONCE
Status: CANCELLED
Start: 2019-07-25

## 2019-07-23 RX ORDER — LANREOTIDE ACETATE 120 MG/.5ML
120 INJECTION SUBCUTANEOUS ONCE
Status: CANCELLED
Start: 2019-09-19

## 2019-07-23 RX ORDER — LANREOTIDE ACETATE 120 MG/.5ML
120 INJECTION SUBCUTANEOUS ONCE
Status: CANCELLED
Start: 2019-08-22

## 2019-07-25 ENCOUNTER — HOSPITAL ENCOUNTER (OUTPATIENT)
Dept: INFUSION THERAPY | Age: 74
Discharge: HOME | End: 2019-07-25
Payer: MEDICARE

## 2019-07-25 VITALS
TEMPERATURE: 98 F | HEART RATE: 69 BPM | DIASTOLIC BLOOD PRESSURE: 94 MMHG | OXYGEN SATURATION: 100 % | RESPIRATION RATE: 20 BRPM | SYSTOLIC BLOOD PRESSURE: 154 MMHG

## 2019-07-25 DIAGNOSIS — C7B.8 NEUROENDOCRINE CARCINOMA METASTATIC TO BONE (HCC): Primary | ICD-10-CM

## 2019-07-25 DIAGNOSIS — C7A.8 NEUROENDOCRINE CARCINOMA METASTATIC TO BONE (HCC): Primary | ICD-10-CM

## 2019-07-25 LAB
ALBUMIN SERPL-MCNC: 3.5 G/DL (ref 3.5–5)
ALBUMIN/GLOB SERPL: 1.1 {RATIO} (ref 1.1–2.2)
ALP SERPL-CCNC: 70 U/L (ref 45–117)
ALT SERPL-CCNC: 26 U/L (ref 12–78)
ANION GAP BLD CALC-SCNC: 15 MMOL/L (ref 10–20)
AST SERPL-CCNC: 30 U/L (ref 15–37)
BASOPHILS # BLD: 0 K/UL (ref 0–0.1)
BASOPHILS NFR BLD: 1 % (ref 0–1)
BILIRUB DIRECT SERPL-MCNC: 0.2 MG/DL (ref 0–0.2)
BILIRUB SERPL-MCNC: 1 MG/DL (ref 0.2–1)
BUN BLD-MCNC: 17 MG/DL (ref 9–20)
CA-I BLD-MCNC: 1.17 MMOL/L (ref 1.12–1.32)
CHLORIDE BLD-SCNC: 107 MMOL/L (ref 98–107)
CO2 BLD-SCNC: 25 MMOL/L (ref 21–32)
CREAT BLD-MCNC: 1.1 MG/DL (ref 0.6–1.3)
DIFFERENTIAL METHOD BLD: ABNORMAL
EOSINOPHIL # BLD: 0.2 K/UL (ref 0–0.4)
EOSINOPHIL NFR BLD: 5 % (ref 0–7)
ERYTHROCYTE [DISTWIDTH] IN BLOOD BY AUTOMATED COUNT: 13.3 % (ref 11.5–14.5)
GLOBULIN SER CALC-MCNC: 3.2 G/DL (ref 2–4)
GLUCOSE BLD-MCNC: 136 MG/DL (ref 65–100)
HCT VFR BLD AUTO: 34.7 % (ref 36.6–50.3)
HCT VFR BLD CALC: 34 % (ref 36.6–50.3)
HGB BLD-MCNC: 11.3 G/DL (ref 12.1–17)
IMM GRANULOCYTES # BLD AUTO: 0 K/UL (ref 0–0.04)
IMM GRANULOCYTES NFR BLD AUTO: 0 % (ref 0–0.5)
LYMPHOCYTES # BLD: 0.6 K/UL (ref 0.8–3.5)
LYMPHOCYTES NFR BLD: 14 % (ref 12–49)
MAGNESIUM SERPL-MCNC: 2.2 MG/DL (ref 1.6–2.4)
MCH RBC QN AUTO: 25.8 PG (ref 26–34)
MCHC RBC AUTO-ENTMCNC: 32.6 G/DL (ref 30–36.5)
MCV RBC AUTO: 79.2 FL (ref 80–99)
MONOCYTES # BLD: 0.5 K/UL (ref 0–1)
MONOCYTES NFR BLD: 10 % (ref 5–13)
NEUTS SEG # BLD: 3.3 K/UL (ref 1.8–8)
NEUTS SEG NFR BLD: 70 % (ref 32–75)
NRBC # BLD: 0 K/UL (ref 0–0.01)
NRBC BLD-RTO: 0 PER 100 WBC
PHOSPHATE SERPL-MCNC: 2.6 MG/DL (ref 2.6–4.7)
PLATELET # BLD AUTO: 144 K/UL (ref 150–400)
PMV BLD AUTO: 10.6 FL (ref 8.9–12.9)
POTASSIUM BLD-SCNC: 3.6 MMOL/L (ref 3.5–5.1)
PROT SERPL-MCNC: 6.7 G/DL (ref 6.4–8.2)
RBC # BLD AUTO: 4.38 M/UL (ref 4.1–5.7)
RBC MORPH BLD: ABNORMAL
SERVICE CMNT-IMP: ABNORMAL
SODIUM BLD-SCNC: 143 MMOL/L (ref 136–145)
WBC # BLD AUTO: 4.6 K/UL (ref 4.1–11.1)

## 2019-07-25 PROCEDURE — 80047 BASIC METABLC PNL IONIZED CA: CPT

## 2019-07-25 PROCEDURE — 96372 THER/PROPH/DIAG INJ SC/IM: CPT

## 2019-07-25 PROCEDURE — 80076 HEPATIC FUNCTION PANEL: CPT

## 2019-07-25 PROCEDURE — 84100 ASSAY OF PHOSPHORUS: CPT

## 2019-07-25 PROCEDURE — 85025 COMPLETE CBC W/AUTO DIFF WBC: CPT

## 2019-07-25 PROCEDURE — 96402 CHEMO HORMON ANTINEOPL SQ/IM: CPT

## 2019-07-25 PROCEDURE — 74011250636 HC RX REV CODE- 250/636: Performed by: INTERNAL MEDICINE

## 2019-07-25 PROCEDURE — 96401 CHEMO ANTI-NEOPL SQ/IM: CPT

## 2019-07-25 PROCEDURE — 83735 ASSAY OF MAGNESIUM: CPT

## 2019-07-25 PROCEDURE — 36415 COLL VENOUS BLD VENIPUNCTURE: CPT

## 2019-07-25 RX ORDER — LANREOTIDE ACETATE 120 MG/.5ML
120 INJECTION SUBCUTANEOUS ONCE
Status: COMPLETED | OUTPATIENT
Start: 2019-07-25 | End: 2019-07-25

## 2019-07-25 RX ADMIN — LANREOTIDE ACETATE 120 MG: 120 INJECTION SUBCUTANEOUS at 09:15

## 2019-07-25 RX ADMIN — DENOSUMAB 120 MG: 120 INJECTION SUBCUTANEOUS at 09:18

## 2019-07-25 NOTE — PROGRESS NOTES
[facility] Visit Note    0900 Pt arrived at [facility] ambulatory and in no distress for xgeva/ lanreotide. Assessment completed, no new complaints voiced. Labs obtained via right arm. Ica 1.17     Visit Vitals  BP (!) 154/94 (BP 1 Location: Left arm, BP Patient Position: Sitting)   Pulse 69   Temp 98 °F (36.7 °C)   Resp 20   SpO2 100%       Medications received:  xgeva 120 mg SQ SARA  Lanreotide 120 mg SQ left hip    0920 Tolerated treatment well, no adverse reaction noted. D/Cd from [facility] ambulatory and in no distress accompanied by wife. Next appt 8/22/19    Recent Results (from the past 8 hour(s))   CBC WITH AUTOMATED DIFF    Collection Time: 07/25/19  9:10 AM   Result Value Ref Range    WBC 4.6 4.1 - 11.1 K/uL    RBC 4.38 4.10 - 5.70 M/uL    HGB 11.3 (L) 12.1 - 17.0 g/dL    HCT 34.7 (L) 36.6 - 50.3 %    MCV 79.2 (L) 80.0 - 99.0 FL    MCH 25.8 (L) 26.0 - 34.0 PG    MCHC 32.6 30.0 - 36.5 g/dL    RDW 13.3 11.5 - 14.5 %    PLATELET 544 (L) 546 - 400 K/uL    MPV 10.6 8.9 - 12.9 FL    NRBC 0.0 0  WBC    ABSOLUTE NRBC 0.00 0.00 - 0.01 K/uL    NEUTROPHILS 70 32 - 75 %    LYMPHOCYTES 14 12 - 49 %    MONOCYTES 10 5 - 13 %    EOSINOPHILS 5 0 - 7 %    BASOPHILS 1 0 - 1 %    IMMATURE GRANULOCYTES 0 0.0 - 0.5 %    ABS. NEUTROPHILS 3.3 1.8 - 8.0 K/UL    ABS. LYMPHOCYTES 0.6 (L) 0.8 - 3.5 K/UL    ABS. MONOCYTES 0.5 0.0 - 1.0 K/UL    ABS. EOSINOPHILS 0.2 0.0 - 0.4 K/UL    ABS. BASOPHILS 0.0 0.0 - 0.1 K/UL    ABS. IMM.  GRANS. 0.0 0.00 - 0.04 K/UL    DF AUTOMATED      RBC COMMENTS NORMOCYTIC, NORMOCHROMIC     POC CHEM8    Collection Time: 07/25/19  9:16 AM   Result Value Ref Range    Calcium, ionized (POC) 1.17 1.12 - 1.32 mmol/L    Sodium (POC) 143 136 - 145 mmol/L    Potassium (POC) 3.6 3.5 - 5.1 mmol/L    Chloride (POC) 107 98 - 107 mmol/L    CO2 (POC) 25 21 - 32 mmol/L    Anion gap (POC) 15 10 - 20 mmol/L    Glucose (POC) 136 (H) 65 - 100 mg/dL    BUN (POC) 17 9 - 20 mg/dL    Creatinine (POC) 1.1 0.6 - 1.3 mg/dL    GFRAA, POC >60 >60 ml/min/1.73m2    GFRNA, POC >60 >60 ml/min/1.73m2    Hematocrit (POC) 34 (L) 36.6 - 50.3 %    Comment Notified RN or MD immediately by

## 2019-08-22 ENCOUNTER — HOSPITAL ENCOUNTER (OUTPATIENT)
Dept: INFUSION THERAPY | Age: 74
Discharge: HOME | End: 2019-08-22
Payer: MEDICARE

## 2019-08-22 VITALS
RESPIRATION RATE: 18 BRPM | WEIGHT: 175.6 LBS | TEMPERATURE: 97.9 F | SYSTOLIC BLOOD PRESSURE: 134 MMHG | HEART RATE: 71 BPM | HEIGHT: 72 IN | BODY MASS INDEX: 23.78 KG/M2 | DIASTOLIC BLOOD PRESSURE: 83 MMHG | OXYGEN SATURATION: 99 %

## 2019-08-22 DIAGNOSIS — C7B.8 NEUROENDOCRINE CARCINOMA METASTATIC TO BONE (HCC): Primary | ICD-10-CM

## 2019-08-22 DIAGNOSIS — C7A.8 NEUROENDOCRINE CARCINOMA METASTATIC TO BONE (HCC): Primary | ICD-10-CM

## 2019-08-22 LAB
ALBUMIN SERPL-MCNC: 3.5 G/DL (ref 3.5–5)
ALBUMIN/GLOB SERPL: 1 {RATIO} (ref 1.1–2.2)
ALP SERPL-CCNC: 71 U/L (ref 45–117)
ALT SERPL-CCNC: 36 U/L (ref 12–78)
ANION GAP BLD CALC-SCNC: 16 MMOL/L (ref 10–20)
AST SERPL-CCNC: 30 U/L (ref 15–37)
BASOPHILS # BLD: 0 K/UL (ref 0–0.1)
BASOPHILS NFR BLD: 1 % (ref 0–1)
BILIRUB DIRECT SERPL-MCNC: 0.2 MG/DL (ref 0–0.2)
BILIRUB SERPL-MCNC: 1.1 MG/DL (ref 0.2–1)
BUN BLD-MCNC: 23 MG/DL (ref 9–20)
CA-I BLD-MCNC: 1.2 MMOL/L (ref 1.12–1.32)
CHLORIDE BLD-SCNC: 104 MMOL/L (ref 98–107)
CHOLEST SERPL-MCNC: 225 MG/DL
CO2 BLD-SCNC: 27 MMOL/L (ref 21–32)
CREAT BLD-MCNC: 1.2 MG/DL (ref 0.6–1.3)
DIFFERENTIAL METHOD BLD: ABNORMAL
EOSINOPHIL # BLD: 0.3 K/UL (ref 0–0.4)
EOSINOPHIL NFR BLD: 6 % (ref 0–7)
ERYTHROCYTE [DISTWIDTH] IN BLOOD BY AUTOMATED COUNT: 14 % (ref 11.5–14.5)
EST. AVERAGE GLUCOSE BLD GHB EST-MCNC: 128 MG/DL
GLOBULIN SER CALC-MCNC: 3.4 G/DL (ref 2–4)
GLUCOSE BLD-MCNC: 144 MG/DL (ref 65–100)
HBA1C MFR BLD: 6.1 % (ref 4.2–6.3)
HCT VFR BLD AUTO: 36.4 % (ref 36.6–50.3)
HCT VFR BLD CALC: 34 % (ref 36.6–50.3)
HDLC SERPL-MCNC: 48 MG/DL
HDLC SERPL: 4.7 {RATIO} (ref 0–5)
HGB BLD-MCNC: 11.5 G/DL (ref 12.1–17)
IMM GRANULOCYTES # BLD AUTO: 0 K/UL (ref 0–0.04)
IMM GRANULOCYTES NFR BLD AUTO: 0 % (ref 0–0.5)
LDLC SERPL CALC-MCNC: 131 MG/DL (ref 0–100)
LIPID PROFILE,FLP: ABNORMAL
LYMPHOCYTES # BLD: 0.7 K/UL (ref 0.8–3.5)
LYMPHOCYTES NFR BLD: 17 % (ref 12–49)
MAGNESIUM SERPL-MCNC: 2.2 MG/DL (ref 1.6–2.4)
MCH RBC QN AUTO: 25.1 PG (ref 26–34)
MCHC RBC AUTO-ENTMCNC: 31.6 G/DL (ref 30–36.5)
MCV RBC AUTO: 79.5 FL (ref 80–99)
MONOCYTES # BLD: 0.3 K/UL (ref 0–1)
MONOCYTES NFR BLD: 8 % (ref 5–13)
NEUTS SEG # BLD: 3 K/UL (ref 1.8–8)
NEUTS SEG NFR BLD: 68 % (ref 32–75)
NRBC # BLD: 0 K/UL (ref 0–0.01)
NRBC BLD-RTO: 0 PER 100 WBC
PHOSPHATE SERPL-MCNC: 3 MG/DL (ref 2.6–4.7)
PLATELET # BLD AUTO: 142 K/UL (ref 150–400)
PMV BLD AUTO: 10.4 FL (ref 8.9–12.9)
POTASSIUM BLD-SCNC: 3.7 MMOL/L (ref 3.5–5.1)
PROT SERPL-MCNC: 6.9 G/DL (ref 6.4–8.2)
PROT UR-MCNC: 12 MG/DL (ref 0–11.9)
RBC # BLD AUTO: 4.58 M/UL (ref 4.1–5.7)
RBC MORPH BLD: ABNORMAL
SERVICE CMNT-IMP: ABNORMAL
SODIUM BLD-SCNC: 142 MMOL/L (ref 136–145)
TRIGL SERPL-MCNC: 230 MG/DL (ref ?–150)
VLDLC SERPL CALC-MCNC: 46 MG/DL
WBC # BLD AUTO: 4.3 K/UL (ref 4.1–11.1)

## 2019-08-22 PROCEDURE — 80047 BASIC METABLC PNL IONIZED CA: CPT

## 2019-08-22 PROCEDURE — 36415 COLL VENOUS BLD VENIPUNCTURE: CPT

## 2019-08-22 PROCEDURE — 96372 THER/PROPH/DIAG INJ SC/IM: CPT

## 2019-08-22 PROCEDURE — 84156 ASSAY OF PROTEIN URINE: CPT

## 2019-08-22 PROCEDURE — 85025 COMPLETE CBC W/AUTO DIFF WBC: CPT

## 2019-08-22 PROCEDURE — 83036 HEMOGLOBIN GLYCOSYLATED A1C: CPT

## 2019-08-22 PROCEDURE — 80061 LIPID PANEL: CPT

## 2019-08-22 PROCEDURE — 84100 ASSAY OF PHOSPHORUS: CPT

## 2019-08-22 PROCEDURE — 83735 ASSAY OF MAGNESIUM: CPT

## 2019-08-22 PROCEDURE — 74011250636 HC RX REV CODE- 250/636: Performed by: INTERNAL MEDICINE

## 2019-08-22 PROCEDURE — 80076 HEPATIC FUNCTION PANEL: CPT

## 2019-08-22 RX ORDER — LANREOTIDE ACETATE 120 MG/.5ML
120 INJECTION SUBCUTANEOUS ONCE
Status: COMPLETED | OUTPATIENT
Start: 2019-08-22 | End: 2019-08-22

## 2019-08-22 RX ADMIN — DENOSUMAB 120 MG: 120 INJECTION SUBCUTANEOUS at 09:20

## 2019-08-22 RX ADMIN — LANREOTIDE ACETATE 120 MG: 120 INJECTION SUBCUTANEOUS at 09:22

## 2019-08-22 NOTE — PROGRESS NOTES
8000 [facility] Visit Note    9478 Pt arrived at [facility] ambulatory and in no distress for Lanreotide/Xgeva. Assessment completed, no new complaints voiced. Labs obtained- CBC with diff, Chem 8, Magnesium, Phosphorus, Hepatic Function Panel, Urine Protein, Lipid Profile, and HgA1C. Visit Vitals  /83 (BP 1 Location: Left arm, BP Patient Position: Sitting)   Pulse 71   Temp 97.9 °F (36.6 °C)   Resp 18   SpO2 99%     Recent Results (from the past 12 hour(s))   CBC WITH AUTOMATED DIFF    Collection Time: 08/22/19  9:05 AM   Result Value Ref Range    WBC 4.3 4.1 - 11.1 K/uL    RBC 4.58 4.10 - 5.70 M/uL    HGB 11.5 (L) 12.1 - 17.0 g/dL    HCT 36.4 (L) 36.6 - 50.3 %    MCV 79.5 (L) 80.0 - 99.0 FL    MCH 25.1 (L) 26.0 - 34.0 PG    MCHC 31.6 30.0 - 36.5 g/dL    RDW 14.0 11.5 - 14.5 %    PLATELET 342 (L) 252 - 400 K/uL    MPV 10.4 8.9 - 12.9 FL    NRBC 0.0 0  WBC    ABSOLUTE NRBC 0.00 0.00 - 0.01 K/uL    NEUTROPHILS 68 32 - 75 %    LYMPHOCYTES 17 12 - 49 %    MONOCYTES 8 5 - 13 %    EOSINOPHILS 6 0 - 7 %    BASOPHILS 1 0 - 1 %    IMMATURE GRANULOCYTES 0 0.0 - 0.5 %    ABS. NEUTROPHILS 3.0 1.8 - 8.0 K/UL    ABS. LYMPHOCYTES 0.7 (L) 0.8 - 3.5 K/UL    ABS. MONOCYTES 0.3 0.0 - 1.0 K/UL    ABS. EOSINOPHILS 0.3 0.0 - 0.4 K/UL    ABS. BASOPHILS 0.0 0.0 - 0.1 K/UL    ABS. IMM. GRANS. 0.0 0.00 - 0.04 K/UL    DF SMEAR SCANNED      RBC COMMENTS NORMOCYTIC, NORMOCHROMIC     HEPATIC FUNCTION PANEL    Collection Time: 08/22/19  9:05 AM   Result Value Ref Range    Protein, total 6.9 6.4 - 8.2 g/dL    Albumin 3.5 3.5 - 5.0 g/dL    Globulin 3.4 2.0 - 4.0 g/dL    A-G Ratio 1.0 (L) 1.1 - 2.2      Bilirubin, total 1.1 (H) 0.2 - 1.0 MG/DL    Bilirubin, direct 0.2 0.0 - 0.2 MG/DL    Alk.  phosphatase 71 45 - 117 U/L    AST (SGOT) 30 15 - 37 U/L    ALT (SGPT) 36 12 - 78 U/L   MAGNESIUM    Collection Time: 08/22/19  9:05 AM   Result Value Ref Range    Magnesium 2.2 1.6 - 2.4 mg/dL   PHOSPHORUS    Collection Time: 08/22/19  9:05 AM   Result Value Ref Range    Phosphorus 3.0 2.6 - 4.7 MG/DL   PROTEIN URINE, RANDOM    Collection Time: 08/22/19  9:05 AM   Result Value Ref Range    Protein, urine random 12 (H) 0.0 - 11.9 mg/dL   POC CHEM8    Collection Time: 08/22/19  9:09 AM   Result Value Ref Range    Calcium, ionized (POC) 1.20 1. 12 - 1.32 mmol/L    Sodium (POC) 142 136 - 145 mmol/L    Potassium (POC) 3.7 3.5 - 5.1 mmol/L    Chloride (POC) 104 98 - 107 mmol/L    CO2 (POC) 27 21 - 32 mmol/L    Anion gap (POC) 16 10 - 20 mmol/L    Glucose (POC) 144 (H) 65 - 100 mg/dL    BUN (POC) 23 (H) 9 - 20 mg/dL    Creatinine (POC) 1.2 0.6 - 1.3 mg/dL    GFRAA, POC >60 >60 ml/min/1.73m2    GFRNA, POC 60 (L) >60 ml/min/1.73m2    Hematocrit (POC) 34 (L) 36.6 - 50.3 %    Comment Notified RN or MD immediately by        Ionized Calcium 1.20    Medications received:  Xgeva 120 mg SQ in left arm  Lanreotide 120 mg SQ in right GM    0925 Tolerated treatment well, no adverse reaction noted. D/Cd from [facility] ambulatory and in no distress accompanied by spouse. Next appt 9/19/19 @ 0900.

## 2019-09-09 ENCOUNTER — HOSPITAL ENCOUNTER (EMERGENCY)
Age: 74
Discharge: HOME | End: 2019-09-09
Attending: EMERGENCY MEDICINE | Admitting: EMERGENCY MEDICINE
Payer: MEDICARE

## 2019-09-09 VITALS
SYSTOLIC BLOOD PRESSURE: 153 MMHG | OXYGEN SATURATION: 97 % | TEMPERATURE: 98.1 F | HEART RATE: 85 BPM | DIASTOLIC BLOOD PRESSURE: 96 MMHG | RESPIRATION RATE: 16 BRPM

## 2019-09-09 DIAGNOSIS — E86.0 DEHYDRATION: Primary | ICD-10-CM

## 2019-09-09 DIAGNOSIS — R11.2 NON-INTRACTABLE VOMITING WITH NAUSEA, UNSPECIFIED VOMITING TYPE: ICD-10-CM

## 2019-09-09 LAB
ALBUMIN SERPL-MCNC: 3.5 G/DL (ref 3.5–5)
ALBUMIN/GLOB SERPL: 1.1 {RATIO} (ref 1.1–2.2)
ALP SERPL-CCNC: 63 U/L (ref 45–117)
ALT SERPL-CCNC: 21 U/L (ref 12–78)
ANION GAP SERPL CALC-SCNC: 5 MMOL/L (ref 5–15)
APPEARANCE UR: CLEAR
AST SERPL-CCNC: 23 U/L (ref 15–37)
BACTERIA URNS QL MICRO: ABNORMAL /HPF
BASOPHILS # BLD: 0 K/UL (ref 0–0.1)
BASOPHILS NFR BLD: 0 % (ref 0–1)
BILIRUB SERPL-MCNC: 0.9 MG/DL (ref 0.2–1)
BILIRUB UR QL: NEGATIVE
BUN SERPL-MCNC: 26 MG/DL (ref 6–20)
BUN/CREAT SERPL: 14 (ref 12–20)
CALCIUM SERPL-MCNC: 9 MG/DL (ref 8.5–10.1)
CAOX CRY URNS QL MICRO: ABNORMAL
CHLORIDE SERPL-SCNC: 111 MMOL/L (ref 97–108)
CO2 SERPL-SCNC: 28 MMOL/L (ref 21–32)
COLOR UR: ABNORMAL
CREAT SERPL-MCNC: 1.82 MG/DL (ref 0.7–1.3)
DIFFERENTIAL METHOD BLD: ABNORMAL
EOSINOPHIL # BLD: 0.1 K/UL (ref 0–0.4)
EOSINOPHIL NFR BLD: 1 % (ref 0–7)
EPITH CASTS URNS QL MICRO: ABNORMAL /LPF
ERYTHROCYTE [DISTWIDTH] IN BLOOD BY AUTOMATED COUNT: 14.3 % (ref 11.5–14.5)
GLOBULIN SER CALC-MCNC: 3.2 G/DL (ref 2–4)
GLUCOSE SERPL-MCNC: 171 MG/DL (ref 65–100)
GLUCOSE UR STRIP.AUTO-MCNC: NEGATIVE MG/DL
GRAN CASTS URNS QL MICRO: ABNORMAL /LPF
HCT VFR BLD AUTO: 42.6 % (ref 36.6–50.3)
HGB BLD-MCNC: 13.6 G/DL (ref 12.1–17)
HGB UR QL STRIP: NEGATIVE
HYALINE CASTS URNS QL MICRO: ABNORMAL /LPF (ref 0–5)
IMM GRANULOCYTES # BLD AUTO: 0 K/UL (ref 0–0.04)
IMM GRANULOCYTES NFR BLD AUTO: 0 % (ref 0–0.5)
KETONES UR QL STRIP.AUTO: NEGATIVE MG/DL
LACTATE BLD-SCNC: 1.18 MMOL/L (ref 0.4–2)
LEUKOCYTE ESTERASE UR QL STRIP.AUTO: NEGATIVE
LIPASE SERPL-CCNC: 146 U/L (ref 73–393)
LYMPHOCYTES # BLD: 0.6 K/UL (ref 0.8–3.5)
LYMPHOCYTES NFR BLD: 5 % (ref 12–49)
MCH RBC QN AUTO: 25.3 PG (ref 26–34)
MCHC RBC AUTO-ENTMCNC: 31.9 G/DL (ref 30–36.5)
MCV RBC AUTO: 79.3 FL (ref 80–99)
MONOCYTES # BLD: 0.7 K/UL (ref 0–1)
MONOCYTES NFR BLD: 6 % (ref 5–13)
NEUTS SEG # BLD: 10.8 K/UL (ref 1.8–8)
NEUTS SEG NFR BLD: 88 % (ref 32–75)
NITRITE UR QL STRIP.AUTO: NEGATIVE
NRBC # BLD: 0 K/UL (ref 0–0.01)
NRBC BLD-RTO: 0 PER 100 WBC
PH UR STRIP: 5.5 [PH] (ref 5–8)
PLATELET # BLD AUTO: 189 K/UL (ref 150–400)
PMV BLD AUTO: 10.5 FL (ref 8.9–12.9)
POTASSIUM SERPL-SCNC: 3.8 MMOL/L (ref 3.5–5.1)
PROT SERPL-MCNC: 6.7 G/DL (ref 6.4–8.2)
PROT UR STRIP-MCNC: 100 MG/DL
RBC # BLD AUTO: 5.37 M/UL (ref 4.1–5.7)
RBC #/AREA URNS HPF: ABNORMAL /HPF (ref 0–5)
RBC MORPH BLD: ABNORMAL
SODIUM SERPL-SCNC: 144 MMOL/L (ref 136–145)
SP GR UR REFRACTOMETRY: 1.02 (ref 1–1.03)
UROBILINOGEN UR QL STRIP.AUTO: 0.2 EU/DL (ref 0.2–1)
WBC # BLD AUTO: 12.2 K/UL (ref 4.1–11.1)
WBC URNS QL MICRO: ABNORMAL /HPF (ref 0–4)

## 2019-09-09 PROCEDURE — 83690 ASSAY OF LIPASE: CPT

## 2019-09-09 PROCEDURE — 83605 ASSAY OF LACTIC ACID: CPT

## 2019-09-09 PROCEDURE — 81001 URINALYSIS AUTO W/SCOPE: CPT

## 2019-09-09 PROCEDURE — 36415 COLL VENOUS BLD VENIPUNCTURE: CPT

## 2019-09-09 PROCEDURE — 85025 COMPLETE CBC W/AUTO DIFF WBC: CPT

## 2019-09-09 PROCEDURE — 74011250636 HC RX REV CODE- 250/636: Performed by: EMERGENCY MEDICINE

## 2019-09-09 PROCEDURE — 80053 COMPREHEN METABOLIC PANEL: CPT

## 2019-09-09 PROCEDURE — 74011250636 HC RX REV CODE- 250/636: Performed by: STUDENT IN AN ORGANIZED HEALTH CARE EDUCATION/TRAINING PROGRAM

## 2019-09-09 PROCEDURE — 96374 THER/PROPH/DIAG INJ IV PUSH: CPT

## 2019-09-09 PROCEDURE — 99285 EMERGENCY DEPT VISIT HI MDM: CPT

## 2019-09-09 PROCEDURE — 96361 HYDRATE IV INFUSION ADD-ON: CPT

## 2019-09-09 RX ORDER — ONDANSETRON 2 MG/ML
4 INJECTION INTRAMUSCULAR; INTRAVENOUS
Status: COMPLETED | OUTPATIENT
Start: 2019-09-09 | End: 2019-09-09

## 2019-09-09 RX ADMIN — ONDANSETRON 4 MG: 2 INJECTION INTRAMUSCULAR; INTRAVENOUS at 00:49

## 2019-09-09 RX ADMIN — SODIUM CHLORIDE 1000 ML: 900 INJECTION, SOLUTION INTRAVENOUS at 03:22

## 2019-09-09 RX ADMIN — SODIUM CHLORIDE 1000 ML: 900 INJECTION, SOLUTION INTRAVENOUS at 00:49

## 2019-09-09 NOTE — ED TRIAGE NOTES
Pt arrives via ems for c/o abdominal pain and vomiting that started earlier today. Pt states when he got home from work earlier today he was having bad stomach cramps and has vomited approx 2-3 times. Pt states he became very diaphoretic and dizzy during his vomiting episodes. Per pt he has a history of bone and bowel cancer. Pt states his last chemo was at the end of last month.

## 2019-09-09 NOTE — ED PROVIDER NOTES
EMERGENCY DEPARTMENT HISTORY AND PHYSICAL EXAM          Date: 9/9/2019  Patient Name: Sanam Charles  Attending of Record: Dr. Juanita Chavarria    History of Presenting Illness     Chief Complaint   Patient presents with    Vomiting    Abdominal Pain       History Provided By: Patient and Patient's Wife    HPI: Sanam Charles is a 68 y.o. 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 The patient was at home when he feel diaphoretic, nauseated, abdominal cramping. He had an episode of nonbilious, nonbloody emesis. His symptoms lasted for less than an hour. After vomiting he felt better. No history of the symptoms. No recent illnesses. No treatment for his symptoms. He is currently undergoing chemo. He had a normal bowel movement today. He also has urinary retention that he sees urology for. He specifically denies any fever, chills, chest pain, shortness of breath, cough, back pain, headache, lightheadedness, syncope    PCP: Ion Nuñez MD    Social Hx: no tobacco , no EtOH, nollicit Drugs     There are no other complaints, changes, or physical findings at this time. Current Outpatient Medications   Medication Sig Dispense Refill    potassium chloride (KLOR-CON) 10 mEq tablet Take 1 Tab by mouth two (2) times a day. 60 Tab 2    lisinopril (PRINIVIL, ZESTRIL) 20 mg tablet Take 1 Tab by mouth daily. 30 Tab 0    tamsulosin (FLOMAX) 0.4 mg capsule Take 0.4 mg by mouth daily.  everolimus (AFINITOR) 10 mg tab Take 1 Tab by mouth daily. 30 Tab 6    methocarbamol (ROBAXIN-750) 750 mg tablet Take 1 Tab by mouth four (4) times daily.  20 Tab 0       Past History     Past Medical History:  Past Medical History:   Diagnosis Date    Adverse effect of anesthesia     low HR and very slow to wakeup    Cancer ([facility] [address] 5/2016    neuroendocrine, retroperitoneal LN involvement    Diarrhea     Frequent urination     Hypertension     Poor appetite     Unspecified adverse effect of anesthesia     \"hard to put to sleep and slow to wake up-heart rate dropped very low\"       Past Surgical History:  Past Surgical History:   Procedure Laterality Date    COLONOSCOPY N/A 2016    COLONOSCOPY performed by Jay William MD at [address] [place] [address]  2016         HX ORTHOPAEDIC      knee scope left knee    HX OTHER SURGICAL  2013    excision of scalp cyst     UPPER GI ENDOSCOPY,BIOPSY  2016            Family History:  Family History   Problem Relation Age of Onset    Cancer Mother     Stroke Sister        Social History:  Social History     Tobacco Use    Smoking status: Former Smoker     Packs/day: 1.00     Years: 8.00     Pack years: 8.00     Last attempt to quit: 1972     Years since quittin.3    Smokeless tobacco: Never Used   Substance Use Topics    Alcohol use: No    Drug use: No       Allergies:  No Known Allergies      Review of Systems   Review of Systems   Constitutional: Positive for diaphoresis. Negative for chills and fever. HENT: Negative for congestion and rhinorrhea. Eyes: Negative for visual disturbance. Respiratory: Negative for cough and shortness of breath. Cardiovascular: Negative for chest pain. Gastrointestinal: Positive for abdominal pain, nausea and vomiting. Negative for blood in stool and diarrhea. Genitourinary: Negative for dysuria and hematuria. Musculoskeletal: Negative for back pain. Skin: Negative for rash. Neurological: Negative for dizziness, syncope, light-headedness, numbness and headaches. Physical Exam   Physical Exam   Constitutional: He is oriented to person, place, and time. He appears well-developed and well-nourished. No distress. HENT:   Head: Normocephalic and atraumatic. Dry mucous membranes   Eyes: Pupils are equal, round, and reactive to light. No scleral icterus. Neck: Normal range of motion. Cardiovascular: Normal rate, regular rhythm, normal heart sounds and intact distal pulses. Pulmonary/Chest: Effort normal and breath sounds normal. No respiratory distress. He has no wheezes. Abdominal: Soft. He exhibits no distension. There is tenderness. There is no rebound and no guarding. Increased bowel sounds. Minimal tenderness to palpation of the mid abdomen. No guarding. No rebound. Musculoskeletal: Normal range of motion. He exhibits no edema or deformity. Neurological: He is alert and oriented to person, place, and time. Skin: Skin is warm and dry. No rash noted. He is not diaphoretic. Nursing note and vitals reviewed. Diagnostic Study Results     Labs -     Recent Results (from the past 12 hour(s))   CBC WITH AUTOMATED DIFF    Collection Time: 09/09/19  1:19 AM   Result Value Ref Range    WBC 12.2 (H) 4.1 - 11.1 K/uL    RBC 5.37 4.10 - 5.70 M/uL    HGB 13.6 12.1 - 17.0 g/dL    HCT 42.6 36.6 - 50.3 %    MCV 79.3 (L) 80.0 - 99.0 FL    MCH 25.3 (L) 26.0 - 34.0 PG    MCHC 31.9 30.0 - 36.5 g/dL    RDW 14.3 11.5 - 14.5 %    PLATELET 711 145 - 027 K/uL    MPV 10.5 8.9 - 12.9 FL    NRBC 0.0 0  WBC    ABSOLUTE NRBC 0.00 0.00 - 0.01 K/uL    NEUTROPHILS PENDING %    LYMPHOCYTES PENDING %    MONOCYTES PENDING %    EOSINOPHILS PENDING %    BASOPHILS PENDING %    IMMATURE GRANULOCYTES PENDING %    ABS. NEUTROPHILS PENDING K/UL    ABS. LYMPHOCYTES PENDING K/UL    ABS. MONOCYTES PENDING K/UL    ABS. EOSINOPHILS PENDING K/UL    ABS. BASOPHILS PENDING K/UL    ABS. IMM. GRANS. PENDING K/UL    DF PENDING    POC LACTIC ACID    Collection Time: 09/09/19  1:21 AM   Result Value Ref Range    Lactic Acid (POC) 1.18 0.40 - 2.00 mmol/L       Radiologic Studies -   No orders to display     CT Results  (Last 48 hours)    None          Medical Decision Making   I am the first provider for this patient.     I reviewed the vital signs, available nursing notes, past medical history, past surgical history, family history and social history. Vital Signs-Reviewed the patient's vital signs. Patient Vitals for the past 12 hrs:   Temp Pulse Resp BP SpO2   09/09/19 0415    (!) 153/96 97 %   09/09/19 0315 98.1 °F (36.7 °C) 85 16 134/90 98 %   09/09/19 0230    131/81 96 %   09/09/19 0034    92/63 97 %   09/09/19 0031 97.5 °F (36.4 °C) 62 16 92/63 99 %       Pulse Oximetry Analysis - 97% on RA    Cardiac Monitor:   Rate: 62 bpm  Rhythm: Normal Sinus Rhythm      Records Reviewed: Nursing Notes and Old Medical Records    Provider Notes (Medical Decision Making):     DDx: Hepatitis, pancreatitis, gastroenteritis, carcinoid syndrome  78-year-old male with a past medical history of neuroendocrine tumor of the bowel and bone, high blood pressure who presents via EMS after an episode of diaphoresis, nausea, vomiting, epigastric abdominal cramping. Symptoms came on suddenly. Felt better after vomiting. On exam this patient is afebrile, vital signs within normal limits, systolic blood pressure in the 90s. Patient in no acute distress, nontoxic-appearing. He has minimal abdominal tenderness to palpation, soft abdomen, no guarding. He has dry mucous membranes. We will evaluate him with a CMP, CBC, lipase, lactate, UA. We will give him IV fluids, Zofran. This patient could have experienced carcinoid reaction secondary to his tumor. Will reevaluate patient after therapies and lab work. ED Course:   Initial assessment performed. The patients presenting problems have been discussed, and they are in agreement with the care plan formulated and outlined with them. I have encouraged them to ask questions as they arise throughout their visit. Progress note:  4:30 AM  Pt noted to be feeling better , ready for discharge. Updated pt and/or family on all final lab and imaging findings. Will follow up as instructed.  All questions have been answered, pt voiced understanding and agreement with plan. Specific return precautions provided as well as instructions to return to the ED should sx worsen at any time. Vital signs stable for discharge. Patient's urine is negative for signs of infection. He feels better after receiving 2 L IV fluid. Discussed with him and his family return precautions including recurrent nausea, vomiting, fever, chills, abdominal pain. Repeat temperature is 98.1. Patient's abdomen is benign, soft, nontender to palpation. will discharge home. Diagnosis     Clinical Impression:   1. Dehydration    2. Non-intractable vomiting with nausea, unspecified vomiting type        PLAN:  1. Current Discharge Medication List        2.    Follow-up Information     Follow up With Specialties Details Why Contact Info    Heath Valencia MD [facility] Call If symptoms worsen [address]  [phone]          Return to ED if worse     Disposition:    Discharge             Art Toledo MD  PGY-2

## 2019-09-09 NOTE — DISCHARGE INSTRUCTIONS
Patient Education        Dehydration: Care Instructions  Your Care Instructions  Dehydration happens when your body loses too much fluid. This might happen when you do not drink enough water or you lose large amounts of fluids from your body because of diarrhea, vomiting, or sweating. Severe dehydration can be life-threatening. Water and minerals called electrolytes help put your body fluids back in balance. Learn the early signs of fluid loss, and drink more fluids to prevent dehydration. Follow-up care is a key part of your treatment and safety. Be sure to make and go to all appointments, and call your doctor if you are having problems. It's also a good idea to know your test results and keep a list of the medicines you take. How can you care for yourself at home? · To prevent dehydration, drink plenty of fluids, enough so that your urine is light yellow or clear like water. Choose water and other caffeine-free clear liquids until you feel better. If you have kidney, heart, or liver disease and have to limit fluids, talk with your doctor before you increase the amount of fluids you drink. · If you do not feel like eating or drinking, try taking small sips of water, sports drinks, or other rehydration drinks. · Get plenty of rest.  To prevent dehydration  · Add more fluids to your diet and daily routine, unless your doctor has told you not to. · During hot weather, drink more fluids. Drink even more fluids if you exercise a lot. Stay away from drinks with alcohol or caffeine. · Watch for the symptoms of dehydration. These include:  ? A dry, sticky mouth. ? Dark yellow urine, and not much of it. ? Dry and sunken eyes. ? Feeling very tired. · Learn what problems can lead to dehydration. These include:  ? Diarrhea, fever, and vomiting. ? Any illness with a fever, such as pneumonia or the flu. ?  Activities that cause heavy sweating, such as endurance races and heavy outdoor work in hot or humid weather. ? Alcohol or drug use or problems caused by quitting their use (withdrawal). ? Certain medicines, such as cold and allergy pills (antihistamines), diet pills (diuretics), and laxatives. ? Certain diseases, such as diabetes, cancer, and heart or kidney disease. When should you call for help? Call 911 anytime you think you may need emergency care. For example, call if:    · You passed out (lost consciousness).    Call your doctor now or seek immediate medical care if:    · You are confused and cannot think clearly.     · You are dizzy or lightheaded, or you feel like you may faint.     · You have signs of needing more fluids. You have sunken eyes and a dry mouth, and you pass only a little dark urine.     · You cannot keep fluids down.    Watch closely for changes in your health, and be sure to contact your doctor if:    · You are not making tears.     · Your skin is very dry and sags slowly back into place after you pinch it.     · Your mouth and eyes are very dry. Where can you learn more? Go to http://mary lou-jimmy.info/. Enter A918 in the search box to learn more about \"Dehydration: Care Instructions. \"  Current as of: September 23, 2018  Content Version: 12.1  © 2078-8660 [employer]. Care instructions adapted under license by [employer] (which disclaims liability or warranty for this information). If you have questions about a medical condition or this instruction, always ask your healthcare professional. [place] [address] any warranty or liability for your use of this information. Patient Education        Nausea and Vomiting: Care Instructions  Your Care Instructions    When you are nauseated, you may feel weak and sweaty and notice a lot of saliva in your mouth. Nausea often leads to vomiting. Most of the time you do not need to worry about nausea and vomiting, but they can be signs of other illnesses.   Two common causes of nausea and vomiting are stomach flu and food poisoning. Nausea and vomiting from viral stomach flu will usually start to improve within 24 hours. Nausea and vomiting from food poisoning may last from 12 to 48 hours. The doctor has checked you carefully, but problems can develop later. If you notice any problems or new symptoms, get medical treatment right away. Follow-up care is a key part of your treatment and safety. Be sure to make and go to all appointments, and call your doctor if you are having problems. It's also a good idea to know your test results and keep a list of the medicines you take. How can you care for yourself at home? · To prevent dehydration, drink plenty of fluids, enough so that your urine is light yellow or clear like water. Choose water and other caffeine-free clear liquids until you feel better. If you have kidney, heart, or liver disease and have to limit fluids, talk with your doctor before you increase the amount of fluids you drink. · Rest in bed until you feel better. · When you are able to eat, try clear soups, mild foods, and liquids until all symptoms are gone for 12 to 48 hours. Other good choices include dry toast, crackers, cooked cereal, and gelatin dessert, such as Jell-O. When should you call for help? Call 911 anytime you think you may need emergency care. For example, call if:    · You passed out (lost consciousness).    Call your doctor now or seek immediate medical care if:    · You have symptoms of dehydration, such as:  ? Dry eyes and a dry mouth. ? Passing only a little dark urine. ?  Feeling thirstier than usual.     · You have new or worsening belly pain.     · You have a new or higher fever.     · You vomit blood or what looks like coffee grounds.    Watch closely for changes in your health, and be sure to contact your doctor if:    · You have ongoing nausea and vomiting.     · Your vomiting is getting worse.     · Your vomiting lasts longer than 2 days.     · You are not getting better as expected. Where can you learn more? Go to http://mary lou-jimmy.info/. Enter 25 [id number] in the search box to learn more about \"Nausea and Vomiting: Care Instructions. \"  Current as of: September 23, 2018  Content Version: 12.1  © 6973-2567 [employer]. Care instructions adapted under license by [employer] (which disclaims liability or warranty for this information). If you have questions about a medical condition or this instruction, always ask your healthcare professional. [address] any warranty or liability for your use of this information.

## 2019-09-09 NOTE — ED NOTES
Pt provided with discharge paperwork by provider. Pt verbalized understanding of discharge paperwork and follow up information. Iv removed as noted. This rn assisted pt out of ed in wheelchair.

## 2019-09-11 ENCOUNTER — OFFICE VISIT (OUTPATIENT)
Dept: ONCOLOGY | Age: 74
End: 2019-09-11

## 2019-09-11 VITALS
BODY MASS INDEX: 23.73 KG/M2 | DIASTOLIC BLOOD PRESSURE: 88 MMHG | HEART RATE: 70 BPM | RESPIRATION RATE: 16 BRPM | TEMPERATURE: 97.7 F | HEIGHT: 72 IN | WEIGHT: 175.2 LBS | OXYGEN SATURATION: 100 % | SYSTOLIC BLOOD PRESSURE: 150 MMHG

## 2019-09-11 DIAGNOSIS — C7B.8 NEUROENDOCRINE CARCINOMA METASTATIC TO BONE (HCC): Primary | ICD-10-CM

## 2019-09-11 DIAGNOSIS — C79.51 BONE METASTASIS (HCC): ICD-10-CM

## 2019-09-11 DIAGNOSIS — C7A.8 NEUROENDOCRINE CARCINOMA METASTATIC TO BONE (HCC): Primary | ICD-10-CM

## 2019-09-11 NOTE — PROGRESS NOTES
[address], 97 [facility] Domo Mullins, 200 [facility]  [phone]       Oncology Follow up Note        Patient: Luis M Puga MRN: [id number]  SSN: [id number]    YOB: 1945  Age: 68 y.o. Sex: male        Diagnosis:     1. 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 Afinitor 10 mg + Somatuline Depot 120 mg SC - started Afinitor 3/30/2019  1. Somatuline Depot 120 mg SC - 05/2016 - 11/2018; Restarted 3/4/2019 -    (treatment held per patient request since11/16/2018)   2. Xgeva 120mg SC     Subjective:      Luis M Puga is a 68 y.o. male with a diagnosis of metastatic neuroendocrine cancer. He started experiencing pain in both groins and the abdomen. A CT of the abdomen showed retroperitoneal adenopathy. CT guided biopsy of the retroperitoneal LN reveals a dx of well differentiated neuroendocrine carcinoma. Bone scan shows disease in the bone. Mr. Jayla Alarcon received Somatuline and had good control of disease. He took a break from therapy in Nov 2018. He went on an extended vacation until the beginning of February to [facility]. CT showed progression of disease in the nodes. Mr. Jayla Alarcon resumed therapy with Somatuline with the addition of Afinitor. Symptoms such as bone pains, diarrhea and fatigue improved  Due to insurance preference we had to switch zoledronic acid from denosumab. After the first infusion of zoledronic acid he developed bone pains, fatigue etc. The symptoms are better but persisting. On this past sunday night Mr. Jayla Alarcon went to the ED with complaints of vomiting, abdominal cramping and diaphoresis. Work up was negative. He feels better today, but continues to intermittent abdominal cramping.          Review of Systems:    Constitutional: fatigue, hot flashes, weight loss  Eyes: negative  Ears, Nose, Mouth, Throat, and Face: negative  Respiratory: negative  Cardiovascular: negative  Gastrointestinal: negative  Genitourinary: urinary incontinence  Integument/Breast: negative  Hematologic/Lymphatic: negative  Musculoskeletal: back pain  Neurological: negative      Past Medical History:   Diagnosis Date    Adverse effect of anesthesia     low HR and very slow to wakeup    Cancer ([address] 2016    neuroendocrine, retroperitoneal LN involvement    Diarrhea     Frequent urination     Hypertension     Poor appetite     Unspecified adverse effect of anesthesia     \"hard to put to sleep and slow to wake up-heart rate dropped very low\"     Past Surgical History:   Procedure Laterality Date    COLONOSCOPY N/A 2016    COLONOSCOPY performed by Jay William MD at [address] [place] [address]  2016         HX ORTHOPAEDIC      knee scope left knee    HX OTHER SURGICAL  2013    excision of scalp cyst     UPPER GI ENDOSCOPY,BIOPSY  2016           Family History   Problem Relation Age of Onset    Cancer Mother     Stroke Sister      Social History     Tobacco Use    Smoking status: Former Smoker     Packs/day: 1.00     Years: 8.00     Pack years: 8.00     Last attempt to quit: 1972     Years since quittin.3    Smokeless tobacco: Never Used   Substance Use Topics    Alcohol use: No      Prior to Admission medications    Medication Sig Start Date End Date Taking? Authorizing Provider   potassium chloride (KLOR-CON) 10 mEq tablet Take 1 Tab by mouth two (2) times a day. Patient taking differently: Take 10 mEq by mouth two (2) times a day. As needed per pt 19  Yes Annamarie Hong NP   lisinopril (PRINIVIL, ZESTRIL) 20 mg tablet Take 1 Tab by mouth daily. 19  Yes Miri Cuello MD   tamsulosin [facility]) 0.4 mg capsule Take 0.4 mg by mouth daily. Yes Provider, Historical   everolimus (AFINITOR) 10 mg tab Take 1 Tab by mouth daily.  3/5/19  Yes Navjot Patton NP   methocarbamol (ROBAXIN-750) 750 mg tablet Take 1 Tab by mouth four (4) times daily. 12/26/17   Marylene Junior, PA          No Known Allergies        Objective:     Vitals:    09/11/19 0921   BP: 150/88   Pulse: 70   Resp: 16   Temp: 97.7 °F (36.5 °C)   TempSrc: Oral   SpO2: 100%   Weight: 175 lb 3.2 oz (79.5 kg)   Height: 6' (1.829 m)      Pain Scale: 2/10  Generalized bone pains      Physical Exam:    GENERAL: alert, cooperative  EYE: negative  LYMPHATIC: Cervical, supraclavicular, and axillary nodes normal.   THROAT & NECK: normal and no erythema or exudates noted. LUNG: clear to auscultation bilaterally  HEART: regular rate and rhythm  ABDOMEN: soft, non-tender  EXTREMITIES: no cyanosis or edema  SKIN: Normal.  NEUROLOGIC: negative      IMAGING:     I personally reviewed the images. Enlarged and abnormal retroperitoneal adenopathy. Bone metastasis. CT Results (most recent):  Results from Hospital Encounter encounter on 07/02/19   CT ABD PELV W CONT    Narrative EXAM: CT ABD PELV W CONT    INDICATION: restaging disease 67-year-old with neuroendocrine carcinoma. Biopsy-proven involvement of retroperitoneal lymph nodes status post biopsy in  2016. Bone scan showed osseous metastases. COMPARISON: CT abdomen pelvis 2/25/2019, 2/26/2018, 8/18/2017, 1/23/2017     CONTRAST: 100 mL of Isovue-370. TECHNIQUE:   Following the uneventful intravenous administration of contrast, thin axial  images were obtained through the abdomen and pelvis. Coronal and sagittal  reconstructions were generated. Oral contrast was administered. CT dose  reduction was achieved through use of a standardized protocol tailored for this  examination and automatic exposure control for dose modulation. FINDINGS:   LUNG BASES: Mild dependent hypoventilatory changes.   INCIDENTALLY IMAGED HEART AND MEDIASTINUM: In the right inferior mediastinum  adjacent to the aorta is a morphologically abnormal lymph node measuring in  short axis 0.9 cm, previously 1.1 cm.  LIVER: No mass or biliary dilatation. GALLBLADDER: Cholelithiasis. No evidence cholecystitis. SPLEEN: No mass. PANCREAS: No mass or ductal dilatation. ADRENALS: Unremarkable. KIDNEYS: No mass, calculus, or hydronephrosis. STOMACH: Unremarkable. SMALL BOWEL: No dilatation or wall thickening. COLON: No dilatation or wall thickening. APPENDIX: Unremarkable. PERITONEUM: No ascites or pneumoperitoneum. Mesentery: A dominant mesenteric lesion with internal calcifications measures  2.8 x 1.5 cm, previously 4.5 x 2.1 cm. Just anterior to this there is a smaller  lesion, image 2-35 measuring 1.1 x 0.8 cm, previously 1.4 x 0.9 cm. RETROPERITONEUM: Retroperitoneal adenopathy has slightly decreased in size. For  example the largest lesion at the level of the mid kidney along the left lateral  aspect of the aorta measures 1.9 cm in short axis, previously 2.2 cm. Just  inferior to this there are conglomerate lymph nodes, image 2-32 which measures  3.2 x 1.8 cm, previously 3.6 x 2.3 cm. Just above the bifurcation also along the  left lateral aspect of the aorta a node measures 1.8 cm in short axis,  previously 2.1 cm. Moderate atherosclerotic disease of the aorta without  evidence of aneurysm. REPRODUCTIVE ORGANS: Prostatomegaly. URINARY BLADDER: Distended with a diffusely thickened wall and multiple small  diverticula. There is new mild dilation of the distal ureters bilaterally ureter  without a visualized obstructing stone or mass. BONES: Innumerable sclerotic lesions throughout the visualized skeleton. Examples as follows. In the right posterior iliac bone just behind the  sacroiliac joint, image 2-55 a lesion measures 1.3 x 1.2 cm. In the L1 vertebral  body there is a 2.5 x 1.8 cm lesion. ADDITIONAL COMMENTS: N/A      Impression IMPRESSION:  1. Slightly decreased retroperitoneal lymph nodes, mesenteric lesions, and a  posterior mediastinal node.   2.  Innumerable sclerotic osseous metastases are not significantly changed. 3.  Diffusely, irregularly thickened bladder wall with multiple small  diverticula again seen. New dilation of the bilateral distal ureters without  evidence of an obstructing stone or mass may be due to chronic obstruction and  overdistention. I reviewed the imaging personally. Good response to treatment. Lab Results   Component Value Date/Time    WBC 12.2 (H) 09/09/2019 01:19 AM    Hemoglobin (POC) 14.6 12/21/2017 09:10 AM    HGB 13.6 09/09/2019 01:19 AM    Hematocrit (POC) 34 (L) 08/22/2019 09:09 AM    HCT 42.6 09/09/2019 01:19 AM    PLATELET 861 05/62/0290 01:19 AM    MCV 79.3 (L) 09/09/2019 01:19 AM       Lab Results   Component Value Date/Time    Sodium 144 09/09/2019 01:19 AM    Potassium 3.8 09/09/2019 01:19 AM    Chloride 111 (H) 09/09/2019 01:19 AM    CO2 28 09/09/2019 01:19 AM    Anion gap 5 09/09/2019 01:19 AM    Glucose 171 (H) 09/09/2019 01:19 AM    BUN 26 (H) 09/09/2019 01:19 AM    Creatinine 1.82 (H) 09/09/2019 01:19 AM    BUN/Creatinine ratio 14 09/09/2019 01:19 AM    GFR est AA 45 (L) 09/09/2019 01:19 AM    GFR est non-AA 37 (L) 09/09/2019 01:19 AM    Calcium 9.0 09/09/2019 01:19 AM    Bilirubin, total 0.9 09/09/2019 01:19 AM    AST (SGOT) 23 09/09/2019 01:19 AM    Alk. phosphatase 63 09/09/2019 01:19 AM    Protein, total 6.7 09/09/2019 01:19 AM    Albumin 3.5 09/09/2019 01:19 AM    Globulin 3.2 09/09/2019 01:19 AM    A-G Ratio 1.1 09/09/2019 01:19 AM    ALT (SGPT) 21 09/09/2019 01:19 AM           Assessment:     1. 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 Had excellent disease control. He experienced a number of side effects including hot flashes, weight loss etc  He decided to take a break from therapy. Last CT showed disease progression in retroperitoneum and abdomen.    Restarted Somatuline   Flushing, appetite, diarrhea, back pain are all better. Currently on Everolimus 10 mg in addition to Somatuline. Tolerating treatment   A detailed system by system evaluation of side effect was performed to assess chemotherapy related toxicity. Blood counts are acceptable. Results reviewed with the patient. Symptom management form reviewed and scanned into the EMR under Media. At this time, I will d/c Everolimus since he is doing well with SSA. 2. Bone metastasis    > Denosumab      3. Urinary incontinence    BPH  Managed by Dr. Caitlin Rojas:       > Continue Denosumab  > Continue Somatuline  > Continue Afinitor 10 mg for 1 more month and then stop  > Repeat scans in November - ordered  > follow up in 3 months         Signed by: Ramon Ha MD                     September 11, 2019          CC. Gregory Quinonez MD  CC. Debbie Hoff MD  CC.  Chauncey Simmonds, MD

## 2019-09-11 NOTE — PROGRESS NOTES
69 y/o cauc male here for f/u appt for met neuroendocrine. cancer. Pt receiving lancreotide and xgeva. Mod amt. Of hot flashes reported. Pt follows with damon. pt using mouth wash and toothpaste with afinitor. 1. Have you been to the ER, urgent care clinic since your last visit? Hospitalized since your last visit? Yes When: 9/9/19 Where: [facility] ed Reason for visit: n/v/ abd pain    2. Have you seen or consulted any other health care providers outside of the [address] [place] [address] since your last visit? Include any pap smears or colon screening.  No

## 2019-09-19 ENCOUNTER — HOSPITAL ENCOUNTER (OUTPATIENT)
Dept: INFUSION THERAPY | Age: 74
Discharge: HOME | End: 2019-09-19
Payer: MEDICARE

## 2019-09-19 VITALS
DIASTOLIC BLOOD PRESSURE: 88 MMHG | RESPIRATION RATE: 18 BRPM | WEIGHT: 174.8 LBS | HEART RATE: 62 BPM | OXYGEN SATURATION: 100 % | BODY MASS INDEX: 23.68 KG/M2 | HEIGHT: 72 IN | TEMPERATURE: 97.8 F | SYSTOLIC BLOOD PRESSURE: 160 MMHG

## 2019-09-19 DIAGNOSIS — C7B.8 NEUROENDOCRINE CARCINOMA METASTATIC TO BONE (HCC): Primary | ICD-10-CM

## 2019-09-19 DIAGNOSIS — C7A.8 NEUROENDOCRINE CARCINOMA METASTATIC TO BONE (HCC): Primary | ICD-10-CM

## 2019-09-19 PROCEDURE — 96372 THER/PROPH/DIAG INJ SC/IM: CPT

## 2019-09-19 PROCEDURE — 74011250636 HC RX REV CODE- 250/636: Performed by: INTERNAL MEDICINE

## 2019-09-19 RX ORDER — LANREOTIDE ACETATE 120 MG/.5ML
120 INJECTION SUBCUTANEOUS ONCE
Status: COMPLETED | OUTPATIENT
Start: 2019-09-19 | End: 2019-09-19

## 2019-09-19 RX ADMIN — DENOSUMAB 120 MG: 120 INJECTION SUBCUTANEOUS at 09:17

## 2019-09-19 RX ADMIN — LANREOTIDE ACETATE 120 MG: 120 INJECTION SUBCUTANEOUS at 09:21

## 2019-09-19 NOTE — PROGRESS NOTES
8000 [facility] Visit Note    4547 Pt arrived at [facility] ambulatory and in no distress for Lanreotide/Xgeva. Assessment completed, no new complaints voiced. Recent labs obtained on 9/9/19 during ED visit. Pt had vomiting, diarrhea, and weakness for several days. Work-up was negative, and treatment remains unchanged. Visit Vitals  /88 (BP 1 Location: Left arm, BP Patient Position: Sitting)   Pulse 62   Temp 97.8 °F (36.6 °C)   Resp 18   SpO2 100%     Calcium 9.0 on 9/9    Medications received:  Xgeva 120 mg SQ in left arm  Lanreotide 120 mg SQ in left GM    0925 Tolerated treatment well, no adverse reaction noted. D/Cd from [facility] ambulatory and in no distress accompanied by spouse. Next appt 10/17/19 @ 0900.

## 2019-10-09 RX ORDER — LANREOTIDE ACETATE 120 MG/.5ML
120 INJECTION SUBCUTANEOUS ONCE
Status: CANCELLED | OUTPATIENT
Start: 2019-11-14

## 2019-10-09 RX ORDER — LANREOTIDE ACETATE 120 MG/.5ML
120 INJECTION SUBCUTANEOUS ONCE
Status: CANCELLED | OUTPATIENT
Start: 2019-10-17

## 2019-10-17 ENCOUNTER — HOSPITAL ENCOUNTER (OUTPATIENT)
Dept: INFUSION THERAPY | Age: 74
Discharge: HOME | End: 2019-10-17
Payer: MEDICARE

## 2019-10-17 VITALS
WEIGHT: 173.4 LBS | HEIGHT: 72 IN | SYSTOLIC BLOOD PRESSURE: 138 MMHG | DIASTOLIC BLOOD PRESSURE: 86 MMHG | HEART RATE: 74 BPM | BODY MASS INDEX: 23.49 KG/M2 | RESPIRATION RATE: 18 BRPM | TEMPERATURE: 97.9 F | OXYGEN SATURATION: 98 %

## 2019-10-17 DIAGNOSIS — C7A.8 NEUROENDOCRINE CARCINOMA METASTATIC TO BONE (HCC): Primary | ICD-10-CM

## 2019-10-17 DIAGNOSIS — C7B.8 NEUROENDOCRINE CARCINOMA METASTATIC TO BONE (HCC): Primary | ICD-10-CM

## 2019-10-17 LAB
ALBUMIN SERPL-MCNC: 3.4 G/DL (ref 3.5–5)
ALBUMIN/GLOB SERPL: 0.9 {RATIO} (ref 1.1–2.2)
ALP SERPL-CCNC: 105 U/L (ref 45–117)
ALT SERPL-CCNC: 32 U/L (ref 12–78)
ANION GAP BLD CALC-SCNC: 14 MMOL/L (ref 10–20)
AST SERPL-CCNC: 26 U/L (ref 15–37)
BASOPHILS # BLD: 0.1 K/UL (ref 0–0.1)
BASOPHILS NFR BLD: 1 % (ref 0–1)
BILIRUB DIRECT SERPL-MCNC: 0.1 MG/DL (ref 0–0.2)
BILIRUB SERPL-MCNC: 0.8 MG/DL (ref 0.2–1)
BUN BLD-MCNC: 17 MG/DL (ref 9–20)
CA-I BLD-MCNC: 1.23 MMOL/L (ref 1.12–1.32)
CHLORIDE BLD-SCNC: 107 MMOL/L (ref 98–107)
CHOLEST SERPL-MCNC: 236 MG/DL
CO2 BLD-SCNC: 28 MMOL/L (ref 21–32)
CREAT BLD-MCNC: 1.3 MG/DL (ref 0.6–1.3)
DIFFERENTIAL METHOD BLD: ABNORMAL
EOSINOPHIL # BLD: 0.4 K/UL (ref 0–0.4)
EOSINOPHIL NFR BLD: 7 % (ref 0–7)
ERYTHROCYTE [DISTWIDTH] IN BLOOD BY AUTOMATED COUNT: 13.4 % (ref 11.5–14.5)
EST. AVERAGE GLUCOSE BLD GHB EST-MCNC: 137 MG/DL
GLOBULIN SER CALC-MCNC: 3.6 G/DL (ref 2–4)
GLUCOSE BLD-MCNC: 89 MG/DL (ref 65–100)
HBA1C MFR BLD: 6.4 % (ref 4.2–6.3)
HCT VFR BLD AUTO: 35 % (ref 36.6–50.3)
HCT VFR BLD CALC: 34 % (ref 36.6–50.3)
HDLC SERPL-MCNC: 49 MG/DL
HDLC SERPL: 4.8 {RATIO} (ref 0–5)
HGB BLD-MCNC: 11.2 G/DL (ref 12.1–17)
IMM GRANULOCYTES # BLD AUTO: 0 K/UL (ref 0–0.04)
IMM GRANULOCYTES NFR BLD AUTO: 0 % (ref 0–0.5)
LDLC SERPL CALC-MCNC: 140.6 MG/DL (ref 0–100)
LIPID PROFILE,FLP: ABNORMAL
LYMPHOCYTES # BLD: 0.9 K/UL (ref 0.8–3.5)
LYMPHOCYTES NFR BLD: 16 % (ref 12–49)
MAGNESIUM SERPL-MCNC: 1.9 MG/DL (ref 1.6–2.4)
MCH RBC QN AUTO: 25.1 PG (ref 26–34)
MCHC RBC AUTO-ENTMCNC: 32 G/DL (ref 30–36.5)
MCV RBC AUTO: 78.5 FL (ref 80–99)
MONOCYTES # BLD: 0.5 K/UL (ref 0–1)
MONOCYTES NFR BLD: 9 % (ref 5–13)
NEUTS SEG # BLD: 3.9 K/UL (ref 1.8–8)
NEUTS SEG NFR BLD: 67 % (ref 32–75)
NRBC # BLD: 0 K/UL (ref 0–0.01)
NRBC BLD-RTO: 0 PER 100 WBC
PHOSPHATE SERPL-MCNC: 3.1 MG/DL (ref 2.6–4.7)
PLATELET # BLD AUTO: 161 K/UL (ref 150–400)
PMV BLD AUTO: 10.2 FL (ref 8.9–12.9)
POTASSIUM BLD-SCNC: 3.4 MMOL/L (ref 3.5–5.1)
PROT SERPL-MCNC: 7 G/DL (ref 6.4–8.2)
PROT UR-MCNC: 21 MG/DL (ref 0–11.9)
RBC # BLD AUTO: 4.46 M/UL (ref 4.1–5.7)
SERVICE CMNT-IMP: ABNORMAL
SODIUM BLD-SCNC: 145 MMOL/L (ref 136–145)
TRIGL SERPL-MCNC: 232 MG/DL (ref ?–150)
VLDLC SERPL CALC-MCNC: 46.4 MG/DL
WBC # BLD AUTO: 5.8 K/UL (ref 4.1–11.1)

## 2019-10-17 PROCEDURE — 83036 HEMOGLOBIN GLYCOSYLATED A1C: CPT

## 2019-10-17 PROCEDURE — 80047 BASIC METABLC PNL IONIZED CA: CPT

## 2019-10-17 PROCEDURE — 85025 COMPLETE CBC W/AUTO DIFF WBC: CPT

## 2019-10-17 PROCEDURE — 36415 COLL VENOUS BLD VENIPUNCTURE: CPT

## 2019-10-17 PROCEDURE — 84156 ASSAY OF PROTEIN URINE: CPT

## 2019-10-17 PROCEDURE — 84100 ASSAY OF PHOSPHORUS: CPT

## 2019-10-17 PROCEDURE — 80076 HEPATIC FUNCTION PANEL: CPT

## 2019-10-17 PROCEDURE — 83735 ASSAY OF MAGNESIUM: CPT

## 2019-10-17 PROCEDURE — 80061 LIPID PANEL: CPT

## 2019-10-17 PROCEDURE — 96372 THER/PROPH/DIAG INJ SC/IM: CPT

## 2019-10-17 PROCEDURE — 74011250636 HC RX REV CODE- 250/636: Performed by: INTERNAL MEDICINE

## 2019-10-17 RX ORDER — LANREOTIDE ACETATE 120 MG/.5ML
120 INJECTION SUBCUTANEOUS ONCE
Status: COMPLETED | OUTPATIENT
Start: 2019-10-17 | End: 2019-10-17

## 2019-10-17 RX ADMIN — DENOSUMAB 120 MG: 120 INJECTION SUBCUTANEOUS at 09:29

## 2019-10-17 RX ADMIN — LANREOTIDE ACETATE 120 MG: 120 INJECTION SUBCUTANEOUS at 09:35

## 2019-10-17 NOTE — PROGRESS NOTES
8000 [facility] Visit Note    7756 Pt arrived at [facility] ambulatory and in no distress for Lanreotide/Xgeva. Assessment completed. Pt recently had bladder surgery. Labs obtained- CBC w/ diff, I-stat, Hepatic Function Panel, Magnesium, Phosphorus, Lipid Panel, HgbA1C, & Urine Protein. Visit Vitals  /86 (BP 1 Location: Left arm, BP Patient Position: Sitting)   Pulse 74   Temp 97.9 °F (36.6 °C)   Resp 18   Ht 6' (1.829 m)   Wt 78.7 kg (173 lb 6.4 oz)   SpO2 98%   BMI 23.52 kg/m²     Ionized Calcium 1.23    Recent Results (from the past 12 hour(s))   CBC WITH AUTOMATED DIFF    Collection Time: 10/17/19  9:20 AM   Result Value Ref Range    WBC 5.8 4.1 - 11.1 K/uL    RBC 4.46 4.10 - 5.70 M/uL    HGB 11.2 (L) 12.1 - 17.0 g/dL    HCT 35.0 (L) 36.6 - 50.3 %    MCV 78.5 (L) 80.0 - 99.0 FL    MCH 25.1 (L) 26.0 - 34.0 PG    MCHC 32.0 30.0 - 36.5 g/dL    RDW 13.4 11.5 - 14.5 %    PLATELET 422 338 - 058 K/uL    MPV 10.2 8.9 - 12.9 FL    NRBC 0.0 0  WBC    ABSOLUTE NRBC 0.00 0.00 - 0.01 K/uL    NEUTROPHILS 67 32 - 75 %    LYMPHOCYTES 16 12 - 49 %    MONOCYTES 9 5 - 13 %    EOSINOPHILS 7 0 - 7 %    BASOPHILS 1 0 - 1 %    IMMATURE GRANULOCYTES 0 0.0 - 0.5 %    ABS. NEUTROPHILS 3.9 1.8 - 8.0 K/UL    ABS. LYMPHOCYTES 0.9 0.8 - 3.5 K/UL    ABS. MONOCYTES 0.5 0.0 - 1.0 K/UL    ABS. EOSINOPHILS 0.4 0.0 - 0.4 K/UL    ABS. BASOPHILS 0.1 0.0 - 0.1 K/UL    ABS. IMM. GRANS. 0.0 0.00 - 0.04 K/UL    DF AUTOMATED     HEPATIC FUNCTION PANEL    Collection Time: 10/17/19  9:20 AM   Result Value Ref Range    Protein, total 7.0 6.4 - 8.2 g/dL    Albumin 3.4 (L) 3.5 - 5.0 g/dL    Globulin 3.6 2.0 - 4.0 g/dL    A-G Ratio 0.9 (L) 1.1 - 2.2      Bilirubin, total 0.8 0.2 - 1.0 MG/DL    Bilirubin, direct 0.1 0.0 - 0.2 MG/DL    Alk.  phosphatase 105 45 - 117 U/L    AST (SGOT) 26 15 - 37 U/L    ALT (SGPT) 32 12 - 78 U/L   MAGNESIUM    Collection Time: 10/17/19  9:20 AM   Result Value Ref Range    Magnesium 1.9 1.6 - 2.4 mg/dL   PHOSPHORUS    Collection Time: 10/17/19  9:20 AM   Result Value Ref Range    Phosphorus 3.1 2.6 - 4.7 MG/DL   LIPID PANEL    Collection Time: 10/17/19  9:20 AM   Result Value Ref Range    LIPID PROFILE          Cholesterol, total 236 (H) <200 MG/DL    Triglyceride 232 (H) <150 MG/DL    HDL Cholesterol 49 MG/DL    LDL, calculated 140.6 (H) 0 - 100 MG/DL    VLDL, calculated 46.4 MG/DL    CHOL/HDL Ratio 4.8 0.0 - 5.0     HEMOGLOBIN A1C WITH EAG    Collection Time: 10/17/19  9:20 AM   Result Value Ref Range    Hemoglobin A1c 6.4 (H) 4.2 - 6.3 %    Est. average glucose 137 mg/dL   PROTEIN URINE, RANDOM    Collection Time: 10/17/19  9:20 AM   Result Value Ref Range    Protein, urine random 21 (H) 0.0 - 11.9 mg/dL   POC CHEM8    Collection Time: 10/17/19  9:24 AM   Result Value Ref Range    Calcium, ionized (POC) 1.23 1.12 - 1.32 mmol/L    Sodium (POC) 145 136 - 145 mmol/L    Potassium (POC) 3.4 (L) 3.5 - 5.1 mmol/L    Chloride (POC) 107 98 - 107 mmol/L    CO2 (POC) 28 21 - 32 mmol/L    Anion gap (POC) 14 10 - 20 mmol/L    Glucose (POC) 89 65 - 100 mg/dL    BUN (POC) 17 9 - 20 mg/dL    Creatinine (POC) 1.3 0.6 - 1.3 mg/dL    GFRAA, POC >60 >60 ml/min/1.73m2    GFRNA, POC 54 (L) >60 ml/min/1.73m2    Hematocrit (POC) 34 (L) 36.6 - 50.3 %    Comment Notified RN or MD immediately by          Medications received:  Xgeva 120 mg SQ in left arm  Lanreotide 120 mg SQ in right GM    0925 Tolerated treatment well, no adverse reaction noted. D/Cd from [facility] and in no distress accompanied by spouse. Next appt 11/14/19 @ 0900.

## 2019-10-18 ENCOUNTER — TELEPHONE (OUTPATIENT)
Dept: ONCOLOGY | Age: 74
End: 2019-10-18

## 2019-10-18 NOTE — TELEPHONE ENCOUNTER
This nurse returned a call. HIPAA verified by two patient identifiers. Pt made aware of results. Cholesterol can be effected r/t afinitor. Pt tx of afinitor has actually stopped recently. He has an appt with PCP next month and will f/u with his office regarding cholesterol.

## 2019-11-11 ENCOUNTER — HOSPITAL ENCOUNTER (OUTPATIENT)
Dept: CT IMAGING | Age: 74
Discharge: HOME | End: 2019-11-11
Attending: INTERNAL MEDICINE
Payer: MEDICARE

## 2019-11-11 ENCOUNTER — HOSPITAL ENCOUNTER (OUTPATIENT)
Dept: NUCLEAR MEDICINE | Age: 74
Discharge: HOME | End: 2019-11-11
Attending: INTERNAL MEDICINE
Payer: MEDICARE

## 2019-11-11 DIAGNOSIS — C7A.8 NEUROENDOCRINE CARCINOMA METASTATIC TO BONE (HCC): ICD-10-CM

## 2019-11-11 DIAGNOSIS — C7B.8 NEUROENDOCRINE CARCINOMA METASTATIC TO BONE (HCC): ICD-10-CM

## 2019-11-11 PROCEDURE — 78306 BONE IMAGING WHOLE BODY: CPT

## 2019-11-11 PROCEDURE — 74011636320 HC RX REV CODE- 636/320: Performed by: INTERNAL MEDICINE

## 2019-11-11 PROCEDURE — 74177 CT ABD & PELVIS W/CONTRAST: CPT

## 2019-11-11 RX ORDER — SODIUM CHLORIDE 0.9 % (FLUSH) 0.9 %
10 SYRINGE (ML) INJECTION
Status: COMPLETED | OUTPATIENT
Start: 2019-11-11 | End: 2019-11-11

## 2019-11-11 RX ADMIN — IOPAMIDOL 100 ML: 755 INJECTION, SOLUTION INTRAVENOUS at 12:31

## 2019-11-11 RX ADMIN — Medication 10 ML: at 12:31

## 2019-11-11 RX ADMIN — IOHEXOL 20 ML: 240 INJECTION, SOLUTION INTRATHECAL; INTRAVASCULAR; INTRAVENOUS; ORAL at 12:31

## 2019-11-12 ENCOUNTER — DOCUMENTATION ONLY (OUTPATIENT)
Dept: ONCOLOGY | Age: 74
End: 2019-11-12

## 2019-11-12 NOTE — PROGRESS NOTES
Called pt, no answer, left VM.
Slight progression of disease in the retroperitoneal nodes. Resume Afinitor.
7

## 2019-11-14 ENCOUNTER — HOSPITAL ENCOUNTER (OUTPATIENT)
Dept: INFUSION THERAPY | Age: 74
Discharge: HOME | End: 2019-11-14
Payer: MEDICARE

## 2019-11-14 VITALS
WEIGHT: 174.6 LBS | RESPIRATION RATE: 18 BRPM | HEART RATE: 72 BPM | DIASTOLIC BLOOD PRESSURE: 92 MMHG | OXYGEN SATURATION: 99 % | TEMPERATURE: 98 F | HEIGHT: 72 IN | BODY MASS INDEX: 23.65 KG/M2 | SYSTOLIC BLOOD PRESSURE: 154 MMHG

## 2019-11-14 DIAGNOSIS — C7A.8 NEUROENDOCRINE CARCINOMA METASTATIC TO BONE (HCC): Primary | ICD-10-CM

## 2019-11-14 DIAGNOSIS — C7B.8 NEUROENDOCRINE CARCINOMA METASTATIC TO BONE (HCC): Primary | ICD-10-CM

## 2019-11-14 LAB
ALBUMIN SERPL-MCNC: 3.7 G/DL (ref 3.5–5)
ALBUMIN/GLOB SERPL: 1.2 {RATIO} (ref 1.1–2.2)
ALP SERPL-CCNC: 65 U/L (ref 45–117)
ALT SERPL-CCNC: 23 U/L (ref 12–78)
ANION GAP BLD CALC-SCNC: 14 MMOL/L (ref 10–20)
AST SERPL-CCNC: 14 U/L (ref 15–37)
BASOPHILS # BLD: 0.1 K/UL (ref 0–0.1)
BASOPHILS NFR BLD: 1 % (ref 0–1)
BILIRUB DIRECT SERPL-MCNC: 0.2 MG/DL (ref 0–0.2)
BILIRUB SERPL-MCNC: 1.2 MG/DL (ref 0.2–1)
BUN BLD-MCNC: 25 MG/DL (ref 9–20)
CA-I BLD-MCNC: 1.19 MMOL/L (ref 1.12–1.32)
CHLORIDE BLD-SCNC: 104 MMOL/L (ref 98–107)
CO2 BLD-SCNC: 29 MMOL/L (ref 21–32)
CREAT BLD-MCNC: 1.3 MG/DL (ref 0.6–1.3)
DIFFERENTIAL METHOD BLD: ABNORMAL
EOSINOPHIL # BLD: 0.3 K/UL (ref 0–0.4)
EOSINOPHIL NFR BLD: 5 % (ref 0–7)
ERYTHROCYTE [DISTWIDTH] IN BLOOD BY AUTOMATED COUNT: 18.6 % (ref 11.5–14.5)
GLOBULIN SER CALC-MCNC: 3 G/DL (ref 2–4)
GLUCOSE BLD-MCNC: 134 MG/DL (ref 65–100)
HCT VFR BLD AUTO: 41.1 % (ref 36.6–50.3)
HCT VFR BLD CALC: 40 % (ref 36.6–50.3)
HGB BLD-MCNC: 13.1 G/DL (ref 12.1–17)
IMM GRANULOCYTES # BLD AUTO: 0 K/UL (ref 0–0.04)
IMM GRANULOCYTES NFR BLD AUTO: 0 % (ref 0–0.5)
LYMPHOCYTES # BLD: 0.8 K/UL (ref 0.8–3.5)
LYMPHOCYTES NFR BLD: 12 % (ref 12–49)
MAGNESIUM SERPL-MCNC: 2.2 MG/DL (ref 1.6–2.4)
MCH RBC QN AUTO: 26.5 PG (ref 26–34)
MCHC RBC AUTO-ENTMCNC: 31.9 G/DL (ref 30–36.5)
MCV RBC AUTO: 83 FL (ref 80–99)
MONOCYTES # BLD: 0.4 K/UL (ref 0–1)
MONOCYTES NFR BLD: 7 % (ref 5–13)
NEUTS SEG # BLD: 4.8 K/UL (ref 1.8–8)
NEUTS SEG NFR BLD: 75 % (ref 32–75)
NRBC # BLD: 0 K/UL (ref 0–0.01)
NRBC BLD-RTO: 0 PER 100 WBC
PHOSPHATE SERPL-MCNC: 2.8 MG/DL (ref 2.6–4.7)
PLATELET # BLD AUTO: 165 K/UL (ref 150–400)
PMV BLD AUTO: 10.3 FL (ref 8.9–12.9)
POTASSIUM BLD-SCNC: 3.8 MMOL/L (ref 3.5–5.1)
PROT SERPL-MCNC: 6.7 G/DL (ref 6.4–8.2)
RBC # BLD AUTO: 4.95 M/UL (ref 4.1–5.7)
RBC MORPH BLD: ABNORMAL
SERVICE CMNT-IMP: ABNORMAL
SODIUM BLD-SCNC: 142 MMOL/L (ref 136–145)
WBC # BLD AUTO: 6.4 K/UL (ref 4.1–11.1)
WBC MORPH BLD: ABNORMAL

## 2019-11-14 PROCEDURE — 96372 THER/PROPH/DIAG INJ SC/IM: CPT

## 2019-11-14 PROCEDURE — 85025 COMPLETE CBC W/AUTO DIFF WBC: CPT

## 2019-11-14 PROCEDURE — 80076 HEPATIC FUNCTION PANEL: CPT

## 2019-11-14 PROCEDURE — 80047 BASIC METABLC PNL IONIZED CA: CPT

## 2019-11-14 PROCEDURE — 83735 ASSAY OF MAGNESIUM: CPT

## 2019-11-14 PROCEDURE — 74011250636 HC RX REV CODE- 250/636: Performed by: INTERNAL MEDICINE

## 2019-11-14 PROCEDURE — 36415 COLL VENOUS BLD VENIPUNCTURE: CPT

## 2019-11-14 PROCEDURE — 84100 ASSAY OF PHOSPHORUS: CPT

## 2019-11-14 RX ORDER — LANREOTIDE ACETATE 120 MG/.5ML
120 INJECTION SUBCUTANEOUS ONCE
Status: COMPLETED | OUTPATIENT
Start: 2019-11-14 | End: 2019-11-14

## 2019-11-14 RX ADMIN — DENOSUMAB 120 MG: 120 INJECTION SUBCUTANEOUS at 09:24

## 2019-11-14 RX ADMIN — LANREOTIDE ACETATE 120 MG: 120 INJECTION SUBCUTANEOUS at 09:27

## 2019-11-14 NOTE — PROGRESS NOTES
8000 [facility] Visit Note    0900 Pt arrived at [facility] ambulatory and in no distress for Lanreotide/Xgeva. Assessment completed, no new complaints voiced. Labs obtained- CBC with diff, Chem 8 I-stat, Mag, Phos, and Hepatic Panel. Visit Vitals  BP (!) 154/92 (BP 1 Location: Left arm, BP Patient Position: Sitting)   Pulse 72   Temp 98 °F (36.7 °C)   Resp 18   Ht 6' (1.829 m)   Wt 79.2 kg (174 lb 9.6 oz)   SpO2 99%   BMI 23.68 kg/m²     Recent Results (from the past 12 hour(s))   CBC WITH AUTOMATED DIFF    Collection Time: 11/14/19  9:11 AM   Result Value Ref Range    WBC 6.4 4.1 - 11.1 K/uL    RBC 4.95 4.10 - 5.70 M/uL    HGB 13.1 12.1 - 17.0 g/dL    HCT 41.1 36.6 - 50.3 %    MCV 83.0 80.0 - 99.0 FL    MCH 26.5 26.0 - 34.0 PG    MCHC 31.9 30.0 - 36.5 g/dL    RDW 18.6 (H) 11.5 - 14.5 %    PLATELET 425 150 - 341 K/uL    MPV 10.3 8.9 - 12.9 FL    NRBC 0.0 0  WBC    ABSOLUTE NRBC 0.00 0.00 - 0.01 K/uL    NEUTROPHILS 75 32 - 75 %    LYMPHOCYTES 12 12 - 49 %    MONOCYTES 7 5 - 13 %    EOSINOPHILS 5 0 - 7 %    BASOPHILS 1 0 - 1 %    IMMATURE GRANULOCYTES 0 0.0 - 0.5 %    ABS. NEUTROPHILS 4.8 1.8 - 8.0 K/UL    ABS. LYMPHOCYTES 0.8 0.8 - 3.5 K/UL    ABS. MONOCYTES 0.4 0.0 - 1.0 K/UL    ABS. EOSINOPHILS 0.3 0.0 - 0.4 K/UL    ABS. BASOPHILS 0.1 0.0 - 0.1 K/UL    ABS. IMM. GRANS. 0.0 0.00 - 0.04 K/UL    DF SMEAR SCANNED      RBC COMMENTS ANISOCYTOSIS  1+        WBC COMMENTS TOXIC GRANULATION     HEPATIC FUNCTION PANEL    Collection Time: 11/14/19  9:11 AM   Result Value Ref Range    Protein, total 6.7 6.4 - 8.2 g/dL    Albumin 3.7 3.5 - 5.0 g/dL    Globulin 3.0 2.0 - 4.0 g/dL    A-G Ratio 1.2 1.1 - 2.2      Bilirubin, total 1.2 (H) 0.2 - 1.0 MG/DL    Bilirubin, direct 0.2 0.0 - 0.2 MG/DL    Alk.  phosphatase 65 45 - 117 U/L    AST (SGOT) 14 (L) 15 - 37 U/L    ALT (SGPT) 23 12 - 78 U/L   MAGNESIUM    Collection Time: 11/14/19  9:11 AM   Result Value Ref Range    Magnesium 2.2 1.6 - 2.4 mg/dL PHOSPHORUS    Collection Time: 11/14/19  9:11 AM   Result Value Ref Range    Phosphorus 2.8 2.6 - 4.7 MG/DL   POC CHEM8    Collection Time: 11/14/19  9:15 AM   Result Value Ref Range    Calcium, ionized (POC) 1.19 1.12 - 1.32 mmol/L    Sodium (POC) 142 136 - 145 mmol/L    Potassium (POC) 3.8 3.5 - 5.1 mmol/L    Chloride (POC) 104 98 - 107 mmol/L    CO2 (POC) 29 21 - 32 mmol/L    Anion gap (POC) 14 10 - 20 mmol/L    Glucose (POC) 134 (H) 65 - 100 mg/dL    BUN (POC) 25 (H) 9 - 20 mg/dL    Creatinine (POC) 1.3 0.6 - 1.3 mg/dL    GFRAA, POC >60 >60 ml/min/1.73m2    GFRNA, POC 54 (L) >60 ml/min/1.73m2    Hematocrit (POC) 40 36.6 - 50.3 %    Comment Notified RN or MD immediately by        Ionized Calcium 1.19    Medications received:  Lanreotide 120 mg SQ in left buttocks  Xgeva 120 mg SQ in left arm    0930 Tolerated treatment well, no adverse reaction noted. D/Cd from [facility] ambulatory and in no distress accompanied by spouse. Next appt 12/12/19 @ 0900.

## 2019-12-11 ENCOUNTER — OFFICE VISIT (OUTPATIENT)
Dept: ONCOLOGY | Age: 74
End: 2019-12-11

## 2019-12-11 VITALS
WEIGHT: 176.4 LBS | RESPIRATION RATE: 16 BRPM | BODY MASS INDEX: 23.89 KG/M2 | HEART RATE: 69 BPM | OXYGEN SATURATION: 98 % | HEIGHT: 72 IN | DIASTOLIC BLOOD PRESSURE: 82 MMHG | SYSTOLIC BLOOD PRESSURE: 140 MMHG | TEMPERATURE: 68 F

## 2019-12-11 DIAGNOSIS — C7B.8 NEUROENDOCRINE CARCINOMA METASTATIC TO BONE (HCC): Primary | ICD-10-CM

## 2019-12-11 DIAGNOSIS — C7A.8 NEUROENDOCRINE CARCINOMA METASTATIC TO BONE (HCC): Primary | ICD-10-CM

## 2019-12-11 DIAGNOSIS — C79.51 BONE METASTASIS (HCC): ICD-10-CM

## 2019-12-11 NOTE — PROGRESS NOTES
[address] [facility]  [address], [address] [facility], [facility] Domo Joyceu, [address]  [phone]       Oncology Follow up Note        Patient: Vale Navarrete MRN: [id number]  SSN: [id number]    YOB: 1945  Age: 68 y.o. Sex: male        Diagnosis:     1. 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 Afinitor 10 mg + Somatuline Depot 120 mg SC - started Afinitor 3/30/2019  1. Somatuline Depot 120 mg SC - 05/2016 - 11/2018; Restarted 3/4/2019 -    (treatment held per patient request since11/16/2018)   2. Xgeva 120mg SC     Subjective:      Vale Navarrete is a 68 y.o. male with a diagnosis of metastatic neuroendocrine cancer. He started experiencing pain in both groins and the abdomen. A CT of the abdomen showed retroperitoneal adenopathy. CT guided biopsy of the retroperitoneal LN reveals a dx of well differentiated neuroendocrine carcinoma. Bone scan shows disease in the bone. Mr. Radames Cunha received Somatuline and had good control of disease. He took a break from therapy in Nov 2018. He went on an extended vacation until the beginning of February to [facility]. CT showed progression of disease in the nodes. Mr. Radames Cunha resumed therapy with Somatuline with the addition of Afinitor. Symptoms such as bone pains, diarrhea and fatigue improved  Due to insurance preference we had to switch zoledronic acid from denosumab. He took a break from [address] [place] [address] in October 2019, but repeat scans showed slight progression of disease. He resumed Afinitor in November 2019. He is here today with his wife and notes some lose stools, dry eyes and dry mouth.       Review of Systems:    Constitutional: fatigue, hot flashes, weight loss  Eyes: negative  Ears, Nose, Mouth, Throat, and Face: negative  Respiratory: negative  Cardiovascular: negative  Gastrointestinal: diarrhea  Genitourinary: urinary incontinence  Integument/Breast: negative  Hematologic/Lymphatic: negative  Musculoskeletal: back pain  Neurological: negative      Past Medical History:   Diagnosis Date    Adverse effect of anesthesia     low HR and very slow to wakeup    Cancer ([address] 2016    neuroendocrine, retroperitoneal LN involvement    Diarrhea     Frequent urination     Hypertension     Poor appetite     Unspecified adverse effect of anesthesia     \"hard to put to sleep and slow to wake up-heart rate dropped very low\"     Past Surgical History:   Procedure Laterality Date    COLONOSCOPY N/A 2016    COLONOSCOPY performed by Marquis Cecil MD at [address] [place] [address]  2016         HX ORTHOPAEDIC      knee scope left knee    HX OTHER SURGICAL  2013    excision of scalp cyst     UPPER GI ENDOSCOPY,BIOPSY  2016           Family History   Problem Relation Age of Onset    Cancer Mother     Stroke Sister      Social History     Tobacco Use    Smoking status: Former Smoker     Packs/day: 1.00     Years: 8.00     Pack years: 8.00     Last attempt to quit: 1972     Years since quittin.6    Smokeless tobacco: Never Used   Substance Use Topics    Alcohol use: No      Prior to Admission medications    Medication Sig Start Date End Date Taking? Authorizing Provider   potassium chloride (KLOR-CON) 10 mEq tablet Take 1 Tab by mouth two (2) times a day. Patient taking differently: Take 10 mEq by mouth two (2) times a day. As needed per pt 19  Yes Annamarie Hong NP   lisinopril (PRINIVIL, ZESTRIL) 20 mg tablet Take 1 Tab by mouth daily. 19  Yes Yojana Becker MD   tamsulosin [facility]) 0.4 mg capsule Take 0.4 mg by mouth daily. Yes Provider, Historical   everolimus (AFINITOR) 10 mg tab Take 1 Tab by mouth daily. 3/5/19  Yes Hanny, [place] Angelucci H, NP   methocarbamol (ROBAXIN-750) 750 mg tablet Take 1 Tab by mouth four (4) times daily.  17   HUMA Mcneil          No Known Allergies        Objective:     Vitals:    12/11/19 1029   BP: 140/82   Pulse: 69   Resp: 16   Temp: (!) 68 °F (20 °C)   TempSrc: Oral   SpO2: 98%   Weight: 176 lb 6.4 oz (80 kg)   Height: 6' (1.829 m)      Pain Scale: 3/10  Generalized bone pains      Physical Exam:    GENERAL: alert, cooperative  EYE: negative  LYMPHATIC: Cervical, supraclavicular, and axillary nodes normal.   THROAT & NECK: normal and no erythema or exudates noted. LUNG: clear to auscultation bilaterally  HEART: regular rate and rhythm  ABDOMEN: soft, non-tender  EXTREMITIES: no cyanosis or edema  SKIN: Normal.  NEUROLOGIC: negative      IMAGING:     I personally reviewed the images. Enlarged and abnormal retroperitoneal adenopathy. Bone metastasis. CT Results (most recent):  Results from Hospital Encounter encounter on 11/11/19   CT ABD PELV W CONT    Narrative EXAM:  CT ABDOMEN PELVIS WITH CONTRAST  INDICATION:  metastatic neuroendocrine carcinoma. Other malignant neuroendocrine  tumors  Additional history:  COMPARISON: CT of the abdomen and pelvis, 7/2/2019 and 2/25/2019. Hillary Sow TECHNIQUE:   Multislice helical CT was performed from the diaphragm to the symphysis pubis  with oral and intravenous contrast administration. Contiguous 5 mm axial images  were reconstructed and lung and soft tissue windows were generated. Coronal and  sagittal reformations were generated. CT dose reduction was achieved through use of a standardized protocol tailored  for this examination and automatic exposure control for dose modulation. Hillary Sow FINDINGS:  INCIDENTALLY IMAGED CHEST:  Heart/vessels: Within normal limits. Mediastinum: Rounded lymph node at the aortic hiatus which measures 0.9 cm in  short axis, not significant changed  Lungs/Pleura: Scarring/atelectasis in the right base. .  ABDOMEN:  Liver: Within normal limits. Gallbladder/Biliary: Gas containing stone in the gallbladder. Spleen: Within normal limits.   Pancreas: Pancreatic atrophy with slightly distended pancreatic duct measuring  0.5 cm. Adrenals: Within normal limits. Kidneys: Punctate calculus in the lower pole the left kidney. Tiny,  low-attenuation lesions in both kidneys too small to characterize  Peritoneum/Mesenteries: There is soft tissue attenuation around the roots of the  mesentery which contains coarse calcifications. Extraperitoneum: Increased size of retroperitoneal adenopathy relative to most  recent comparison, diminished relative to February, 2019. A representative lori  complex the left periaortic the upper abdomen currently measures 3.1 x 2.6 cm as  compared to 2.8 x 2.3 cm in July, 2019 (remeasured for the purpose of  comparison). Further, lymph nodes left the aorta and in the lower abdomen  currently measures approximately 2.5 x 2 cm as compared to 2.3 x 1.5 cm in July, 2019 (remeasured for the purpose of comparison). Gastrointestinal tract: Diverticulosis in the ascending, transverse, descending  and sigmoid colon. Normal-appearing appendix  Vascular: Celiac axis is absent with left hepatic and splenic arteries arising  directly from the aorta. Right hepatic artery arises from the superior  mesenteric artery. Calcifications in the aorta  . PELVIS:  Extraperitoneum: Calcifications in the pelvis suggesting phleboliths. Ureters: Left-sided hydroureter. Bladder: Distended bladder with thickened bladder wall. There is a diverticulum  in the right fundus. Reproductive System: Clip/fiducial markers within the prostate. .  MSK:   Loss of disc space height at L5/S1 with slight retrolisthesis. Small,  fat-containing hernia. Innumerable sclerotic foci throughout the skeleton, not  significantly changed  . Impression IMPRESSION:   1. Slightly increased size of retroperitoneal adenopathy relative to July, 2019,  but decreased relative to February, 2019.  2. No significant change in the soft tissue about the root of the mesentery.   3. Innumerable sclerotic metastatic foci throughout the skeleton, not  significantly changed. 4. Incidental findings as above. I reviewed the imaging personally. Slight progression of disease in the retroperitoneal nodes. Lab Results   Component Value Date/Time    WBC 6.4 11/14/2019 09:11 AM    Hemoglobin (POC) 14.6 12/21/2017 09:10 AM    HGB 13.1 11/14/2019 09:11 AM    Hematocrit (POC) 40 11/14/2019 09:15 AM    HCT 41.1 11/14/2019 09:11 AM    PLATELET 191 80/16/5734 09:11 AM    MCV 83.0 11/14/2019 09:11 AM       Lab Results   Component Value Date/Time    Sodium 144 09/09/2019 01:19 AM    Potassium 3.8 09/09/2019 01:19 AM    Chloride 111 (H) 09/09/2019 01:19 AM    CO2 28 09/09/2019 01:19 AM    Anion gap 5 09/09/2019 01:19 AM    Glucose 171 (H) 09/09/2019 01:19 AM    BUN 26 (H) 09/09/2019 01:19 AM    Creatinine 1.82 (H) 09/09/2019 01:19 AM    BUN/Creatinine ratio 14 09/09/2019 01:19 AM    GFR est AA 45 (L) 09/09/2019 01:19 AM    GFR est non-AA 37 (L) 09/09/2019 01:19 AM    Calcium 9.0 09/09/2019 01:19 AM    Bilirubin, total 1.2 (H) 11/14/2019 09:11 AM    AST (SGOT) 14 (L) 11/14/2019 09:11 AM    Alk. phosphatase 65 11/14/2019 09:11 AM    Protein, total 6.7 11/14/2019 09:11 AM    Albumin 3.7 11/14/2019 09:11 AM    Globulin 3.0 11/14/2019 09:11 AM    A-G Ratio 1.2 11/14/2019 09:11 AM    ALT (SGPT) 23 11/14/2019 09:11 AM           Assessment:     1. 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 Had excellent disease control. He experienced a number of side effects including hot flashes, weight loss etc  He decided to take a break from therapy. CT showed disease progression in retroperitoneum and abdomen. Restarted Somatuline. Added Afinitor. Flushing, appetite, diarrhea, back pain are all better.      Stopped Afinitor in Oct 2019  Ct shows progression in retroperitoneal nodes    He is now back on Everolimus 10 mg in addition to [employer] treatment   A detailed system by system evaluation of side effect was performed to assess chemotherapy related toxicity. Blood counts are acceptable. Results reviewed with the patient. Symptom management form reviewed and scanned into the EMR under Media. 2. Bone metastasis    > Denosumab  Hold for dental work      3. Urinary incontinence    BPH  Managed by Dr. Kaden Garsia:       > Continue Afinitor  > Hold Denosumab  > Continue Somatuline  > Follow-up in 2 months       Signed by: Jered Farris MD                     December 11, 2019      CC. Prieto Murillo MD  CC.  Guy Chinchilla MD

## 2019-12-11 NOTE — PROGRESS NOTES
67 y/o cauc male here for f/u appt for neuroendocrine cancer. Pt is on afinitor which was restarted 11/11/19. Pt reports diarrhea but he had prostate surgery and has been on he thinks is related to that. He says he has had loose stools most of his life though. Pt is having urodynamics done on 12/18/19. 1. Have you been to the ER, urgent care clinic since your last visit? Hospitalized since your last visit? No    2. Have you seen or consulted any other health care providers outside of the [address] [place] [address] since your last visit? Include any pap smears or colon screening.  No

## 2019-12-12 ENCOUNTER — HOSPITAL ENCOUNTER (OUTPATIENT)
Dept: INFUSION THERAPY | Age: 74
Discharge: HOME | End: 2019-12-12
Payer: MEDICARE

## 2019-12-12 VITALS
TEMPERATURE: 97.4 F | OXYGEN SATURATION: 100 % | HEIGHT: 72 IN | DIASTOLIC BLOOD PRESSURE: 100 MMHG | WEIGHT: 174 LBS | HEART RATE: 58 BPM | RESPIRATION RATE: 18 BRPM | BODY MASS INDEX: 23.57 KG/M2 | SYSTOLIC BLOOD PRESSURE: 150 MMHG

## 2019-12-12 DIAGNOSIS — C7B.8 NEUROENDOCRINE CARCINOMA METASTATIC TO BONE (HCC): Primary | ICD-10-CM

## 2019-12-12 DIAGNOSIS — C7A.8 NEUROENDOCRINE CARCINOMA METASTATIC TO BONE (HCC): Primary | ICD-10-CM

## 2019-12-12 LAB
ALBUMIN SERPL-MCNC: 3.5 G/DL (ref 3.5–5)
ALBUMIN/GLOB SERPL: 1 {RATIO} (ref 1.1–2.2)
ALP SERPL-CCNC: 66 U/L (ref 45–117)
ALT SERPL-CCNC: 29 U/L (ref 12–78)
ANION GAP SERPL CALC-SCNC: 4 MMOL/L (ref 5–15)
AST SERPL-CCNC: 25 U/L (ref 15–37)
BASOPHILS # BLD: 0 K/UL (ref 0–0.1)
BASOPHILS NFR BLD: 1 % (ref 0–1)
BILIRUB SERPL-MCNC: 1 MG/DL (ref 0.2–1)
BUN SERPL-MCNC: 18 MG/DL (ref 6–20)
BUN/CREAT SERPL: 16 (ref 12–20)
CALCIUM SERPL-MCNC: 8.4 MG/DL (ref 8.5–10.1)
CHLORIDE SERPL-SCNC: 109 MMOL/L (ref 97–108)
CHOLEST SERPL-MCNC: 243 MG/DL
CO2 SERPL-SCNC: 30 MMOL/L (ref 21–32)
CREAT SERPL-MCNC: 1.14 MG/DL (ref 0.7–1.3)
DIFFERENTIAL METHOD BLD: ABNORMAL
EOSINOPHIL # BLD: 0.2 K/UL (ref 0–0.4)
EOSINOPHIL NFR BLD: 5 % (ref 0–7)
ERYTHROCYTE [DISTWIDTH] IN BLOOD BY AUTOMATED COUNT: 15.8 % (ref 11.5–14.5)
EST. AVERAGE GLUCOSE BLD GHB EST-MCNC: 120 MG/DL
GLOBULIN SER CALC-MCNC: 3.4 G/DL (ref 2–4)
GLUCOSE SERPL-MCNC: 107 MG/DL (ref 65–100)
HBA1C MFR BLD: 5.8 % (ref 4–5.6)
HCT VFR BLD AUTO: 38.5 % (ref 36.6–50.3)
HDLC SERPL-MCNC: 57 MG/DL
HDLC SERPL: 4.3 {RATIO} (ref 0–5)
HGB BLD-MCNC: 12.4 G/DL (ref 12.1–17)
IMM GRANULOCYTES # BLD AUTO: 0 K/UL (ref 0–0.04)
IMM GRANULOCYTES NFR BLD AUTO: 0 % (ref 0–0.5)
LDLC SERPL CALC-MCNC: 156.6 MG/DL (ref 0–100)
LIPID PROFILE,FLP: ABNORMAL
LYMPHOCYTES # BLD: 0.8 K/UL (ref 0.8–3.5)
LYMPHOCYTES NFR BLD: 21 % (ref 12–49)
MAGNESIUM SERPL-MCNC: 2 MG/DL (ref 1.6–2.4)
MCH RBC QN AUTO: 26.4 PG (ref 26–34)
MCHC RBC AUTO-ENTMCNC: 32.2 G/DL (ref 30–36.5)
MCV RBC AUTO: 81.9 FL (ref 80–99)
MONOCYTES # BLD: 0.4 K/UL (ref 0–1)
MONOCYTES NFR BLD: 11 % (ref 5–13)
NEUTS SEG # BLD: 2.4 K/UL (ref 1.8–8)
NEUTS SEG NFR BLD: 62 % (ref 32–75)
NRBC # BLD: 0 K/UL (ref 0–0.01)
NRBC BLD-RTO: 0 PER 100 WBC
PHOSPHATE SERPL-MCNC: 2.7 MG/DL (ref 2.6–4.7)
PLATELET # BLD AUTO: 136 K/UL (ref 150–400)
PMV BLD AUTO: 10.4 FL (ref 8.9–12.9)
POTASSIUM SERPL-SCNC: 3.2 MMOL/L (ref 3.5–5.1)
PROT SERPL-MCNC: 6.9 G/DL (ref 6.4–8.2)
PROT UR-MCNC: 9 MG/DL (ref 0–11.9)
RBC # BLD AUTO: 4.7 M/UL (ref 4.1–5.7)
RBC MORPH BLD: ABNORMAL
SODIUM SERPL-SCNC: 143 MMOL/L (ref 136–145)
TRIGL SERPL-MCNC: 147 MG/DL (ref ?–150)
VLDLC SERPL CALC-MCNC: 29.4 MG/DL
WBC # BLD AUTO: 3.8 K/UL (ref 4.1–11.1)

## 2019-12-12 PROCEDURE — 96372 THER/PROPH/DIAG INJ SC/IM: CPT

## 2019-12-12 PROCEDURE — 85025 COMPLETE CBC W/AUTO DIFF WBC: CPT

## 2019-12-12 PROCEDURE — 84156 ASSAY OF PROTEIN URINE: CPT

## 2019-12-12 PROCEDURE — 84100 ASSAY OF PHOSPHORUS: CPT

## 2019-12-12 PROCEDURE — 80061 LIPID PANEL: CPT

## 2019-12-12 PROCEDURE — 83735 ASSAY OF MAGNESIUM: CPT

## 2019-12-12 PROCEDURE — 83036 HEMOGLOBIN GLYCOSYLATED A1C: CPT

## 2019-12-12 PROCEDURE — 74011250636 HC RX REV CODE- 250/636: Performed by: INTERNAL MEDICINE

## 2019-12-12 PROCEDURE — 36415 COLL VENOUS BLD VENIPUNCTURE: CPT

## 2019-12-12 PROCEDURE — 80053 COMPREHEN METABOLIC PANEL: CPT

## 2019-12-12 RX ORDER — LANREOTIDE ACETATE 120 MG/.5ML
120 INJECTION SUBCUTANEOUS ONCE
Status: COMPLETED | OUTPATIENT
Start: 2019-12-12 | End: 2019-12-12

## 2019-12-12 RX ADMIN — LANREOTIDE ACETATE 120 MG: 120 INJECTION SUBCUTANEOUS at 09:19

## 2019-12-12 NOTE — PROGRESS NOTES
[facility] Visit Note    3109 Pt arrived at [facility] and in no distress for Lanreotide. Assessment completed, no new complaints voiced. Pt needs dental work soon, therefore [facility] will be held for a few months per MD orders. Labs obtained- CBC w/diff, CMP, Lipid Profile, Magnesium, Phosphorus, Urine Protein, and HgbA1C. Visit Vitals  BP (!) 150/100 (BP Patient Position: Sitting)   Pulse (!) 58   Temp 97.4 °F (36.3 °C)   Resp 18   SpO2 100%     Recent Results (from the past 12 hour(s))   CBC WITH AUTOMATED DIFF    Collection Time: 12/12/19  9:06 AM   Result Value Ref Range    WBC 3.8 (L) 4.1 - 11.1 K/uL    RBC 4.70 4. 10 - 5.70 M/uL    HGB 12.4 12.1 - 17.0 g/dL    HCT 38.5 36.6 - 50.3 %    MCV 81.9 80.0 - 99.0 FL    MCH 26.4 26.0 - 34.0 PG    MCHC 32.2 30.0 - 36.5 g/dL    RDW 15.8 (H) 11.5 - 14.5 %    PLATELET 649 (L) 633 - 400 K/uL    MPV 10.4 8.9 - 12.9 FL    NRBC 0.0 0  WBC    ABSOLUTE NRBC 0.00 0.00 - 0.01 K/uL    NEUTROPHILS 62 32 - 75 %    LYMPHOCYTES 21 12 - 49 %    MONOCYTES 11 5 - 13 %    EOSINOPHILS 5 0 - 7 %    BASOPHILS 1 0 - 1 %    IMMATURE GRANULOCYTES 0 0.0 - 0.5 %    ABS. NEUTROPHILS 2.4 1.8 - 8.0 K/UL    ABS. LYMPHOCYTES 0.8 0.8 - 3.5 K/UL    ABS. MONOCYTES 0.4 0.0 - 1.0 K/UL    ABS. EOSINOPHILS 0.2 0.0 - 0.4 K/UL    ABS. BASOPHILS 0.0 0.0 - 0.1 K/UL    ABS. IMM.  GRANS. 0.0 0.00 - 0.04 K/UL    DF AUTOMATED      RBC COMMENTS MICROCYTOSIS  1+       MAGNESIUM    Collection Time: 12/12/19  9:06 AM   Result Value Ref Range    Magnesium 2.0 1.6 - 2.4 mg/dL   PHOSPHORUS    Collection Time: 12/12/19  9:06 AM   Result Value Ref Range    Phosphorus 2.7 2.6 - 4.7 MG/DL   PROTEIN URINE, RANDOM    Collection Time: 12/12/19  9:06 AM   Result Value Ref Range    Protein, urine random 9 0.0 - 82.2 mg/dL   METABOLIC PANEL, COMPREHENSIVE    Collection Time: 12/12/19  9:06 AM   Result Value Ref Range    Sodium 143 136 - 145 mmol/L    Potassium 3.2 (L) 3.5 - 5.1 mmol/L Chloride 109 (H) 97 - 108 mmol/L    CO2 30 21 - 32 mmol/L    Anion gap 4 (L) 5 - 15 mmol/L    Glucose 107 (H) 65 - 100 mg/dL    BUN 18 6 - 20 MG/DL    Creatinine 1.14 0.70 - 1.30 MG/DL    BUN/Creatinine ratio 16 12 - 20      GFR est AA >60 >60 ml/min/1.73m2    GFR est non-AA >60 >60 ml/min/1.73m2    Calcium 8.4 (L) 8.5 - 10.1 MG/DL    Bilirubin, total 1.0 0.2 - 1.0 MG/DL    ALT (SGPT) 29 12 - 78 U/L    AST (SGOT) 25 15 - 37 U/L    Alk. phosphatase 66 45 - 117 U/L    Protein, total 6.9 6.4 - 8.2 g/dL    Albumin 3.5 3.5 - 5.0 g/dL    Globulin 3.4 2.0 - 4.0 g/dL    A-G Ratio 1.0 (L) 1.1 - 2.2       See [facility] for pending results. Medications received:  Lanreotide 120 mg SQ in right buttock    0925 Tolerated treatment well, no adverse reaction noted. D/Cd from [facility] ambulatory and in no distress accompanied by spouse. Next appt 1/9/20 @ 0900.

## 2020-01-03 RX ORDER — LANREOTIDE ACETATE 120 MG/.5ML
120 INJECTION SUBCUTANEOUS ONCE
Status: CANCELLED | OUTPATIENT
Start: 2020-02-06

## 2020-01-03 RX ORDER — LANREOTIDE ACETATE 120 MG/.5ML
120 INJECTION SUBCUTANEOUS ONCE
Status: CANCELLED | OUTPATIENT
Start: 2020-01-09

## 2020-01-09 ENCOUNTER — HOSPITAL ENCOUNTER (OUTPATIENT)
Dept: INFUSION THERAPY | Age: 75
Discharge: HOME | End: 2020-01-09
Payer: MEDICARE

## 2020-01-09 VITALS
OXYGEN SATURATION: 100 % | BODY MASS INDEX: 23.61 KG/M2 | RESPIRATION RATE: 18 BRPM | HEART RATE: 65 BPM | DIASTOLIC BLOOD PRESSURE: 100 MMHG | HEIGHT: 72 IN | TEMPERATURE: 97.6 F | SYSTOLIC BLOOD PRESSURE: 170 MMHG | WEIGHT: 174.3 LBS

## 2020-01-09 DIAGNOSIS — C7B.8 NEUROENDOCRINE CARCINOMA METASTATIC TO BONE (HCC): Primary | ICD-10-CM

## 2020-01-09 DIAGNOSIS — C7A.8 NEUROENDOCRINE CARCINOMA METASTATIC TO BONE (HCC): Primary | ICD-10-CM

## 2020-01-09 LAB
ALBUMIN SERPL-MCNC: 3.5 G/DL (ref 3.5–5)
ALBUMIN/GLOB SERPL: 1 {RATIO} (ref 1.1–2.2)
ALP SERPL-CCNC: 65 U/L (ref 45–117)
ALT SERPL-CCNC: 33 U/L (ref 12–78)
ANION GAP SERPL CALC-SCNC: 4 MMOL/L (ref 5–15)
AST SERPL-CCNC: 24 U/L (ref 15–37)
BASOPHILS # BLD: 0 K/UL (ref 0–0.1)
BASOPHILS NFR BLD: 1 % (ref 0–1)
BILIRUB SERPL-MCNC: 0.9 MG/DL (ref 0.2–1)
BUN SERPL-MCNC: 20 MG/DL (ref 6–20)
BUN/CREAT SERPL: 16 (ref 12–20)
CALCIUM SERPL-MCNC: 8.4 MG/DL (ref 8.5–10.1)
CHLORIDE SERPL-SCNC: 111 MMOL/L (ref 97–108)
CO2 SERPL-SCNC: 28 MMOL/L (ref 21–32)
CREAT SERPL-MCNC: 1.29 MG/DL (ref 0.7–1.3)
DIFFERENTIAL METHOD BLD: ABNORMAL
EOSINOPHIL # BLD: 0.2 K/UL (ref 0–0.4)
EOSINOPHIL NFR BLD: 5 % (ref 0–7)
ERYTHROCYTE [DISTWIDTH] IN BLOOD BY AUTOMATED COUNT: 14.9 % (ref 11.5–14.5)
GLOBULIN SER CALC-MCNC: 3.5 G/DL (ref 2–4)
GLUCOSE SERPL-MCNC: 106 MG/DL (ref 65–100)
HCT VFR BLD AUTO: 37.9 % (ref 36.6–50.3)
HGB BLD-MCNC: 12.5 G/DL (ref 12.1–17)
IMM GRANULOCYTES # BLD AUTO: 0 K/UL (ref 0–0.04)
IMM GRANULOCYTES NFR BLD AUTO: 0 % (ref 0–0.5)
LYMPHOCYTES # BLD: 0.9 K/UL (ref 0.8–3.5)
LYMPHOCYTES NFR BLD: 20 % (ref 12–49)
MAGNESIUM SERPL-MCNC: 2.3 MG/DL (ref 1.6–2.4)
MCH RBC QN AUTO: 26.5 PG (ref 26–34)
MCHC RBC AUTO-ENTMCNC: 33 G/DL (ref 30–36.5)
MCV RBC AUTO: 80.5 FL (ref 80–99)
MONOCYTES # BLD: 0.5 K/UL (ref 0–1)
MONOCYTES NFR BLD: 11 % (ref 5–13)
NEUTS SEG # BLD: 2.8 K/UL (ref 1.8–8)
NEUTS SEG NFR BLD: 64 % (ref 32–75)
NRBC # BLD: 0 K/UL (ref 0–0.01)
NRBC BLD-RTO: 0 PER 100 WBC
PHOSPHATE SERPL-MCNC: 2.5 MG/DL (ref 2.6–4.7)
PLATELET # BLD AUTO: 127 K/UL (ref 150–400)
PMV BLD AUTO: 11 FL (ref 8.9–12.9)
POTASSIUM SERPL-SCNC: 3.2 MMOL/L (ref 3.5–5.1)
PROT SERPL-MCNC: 7 G/DL (ref 6.4–8.2)
RBC # BLD AUTO: 4.71 M/UL (ref 4.1–5.7)
SODIUM SERPL-SCNC: 143 MMOL/L (ref 136–145)
WBC # BLD AUTO: 4.5 K/UL (ref 4.1–11.1)

## 2020-01-09 PROCEDURE — 85025 COMPLETE CBC W/AUTO DIFF WBC: CPT

## 2020-01-09 PROCEDURE — 84100 ASSAY OF PHOSPHORUS: CPT

## 2020-01-09 PROCEDURE — 83735 ASSAY OF MAGNESIUM: CPT

## 2020-01-09 PROCEDURE — 36415 COLL VENOUS BLD VENIPUNCTURE: CPT

## 2020-01-09 PROCEDURE — 74011250636 HC RX REV CODE- 250/636: Performed by: NURSE PRACTITIONER

## 2020-01-09 PROCEDURE — 96372 THER/PROPH/DIAG INJ SC/IM: CPT

## 2020-01-09 PROCEDURE — 80053 COMPREHEN METABOLIC PANEL: CPT

## 2020-01-09 RX ORDER — LANREOTIDE ACETATE 120 MG/.5ML
120 INJECTION SUBCUTANEOUS ONCE
Status: COMPLETED | OUTPATIENT
Start: 2020-01-09 | End: 2020-01-09

## 2020-01-09 RX ADMIN — LANREOTIDE ACETATE 120 MG: 120 INJECTION SUBCUTANEOUS at 09:38

## 2020-02-03 ENCOUNTER — TELEPHONE (OUTPATIENT)
Dept: ONCOLOGY | Age: 75
End: 2020-02-03

## 2020-02-03 DIAGNOSIS — C7B.8 NEUROENDOCRINE CARCINOMA METASTATIC TO BONE (HCC): ICD-10-CM

## 2020-02-03 DIAGNOSIS — C7A.8 NEUROENDOCRINE CARCINOMA METASTATIC TO BONE (HCC): ICD-10-CM

## 2020-02-03 RX ORDER — EVEROLIMUS 10 MG/1
10 TABLET ORAL DAILY
Qty: 30 TAB | Refills: 6 | Status: SHIPPED | OUTPATIENT
Start: 2020-02-03 | End: 2020-08-17 | Stop reason: SDUPTHER

## 2020-02-03 NOTE — TELEPHONE ENCOUNTER
Pt called to state how long should he be off of his Chemo shot before he can get dental work done (broken tooth) and also is requesting a refill on everolimus (AFINITOR) 10 mg tab

## 2020-02-03 NOTE — TELEPHONE ENCOUNTER
Pt should wait 4 weeks before and 4 weeks after for dental work.     VORB FROM DR Royer Loza REFILL EVEROLIMUS AFINITOR 10MG TAB TAKE 1 TAB BY MOUTH ONCE DAILY DISPENSE 30 R 6

## 2020-02-04 ENCOUNTER — DOCUMENTATION ONLY (OUTPATIENT)
Dept: ONCOLOGY | Age: 75
End: 2020-02-04

## 2020-02-04 NOTE — PROGRESS NOTES
This nurse called [facility] and they have verified this prescription and have set up shipment for him without any delays.

## 2020-02-06 ENCOUNTER — OFFICE VISIT (OUTPATIENT)
Dept: ONCOLOGY | Age: 75
End: 2020-02-06

## 2020-02-06 ENCOUNTER — HOSPITAL ENCOUNTER (OUTPATIENT)
Dept: INFUSION THERAPY | Age: 75
Discharge: HOME | End: 2020-02-06
Payer: MEDICARE

## 2020-02-06 VITALS
HEIGHT: 72 IN | HEART RATE: 85 BPM | WEIGHT: 171.74 LBS | DIASTOLIC BLOOD PRESSURE: 90 MMHG | OXYGEN SATURATION: 98 % | SYSTOLIC BLOOD PRESSURE: 160 MMHG | BODY MASS INDEX: 23.26 KG/M2 | TEMPERATURE: 98.5 F | RESPIRATION RATE: 16 BRPM

## 2020-02-06 VITALS
WEIGHT: 171.8 LBS | OXYGEN SATURATION: 100 % | RESPIRATION RATE: 16 BRPM | BODY MASS INDEX: 23.3 KG/M2 | DIASTOLIC BLOOD PRESSURE: 78 MMHG | TEMPERATURE: 97.6 F | SYSTOLIC BLOOD PRESSURE: 140 MMHG | HEART RATE: 69 BPM

## 2020-02-06 DIAGNOSIS — R53.83 FATIGUE, UNSPECIFIED TYPE: ICD-10-CM

## 2020-02-06 DIAGNOSIS — R19.7 DIARRHEA, UNSPECIFIED TYPE: ICD-10-CM

## 2020-02-06 DIAGNOSIS — C7B.8 NEUROENDOCRINE CARCINOMA METASTATIC TO BONE (HCC): Primary | ICD-10-CM

## 2020-02-06 DIAGNOSIS — C7A.8 NEUROENDOCRINE CARCINOMA METASTATIC TO BONE (HCC): Primary | ICD-10-CM

## 2020-02-06 DIAGNOSIS — C79.51 BONE METASTASIS (HCC): ICD-10-CM

## 2020-02-06 LAB
ALBUMIN SERPL-MCNC: 3.5 G/DL (ref 3.5–5)
ALBUMIN/GLOB SERPL: 1 {RATIO} (ref 1.1–2.2)
ALP SERPL-CCNC: 66 U/L (ref 45–117)
ALT SERPL-CCNC: 25 U/L (ref 12–78)
ANION GAP SERPL CALC-SCNC: 6 MMOL/L (ref 5–15)
AST SERPL-CCNC: 28 U/L (ref 15–37)
BASOPHILS # BLD: 0 K/UL (ref 0–0.1)
BASOPHILS NFR BLD: 1 % (ref 0–1)
BILIRUB SERPL-MCNC: 0.8 MG/DL (ref 0.2–1)
BUN SERPL-MCNC: 18 MG/DL (ref 6–20)
BUN/CREAT SERPL: 15 (ref 12–20)
CALCIUM SERPL-MCNC: 8.5 MG/DL (ref 8.5–10.1)
CHLORIDE SERPL-SCNC: 109 MMOL/L (ref 97–108)
CHOLEST SERPL-MCNC: 229 MG/DL
CO2 SERPL-SCNC: 28 MMOL/L (ref 21–32)
CREAT SERPL-MCNC: 1.22 MG/DL (ref 0.7–1.3)
DIFFERENTIAL METHOD BLD: ABNORMAL
EOSINOPHIL # BLD: 0.3 K/UL (ref 0–0.4)
EOSINOPHIL NFR BLD: 7 % (ref 0–7)
ERYTHROCYTE [DISTWIDTH] IN BLOOD BY AUTOMATED COUNT: 13.8 % (ref 11.5–14.5)
EST. AVERAGE GLUCOSE BLD GHB EST-MCNC: 137 MG/DL
GLOBULIN SER CALC-MCNC: 3.4 G/DL (ref 2–4)
GLUCOSE SERPL-MCNC: 141 MG/DL (ref 65–100)
HBA1C MFR BLD: 6.4 % (ref 4–5.6)
HCT VFR BLD AUTO: 36.6 % (ref 36.6–50.3)
HDLC SERPL-MCNC: 49 MG/DL
HDLC SERPL: 4.7 {RATIO} (ref 0–5)
HGB BLD-MCNC: 12.2 G/DL (ref 12.1–17)
IMM GRANULOCYTES # BLD AUTO: 0 K/UL (ref 0–0.04)
IMM GRANULOCYTES NFR BLD AUTO: 0 % (ref 0–0.5)
LDLC SERPL CALC-MCNC: 144.2 MG/DL (ref 0–100)
LIPID PROFILE,FLP: ABNORMAL
LYMPHOCYTES # BLD: 0.7 K/UL (ref 0.8–3.5)
LYMPHOCYTES NFR BLD: 17 % (ref 12–49)
MAGNESIUM SERPL-MCNC: 2.1 MG/DL (ref 1.6–2.4)
MCH RBC QN AUTO: 26 PG (ref 26–34)
MCHC RBC AUTO-ENTMCNC: 33.3 G/DL (ref 30–36.5)
MCV RBC AUTO: 78 FL (ref 80–99)
MONOCYTES # BLD: 0.4 K/UL (ref 0–1)
MONOCYTES NFR BLD: 10 % (ref 5–13)
NEUTS SEG # BLD: 2.5 K/UL (ref 1.8–8)
NEUTS SEG NFR BLD: 65 % (ref 32–75)
NRBC # BLD: 0 K/UL (ref 0–0.01)
NRBC BLD-RTO: 0 PER 100 WBC
PHOSPHATE SERPL-MCNC: 2.8 MG/DL (ref 2.6–4.7)
PLATELET # BLD AUTO: 142 K/UL (ref 150–400)
PMV BLD AUTO: 10.4 FL (ref 8.9–12.9)
POTASSIUM SERPL-SCNC: 3.1 MMOL/L (ref 3.5–5.1)
PROT SERPL-MCNC: 6.9 G/DL (ref 6.4–8.2)
PROT UR-MCNC: 10 MG/DL (ref 0–11.9)
RBC # BLD AUTO: 4.69 M/UL (ref 4.1–5.7)
RBC MORPH BLD: ABNORMAL
SODIUM SERPL-SCNC: 143 MMOL/L (ref 136–145)
TRIGL SERPL-MCNC: 179 MG/DL (ref ?–150)
VLDLC SERPL CALC-MCNC: 35.8 MG/DL
WBC # BLD AUTO: 3.9 K/UL (ref 4.1–11.1)

## 2020-02-06 PROCEDURE — 84156 ASSAY OF PROTEIN URINE: CPT

## 2020-02-06 PROCEDURE — 80053 COMPREHEN METABOLIC PANEL: CPT

## 2020-02-06 PROCEDURE — 80061 LIPID PANEL: CPT

## 2020-02-06 PROCEDURE — 84100 ASSAY OF PHOSPHORUS: CPT

## 2020-02-06 PROCEDURE — 96372 THER/PROPH/DIAG INJ SC/IM: CPT

## 2020-02-06 PROCEDURE — 83735 ASSAY OF MAGNESIUM: CPT

## 2020-02-06 PROCEDURE — 36415 COLL VENOUS BLD VENIPUNCTURE: CPT

## 2020-02-06 PROCEDURE — 85025 COMPLETE CBC W/AUTO DIFF WBC: CPT

## 2020-02-06 PROCEDURE — 83036 HEMOGLOBIN GLYCOSYLATED A1C: CPT

## 2020-02-06 PROCEDURE — 74011250636 HC RX REV CODE- 250/636: Performed by: NURSE PRACTITIONER

## 2020-02-06 RX ORDER — LANREOTIDE ACETATE 120 MG/.5ML
120 INJECTION SUBCUTANEOUS ONCE
Status: COMPLETED | OUTPATIENT
Start: 2020-02-06 | End: 2020-02-06

## 2020-02-06 RX ADMIN — LANREOTIDE ACETATE 120 MG: 120 INJECTION SUBCUTANEOUS at 09:35

## 2020-02-06 NOTE — PROGRESS NOTES
68 y/o cauc male here for f/u for neuroendocrine ca. Pt restarted afinitor 11/2019. Pt reports having periods of hot flashes. Moderate amount. Pt is on somatuline. On hold from denosaumab due to dental work. Pt reports he is having more headaches over the last 4 weeks. Pt BP is higher today per pt. Pt reports his bowel movements are very loose and he thinks it is getting worse. He feels that the flomax has made it worse. Pt does not take anything to help with the bowel movements. He is constantly going to the restroom for urination and or bowel movement. 1. Have you been to the ER, urgent care clinic since your last visit? Hospitalized since your last visit? No    2. Have you seen or consulted any other health care providers outside of the [address] [place] [address] since your last visit? Include any pap smears or colon screening.  No

## 2020-02-06 NOTE — PROGRESS NOTES
900- Pt arrived to [facility] ambulatory in no acute distress for Lanreotide.  Assessment unremarkable. Labs obtained from [facility]. Labs - CBC w/ diff, CMP, Mag, Phos, Urine Protein, HgbA1c, Lipid Profile    Recent Results (from the past 12 hour(s))   CBC WITH AUTOMATED DIFF    Collection Time: 02/06/20  9:12 AM   Result Value Ref Range    WBC 3.9 (L) 4.1 - 11.1 K/uL    RBC 4.69 4.10 - 5.70 M/uL    HGB 12.2 12.1 - 17.0 g/dL    HCT 36.6 36.6 - 50.3 %    MCV 78.0 (L) 80.0 - 99.0 FL    MCH 26.0 26.0 - 34.0 PG    MCHC 33.3 30.0 - 36.5 g/dL    RDW 13.8 11.5 - 14.5 %    PLATELET 095 (L) 160 - 400 K/uL    MPV 10.4 8.9 - 12.9 FL    NRBC 0.0 0  WBC    ABSOLUTE NRBC 0.00 0.00 - 0.01 K/uL    NEUTROPHILS 65 32 - 75 %    LYMPHOCYTES 17 12 - 49 %    MONOCYTES 10 5 - 13 %    EOSINOPHILS 7 0 - 7 %    BASOPHILS 1 0 - 1 %    IMMATURE GRANULOCYTES 0 0.0 - 0.5 %    ABS. NEUTROPHILS 2.5 1.8 - 8.0 K/UL    ABS. LYMPHOCYTES 0.7 (L) 0.8 - 3.5 K/UL    ABS. MONOCYTES 0.4 0.0 - 1.0 K/UL    ABS. EOSINOPHILS 0.3 0.0 - 0.4 K/UL    ABS. BASOPHILS 0.0 0.0 - 0.1 K/UL    ABS. IMM. GRANS. 0.0 0.00 - 0.04 K/UL    DF AUTOMATED      RBC COMMENTS NORMOCYTIC, NORMOCHROMIC     METABOLIC PANEL, COMPREHENSIVE    Collection Time: 02/06/20  9:12 AM   Result Value Ref Range    Sodium 143 136 - 145 mmol/L    Potassium 3.1 (L) 3.5 - 5.1 mmol/L    Chloride 109 (H) 97 - 108 mmol/L    CO2 28 21 - 32 mmol/L    Anion gap 6 5 - 15 mmol/L    Glucose 141 (H) 65 - 100 mg/dL    BUN 18 6 - 20 MG/DL    Creatinine 1.22 0.70 - 1.30 MG/DL    BUN/Creatinine ratio 15 12 - 20      GFR est AA >60 >60 ml/min/1.73m2    GFR est non-AA 58 (L) >60 ml/min/1.73m2    Calcium 8.5 8.5 - 10.1 MG/DL    Bilirubin, total 0.8 0.2 - 1.0 MG/DL    ALT (SGPT) 25 12 - 78 U/L    AST (SGOT) 28 15 - 37 U/L    Alk.  phosphatase 66 45 - 117 U/L    Protein, total 6.9 6.4 - 8.2 g/dL    Albumin 3.5 3.5 - 5.0 g/dL    Globulin 3.4 2.0 - 4.0 g/dL    A-G Ratio 1.0 (L) 1.1 - 2.2     MAGNESIUM Collection Time: 02/06/20  9:12 AM   Result Value Ref Range    Magnesium 2.1 1.6 - 2.4 mg/dL   PHOSPHORUS    Collection Time: 02/06/20  9:12 AM   Result Value Ref Range    Phosphorus 2.8 2.6 - 4.7 MG/DL   HEMOGLOBIN A1C WITH EAG    Collection Time: 02/06/20  9:12 AM   Result Value Ref Range    Hemoglobin A1c 6.4 (H) 4.0 - 5.6 %    Est. average glucose 137 mg/dL   PROTEIN URINE, RANDOM    Collection Time: 02/06/20  9:12 AM   Result Value Ref Range    Protein, urine random 10 0.0 - 11.9 mg/dL   LIPID PANEL    Collection Time: 02/06/20  9:12 AM   Result Value Ref Range    LIPID PROFILE          Cholesterol, total 229 (H) <200 MG/DL    Triglyceride 179 (H) <150 MG/DL    HDL Cholesterol 49 MG/DL    LDL, calculated 144.2 (H) 0 - 100 MG/DL    VLDL, calculated 35.8 MG/DL    CHOL/HDL Ratio 4.7 0.0 - 5.0       The following medications administered:  Lanreotide 120 mg to right buttock    Patient Vitals for the past 12 hrs:   Temp Pulse Resp BP SpO2   02/06/20 0859 97.6 °F (36.4 °C) 69 16 140/78 100 %     940- Pt tolerated treatment well, no adverse reactions noted.  Pt discharged ambulatory in no acute distress, accompanied by spouse. Next appointment 3/5/20.

## 2020-02-06 NOTE — PROGRESS NOTES
[address] [facility]  [address], [address] [facility] Domo Mullins, [address] [facility]  [phone]       Oncology Follow up Note        Patient: Imer Simms MRN: [id number]  SSN: [id number]    YOB: 1945  Age: 69 y.o. Sex: male        Diagnosis:     1. 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 Afinitor 10 mg + Somatuline Depot 120 mg SC - started Afinitor 3/30/2019  1. Somatuline Depot 120 mg SC - 05/2016 - 11/2018; Restarted 3/4/2019 -    (treatment held per patient request since11/16/2018)   2. Xgeva 120mg SC     Subjective:      Imer Simms is a 69 y.o. male with a diagnosis of metastatic neuroendocrine cancer. He started experiencing pain in both groins and the abdomen. A CT of the abdomen showed retroperitoneal adenopathy. CT guided biopsy of the retroperitoneal LN reveals a dx of well differentiated neuroendocrine carcinoma. Bone scan shows disease in the bone. Mr. Digna Smith received Somatuline and had good control of disease. He took a break from therapy in Nov 2018. He went on an extended vacation until the beginning of February to [facility]. CT showed progression of disease in the nodes. Mr. Digna Smith resumed therapy with Somatuline with the addition of Afinitor. Symptoms such as bone pains, diarrhea and fatigue improved  Due to insurance preference we had to switch zoledronic acid from denosumab. He took a break from [address] [place] [address] in October 2019, but repeat scans showed slight progression of disease. He resumed Afinitor in November 2019. He is here today for follow-up with his wife. He complains of diarrhea but has not tried any anti-diarrheal medication. He has more than 6 movements a day and some times more than 4 a night. Its watery, no cramps. He is fatigued.        Review of Systems:    Constitutional: fatigue  Eyes: negative  Ears, Nose, Mouth, Throat, and Face: negative  Respiratory: negative  Cardiovascular: negative  Gastrointestinal: diarrhea  Genitourinary: urinary incontinence  Integument/Breast: negative  Hematologic/Lymphatic: negative  Musculoskeletal: back pain  Neurological: negative      Past Medical History:   Diagnosis Date    Adverse effect of anesthesia     low HR and very slow to wakeup    Cancer ([address] 2016    neuroendocrine, retroperitoneal LN involvement    Diarrhea     Frequent urination     Hypertension     Poor appetite     Unspecified adverse effect of anesthesia     \"hard to put to sleep and slow to wake up-heart rate dropped very low\"     Past Surgical History:   Procedure Laterality Date    COLONOSCOPY N/A 2016    COLONOSCOPY performed by Rahel Wagner MD at [place] [address]  2016         HX ORTHOPAEDIC      knee scope left knee    HX OTHER SURGICAL  2013    excision of scalp cyst     UPPER GI ENDOSCOPY,BIOPSY  2016           Family History   Problem Relation Age of Onset    Cancer Mother     Stroke Sister      Social History     Tobacco Use    Smoking status: Former Smoker     Packs/day: 1.00     Years: 8.00     Pack years: 8.00     Last attempt to quit: 1972     Years since quittin.7    Smokeless tobacco: Never Used   Substance Use Topics    Alcohol use: No      Prior to Admission medications    Medication Sig Start Date End Date Taking? Authorizing Provider   everolimus (AFINITOR) 10 mg tab Take 1 Tab by mouth daily. 2/3/20  Yes Richard Ramos MD   lisinopril (PRINIVIL, ZESTRIL) 20 mg tablet Take 1 Tab by mouth daily. 19  Yes Richard Ramos MD   tamsulosin [facility]) 0.4 mg capsule Take 0.4 mg by mouth daily. Yes Provider, Historical   potassium chloride (KLOR-CON) 10 mEq tablet Take 1 Tab by mouth two (2) times a day. Patient taking differently: Take 10 mEq by mouth two (2) times a day.  As needed per pt 19  Nito Garcia NP methocarbamol (ROBAXIN-750) 750 mg tablet Take 1 Tab by mouth four (4) times daily. 12/26/17 2/6/20  HUMA Barillas          No Known Allergies        Objective:     Vitals:    02/06/20 1317   BP: 160/90   Pulse: 85   Resp: 16   Temp: 98.5 °F (36.9 °C)   TempSrc: Oral   SpO2: 98%   Weight: 171 lb 11.8 oz (77.9 kg)   Height: 6' (1.829 m)      Pain Scale: 5/10  Generalized bone pains      Physical Exam:    GENERAL: alert, cooperative  EYE: negative  LYMPHATIC: Cervical, supraclavicular, and axillary nodes normal.   THROAT & NECK: normal and no erythema or exudates noted. LUNG: clear to auscultation bilaterally  HEART: regular rate and rhythm  ABDOMEN: soft, non-tender  EXTREMITIES: no cyanosis or edema  SKIN: Normal.  NEUROLOGIC: negative      IMAGING:     I personally reviewed the images. Enlarged and abnormal retroperitoneal adenopathy. Bone metastasis. CT Results (most recent):  Results from Hospital Encounter encounter on 11/11/19   CT ABD PELV W CONT    Narrative EXAM:  CT ABDOMEN PELVIS WITH CONTRAST  INDICATION:  metastatic neuroendocrine carcinoma. Other malignant neuroendocrine  tumors  Additional history:  COMPARISON: CT of the abdomen and pelvis, 7/2/2019 and 2/25/2019. Mliss Dacosta TECHNIQUE:   Multislice helical CT was performed from the diaphragm to the symphysis pubis  with oral and intravenous contrast administration. Contiguous 5 mm axial images  were reconstructed and lung and soft tissue windows were generated. Coronal and  sagittal reformations were generated. CT dose reduction was achieved through use of a standardized protocol tailored  for this examination and automatic exposure control for dose modulation. Mliss Dacosta FINDINGS:  INCIDENTALLY IMAGED CHEST:  Heart/vessels: Within normal limits. Mediastinum: Rounded lymph node at the aortic hiatus which measures 0.9 cm in  short axis, not significant changed  Lungs/Pleura: Scarring/atelectasis in the right base. .  ABDOMEN:  Liver:  Within normal limits. Gallbladder/Biliary: Gas containing stone in the gallbladder. Spleen: Within normal limits. Pancreas: Pancreatic atrophy with slightly distended pancreatic duct measuring  0.5 cm. Adrenals: Within normal limits. Kidneys: Punctate calculus in the lower pole the left kidney. Tiny,  low-attenuation lesions in both kidneys too small to characterize  Peritoneum/Mesenteries: There is soft tissue attenuation around the roots of the  mesentery which contains coarse calcifications. Extraperitoneum: Increased size of retroperitoneal adenopathy relative to most  recent comparison, diminished relative to February, 2019. A representative lori  complex the left periaortic the upper abdomen currently measures 3.1 x 2.6 cm as  compared to 2.8 x 2.3 cm in July, 2019 (remeasured for the purpose of  comparison). Further, lymph nodes left the aorta and in the lower abdomen  currently measures approximately 2.5 x 2 cm as compared to 2.3 x 1.5 cm in July, 2019 (remeasured for the purpose of comparison). Gastrointestinal tract: Diverticulosis in the ascending, transverse, descending  and sigmoid colon. Normal-appearing appendix  Vascular: Celiac axis is absent with left hepatic and splenic arteries arising  directly from the aorta. Right hepatic artery arises from the superior  mesenteric artery. Calcifications in the aorta  . PELVIS:  Extraperitoneum: Calcifications in the pelvis suggesting phleboliths. Ureters: Left-sided hydroureter. Bladder: Distended bladder with thickened bladder wall. There is a diverticulum  in the right fundus. Reproductive System: Clip/fiducial markers within the prostate. .  MSK:   Loss of disc space height at L5/S1 with slight retrolisthesis. Small,  fat-containing hernia. Innumerable sclerotic foci throughout the skeleton, not  significantly changed  . Impression IMPRESSION:   1.  Slightly increased size of retroperitoneal adenopathy relative to July, 2019,  but decreased relative to February, 2019.  2. No significant change in the soft tissue about the root of the mesentery. 3. Innumerable sclerotic metastatic foci throughout the skeleton, not  significantly changed. 4. Incidental findings as above. I reviewed the imaging personally. Slight progression of disease in the retroperitoneal nodes. Lab Results   Component Value Date/Time    WBC 3.9 (L) 02/06/2020 09:12 AM    Hemoglobin (POC) 14.6 12/21/2017 09:10 AM    HGB 12.2 02/06/2020 09:12 AM    Hematocrit (POC) 40 11/14/2019 09:15 AM    HCT 36.6 02/06/2020 09:12 AM    PLATELET 204 (L) 94/66/5891 09:12 AM    MCV 78.0 (L) 02/06/2020 09:12 AM       Lab Results   Component Value Date/Time    Sodium 143 02/06/2020 09:12 AM    Potassium 3.1 (L) 02/06/2020 09:12 AM    Chloride 109 (H) 02/06/2020 09:12 AM    CO2 28 02/06/2020 09:12 AM    Anion gap 6 02/06/2020 09:12 AM    Glucose 141 (H) 02/06/2020 09:12 AM    BUN 18 02/06/2020 09:12 AM    Creatinine 1.22 02/06/2020 09:12 AM    BUN/Creatinine ratio 15 02/06/2020 09:12 AM    GFR est AA >60 02/06/2020 09:12 AM    GFR est non-AA 58 (L) 02/06/2020 09:12 AM    Calcium 8.5 02/06/2020 09:12 AM    Bilirubin, total 0.8 02/06/2020 09:12 AM    AST (SGOT) 28 02/06/2020 09:12 AM    Alk. phosphatase 66 02/06/2020 09:12 AM    Protein, total 6.9 02/06/2020 09:12 AM    Albumin 3.5 02/06/2020 09:12 AM    Globulin 3.4 02/06/2020 09:12 AM    A-G Ratio 1.0 (L) 02/06/2020 09:12 AM    ALT (SGPT) 25 02/06/2020 09:12 AM           Assessment:     1. 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 Had excellent disease control. He experienced a number of side effects including hot flashes, weight loss etc  He decided to take a break from therapy. CT showed disease progression in retroperitoneum and abdomen. Restarted Somatuline.  Added Afinitor. Flushing, appetite, diarrhea, back pain are all better. Stopped Afinitor in Oct 2019  Ct shows progression in retroperitoneal nodes    He is now back on Everolimus 10 mg in addition to [employer] treatment   + diarrhea  A detailed system by system evaluation of side effect was performed to assess chemotherapy related toxicity. Blood counts are acceptable. Results reviewed with the patient. Symptom management form reviewed and scanned into the EMR under Media. 2. Bone metastasis    > Denosumab  Hold for dental work      3. Urinary incontinence    BPH  Managed by Dr. Srikanth Hogdes      4. Diarrhea    Imodium as needed      Plan:       > Continue Afinitor  > Hold Denosumab  > Continue Somatuline  > Imodium as needed  > CT Abd Pelv, NM bone scan in 3 months  > Follow-up in 3 months    I saw the patient in conjunction with Deo Lobo NP       Signed by: Claudia Polanco MD                     February 6, 2020        CC. Francine Villa MD  CC.  Jossue Sanchez MD

## 2020-02-11 ENCOUNTER — TELEPHONE (OUTPATIENT)
Dept: ONCOLOGY | Age: 75
End: 2020-02-11

## 2020-02-11 NOTE — TELEPHONE ENCOUNTER
Called re a notice that he got for renewal of his FA from [employer]. Please call to discuss.   Wa not sure whether this goes to Emma Gramajo or not

## 2020-02-28 RX ORDER — LANREOTIDE ACETATE 120 MG/.5ML
120 INJECTION SUBCUTANEOUS ONCE
Status: CANCELLED | OUTPATIENT
Start: 2020-04-02

## 2020-02-28 RX ORDER — LANREOTIDE ACETATE 120 MG/.5ML
120 INJECTION SUBCUTANEOUS ONCE
Status: CANCELLED | OUTPATIENT
Start: 2020-03-05

## 2020-02-28 RX ORDER — LANREOTIDE ACETATE 120 MG/.5ML
120 INJECTION SUBCUTANEOUS ONCE
Status: CANCELLED | OUTPATIENT
Start: 2020-04-30

## 2020-03-02 DIAGNOSIS — K13.79 MOUTH SORES: ICD-10-CM

## 2020-03-02 DIAGNOSIS — C7A.8 NEUROENDOCRINE CARCINOMA METASTATIC TO BONE (HCC): Primary | ICD-10-CM

## 2020-03-02 DIAGNOSIS — C7B.8 NEUROENDOCRINE CARCINOMA METASTATIC TO BONE (HCC): Primary | ICD-10-CM

## 2020-03-02 RX ORDER — DEXAMETHASONE 0.5 MG/5ML
1 ELIXIR ORAL 4 TIMES DAILY
Qty: 400 ML | Refills: 1 | Status: SHIPPED | OUTPATIENT
Start: 2020-03-02 | End: 2020-03-12

## 2020-03-02 NOTE — TELEPHONE ENCOUNTER
REVA FROM DR Stan Schofield DEXAMETHASONE SOLUTION 0.5/5mL take 10ml by mouth four times daily, swish for 2 minutes then spit, dispense 400 mL r 1

## 2020-03-05 ENCOUNTER — HOSPITAL ENCOUNTER (OUTPATIENT)
Dept: INFUSION THERAPY | Age: 75
Discharge: HOME | End: 2020-03-05
Payer: MEDICARE

## 2020-03-05 VITALS
DIASTOLIC BLOOD PRESSURE: 96 MMHG | SYSTOLIC BLOOD PRESSURE: 170 MMHG | BODY MASS INDEX: 23.65 KG/M2 | HEART RATE: 64 BPM | HEIGHT: 72 IN | RESPIRATION RATE: 16 BRPM | TEMPERATURE: 97.6 F | OXYGEN SATURATION: 100 % | WEIGHT: 174.6 LBS

## 2020-03-05 DIAGNOSIS — C7B.8 NEUROENDOCRINE CARCINOMA METASTATIC TO BONE (HCC): Primary | ICD-10-CM

## 2020-03-05 DIAGNOSIS — C7A.8 NEUROENDOCRINE CARCINOMA METASTATIC TO BONE (HCC): Primary | ICD-10-CM

## 2020-03-05 LAB
ALBUMIN SERPL-MCNC: 3.3 G/DL (ref 3.5–5)
ALBUMIN/GLOB SERPL: 0.9 {RATIO} (ref 1.1–2.2)
ALP SERPL-CCNC: 66 U/L (ref 45–117)
ALT SERPL-CCNC: 26 U/L (ref 12–78)
ANION GAP SERPL CALC-SCNC: 4 MMOL/L (ref 5–15)
AST SERPL-CCNC: 23 U/L (ref 15–37)
BASOPHILS # BLD: 0 K/UL (ref 0–0.1)
BASOPHILS NFR BLD: 1 % (ref 0–1)
BILIRUB SERPL-MCNC: 0.8 MG/DL (ref 0.2–1)
BUN SERPL-MCNC: 20 MG/DL (ref 6–20)
BUN/CREAT SERPL: 15 (ref 12–20)
CALCIUM SERPL-MCNC: 8.6 MG/DL (ref 8.5–10.1)
CHLORIDE SERPL-SCNC: 110 MMOL/L (ref 97–108)
CO2 SERPL-SCNC: 29 MMOL/L (ref 21–32)
CREAT SERPL-MCNC: 1.31 MG/DL (ref 0.7–1.3)
DIFFERENTIAL METHOD BLD: ABNORMAL
EOSINOPHIL # BLD: 0.3 K/UL (ref 0–0.4)
EOSINOPHIL NFR BLD: 6 % (ref 0–7)
ERYTHROCYTE [DISTWIDTH] IN BLOOD BY AUTOMATED COUNT: 13.2 % (ref 11.5–14.5)
GLOBULIN SER CALC-MCNC: 3.5 G/DL (ref 2–4)
GLUCOSE SERPL-MCNC: 109 MG/DL (ref 65–100)
HCT VFR BLD AUTO: 35.1 % (ref 36.6–50.3)
HGB BLD-MCNC: 11.5 G/DL (ref 12.1–17)
IMM GRANULOCYTES # BLD AUTO: 0 K/UL (ref 0–0.04)
IMM GRANULOCYTES NFR BLD AUTO: 0 % (ref 0–0.5)
LYMPHOCYTES # BLD: 0.9 K/UL (ref 0.8–3.5)
LYMPHOCYTES NFR BLD: 18 % (ref 12–49)
MAGNESIUM SERPL-MCNC: 2 MG/DL (ref 1.6–2.4)
MCH RBC QN AUTO: 26.1 PG (ref 26–34)
MCHC RBC AUTO-ENTMCNC: 32.8 G/DL (ref 30–36.5)
MCV RBC AUTO: 79.6 FL (ref 80–99)
MONOCYTES # BLD: 0.4 K/UL (ref 0–1)
MONOCYTES NFR BLD: 9 % (ref 5–13)
NEUTS SEG # BLD: 3.2 K/UL (ref 1.8–8)
NEUTS SEG NFR BLD: 66 % (ref 32–75)
NRBC # BLD: 0 K/UL (ref 0–0.01)
NRBC BLD-RTO: 0 PER 100 WBC
PHOSPHATE SERPL-MCNC: 2.8 MG/DL (ref 2.6–4.7)
PLATELET # BLD AUTO: 164 K/UL (ref 150–400)
PMV BLD AUTO: 10.7 FL (ref 8.9–12.9)
POTASSIUM SERPL-SCNC: 3.1 MMOL/L (ref 3.5–5.1)
PROT SERPL-MCNC: 6.8 G/DL (ref 6.4–8.2)
RBC # BLD AUTO: 4.41 M/UL (ref 4.1–5.7)
SODIUM SERPL-SCNC: 143 MMOL/L (ref 136–145)
WBC # BLD AUTO: 4.9 K/UL (ref 4.1–11.1)

## 2020-03-05 PROCEDURE — 74011250636 HC RX REV CODE- 250/636: Performed by: INTERNAL MEDICINE

## 2020-03-05 PROCEDURE — 84100 ASSAY OF PHOSPHORUS: CPT

## 2020-03-05 PROCEDURE — 36415 COLL VENOUS BLD VENIPUNCTURE: CPT

## 2020-03-05 PROCEDURE — 85025 COMPLETE CBC W/AUTO DIFF WBC: CPT

## 2020-03-05 PROCEDURE — 96372 THER/PROPH/DIAG INJ SC/IM: CPT

## 2020-03-05 PROCEDURE — 80053 COMPREHEN METABOLIC PANEL: CPT

## 2020-03-05 PROCEDURE — 83735 ASSAY OF MAGNESIUM: CPT

## 2020-03-05 RX ORDER — LANREOTIDE ACETATE 120 MG/.5ML
120 INJECTION SUBCUTANEOUS ONCE
Status: COMPLETED | OUTPATIENT
Start: 2020-03-05 | End: 2020-03-05

## 2020-03-05 RX ADMIN — LANREOTIDE ACETATE 120 MG: 120 INJECTION SUBCUTANEOUS at 09:17

## 2020-03-05 NOTE — PROGRESS NOTES
[facility] Visit Note    1544 Pt arrived at [facility] and in no distress for Lanreotide. Assessment uremarkable except elevated BP and hot flashes. Visit Vitals  BP (!) 170/96 (BP 1 Location: Left arm, BP Patient Position: Sitting)   Pulse 64   Temp 97.6 °F (36.4 °C)   Resp 16   Ht 6' (1.829 m)   Wt 79.2 kg (174 lb 9.6 oz)   SpO2 100%   BMI 23.68 kg/m²       Labs obtained- CBC with diff, CMP, Mag, and Phos. See [facility] for pending lab results. Oneita Hof on hold for upcoming dental procedure. Medications received:  Lanreotide 120 mg SQ in left gluteus solo    6559 Tolerated treatment well, no adverse reaction noted. D/Cd from [facility] and in no distress accompanied by wife. Next appt 4/2/20 @ 0900.

## 2020-04-02 ENCOUNTER — HOSPITAL ENCOUNTER (OUTPATIENT)
Dept: INFUSION THERAPY | Age: 75
Discharge: HOME | End: 2020-04-02
Payer: MEDICARE

## 2020-04-02 VITALS
OXYGEN SATURATION: 100 % | TEMPERATURE: 97.7 F | RESPIRATION RATE: 16 BRPM | DIASTOLIC BLOOD PRESSURE: 86 MMHG | HEIGHT: 72 IN | BODY MASS INDEX: 23.15 KG/M2 | HEART RATE: 85 BPM | SYSTOLIC BLOOD PRESSURE: 143 MMHG | WEIGHT: 170.9 LBS

## 2020-04-02 DIAGNOSIS — C7B.8 NEUROENDOCRINE CARCINOMA METASTATIC TO BONE (HCC): Primary | ICD-10-CM

## 2020-04-02 DIAGNOSIS — C7A.8 NEUROENDOCRINE CARCINOMA METASTATIC TO BONE (HCC): Primary | ICD-10-CM

## 2020-04-02 LAB
ALBUMIN SERPL-MCNC: 3.6 G/DL (ref 3.5–5)
ALBUMIN/GLOB SERPL: 1 {RATIO} (ref 1.1–2.2)
ALP SERPL-CCNC: 68 U/L (ref 45–117)
ALT SERPL-CCNC: 27 U/L (ref 12–78)
ANION GAP SERPL CALC-SCNC: 5 MMOL/L (ref 5–15)
AST SERPL-CCNC: 24 U/L (ref 15–37)
BASOPHILS # BLD: 0.1 K/UL (ref 0–0.1)
BASOPHILS NFR BLD: 1 % (ref 0–1)
BILIRUB SERPL-MCNC: 1.3 MG/DL (ref 0.2–1)
BUN SERPL-MCNC: 26 MG/DL (ref 6–20)
BUN/CREAT SERPL: 15 (ref 12–20)
CALCIUM SERPL-MCNC: 8.6 MG/DL (ref 8.5–10.1)
CHLORIDE SERPL-SCNC: 109 MMOL/L (ref 97–108)
CHOLEST SERPL-MCNC: 256 MG/DL
CO2 SERPL-SCNC: 29 MMOL/L (ref 21–32)
CREAT SERPL-MCNC: 1.69 MG/DL (ref 0.7–1.3)
DIFFERENTIAL METHOD BLD: ABNORMAL
EOSINOPHIL # BLD: 0.3 K/UL (ref 0–0.4)
EOSINOPHIL NFR BLD: 5 % (ref 0–7)
ERYTHROCYTE [DISTWIDTH] IN BLOOD BY AUTOMATED COUNT: 13.7 % (ref 11.5–14.5)
EST. AVERAGE GLUCOSE BLD GHB EST-MCNC: 126 MG/DL
GLOBULIN SER CALC-MCNC: 3.7 G/DL (ref 2–4)
GLUCOSE SERPL-MCNC: 120 MG/DL (ref 65–100)
HBA1C MFR BLD: 6 % (ref 4–5.6)
HCT VFR BLD AUTO: 35.6 % (ref 36.6–50.3)
HDLC SERPL-MCNC: 55 MG/DL
HDLC SERPL: 4.7 {RATIO} (ref 0–5)
HGB BLD-MCNC: 11.7 G/DL (ref 12.1–17)
IMM GRANULOCYTES # BLD AUTO: 0 K/UL (ref 0–0.04)
IMM GRANULOCYTES NFR BLD AUTO: 0 % (ref 0–0.5)
LDLC SERPL CALC-MCNC: 163.4 MG/DL (ref 0–100)
LIPID PROFILE,FLP: ABNORMAL
LYMPHOCYTES # BLD: 1 K/UL (ref 0.8–3.5)
LYMPHOCYTES NFR BLD: 16 % (ref 12–49)
MAGNESIUM SERPL-MCNC: 2.2 MG/DL (ref 1.6–2.4)
MCH RBC QN AUTO: 25.9 PG (ref 26–34)
MCHC RBC AUTO-ENTMCNC: 32.9 G/DL (ref 30–36.5)
MCV RBC AUTO: 78.8 FL (ref 80–99)
MONOCYTES # BLD: 0.6 K/UL (ref 0–1)
MONOCYTES NFR BLD: 9 % (ref 5–13)
NEUTS SEG # BLD: 4.2 K/UL (ref 1.8–8)
NEUTS SEG NFR BLD: 69 % (ref 32–75)
NRBC # BLD: 0 K/UL (ref 0–0.01)
NRBC BLD-RTO: 0 PER 100 WBC
PHOSPHATE SERPL-MCNC: 2.9 MG/DL (ref 2.6–4.7)
PLATELET # BLD AUTO: 185 K/UL (ref 150–400)
PMV BLD AUTO: 10.4 FL (ref 8.9–12.9)
POTASSIUM SERPL-SCNC: 3.5 MMOL/L (ref 3.5–5.1)
PROT SERPL-MCNC: 7.3 G/DL (ref 6.4–8.2)
PROT UR-MCNC: 16 MG/DL (ref 0–11.9)
RBC # BLD AUTO: 4.52 M/UL (ref 4.1–5.7)
SODIUM SERPL-SCNC: 143 MMOL/L (ref 136–145)
TRIGL SERPL-MCNC: 188 MG/DL (ref ?–150)
VLDLC SERPL CALC-MCNC: 37.6 MG/DL
WBC # BLD AUTO: 6.1 K/UL (ref 4.1–11.1)

## 2020-04-02 PROCEDURE — 96372 THER/PROPH/DIAG INJ SC/IM: CPT

## 2020-04-02 PROCEDURE — 84156 ASSAY OF PROTEIN URINE: CPT

## 2020-04-02 PROCEDURE — 36415 COLL VENOUS BLD VENIPUNCTURE: CPT

## 2020-04-02 PROCEDURE — 83735 ASSAY OF MAGNESIUM: CPT

## 2020-04-02 PROCEDURE — 83036 HEMOGLOBIN GLYCOSYLATED A1C: CPT

## 2020-04-02 PROCEDURE — 85025 COMPLETE CBC W/AUTO DIFF WBC: CPT

## 2020-04-02 PROCEDURE — 74011250636 HC RX REV CODE- 250/636: Performed by: INTERNAL MEDICINE

## 2020-04-02 PROCEDURE — 80061 LIPID PANEL: CPT

## 2020-04-02 PROCEDURE — 80053 COMPREHEN METABOLIC PANEL: CPT

## 2020-04-02 PROCEDURE — 84100 ASSAY OF PHOSPHORUS: CPT

## 2020-04-02 RX ORDER — LANREOTIDE ACETATE 120 MG/.5ML
120 INJECTION SUBCUTANEOUS ONCE
Status: COMPLETED | OUTPATIENT
Start: 2020-04-02 | End: 2020-04-02

## 2020-04-02 RX ADMIN — LANREOTIDE ACETATE 120 MG: 120 INJECTION SUBCUTANEOUS at 09:35

## 2020-04-02 NOTE — PROGRESS NOTES
[facility] VISIT NOTE    6493  Pt arrived at [facility] ambulatory and in no distress for Lanreotide + Xgeva. Assessment completed, pt c/o chronic diarrhea and lower back pain from a pulled muscle. Sheryle Lente will be on hold today per Rose Mar, PharmD d/t recent dental procedure 9-10 days ago per the patient \"(pin and crown insertion\"). Blood pressure 143/86, pulse 85, temperature 97.7 °F (36.5 °C), resp. rate 16, height 6' (1.829 m), weight 77.5 kg (170 lb 14.4 oz), SpO2 100 %. Labs drawn peripherally (CBC, CMP, Mag, Phos, lipid panel, Hgb A1C, urine protein). Medications received:  Lanreotide SQ right buttocks    Tolerated treatment well, no adverse reaction noted. 0940  D/C'd from [facility] ambulatory and in no distress. Next appointment is 4/30/20 at 0930.

## 2020-04-28 ENCOUNTER — HOSPITAL ENCOUNTER (OUTPATIENT)
Dept: NUCLEAR MEDICINE | Age: 75
Discharge: HOME | End: 2020-04-28
Attending: INTERNAL MEDICINE
Payer: MEDICARE

## 2020-04-28 ENCOUNTER — HOSPITAL ENCOUNTER (OUTPATIENT)
Dept: CT IMAGING | Age: 75
Discharge: HOME | End: 2020-04-28
Attending: INTERNAL MEDICINE
Payer: MEDICARE

## 2020-04-28 DIAGNOSIS — C7A.8 NEUROENDOCRINE CARCINOMA METASTATIC TO BONE (HCC): ICD-10-CM

## 2020-04-28 DIAGNOSIS — C7B.8 NEUROENDOCRINE CARCINOMA METASTATIC TO BONE (HCC): ICD-10-CM

## 2020-04-28 PROCEDURE — 74177 CT ABD & PELVIS W/CONTRAST: CPT

## 2020-04-28 PROCEDURE — 74011636320 HC RX REV CODE- 636/320: Performed by: INTERNAL MEDICINE

## 2020-04-28 PROCEDURE — 78306 BONE IMAGING WHOLE BODY: CPT

## 2020-04-28 RX ORDER — SODIUM CHLORIDE 0.9 % (FLUSH) 0.9 %
10 SYRINGE (ML) INJECTION
Status: COMPLETED | OUTPATIENT
Start: 2020-04-28 | End: 2020-04-28

## 2020-04-28 RX ADMIN — IOHEXOL 50 ML: 240 INJECTION, SOLUTION INTRATHECAL; INTRAVASCULAR; INTRAVENOUS; ORAL at 10:00

## 2020-04-28 RX ADMIN — IOPAMIDOL 100 ML: 755 INJECTION, SOLUTION INTRAVENOUS at 10:00

## 2020-04-28 RX ADMIN — Medication 10 ML: at 10:00

## 2020-04-29 ENCOUNTER — APPOINTMENT (OUTPATIENT)
Dept: CT IMAGING | Age: 75
End: 2020-04-29
Attending: EMERGENCY MEDICINE
Payer: MEDICARE

## 2020-04-29 ENCOUNTER — HOSPITAL ENCOUNTER (EMERGENCY)
Age: 75
Discharge: HOME | End: 2020-04-30
Attending: EMERGENCY MEDICINE
Payer: MEDICARE

## 2020-04-29 DIAGNOSIS — R42 VERTIGO: Primary | ICD-10-CM

## 2020-04-29 LAB
APTT PPP: 23.9 SEC (ref 22.1–32)
BASOPHILS # BLD: 0.1 K/UL (ref 0–0.1)
BASOPHILS NFR BLD: 1 % (ref 0–1)
COMMENT, HOLDF: NORMAL
DIFFERENTIAL METHOD BLD: ABNORMAL
EOSINOPHIL # BLD: 0.2 K/UL (ref 0–0.4)
EOSINOPHIL NFR BLD: 4 % (ref 0–7)
ERYTHROCYTE [DISTWIDTH] IN BLOOD BY AUTOMATED COUNT: 13.6 % (ref 11.5–14.5)
HCT VFR BLD AUTO: 36.1 % (ref 36.6–50.3)
HGB BLD-MCNC: 11.7 G/DL (ref 12.1–17)
IMM GRANULOCYTES # BLD AUTO: 0 K/UL (ref 0–0.04)
IMM GRANULOCYTES NFR BLD AUTO: 1 % (ref 0–0.5)
INR PPP: 1 (ref 0.9–1.1)
LYMPHOCYTES # BLD: 1.8 K/UL (ref 0.8–3.5)
LYMPHOCYTES NFR BLD: 29 % (ref 12–49)
MCH RBC QN AUTO: 25.2 PG (ref 26–34)
MCHC RBC AUTO-ENTMCNC: 32.4 G/DL (ref 30–36.5)
MCV RBC AUTO: 77.8 FL (ref 80–99)
MONOCYTES # BLD: 0.5 K/UL (ref 0–1)
MONOCYTES NFR BLD: 8 % (ref 5–13)
NEUTS SEG # BLD: 3.6 K/UL (ref 1.8–8)
NEUTS SEG NFR BLD: 57 % (ref 32–75)
NRBC # BLD: 0 K/UL (ref 0–0.01)
NRBC BLD-RTO: 0 PER 100 WBC
PLATELET # BLD AUTO: 194 K/UL (ref 150–400)
PMV BLD AUTO: 10.6 FL (ref 8.9–12.9)
PROTHROMBIN TIME: 10.6 SEC (ref 9–11.1)
RBC # BLD AUTO: 4.64 M/UL (ref 4.1–5.7)
SAMPLES BEING HELD,HOLD: NORMAL
THERAPEUTIC RANGE,PTTT: NORMAL SECS (ref 58–77)
WBC # BLD AUTO: 6.2 K/UL (ref 4.1–11.1)

## 2020-04-29 PROCEDURE — 96374 THER/PROPH/DIAG INJ IV PUSH: CPT

## 2020-04-29 PROCEDURE — 99285 EMERGENCY DEPT VISIT HI MDM: CPT

## 2020-04-29 PROCEDURE — 83735 ASSAY OF MAGNESIUM: CPT

## 2020-04-29 PROCEDURE — 74011250636 HC RX REV CODE- 250/636: Performed by: EMERGENCY MEDICINE

## 2020-04-29 PROCEDURE — 70450 CT HEAD/BRAIN W/O DYE: CPT

## 2020-04-29 PROCEDURE — 85730 THROMBOPLASTIN TIME PARTIAL: CPT

## 2020-04-29 PROCEDURE — 82550 ASSAY OF CK (CPK): CPT

## 2020-04-29 PROCEDURE — 96361 HYDRATE IV INFUSION ADD-ON: CPT

## 2020-04-29 PROCEDURE — 83690 ASSAY OF LIPASE: CPT

## 2020-04-29 PROCEDURE — 80053 COMPREHEN METABOLIC PANEL: CPT

## 2020-04-29 PROCEDURE — 85610 PROTHROMBIN TIME: CPT

## 2020-04-29 PROCEDURE — 85025 COMPLETE CBC W/AUTO DIFF WBC: CPT

## 2020-04-29 PROCEDURE — 36415 COLL VENOUS BLD VENIPUNCTURE: CPT

## 2020-04-29 PROCEDURE — 84484 ASSAY OF TROPONIN QUANT: CPT

## 2020-04-29 PROCEDURE — 93005 ELECTROCARDIOGRAM TRACING: CPT

## 2020-04-29 RX ORDER — SODIUM CHLORIDE 0.9 % (FLUSH) 0.9 %
5-40 SYRINGE (ML) INJECTION AS NEEDED
Status: DISCONTINUED | OUTPATIENT
Start: 2020-04-29 | End: 2020-04-30 | Stop reason: HOSPADM

## 2020-04-29 RX ORDER — ONDANSETRON 2 MG/ML
4 INJECTION INTRAMUSCULAR; INTRAVENOUS
Status: COMPLETED | OUTPATIENT
Start: 2020-04-29 | End: 2020-04-29

## 2020-04-29 RX ORDER — MECLIZINE HCL 12.5 MG 12.5 MG/1
25 TABLET ORAL
Status: DISCONTINUED | OUTPATIENT
Start: 2020-04-29 | End: 2020-04-30 | Stop reason: HOSPADM

## 2020-04-29 RX ORDER — SODIUM CHLORIDE 0.9 % (FLUSH) 0.9 %
5-40 SYRINGE (ML) INJECTION EVERY 8 HOURS
Status: DISCONTINUED | OUTPATIENT
Start: 2020-04-29 | End: 2020-04-30 | Stop reason: HOSPADM

## 2020-04-29 RX ADMIN — SODIUM CHLORIDE 1000 ML: 900 INJECTION, SOLUTION INTRAVENOUS at 23:24

## 2020-04-29 RX ADMIN — Medication 10 ML: at 23:14

## 2020-04-29 RX ADMIN — ONDANSETRON 4 MG: 2 INJECTION INTRAMUSCULAR; INTRAVENOUS at 23:24

## 2020-04-30 ENCOUNTER — HOSPITAL ENCOUNTER (OUTPATIENT)
Dept: INFUSION THERAPY | Age: 75
Discharge: HOME | End: 2020-04-30
Payer: MEDICARE

## 2020-04-30 ENCOUNTER — PATIENT OUTREACH (OUTPATIENT)
Dept: CARDIOLOGY CLINIC | Age: 75
End: 2020-04-30

## 2020-04-30 VITALS
OXYGEN SATURATION: 100 % | TEMPERATURE: 98 F | RESPIRATION RATE: 16 BRPM | HEART RATE: 69 BPM | SYSTOLIC BLOOD PRESSURE: 160 MMHG | DIASTOLIC BLOOD PRESSURE: 88 MMHG

## 2020-04-30 VITALS
OXYGEN SATURATION: 100 % | TEMPERATURE: 97.8 F | HEART RATE: 68 BPM | SYSTOLIC BLOOD PRESSURE: 152 MMHG | DIASTOLIC BLOOD PRESSURE: 102 MMHG | RESPIRATION RATE: 14 BRPM

## 2020-04-30 DIAGNOSIS — C7A.8 NEUROENDOCRINE CARCINOMA METASTATIC TO BONE (HCC): Primary | ICD-10-CM

## 2020-04-30 DIAGNOSIS — C7B.8 NEUROENDOCRINE CARCINOMA METASTATIC TO BONE (HCC): Primary | ICD-10-CM

## 2020-04-30 LAB
ALBUMIN SERPL-MCNC: 3.1 G/DL (ref 3.5–5)
ALBUMIN SERPL-MCNC: 3.2 G/DL (ref 3.5–5)
ALBUMIN/GLOB SERPL: 0.9 {RATIO} (ref 1.1–2.2)
ALBUMIN/GLOB SERPL: 0.9 {RATIO} (ref 1.1–2.2)
ALP SERPL-CCNC: 65 U/L (ref 45–117)
ALP SERPL-CCNC: 74 U/L (ref 45–117)
ALT SERPL-CCNC: 18 U/L (ref 12–78)
ALT SERPL-CCNC: 18 U/L (ref 12–78)
ANION GAP SERPL CALC-SCNC: 6 MMOL/L (ref 5–15)
ANION GAP SERPL CALC-SCNC: 7 MMOL/L (ref 5–15)
AST SERPL-CCNC: 22 U/L (ref 15–37)
AST SERPL-CCNC: 24 U/L (ref 15–37)
ATRIAL RATE: 78 BPM
BASOPHILS # BLD: 0 K/UL (ref 0–0.1)
BASOPHILS NFR BLD: 1 % (ref 0–1)
BILIRUB SERPL-MCNC: 0.6 MG/DL (ref 0.2–1)
BILIRUB SERPL-MCNC: 0.7 MG/DL (ref 0.2–1)
BUN SERPL-MCNC: 24 MG/DL (ref 6–20)
BUN SERPL-MCNC: 27 MG/DL (ref 6–20)
BUN/CREAT SERPL: 15 (ref 12–20)
BUN/CREAT SERPL: 17 (ref 12–20)
CALCIUM SERPL-MCNC: 7.8 MG/DL (ref 8.5–10.1)
CALCIUM SERPL-MCNC: 7.9 MG/DL (ref 8.5–10.1)
CALCULATED P AXIS, ECG09: 52 DEGREES
CALCULATED R AXIS, ECG10: 46 DEGREES
CALCULATED T AXIS, ECG11: 49 DEGREES
CHLORIDE SERPL-SCNC: 110 MMOL/L (ref 97–108)
CHLORIDE SERPL-SCNC: 111 MMOL/L (ref 97–108)
CK SERPL-CCNC: 148 U/L (ref 39–308)
CO2 SERPL-SCNC: 25 MMOL/L (ref 21–32)
CO2 SERPL-SCNC: 26 MMOL/L (ref 21–32)
CREAT SERPL-MCNC: 1.4 MG/DL (ref 0.7–1.3)
CREAT SERPL-MCNC: 1.8 MG/DL (ref 0.7–1.3)
DIAGNOSIS, 93000: NORMAL
DIFFERENTIAL METHOD BLD: ABNORMAL
EOSINOPHIL # BLD: 0.1 K/UL (ref 0–0.4)
EOSINOPHIL NFR BLD: 2 % (ref 0–7)
ERYTHROCYTE [DISTWIDTH] IN BLOOD BY AUTOMATED COUNT: 13.6 % (ref 11.5–14.5)
GLOBULIN SER CALC-MCNC: 3.4 G/DL (ref 2–4)
GLOBULIN SER CALC-MCNC: 3.5 G/DL (ref 2–4)
GLUCOSE SERPL-MCNC: 114 MG/DL (ref 65–100)
GLUCOSE SERPL-MCNC: 164 MG/DL (ref 65–100)
HCT VFR BLD AUTO: 32.4 % (ref 36.6–50.3)
HGB BLD-MCNC: 10.5 G/DL (ref 12.1–17)
IMM GRANULOCYTES # BLD AUTO: 0 K/UL (ref 0–0.04)
IMM GRANULOCYTES NFR BLD AUTO: 0 % (ref 0–0.5)
LIPASE SERPL-CCNC: 129 U/L (ref 73–393)
LYMPHOCYTES # BLD: 0.8 K/UL (ref 0.8–3.5)
LYMPHOCYTES NFR BLD: 17 % (ref 12–49)
MAGNESIUM SERPL-MCNC: 2 MG/DL (ref 1.6–2.4)
MAGNESIUM SERPL-MCNC: 2.1 MG/DL (ref 1.6–2.4)
MCH RBC QN AUTO: 25.2 PG (ref 26–34)
MCHC RBC AUTO-ENTMCNC: 32.4 G/DL (ref 30–36.5)
MCV RBC AUTO: 77.7 FL (ref 80–99)
MONOCYTES # BLD: 0.3 K/UL (ref 0–1)
MONOCYTES NFR BLD: 7 % (ref 5–13)
NEUTS SEG # BLD: 3.4 K/UL (ref 1.8–8)
NEUTS SEG NFR BLD: 73 % (ref 32–75)
NRBC # BLD: 0 K/UL (ref 0–0.01)
NRBC BLD-RTO: 0 PER 100 WBC
P-R INTERVAL, ECG05: 202 MS
PHOSPHATE SERPL-MCNC: 3.4 MG/DL (ref 2.6–4.7)
PLATELET # BLD AUTO: 144 K/UL (ref 150–400)
PMV BLD AUTO: 10.6 FL (ref 8.9–12.9)
POTASSIUM SERPL-SCNC: 3.1 MMOL/L (ref 3.5–5.1)
POTASSIUM SERPL-SCNC: 3.6 MMOL/L (ref 3.5–5.1)
PROT SERPL-MCNC: 6.6 G/DL (ref 6.4–8.2)
PROT SERPL-MCNC: 6.6 G/DL (ref 6.4–8.2)
Q-T INTERVAL, ECG07: 428 MS
QRS DURATION, ECG06: 102 MS
QTC CALCULATION (BEZET), ECG08: 487 MS
RBC # BLD AUTO: 4.17 M/UL (ref 4.1–5.7)
RBC MORPH BLD: ABNORMAL
SODIUM SERPL-SCNC: 142 MMOL/L (ref 136–145)
SODIUM SERPL-SCNC: 143 MMOL/L (ref 136–145)
TROPONIN I SERPL-MCNC: <0.05 NG/ML
VENTRICULAR RATE, ECG03: 78 BPM
WBC # BLD AUTO: 4.6 K/UL (ref 4.1–11.1)

## 2020-04-30 PROCEDURE — 85025 COMPLETE CBC W/AUTO DIFF WBC: CPT

## 2020-04-30 PROCEDURE — 84100 ASSAY OF PHOSPHORUS: CPT

## 2020-04-30 PROCEDURE — 80053 COMPREHEN METABOLIC PANEL: CPT

## 2020-04-30 PROCEDURE — 83735 ASSAY OF MAGNESIUM: CPT

## 2020-04-30 PROCEDURE — 96402 CHEMO HORMON ANTINEOPL SQ/IM: CPT

## 2020-04-30 PROCEDURE — 96372 THER/PROPH/DIAG INJ SC/IM: CPT

## 2020-04-30 PROCEDURE — 74011250636 HC RX REV CODE- 250/636: Performed by: INTERNAL MEDICINE

## 2020-04-30 PROCEDURE — 74011250636 HC RX REV CODE- 250/636: Performed by: EMERGENCY MEDICINE

## 2020-04-30 PROCEDURE — 36415 COLL VENOUS BLD VENIPUNCTURE: CPT

## 2020-04-30 RX ORDER — LANREOTIDE ACETATE 120 MG/.5ML
120 INJECTION SUBCUTANEOUS ONCE
Status: COMPLETED | OUTPATIENT
Start: 2020-04-30 | End: 2020-04-30

## 2020-04-30 RX ORDER — ONDANSETRON 4 MG/1
4 TABLET, ORALLY DISINTEGRATING ORAL
Qty: 10 TAB | Refills: 0 | Status: SHIPPED | OUTPATIENT
Start: 2020-04-30 | End: 2021-01-01

## 2020-04-30 RX ORDER — MECLIZINE HYDROCHLORIDE 25 MG/1
25 TABLET ORAL
Qty: 20 TAB | Refills: 0 | Status: SHIPPED | OUTPATIENT
Start: 2020-04-30 | End: 2021-01-01

## 2020-04-30 RX ORDER — MECLIZINE HCL 12.5 MG 12.5 MG/1
25 TABLET ORAL
Status: COMPLETED | OUTPATIENT
Start: 2020-04-30 | End: 2020-04-30

## 2020-04-30 RX ADMIN — MECLIZINE 25 MG: 12.5 TABLET ORAL at 01:53

## 2020-04-30 RX ADMIN — LANREOTIDE ACETATE 120 MG: 120 INJECTION SUBCUTANEOUS at 10:45

## 2020-04-30 RX ADMIN — DENOSUMAB 120 MG: 120 INJECTION SUBCUTANEOUS at 10:45

## 2020-04-30 NOTE — ED TRIAGE NOTES
Patient arrives from home with c/o dizziness. /100. Patient had syncopal episode x45 seconds, x1 vomiting, /90, HR 80 NSR, Hx cancer. BGL; 160  20g Left AC.

## 2020-04-30 NOTE — ED PROVIDER NOTES
EMERGENCY DEPARTMENT HISTORY AND PHYSICAL EXAM      Date: 4/29/2020  Patient Name: Caterina Allan    History of Presenting Illness     Chief Complaint   Patient presents with    Dizziness       History Provided By: Patient    HPI: Caterina Allan, 69 y.o. male presents to the ED with cc of dizziness and vomiting. Pt PMHx sig for Metastatic Neuroendocrine Disease. Pt brought to the ED by EMS. Per EMS pt was at home tonight laying on the floor when he began feeling dizzy. When they arrived pt had very high BP. They were walking him to the stretcher when he had a syncopal episode and began vomiting Pt had for staging yesterday and NM study, findings of CT below. Pt states this was a sudden onset and there had been no reported issues prior to the event this evening. Pt denies any CP, SOA, or abdominal pain. He denies any headache, fever, or chills. There are no other complaints, changes, or physical findings at this time. PCP: Tom Armas MD    No current facility-administered medications on file prior to encounter. Current Outpatient Medications on File Prior to Encounter   Medication Sig Dispense Refill    everolimus (AFINITOR) 10 mg tab Take 1 Tab by mouth daily. 30 Tab 6    lisinopril (PRINIVIL, ZESTRIL) 20 mg tablet Take 1 Tab by mouth daily. 30 Tab 0    tamsulosin (FLOMAX) 0.4 mg capsule Take 0.4 mg by mouth daily.          Past History     Past Medical History:  Past Medical History:   Diagnosis Date    Adverse effect of anesthesia     low HR and very slow to wakeup    Cancer ([address] 5/2016    neuroendocrine, retroperitoneal LN involvement    Diarrhea     Frequent urination     Hypertension     Poor appetite     Unspecified adverse effect of anesthesia     \"hard to put to sleep and slow to wake up-heart rate dropped very low\"       Past Surgical History:  Past Surgical History:   Procedure Laterality Date    COLONOSCOPY N/A 5/13/2016    COLONOSCOPY performed by [facility] L[facility]ano [facility] at [facility] ENDOSCOPY    COLONOSCOPY,[facility] Nils Guzmán  2016         HX ORTHOPAEDIC      knee scope left knee    HX OTHER SURGICAL  2013    excision of scalp cyst     UPPER GI ENDOSCOPY,BIOPSY  2016            Family History:  Family History   Problem Relation Age of Onset    Cancer Mother     Stroke Sister        Social History:  Social History     Tobacco Use    Smoking status: Former Smoker     Packs/day: 1.00     Years: 8.00     Pack years: 8.00     Last attempt to quit: 1972     Years since quittin.9    Smokeless tobacco: Never Used   Substance Use Topics    Alcohol use: No    Drug use: No       Allergies:  No Known Allergies      Review of Systems   Review of Systems   Constitutional: Negative. Negative for appetite change, chills, fatigue and fever. HENT: Negative. Negative for congestion, rhinorrhea, sinus pressure and sore throat. Eyes: Negative. Respiratory: Negative. Negative for cough, choking, chest tightness, shortness of breath and wheezing. Cardiovascular: Negative. Negative for chest pain, palpitations and leg swelling. Gastrointestinal: Positive for nausea and vomiting. Negative for abdominal pain, constipation and diarrhea. Endocrine: Negative. Genitourinary: Negative. Negative for difficulty urinating, dysuria, flank pain and urgency. Musculoskeletal: Negative. Skin: Negative. Neurological: Positive for dizziness, syncope and light-headedness (.me). Negative for speech difficulty, weakness, numbness and headaches. Psychiatric/Behavioral: Negative. All other systems reviewed and are negative. Physical Exam   Physical Exam  Vitals signs and nursing note reviewed. Constitutional:       Appearance: He is well-developed. He is not diaphoretic. Comments: Pt appears to not feel well, actively vomiting upon arrival to the ED      HENT:      Head: Normocephalic and atraumatic. Mouth/Throat:      Mouth: Mucous membranes are dry. Pharynx: No oropharyngeal exudate. Eyes:      Conjunctiva/sclera: Conjunctivae normal.      Pupils: Pupils are equal, round, and reactive to light. Neck:      Musculoskeletal: Normal range of motion and neck supple. Vascular: No JVD. Trachea: No tracheal deviation. Cardiovascular:      Rate and Rhythm: Normal rate and regular rhythm. Heart sounds: Normal heart sounds. No murmur. Pulmonary:      Effort: Pulmonary effort is normal. No respiratory distress. Breath sounds: Normal breath sounds. No stridor. No wheezing or rales. Abdominal:      General: There is no distension. Palpations: Abdomen is soft. Tenderness: There is no abdominal tenderness. There is no guarding or rebound. Musculoskeletal: Normal range of motion. General: No tenderness. Right lower leg: No edema. Left lower leg: No edema. Skin:     General: Skin is warm and dry. Capillary Refill: Capillary refill takes less than 2 seconds. Neurological:      Mental Status: He is alert and oriented to person, place, and time. Cranial Nerves: No cranial nerve deficit.       Comments: No gross motor or sensory deficits, no ataxia, no nystagmus seen when eyes opened but limited viewing as sig worsening of dizziness with any eye opening or moving his head   Psychiatric:      Comments: Flat affect           Diagnostic Study Results     Labs -     Recent Results (from the past 12 hour(s))   CBC WITH AUTOMATED DIFF    Collection Time: 04/29/20 11:15 PM   Result Value Ref Range    WBC 6.2 4.1 - 11.1 K/uL    RBC 4.64 4.10 - 5.70 M/uL    HGB 11.7 (L) 12.1 - 17.0 g/dL    HCT 36.1 (L) 36.6 - 50.3 %    MCV 77.8 (L) 80.0 - 99.0 FL    MCH 25.2 (L) 26.0 - 34.0 PG    MCHC 32.4 30.0 - 36.5 g/dL    RDW 13.6 11.5 - 14.5 %    PLATELET 593 725 - 457 K/uL    MPV 10.6 8.9 - 12.9 FL    NRBC 0.0 0  WBC    ABSOLUTE NRBC 0.00 0.00 - 0.01 K/uL    NEUTROPHILS 57 32 - 75 %    LYMPHOCYTES 29 12 - 49 %    MONOCYTES 8 5 - 13 %    EOSINOPHILS 4 0 - 7 %    BASOPHILS 1 0 - 1 %    IMMATURE GRANULOCYTES 1 (H) 0.0 - 0.5 %    ABS. NEUTROPHILS 3.6 1.8 - 8.0 K/UL    ABS. LYMPHOCYTES 1.8 0.8 - 3.5 K/UL    ABS. MONOCYTES 0.5 0.0 - 1.0 K/UL    ABS. EOSINOPHILS 0.2 0.0 - 0.4 K/UL    ABS. BASOPHILS 0.1 0.0 - 0.1 K/UL    ABS. IMM. GRANS. 0.0 0.00 - 0.04 K/UL    DF AUTOMATED     METABOLIC PANEL, COMPREHENSIVE    Collection Time: 04/29/20 11:15 PM   Result Value Ref Range    Sodium 142 136 - 145 mmol/L    Potassium 3.1 (L) 3.5 - 5.1 mmol/L    Chloride 110 (H) 97 - 108 mmol/L    CO2 25 21 - 32 mmol/L    Anion gap 7 5 - 15 mmol/L    Glucose 164 (H) 65 - 100 mg/dL    BUN 27 (H) 6 - 20 MG/DL    Creatinine 1.80 (H) 0.70 - 1.30 MG/DL    BUN/Creatinine ratio 15 12 - 20      GFR est AA 45 (L) >60 ml/min/1.73m2    GFR est non-AA 37 (L) >60 ml/min/1.73m2    Calcium 7.8 (L) 8.5 - 10.1 MG/DL    Bilirubin, total 0.6 0.2 - 1.0 MG/DL    ALT (SGPT) 18 12 - 78 U/L    AST (SGOT) 24 15 - 37 U/L    Alk.  phosphatase 74 45 - 117 U/L    Protein, total 6.6 6.4 - 8.2 g/dL    Albumin 3.1 (L) 3.5 - 5.0 g/dL    Globulin 3.5 2.0 - 4.0 g/dL    A-G Ratio 0.9 (L) 1.1 - 2.2     PROTHROMBIN TIME + INR    Collection Time: 04/29/20 11:15 PM   Result Value Ref Range    INR 1.0 0.9 - 1.1      Prothrombin time 10.6 9.0 - 11.1 sec   PTT    Collection Time: 04/29/20 11:15 PM   Result Value Ref Range    aPTT 23.9 22.1 - 32.0 sec    aPTT, therapeutic range     58.0 - 77.0 SECS   LIPASE    Collection Time: 04/29/20 11:15 PM   Result Value Ref Range    Lipase 129 73 - 393 U/L   TROPONIN I    Collection Time: 04/29/20 11:15 PM   Result Value Ref Range    Troponin-I, Qt. <0.05 <0.05 ng/mL   CK W/ REFLX CKMB    Collection Time: 04/29/20 11:15 PM   Result Value Ref Range     39 - 308 U/L   SAMPLES BEING HELD    Collection Time: 04/29/20 11:15 PM   Result Value Ref Range    SAMPLES BEING HELD RD     COMMENT        Add-on orders for these samples will be processed based on acceptable specimen integrity and analyte stability, which may vary by analyte. MAGNESIUM    Collection Time: 04/29/20 11:15 PM   Result Value Ref Range    Magnesium 2.1 1.6 - 2.4 mg/dL   EKG, 12 LEAD, INITIAL    Collection Time: 04/29/20 11:15 PM   Result Value Ref Range    Ventricular Rate 78 BPM    Atrial Rate 78 BPM    P-R Interval 202 ms    QRS Duration 102 ms    Q-T Interval 428 ms    QTC Calculation (Bezet) 487 ms    Calculated P Axis 52 degrees    Calculated R Axis 46 degrees    Calculated T Axis 49 degrees    Diagnosis       ** Poor data quality, interpretation may be adversely affected  Normal sinus rhythm  Anterior infarct , age undetermined  When compared with ECG of 27-APR-2016 12:14,  Anterior infarct is now present         Radiologic Studies -   CT HEAD WO CONT   Final Result   IMPRESSION:    No acute intracranial process. CT Results  (Last 48 hours)               04/28/20 1131  CT ABD PELV W CONT Final result    Impression:  IMPRESSION:   1. Retroperitoneal adenopathy which is stable to slightly decreased in size. 2.  Mesenteric mass with calcifications likely unchanged in size. The slight   difference in measurements likely related to change in position of the mass in   the mesentery. 3.  Sclerotic bone metastases are unchanged. Narrative:  EXAM: CT ABD PELV W CONT       INDICATION: Restaging disease neuroendocrine carcinoma       COMPARISON: 11/11/2019        CONTRAST: 100 mL of Isovue-370. TECHNIQUE:    Following the uneventful intravenous administration of contrast, thin axial   images were obtained through the abdomen and pelvis. Coronal and sagittal   reconstructions were generated. Oral contrast was administered. CT dose   reduction was achieved through use of a standardized protocol tailored for this   examination and automatic exposure control for dose modulation.        FINDINGS:    LOWER THORAX: No significant abnormality in the incidentally imaged lower chest. LIVER: No mass. BILIARY TREE: Gallbladder is within normal limits. Measuring containing   gallstones. SPLEEN: within normal limits. PANCREAS: No mass or ductal dilatation. ADRENALS: Unremarkable. KIDNEYS: No mass, calculus, or hydronephrosis. STOMACH: Unremarkable. SMALL BOWEL: No dilatation or wall thickening. COLON: No dilatation or wall thickening. APPENDIX: Unremarkable. PERITONEUM: Mesenteric mass measuring 2.8 x 1.6 cm relatively stable in size. RETROPERITONEUM: Left retroperitoneal lymphadenopathy is again noted. The   largest node measures approximately 2.8 x 2.3 cm and is decreased in size   REPRODUCTIVE ORGANS: Brachytherapy seeds in the prostate   URINARY BLADDER: Distended urinary bladder   BONES: Sclerotic metastases unchanged. ABDOMINAL WALL: No mass or hernia. ADDITIONAL COMMENTS: N/A               CXR Results  (Last 48 hours)    None          Medical Decision Making   I am the first provider for this patient. I reviewed the vital signs, available nursing notes, past medical history, past surgical history, family history and social history. Vital Signs-Reviewed the patient's vital signs. Patient Vitals for the past 12 hrs:   Temp Pulse Resp BP SpO2   04/29/20 2313 97.7 °F (36.5 °C) 82 15 (!) 161/99 96 %       EKG interpretation: (Preliminary)  NSR, rate 78, normal axis/pr/qrs, no acte ST Chalo Forte, DO        Records Reviewed: Nursing Notes, Old Medical Records, Ambulance Run Sheet, Previous Radiology Studies and Previous Laboratory Studies    Provider Notes (Medical Decision Making):   DDx- Gastritis, food poisoning, Vertigo, dehydration, electrolyte abnormality, ACS    ED Course:   Initial assessment performed. The patients presenting problems have been discussed, and they are in agreement with the care plan formulated and outlined with them. I have encouraged them to ask questions as they arise throughout their visit.        At the time of the initial evaluation discussed with the patient felt more likely to be vertigo symptoms. Nausea medicine in addition to meclizine have been ordered. Initially patient had deferred taking the meclizine and when asked why he was concerned that his mouth was too drying swallow the pill. Patient was provided something to drink and he was able to take the pill without issue. After allowing medication time to work patient was reevaluated. Patient was able to open his eyes move around and has has also been ambulatory throughout the ED department with no problems or issues related to dizziness. Patient does have an appointment this morning in the infusion center for his cancer treatment. Patient be discharged home prescription written for meclizine as well as nausea medicine if needed. At the time of discharge patient states a significant improvement is no longer feeling ill. Disposition:  Pt discharged home. DISCHARGE PLAN:  1. Discharge Medication List as of 4/30/2020  3:38 AM      START taking these medications    Details   meclizine (ANTIVERT) 25 mg tablet Take 1 Tab by mouth three (3) times daily as needed for Dizziness. , Print, Disp-20 Tab, R-0      ondansetron (Zofran ODT) 4 mg disintegrating tablet Take 1 Tab by mouth every eight (8) hours as needed for Nausea. , Print, Disp-10 Tab, R-0         CONTINUE these medications which have NOT CHANGED    Details   everolimus (AFINITOR) 10 mg tab Take 1 Tab by mouth daily. , Normal, Disp-30 Tab, R-6      lisinopril (PRINIVIL, ZESTRIL) 20 mg tablet Take 1 Tab by mouth daily. , Normal, Disp-30 Tab, R-0      tamsulosin (FLOMAX) 0.4 mg capsule Take 0.4 mg by mouth daily. , Historical Med           2. Follow-up Information     Follow up With Specialties Details Why Contact Info    Maria Del Rosario Ugarte MD [facility]  As needed [address]  [phone]          3. Return to ED if worse     Diagnosis     Clinical Impression:   1.  Vertigo Attestations:    Светлана Núñez, DO    Please note that this dictation was completed with [employer], the computer voice recognition software. Quite often unanticipated grammatical, syntax, homophones, and other interpretive errors are inadvertently transcribed by the computer software. Please disregard these errors. Please excuse any errors that have escaped final proofreading. Thank you.

## 2020-04-30 NOTE — ED NOTES
Pt discharged by Maurizio Galeas. Pt provided with discharge instructions Rx and instructions on follow up care. Pt ambulated out of ED with no apparent difficulty accompanied by RN.

## 2020-04-30 NOTE — DISCHARGE INSTRUCTIONS
Patient Education        Vertigo: Care Instructions  Your Care Instructions    Vertigo is the feeling that you or your surroundings are moving when there is no actual movement. It is often described as a feeling of spinning, whirling, falling, or tilting. Vertigo may make you vomit or feel nauseated. You may have trouble standing or walking and may lose your balance. Vertigo is often related to an inner ear problem, but it can have other more serious causes. If vertigo continues, you may need more tests to find its cause. Follow-up care is a key part of your treatment and safety. Be sure to make and go to all appointments, and call your doctor if you are having problems. It's also a good idea to know your test results and keep a list of the medicines you take. How can you care for yourself at home? · Do not lie flat on your back. Prop yourself up slightly. This may reduce the spinning feeling. Keep your eyes open. · Move slowly so that you do not fall. · If your doctor recommends medicine, take it exactly as directed. · Do not drive while you are having vertigo. Certain exercises, called Hobson-Daroff exercises, can help decrease vertigo. To do Hobson-Daroff exercises:  · Sit on the edge of a bed or sofa and quickly lie down on the side that causes the worst vertigo. Lie on your side with your ear down. · Stay in this position for at least 30 seconds or until the vertigo goes away. · Sit up. If this causes vertigo, wait for it to stop. · Repeat the procedure on the other side. · Repeat this 10 times. Do these exercises 2 times a day until the vertigo is gone. When should you call for help? Call 911 anytime you think you may need emergency care. For example, call if:    · You passed out (lost consciousness).     · You have symptoms of a stroke. These may include:  ? Sudden numbness, tingling, weakness, or loss of movement in your face, arm, or leg, especially on only one side of your body.   ? Sudden vision changes. ? Sudden trouble speaking. ? Sudden confusion or trouble understanding simple statements. ? Sudden problems with walking or balance. ? A sudden, severe headache that is different from past headaches.    Call your doctor now or seek immediate medical care if:    · Vertigo occurs with a fever, a headache, or ringing in your ears.     · You have new or increased nausea and vomiting.    Watch closely for changes in your health, and be sure to contact your doctor if:    · Vertigo gets worse or happens more often.     · Vertigo has not gotten better after 2 weeks. Where can you learn more? Go to http://mary lou-jimmy.info/  Enter J916 in the search box to learn more about \"Vertigo: Care Instructions. \"  Current as of: July 28, 2019Content Version: 12.4  © 3991-3892 [employer]. Care instructions adapted under license by [employer] (which disclaims liability or warranty for this information). If you have questions about a medical condition or this instruction, always ask your healthcare professional. [place] [address] any warranty or liability for your use of this information. Patient Education        Epley Maneuver at Home for Vertigo: Exercises  Introduction  Vertigo is a spinning or whirling sensation when you move your head. Your doctor may have moved you in different positions to help your vertigo get better faster. This is called the Epley maneuver. Your doctor also may have asked you to do these exercises at home. Do the exercises as often as your doctor recommends. If your vertigo is getting worse, your doctor may have you change the exercise or stop it. Step 1  Step 1   1. Sit on the edge of a bed or sofa. Step 2   1. Turn your head 45 degrees in the direction your doctor told you to. This should be toward the ear that causes the most vertigo for you. In this picture, the woman is turning toward her left ear.     Step 3 1. Tilt yourself backward until you are lying on your back. Your head should still be at a 45-degree turn. Your head should be about midway between looking straight ahead and looking out to your side. Hold for 30 seconds. If you have vertigo, stay in this position until it stops. Step 4   1. Turn your head 90 degrees toward the ear that has the least vertigo. In this picture, the woman is turning to the right because she has vertigo on her left side. The point of your chin should be raised and over your shoulder. Hold for 30 seconds. Step 5   1. Roll onto the side with the least vertigo. You should now be looking at the floor. Hold for 30 seconds. Follow-up care is a key part of your treatment and safety. Be sure to make and go to all appointments, and call your doctor if you are having problems. It's also a good idea to know your test results and keep a list of the medicines you take. Where can you learn more? Go to http://mary lou-jimmy.info/  Enter P834 in the search box to learn more about \"Epley Maneuver at Home for Vertigo: Exercises. \"  Current as of: November 19, 2019Content Version: 12.4  © 4202-9340 [employer]. Care instructions adapted under license by [employer] (which disclaims liability or warranty for this information). If you have questions about a medical condition or this instruction, always ask your healthcare professional. [place] [address] any warranty or liability for your use of this information.

## 2020-04-30 NOTE — PROGRESS NOTES
Outpatient Infusion Center Progress Note    0915  Pt arrived in stable condition and in no distress for Lanreotide SQ & Xgeva SQ    Assessment unremarkable, no new concerns voiced. Labs obtained per order and sent for processing. Ca=7.9, Corrected Calcium=8.54    Patient Vitals for the past 12 hrs:   Temp Pulse Resp BP SpO2   04/30/20 0940 98 °F (36.7 °C) 69 16 160/88 100 %        Recent Results (from the past 12 hour(s))   CBC WITH AUTOMATED DIFF    Collection Time: 04/30/20  9:25 AM   Result Value Ref Range    WBC 4.6 4.1 - 11.1 K/uL    RBC 4.17 4.10 - 5.70 M/uL    HGB 10.5 (L) 12.1 - 17.0 g/dL    HCT 32.4 (L) 36.6 - 50.3 %    MCV 77.7 (L) 80.0 - 99.0 FL    MCH 25.2 (L) 26.0 - 34.0 PG    MCHC 32.4 30.0 - 36.5 g/dL    RDW 13.6 11.5 - 14.5 %    PLATELET 409 (L) 447 - 400 K/uL    MPV 10.6 8.9 - 12.9 FL    NRBC 0.0 0  WBC    ABSOLUTE NRBC 0.00 0.00 - 0.01 K/uL    NEUTROPHILS 73 32 - 75 %    LYMPHOCYTES 17 12 - 49 %    MONOCYTES 7 5 - 13 %    EOSINOPHILS 2 0 - 7 %    BASOPHILS 1 0 - 1 %    IMMATURE GRANULOCYTES 0 0.0 - 0.5 %    ABS. NEUTROPHILS 3.4 1.8 - 8.0 K/UL    ABS. LYMPHOCYTES 0.8 0.8 - 3.5 K/UL    ABS. MONOCYTES 0.3 0.0 - 1.0 K/UL    ABS. EOSINOPHILS 0.1 0.0 - 0.4 K/UL    ABS. BASOPHILS 0.0 0.0 - 0.1 K/UL    ABS. IMM.  GRANS. 0.0 0.00 - 0.04 K/UL    DF AUTOMATED      RBC COMMENTS NORMOCYTIC, NORMOCHROMIC     METABOLIC PANEL, COMPREHENSIVE    Collection Time: 04/30/20  9:25 AM   Result Value Ref Range    Sodium 143 136 - 145 mmol/L    Potassium 3.6 3.5 - 5.1 mmol/L    Chloride 111 (H) 97 - 108 mmol/L    CO2 26 21 - 32 mmol/L    Anion gap 6 5 - 15 mmol/L    Glucose 114 (H) 65 - 100 mg/dL    BUN 24 (H) 6 - 20 MG/DL    Creatinine 1.40 (H) 0.70 - 1.30 MG/DL    BUN/Creatinine ratio 17 12 - 20      GFR est AA >60 >60 ml/min/1.73m2    GFR est non-AA 50 (L) >60 ml/min/1.73m2    Calcium 7.9 (L) 8.5 - 10.1 MG/DL    Bilirubin, total 0.7 0.2 - 1.0 MG/DL    ALT (SGPT) 18 12 - 78 U/L    AST (SGOT) 22 15 - 37 U/L Alk. phosphatase 65 45 - 117 U/L    Protein, total 6.6 6.4 - 8.2 g/dL    Albumin 3.2 (L) 3.5 - 5.0 g/dL    Globulin 3.4 2.0 - 4.0 g/dL    A-G Ratio 0.9 (L) 1.1 - 2.2     MAGNESIUM    Collection Time: 04/30/20  9:25 AM   Result Value Ref Range    Magnesium 2.0 1.6 - 2.4 mg/dL   PHOSPHORUS    Collection Time: 04/30/20  9:25 AM   Result Value Ref Range    Phosphorus 3.4 2.6 - 4.7 MG/DL        The following medications administered:  Medications Administered     denosumab (XGEVA) injection 120 mg     Admin Date  04/30/2020  Left arm Action  Given Dose  120 mg Route  SubCUTAneous Administered By  Tavia Alicea RN          lanreotide (SOMATULINE DEPOT) injection syringe 120 mg     Admin Date  04/30/2020  Left buttock Action  Given Dose  120 mg Route  SubCUTAneous Administered By  Tavia Alicea RN                Pt tolerated treatment well. No s/s of adverse reaction noted. Pt provided with education on possible side effects of medication along with discharge instructions. Pt verbalized understanding. 1100  Pt discharged in no acute distress.  Next appointment:    Future Appointments   Date Time Provider Mark De La Paz   5/6/2020  9:45 AM MD Adán Glez. Mohan   5/28/2020  9:00 AM [facility] NURSE 4 [facility]   6/25/2020  9:00 AM [facility] NURSE 4 [address] [place] [address]

## 2020-04-30 NOTE — PROGRESS NOTES
Patient contacted regarding recent discharge and COVID-19 risk   Care Transition Nurse/ Ambulatory Care Manager contacted the patient by telephone to perform post discharge assessment. Verified name and  with patient as identifiers. Patient has following risk factors of: immunocompromised and Metastatic endocrine Disease. CTN/ACM reviewed discharge instructions, medical action plan and red flags related to discharge diagnosis. Reviewed and educated them on any new and changed medications related to discharge diagnosis. Advised obtaining a 90-day supply of all daily and as-needed medications. Education provided regarding infection prevention, and signs and symptoms of COVID-19 and when to seek medical attention with patient who verbalized understanding. Discussed exposure protocols and quarantine from [address] you at higher risk for severe illness  and given an opportunity for questions and concerns. The patient agrees to contact the COVID-19 hotline [phone] or PCP office for questions related to their healthcare. CTN/ACM provided contact information for future reference. From CDC: Are you at higher risk for severe illness?  Wash your hands often.  Avoid close contact (6 feet, which is about two arm lengths) with people who are sick.  Put distance between yourself and other people if COVID-19 is spreading in your community.  Clean and disinfect frequently touched surfaces.  Avoid all cruise travel and non-essential air travel.  Call your healthcare professional if you have concerns about COVID-19 and your underlying condition or if you are sick.     For more information on steps you can take to protect yourself, see CDC's How to Protect Yourself      Patient/family/caregiver given information for Nehemias Ndiaye and agrees to enroll no  Patient's preferred e-mail:  n/a  Patient's preferred phone number: n/a  Based on Loop alert triggers, patient will be contacted by nurse care manager for worsening symptoms. Plan for follow-up call in 7-14 days based on severity of symptoms and risk factors.

## 2020-05-06 ENCOUNTER — VIRTUAL VISIT (OUTPATIENT)
Dept: ONCOLOGY | Age: 75
End: 2020-05-06

## 2020-05-06 DIAGNOSIS — C7B.8 NEUROENDOCRINE CARCINOMA METASTATIC TO BONE (HCC): Primary | ICD-10-CM

## 2020-05-06 DIAGNOSIS — I95.1 ORTHOSTATIC HYPOTENSION: ICD-10-CM

## 2020-05-06 DIAGNOSIS — C7A.8 NEUROENDOCRINE CARCINOMA METASTATIC TO BONE (HCC): Primary | ICD-10-CM

## 2020-05-06 DIAGNOSIS — I10 UNCONTROLLED HYPERTENSION: ICD-10-CM

## 2020-05-06 DIAGNOSIS — R19.7 DIARRHEA, UNSPECIFIED TYPE: ICD-10-CM

## 2020-05-06 DIAGNOSIS — C79.51 BONE METASTASIS (HCC): ICD-10-CM

## 2020-05-06 NOTE — PROGRESS NOTES
68 y/o cauc male here for f/u appt. For met. Neuroendocrine cancer. He is on afinitor, somatuline depot, and xgeva. He is waiting for his permanent tooth from dentist but has been on hold due to COVID-19. Will be going over scan today. Pt went to ER on 4/29 and diagnosed with vertigo, he was given meclizine but has not had it since then. His BP was high prior to going to the ER but he has not felt   Like his BP is high since then and does not have a way of checking it at home with a good device. He has to have it manually checked in the offices. He will call his PCP.

## 2020-05-07 NOTE — PROGRESS NOTES
[facility]  [address], [address] [facility] [address], [address] [facility]  [phone]       Oncology Follow up Note        Patient: Micah Germain MRN: [id number]  SSN: [id number]    YOB: 1945  Age: 69 y.o. Sex: male        Diagnosis:     1. 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 Afinitor 10 mg + Somatuline Depot 120 mg SC - started Afinitor 3/30/2019  2. Somatuline Depot 120 mg SC - 05/2016 - 11/2018; Restarted 3/4/2019 -    (treatment held per patient request since11/16/2018)   3. Xgeva 120mg SC     Subjective:      Micah Germain is a 69 y.o. male with a diagnosis of metastatic neuroendocrine cancer. He started experiencing pain in both groins and the abdomen. A CT of the abdomen showed retroperitoneal adenopathy. CT guided biopsy of the retroperitoneal LN reveals a dx of well differentiated neuroendocrine carcinoma. Bone scan shows disease in the bone. Mr. Kathlyn Severe received Somatuline and had good control of disease. He took a break from therapy in Nov 2018. He went on an extended vacation until the beginning of February to [facility]. CT showed progression of disease in the nodes. Mr. Kathlyn Severe resumed therapy with Somatuline with the addition of Afinitor. Symptoms such as bone pains, diarrhea and fatigue improved  He took a break from [address] [place] [address] in October 2019, but repeat scans showed slight progression of disease. He resumed Afinitor in November 2019. I am seeing him in follow up via the [employer] platform. He complains of diarrhea which is intermittent. He suffers with fatigue. He is experiencing dizziness and was in the ED recently.        Review of Systems:    Constitutional: fatigue  Eyes: negative  Ears, Nose, Mouth, Throat, and Face: negative  Respiratory: negative  Cardiovascular: negative  Gastrointestinal: diarrhea  Genitourinary: urinary incontinence  Integument/Breast: negative  Hematologic/Lymphatic: negative  Musculoskeletal: back pain  Neurological: dizziness      Past Medical History:   Diagnosis Date    Adverse effect of anesthesia     low HR and very slow to wakeup    Cancer ([address] 2016    neuroendocrine, retroperitoneal LN involvement    Diarrhea     Frequent urination     Hypertension     Poor appetite     Unspecified adverse effect of anesthesia     \"hard to put to sleep and slow to wake up-heart rate dropped very low\"     Past Surgical History:   Procedure Laterality Date    COLONOSCOPY N/A 2016    COLONOSCOPY performed by Nate Martínez MD at [address] [place] [address]  2016         HX ORTHOPAEDIC      knee scope left knee    HX OTHER SURGICAL  2013    excision of scalp cyst     UPPER GI ENDOSCOPY,BIOPSY  2016           Family History   Problem Relation Age of Onset    Cancer Mother     Stroke Sister      Social History     Tobacco Use    Smoking status: Former Smoker     Packs/day: 1.00     Years: 8.00     Pack years: 8.00     Last attempt to quit: 1972     Years since quittin.0    Smokeless tobacco: Never Used   Substance Use Topics    Alcohol use: No      Prior to Admission medications    Medication Sig Start Date End Date Taking? Authorizing Provider   everolimus (AFINITOR) 10 mg tab Take 1 Tab by mouth daily. 2/3/20  Yes Kelton Gann MD   lisinopril (PRINIVIL, ZESTRIL) 20 mg tablet Take 1 Tab by mouth daily. 19  Yes Kelton Gann MD   tamsulosin [facility]) 0.4 mg capsule Take 0.4 mg by mouth daily. Yes Provider, Historical   meclizine (ANTIVERT) 25 mg tablet Take 1 Tab by mouth three (3) times daily as needed for Dizziness. 20   Fitz Mail, DO   ondansetron (Zofran ODT) 4 mg disintegrating tablet Take 1 Tab by mouth every eight (8) hours as needed for Nausea.  20   Fitz Mail, DO          No Known Allergies        Objective: General: alert, cooperative, no distress   Mental  status: normal mood, behavior, speech, dress, motor activity, and thought processes, able to follow commands   HENT: NCAT   Neck: no visualized mass   Resp: no respiratory distress   Neuro: no gross deficits   Skin: no discoloration or lesions of concern on visible areas   Psychiatric: normal affect, consistent with stated mood, no evidence of hallucinations       Due to this being a TeleHealth evaluation (During [place] [address] public health emergency), many elements of the physical examination are unable to be assessed. Evaluation of the following organ systems was limited: Vitals/Constitutional/EENT/Resp/CV/GI//MS/Neuro/Skin/Heme-Lymph-Imm. IMAGING:       I reviewed the imaging personally. Slight decrease of disease in the retroperitoneal nodes. Lab Results   Component Value Date/Time    WBC 4.6 04/30/2020 09:25 AM    Hemoglobin (POC) 14.6 12/21/2017 09:10 AM    HGB 10.5 (L) 04/30/2020 09:25 AM    Hematocrit (POC) 40 11/14/2019 09:15 AM    HCT 32.4 (L) 04/30/2020 09:25 AM    PLATELET 881 (L) 31/55/5242 09:25 AM    MCV 77.7 (L) 04/30/2020 09:25 AM       Lab Results   Component Value Date/Time    Sodium 143 04/30/2020 09:25 AM    Potassium 3.6 04/30/2020 09:25 AM    Chloride 111 (H) 04/30/2020 09:25 AM    CO2 26 04/30/2020 09:25 AM    Anion gap 6 04/30/2020 09:25 AM    Glucose 114 (H) 04/30/2020 09:25 AM    BUN 24 (H) 04/30/2020 09:25 AM    Creatinine 1.40 (H) 04/30/2020 09:25 AM    BUN/Creatinine ratio 17 04/30/2020 09:25 AM    GFR est AA >60 04/30/2020 09:25 AM    GFR est non-AA 50 (L) 04/30/2020 09:25 AM    Calcium 7.9 (L) 04/30/2020 09:25 AM    Bilirubin, total 0.7 04/30/2020 09:25 AM    AST (SGOT) 22 04/30/2020 09:25 AM    Alk.  phosphatase 65 04/30/2020 09:25 AM    Protein, total 6.6 04/30/2020 09:25 AM    Albumin 3.2 (L) 04/30/2020 09:25 AM    Globulin 3.4 04/30/2020 09:25 AM    A-G Ratio 0.9 (L) 04/30/2020 09:25 AM    ALT (SGPT) 18 04/30/2020 09:25 AM           Assessment:     1. 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 Had excellent disease control. He experienced a number of side effects including hot flashes, weight loss etc  He decided to take a break from therapy. CT showed disease progression in retroperitoneum and abdomen. Restarted Somatuline. Added Afinitor. Flushing, appetite, diarrhea, back pain are all better. Stopped Afinitor in Oct 2019  CT showed progression in retroperitoneal nodes    He is now back on Everolimus 10 mg in addition to [employer] treatment   + diarrhea  A detailed system by system evaluation of side effect was performed to assess chemotherapy related toxicity. Blood counts are acceptable. Results reviewed with the patient. Symptom management form reviewed and scanned into the EMR under Media. CT - Improvement in the retroperitoneal nodes. 2. Bone metastasis    > Denosumab  Hold for dental work      3. Urinary incontinence    BPH  Managed by Dr. Gurwinder Beckman      4. Diarrhea    Imodium as needed      5. Orthostasis    Observation      6. HTN, uncontrolled    May need to add Amlodipine to Lisinopril. Plan:       > Continue Afinitor  > Resume Denosumab  > Continue Somatuline  > Imodium as needed  > Follow-up in 1 month         Signed by: Chance Quinn MD                     May 7, 2020        CC. Jaylen West MD  CC. Mehdi Martinez MD      I was in the office while conducting this encounter. The patient was at his home. Consent:  He and/or his healthcare decision maker is aware that this patient-initiated Telehealth encounter is a billable service, with coverage as determined by his insurance carrier.  He is aware that he may receive a bill and has provided verbal consent to proceed: Yes    Pursuant to the emergency declaration under the [facility], [facility] waiver authority and the [employer] Act, this Virtual  Visit was conducted, with patient's (and/or legal guardian's) consent, to reduce the patient's risk of exposure to COVID-19 and provide necessary medical care. Services were provided through a video synchronous discussion virtually to substitute for in-person visit.

## 2020-05-14 ENCOUNTER — PATIENT OUTREACH (OUTPATIENT)
Dept: CARDIOLOGY CLINIC | Age: 75
End: 2020-05-14

## 2020-05-14 NOTE — PROGRESS NOTES
Patient resolved from Transition of Care episode on 5/14/20  Patient/family has been provided the following resources and education related to COVID-19:                         Signs, symptoms and red flags related to COVID-19            CDC exposure and quarantine guidelines            Conduit exposure contact - [phone]            Contact for their local Department of Health                 Patient currently reports that the following symptoms have improved:  no new symptoms. No further outreach scheduled with this CTN/ACM. Episode of Care resolved. Patient has this CTN/ACM contact information if future needs arise.

## 2020-05-20 RX ORDER — LANREOTIDE ACETATE 120 MG/.5ML
120 INJECTION SUBCUTANEOUS ONCE
Status: CANCELLED | OUTPATIENT
Start: 2020-06-25

## 2020-05-28 ENCOUNTER — HOSPITAL ENCOUNTER (OUTPATIENT)
Dept: INFUSION THERAPY | Age: 75
Discharge: HOME | End: 2020-05-28
Payer: MEDICARE

## 2020-05-28 ENCOUNTER — OFFICE VISIT (OUTPATIENT)
Dept: ONCOLOGY | Age: 75
End: 2020-05-28

## 2020-05-28 VITALS
OXYGEN SATURATION: 97 % | WEIGHT: 169.4 LBS | HEIGHT: 72 IN | HEART RATE: 67 BPM | RESPIRATION RATE: 18 BRPM | BODY MASS INDEX: 22.94 KG/M2 | DIASTOLIC BLOOD PRESSURE: 78 MMHG | SYSTOLIC BLOOD PRESSURE: 150 MMHG | TEMPERATURE: 98 F

## 2020-05-28 DIAGNOSIS — C7B.8 NEUROENDOCRINE CARCINOMA METASTATIC TO BONE (HCC): Primary | ICD-10-CM

## 2020-05-28 DIAGNOSIS — C7A.8 NEUROENDOCRINE CARCINOMA METASTATIC TO BONE (HCC): Primary | ICD-10-CM

## 2020-05-28 LAB
ALBUMIN SERPL-MCNC: 3.3 G/DL (ref 3.5–5)
ALBUMIN/GLOB SERPL: 1 {RATIO} (ref 1.1–2.2)
ALP SERPL-CCNC: 64 U/L (ref 45–117)
ALT SERPL-CCNC: 20 U/L (ref 12–78)
ANION GAP SERPL CALC-SCNC: 3 MMOL/L (ref 5–15)
AST SERPL-CCNC: 25 U/L (ref 15–37)
BASOPHILS # BLD: 0 K/UL (ref 0–0.1)
BASOPHILS NFR BLD: 1 % (ref 0–1)
BILIRUB SERPL-MCNC: 0.8 MG/DL (ref 0.2–1)
BUN SERPL-MCNC: 29 MG/DL (ref 6–20)
BUN/CREAT SERPL: 21 (ref 12–20)
CALCIUM SERPL-MCNC: 8.2 MG/DL (ref 8.5–10.1)
CHLORIDE SERPL-SCNC: 111 MMOL/L (ref 97–108)
CHOLEST SERPL-MCNC: 194 MG/DL
CO2 SERPL-SCNC: 26 MMOL/L (ref 21–32)
CREAT SERPL-MCNC: 1.37 MG/DL (ref 0.7–1.3)
DIFFERENTIAL METHOD BLD: ABNORMAL
EOSINOPHIL # BLD: 0.2 K/UL (ref 0–0.4)
EOSINOPHIL NFR BLD: 6 % (ref 0–7)
ERYTHROCYTE [DISTWIDTH] IN BLOOD BY AUTOMATED COUNT: 13.4 % (ref 11.5–14.5)
EST. AVERAGE GLUCOSE BLD GHB EST-MCNC: 140 MG/DL
GLOBULIN SER CALC-MCNC: 3.4 G/DL (ref 2–4)
GLUCOSE SERPL-MCNC: 119 MG/DL (ref 65–100)
HBA1C MFR BLD: 6.5 % (ref 4–5.6)
HCT VFR BLD AUTO: 31.2 % (ref 36.6–50.3)
HDLC SERPL-MCNC: 46 MG/DL
HDLC SERPL: 4.2 {RATIO} (ref 0–5)
HGB BLD-MCNC: 10 G/DL (ref 12.1–17)
IMM GRANULOCYTES # BLD AUTO: 0 K/UL (ref 0–0.04)
IMM GRANULOCYTES NFR BLD AUTO: 0 % (ref 0–0.5)
LDLC SERPL CALC-MCNC: 128.8 MG/DL (ref 0–100)
LIPID PROFILE,FLP: ABNORMAL
LYMPHOCYTES # BLD: 0.6 K/UL (ref 0.8–3.5)
LYMPHOCYTES NFR BLD: 16 % (ref 12–49)
MAGNESIUM SERPL-MCNC: 2 MG/DL (ref 1.6–2.4)
MCH RBC QN AUTO: 24.8 PG (ref 26–34)
MCHC RBC AUTO-ENTMCNC: 32.1 G/DL (ref 30–36.5)
MCV RBC AUTO: 77.4 FL (ref 80–99)
MONOCYTES # BLD: 0.4 K/UL (ref 0–1)
MONOCYTES NFR BLD: 10 % (ref 5–13)
NEUTS SEG # BLD: 2.5 K/UL (ref 1.8–8)
NEUTS SEG NFR BLD: 67 % (ref 32–75)
NRBC # BLD: 0 K/UL (ref 0–0.01)
NRBC BLD-RTO: 0 PER 100 WBC
PHOSPHATE SERPL-MCNC: 2.8 MG/DL (ref 2.6–4.7)
PLATELET # BLD AUTO: 124 K/UL (ref 150–400)
PMV BLD AUTO: 10.3 FL (ref 8.9–12.9)
POTASSIUM SERPL-SCNC: 3.6 MMOL/L (ref 3.5–5.1)
PROT SERPL-MCNC: 6.7 G/DL (ref 6.4–8.2)
PROT UR-MCNC: 14 MG/DL (ref 0–11.9)
PSA SERPL-MCNC: 5 NG/ML (ref 0.01–4)
RBC # BLD AUTO: 4.03 M/UL (ref 4.1–5.7)
RBC MORPH BLD: ABNORMAL
SODIUM SERPL-SCNC: 140 MMOL/L (ref 136–145)
TRIGL SERPL-MCNC: 96 MG/DL (ref ?–150)
VLDLC SERPL CALC-MCNC: 19.2 MG/DL
WBC # BLD AUTO: 3.7 K/UL (ref 4.1–11.1)

## 2020-05-28 PROCEDURE — 96402 CHEMO HORMON ANTINEOPL SQ/IM: CPT

## 2020-05-28 PROCEDURE — 83735 ASSAY OF MAGNESIUM: CPT

## 2020-05-28 PROCEDURE — 85025 COMPLETE CBC W/AUTO DIFF WBC: CPT

## 2020-05-28 PROCEDURE — 96372 THER/PROPH/DIAG INJ SC/IM: CPT

## 2020-05-28 PROCEDURE — 84156 ASSAY OF PROTEIN URINE: CPT

## 2020-05-28 PROCEDURE — 74011250636 HC RX REV CODE- 250/636: Performed by: INTERNAL MEDICINE

## 2020-05-28 PROCEDURE — 36415 COLL VENOUS BLD VENIPUNCTURE: CPT

## 2020-05-28 PROCEDURE — 84100 ASSAY OF PHOSPHORUS: CPT

## 2020-05-28 PROCEDURE — 84153 ASSAY OF PSA TOTAL: CPT

## 2020-05-28 PROCEDURE — 80053 COMPREHEN METABOLIC PANEL: CPT

## 2020-05-28 PROCEDURE — 80061 LIPID PANEL: CPT

## 2020-05-28 PROCEDURE — 83036 HEMOGLOBIN GLYCOSYLATED A1C: CPT

## 2020-05-28 RX ORDER — LANREOTIDE ACETATE 120 MG/.5ML
120 INJECTION SUBCUTANEOUS ONCE
Status: COMPLETED | OUTPATIENT
Start: 2020-05-28 | End: 2020-05-28

## 2020-05-28 RX ADMIN — LANREOTIDE ACETATE 120 MG: 120 INJECTION SUBCUTANEOUS at 10:02

## 2020-05-28 RX ADMIN — DENOSUMAB 120 MG: 120 INJECTION SUBCUTANEOUS at 10:54

## 2020-05-28 NOTE — PROGRESS NOTES
Outpatient Infusion Center Short Visit Progress Note    6241 - Pt admit to [facility] for Lanreotide and Xgeva ambulatory in stable condition. Assessment completed. No new concerns voiced. Visit Vitals  /78 (BP 1 Location: Left arm, BP Patient Position: Sitting)   Pulse 67   Temp 98 °F (36.7 °C)   Resp 18   Ht 6' (1.829 m)   Wt 76.8 kg (169 lb 6.4 oz)   SpO2 97%   BMI 22.97 kg/m²     Labs drawn peripherally, urine collected and sent to lab. Recent Results (from the past 12 hour(s))   CBC WITH AUTOMATED DIFF    Collection Time: 05/28/20  9:24 AM   Result Value Ref Range    WBC 3.7 (L) 4.1 - 11.1 K/uL    RBC 4.03 (L) 4.10 - 5.70 M/uL    HGB 10.0 (L) 12.1 - 17.0 g/dL    HCT 31.2 (L) 36.6 - 50.3 %    MCV 77.4 (L) 80.0 - 99.0 FL    MCH 24.8 (L) 26.0 - 34.0 PG    MCHC 32.1 30.0 - 36.5 g/dL    RDW 13.4 11.5 - 14.5 %    PLATELET 304 (L) 080 - 400 K/uL    MPV 10.3 8.9 - 12.9 FL    NRBC 0.0 0  WBC    ABSOLUTE NRBC 0.00 0.00 - 0.01 K/uL    NEUTROPHILS 67 32 - 75 %    LYMPHOCYTES 16 12 - 49 %    MONOCYTES 10 5 - 13 %    EOSINOPHILS 6 0 - 7 %    BASOPHILS 1 0 - 1 %    IMMATURE GRANULOCYTES 0 0.0 - 0.5 %    ABS. NEUTROPHILS 2.5 1.8 - 8.0 K/UL    ABS. LYMPHOCYTES 0.6 (L) 0.8 - 3.5 K/UL    ABS. MONOCYTES 0.4 0.0 - 1.0 K/UL    ABS. EOSINOPHILS 0.2 0.0 - 0.4 K/UL    ABS. BASOPHILS 0.0 0.0 - 0.1 K/UL    ABS. IMM.  GRANS. 0.0 0.00 - 0.04 K/UL    DF AUTOMATED      RBC COMMENTS NORMOCYTIC, NORMOCHROMIC     METABOLIC PANEL, COMPREHENSIVE    Collection Time: 05/28/20  9:24 AM   Result Value Ref Range    Sodium 140 136 - 145 mmol/L    Potassium 3.6 3.5 - 5.1 mmol/L    Chloride 111 (H) 97 - 108 mmol/L    CO2 26 21 - 32 mmol/L    Anion gap 3 (L) 5 - 15 mmol/L    Glucose 119 (H) 65 - 100 mg/dL    BUN 29 (H) 6 - 20 MG/DL    Creatinine 1.37 (H) 0.70 - 1.30 MG/DL    BUN/Creatinine ratio 21 (H) 12 - 20      GFR est AA >60 >60 ml/min/1.73m2    GFR est non-AA 51 (L) >60 ml/min/1.73m2    Calcium 8.2 (L) 8.5 - 10.1 MG/DL    Bilirubin, total 0.8 0.2 - 1.0 MG/DL    ALT (SGPT) 20 12 - 78 U/L    AST (SGOT) 25 15 - 37 U/L    Alk. phosphatase 64 45 - 117 U/L    Protein, total 6.7 6.4 - 8.2 g/dL    Albumin 3.3 (L) 3.5 - 5.0 g/dL    Globulin 3.4 2.0 - 4.0 g/dL    A-G Ratio 1.0 (L) 1.1 - 2.2     MAGNESIUM    Collection Time: 05/28/20  9:24 AM   Result Value Ref Range    Magnesium 2.0 1.6 - 2.4 mg/dL   PHOSPHORUS    Collection Time: 05/28/20  9:24 AM   Result Value Ref Range    Phosphorus 2.8 2.6 - 4.7 MG/DL   PROTEIN URINE, RANDOM    Collection Time: 05/28/20  9:24 AM   Result Value Ref Range    Protein, urine random 14 (H) 0.0 - 11.9 mg/dL      Corrected calcium 8.7. Medications:  Lanreotide SQ R  Xgeva SQ SARA    1100 -  Pt tolerated treatment well. D/c home ambulatory in no distress. Pt aware of next appointment scheduled for 6/25/20 at 0900.

## 2020-05-28 NOTE — PROGRESS NOTES
Homero Ricks is a 69 y.o. male here for follow up for:  Chief Complaint   Patient presents with    Cancer     neuroendocrine/met    Chemotherapy   Pt on Afinitor/Somatuline depot/Xgeva    1. Have you been to the ER, urgent care clinic since your last visit? Hospitalized since your last visit? 2. Have you seen or consulted any other health care providers outside of the [address] [place] [address] since your last visit? Include any pap smears or colon screening.

## 2020-06-25 ENCOUNTER — OFFICE VISIT (OUTPATIENT)
Dept: ONCOLOGY | Age: 75
End: 2020-06-25

## 2020-06-25 ENCOUNTER — HOSPITAL ENCOUNTER (OUTPATIENT)
Dept: INFUSION THERAPY | Age: 75
Discharge: HOME | End: 2020-06-25
Payer: MEDICARE

## 2020-06-25 VITALS
BODY MASS INDEX: 22.78 KG/M2 | SYSTOLIC BLOOD PRESSURE: 160 MMHG | DIASTOLIC BLOOD PRESSURE: 80 MMHG | HEART RATE: 70 BPM | WEIGHT: 168.2 LBS | TEMPERATURE: 98 F | HEIGHT: 72 IN | OXYGEN SATURATION: 100 % | RESPIRATION RATE: 18 BRPM

## 2020-06-25 DIAGNOSIS — C7A.8 NEUROENDOCRINE CARCINOMA METASTATIC TO BONE (HCC): Primary | ICD-10-CM

## 2020-06-25 DIAGNOSIS — C7B.8 NEUROENDOCRINE CARCINOMA METASTATIC TO BONE (HCC): Primary | ICD-10-CM

## 2020-06-25 LAB
ALBUMIN SERPL-MCNC: 3.2 G/DL (ref 3.5–5)
ALBUMIN/GLOB SERPL: 0.9 {RATIO} (ref 1.1–2.2)
ALP SERPL-CCNC: 57 U/L (ref 45–117)
ALT SERPL-CCNC: 20 U/L (ref 12–78)
ANION GAP SERPL CALC-SCNC: 5 MMOL/L (ref 5–15)
AST SERPL-CCNC: 21 U/L (ref 15–37)
BASOPHILS # BLD: 0 K/UL (ref 0–0.1)
BASOPHILS NFR BLD: 1 % (ref 0–1)
BILIRUB SERPL-MCNC: 0.7 MG/DL (ref 0.2–1)
BUN SERPL-MCNC: 23 MG/DL (ref 6–20)
BUN/CREAT SERPL: 17 (ref 12–20)
CALCIUM SERPL-MCNC: 8.2 MG/DL (ref 8.5–10.1)
CHLORIDE SERPL-SCNC: 113 MMOL/L (ref 97–108)
CO2 SERPL-SCNC: 26 MMOL/L (ref 21–32)
CREAT SERPL-MCNC: 1.32 MG/DL (ref 0.7–1.3)
DIFFERENTIAL METHOD BLD: ABNORMAL
EOSINOPHIL # BLD: 0.3 K/UL (ref 0–0.4)
EOSINOPHIL NFR BLD: 6 % (ref 0–7)
ERYTHROCYTE [DISTWIDTH] IN BLOOD BY AUTOMATED COUNT: 14.3 % (ref 11.5–14.5)
GLOBULIN SER CALC-MCNC: 3.5 G/DL (ref 2–4)
GLUCOSE SERPL-MCNC: 181 MG/DL (ref 65–100)
HCT VFR BLD AUTO: 30.7 % (ref 36.6–50.3)
HGB BLD-MCNC: 10 G/DL (ref 12.1–17)
IMM GRANULOCYTES # BLD AUTO: 0 K/UL (ref 0–0.04)
IMM GRANULOCYTES NFR BLD AUTO: 0 % (ref 0–0.5)
LYMPHOCYTES # BLD: 0.7 K/UL (ref 0.8–3.5)
LYMPHOCYTES NFR BLD: 16 % (ref 12–49)
MAGNESIUM SERPL-MCNC: 2 MG/DL (ref 1.6–2.4)
MCH RBC QN AUTO: 24.9 PG (ref 26–34)
MCHC RBC AUTO-ENTMCNC: 32.6 G/DL (ref 30–36.5)
MCV RBC AUTO: 76.4 FL (ref 80–99)
MONOCYTES # BLD: 0.3 K/UL (ref 0–1)
MONOCYTES NFR BLD: 8 % (ref 5–13)
NEUTS SEG # BLD: 2.9 K/UL (ref 1.8–8)
NEUTS SEG NFR BLD: 69 % (ref 32–75)
NRBC # BLD: 0 K/UL (ref 0–0.01)
NRBC BLD-RTO: 0 PER 100 WBC
PHOSPHATE SERPL-MCNC: 2.9 MG/DL (ref 2.6–4.7)
PLATELET # BLD AUTO: 141 K/UL (ref 150–400)
PMV BLD AUTO: 10.8 FL (ref 8.9–12.9)
POTASSIUM SERPL-SCNC: 3.8 MMOL/L (ref 3.5–5.1)
PROT SERPL-MCNC: 6.7 G/DL (ref 6.4–8.2)
RBC # BLD AUTO: 4.02 M/UL (ref 4.1–5.7)
RBC MORPH BLD: ABNORMAL
RBC MORPH BLD: ABNORMAL
SODIUM SERPL-SCNC: 144 MMOL/L (ref 136–145)
WBC # BLD AUTO: 4.2 K/UL (ref 4.1–11.1)

## 2020-06-25 PROCEDURE — 85025 COMPLETE CBC W/AUTO DIFF WBC: CPT

## 2020-06-25 PROCEDURE — 96372 THER/PROPH/DIAG INJ SC/IM: CPT

## 2020-06-25 PROCEDURE — 83735 ASSAY OF MAGNESIUM: CPT

## 2020-06-25 PROCEDURE — 74011250636 HC RX REV CODE- 250/636: Performed by: INTERNAL MEDICINE

## 2020-06-25 PROCEDURE — 84100 ASSAY OF PHOSPHORUS: CPT

## 2020-06-25 PROCEDURE — 80053 COMPREHEN METABOLIC PANEL: CPT

## 2020-06-25 PROCEDURE — 36415 COLL VENOUS BLD VENIPUNCTURE: CPT

## 2020-06-25 RX ORDER — LANREOTIDE ACETATE 120 MG/.5ML
120 INJECTION SUBCUTANEOUS ONCE
Status: COMPLETED | OUTPATIENT
Start: 2020-06-25 | End: 2020-06-25

## 2020-06-25 RX ADMIN — LANREOTIDE ACETATE 120 MG: 120 INJECTION SUBCUTANEOUS at 09:27

## 2020-06-25 RX ADMIN — DENOSUMAB 120 MG: 120 INJECTION SUBCUTANEOUS at 09:52

## 2020-06-25 NOTE — PROGRESS NOTES
Outpatient Infusion Center Progress Note    6338  Pt arrived in stable condition and in no distress for Lanreotide    Assessment unremarkable. Labs obtained per order and sent for processing. Patient Vitals for the past 12 hrs:   Temp Pulse Resp BP SpO2   06/25/20 0859 98 °F (36.7 °C) 70 18 160/80 100 %        Recent Results (from the past 12 hour(s))   CBC WITH AUTOMATED DIFF    Collection Time: 06/25/20  8:59 AM   Result Value Ref Range    WBC 4.2 4.1 - 11.1 K/uL    RBC 4.02 (L) 4.10 - 5.70 M/uL    HGB 10.0 (L) 12.1 - 17.0 g/dL    HCT 30.7 (L) 36.6 - 50.3 %    MCV 76.4 (L) 80.0 - 99.0 FL    MCH 24.9 (L) 26.0 - 34.0 PG    MCHC 32.6 30.0 - 36.5 g/dL    RDW 14.3 11.5 - 14.5 %    PLATELET 109 (L) 740 - 400 K/uL    MPV 10.8 8.9 - 12.9 FL    NRBC 0.0 0  WBC    ABSOLUTE NRBC 0.00 0.00 - 0.01 K/uL    NEUTROPHILS 69 32 - 75 %    LYMPHOCYTES 16 12 - 49 %    MONOCYTES 8 5 - 13 %    EOSINOPHILS 6 0 - 7 %    BASOPHILS 1 0 - 1 %    IMMATURE GRANULOCYTES 0 0.0 - 0.5 %    ABS. NEUTROPHILS 2.9 1.8 - 8.0 K/UL    ABS. LYMPHOCYTES 0.7 (L) 0.8 - 3.5 K/UL    ABS. MONOCYTES 0.3 0.0 - 1.0 K/UL    ABS. EOSINOPHILS 0.3 0.0 - 0.4 K/UL    ABS. BASOPHILS 0.0 0.0 - 0.1 K/UL    ABS. IMM. GRANS. 0.0 0.00 - 0.04 K/UL    DF AUTOMATED      RBC COMMENTS MICROCYTOSIS  1+        RBC COMMENTS HYPOCHROMIA  PRESENT       METABOLIC PANEL, COMPREHENSIVE    Collection Time: 06/25/20  8:59 AM   Result Value Ref Range    Sodium 144 136 - 145 mmol/L    Potassium 3.8 3.5 - 5.1 mmol/L    Chloride 113 (H) 97 - 108 mmol/L    CO2 26 21 - 32 mmol/L    Anion gap 5 5 - 15 mmol/L    Glucose 181 (H) 65 - 100 mg/dL    BUN 23 (H) 6 - 20 MG/DL    Creatinine 1.32 (H) 0.70 - 1.30 MG/DL    BUN/Creatinine ratio 17 12 - 20      GFR est AA >60 >60 ml/min/1.73m2    GFR est non-AA 53 (L) >60 ml/min/1.73m2    Calcium 8.2 (L) 8.5 - 10.1 MG/DL    Bilirubin, total 0.7 0.2 - 1.0 MG/DL    ALT (SGPT) 20 12 - 78 U/L    AST (SGOT) 21 15 - 37 U/L    Alk.  phosphatase 57 45 - 117 U/L    Protein, total 6.7 6.4 - 8.2 g/dL    Albumin 3.2 (L) 3.5 - 5.0 g/dL    Globulin 3.5 2.0 - 4.0 g/dL    A-G Ratio 0.9 (L) 1.1 - 2.2     MAGNESIUM    Collection Time: 06/25/20  8:59 AM   Result Value Ref Range    Magnesium 2.0 1.6 - 2.4 mg/dL   PHOSPHORUS    Collection Time: 06/25/20  8:59 AM   Result Value Ref Range    Phosphorus 2.9 2.6 - 4.7 MG/DL        The following medications administered:  Medications Administered     denosumab (XGEVA) injection 120 mg     Admin Date  06/25/2020 Action  Given Dose  120 mg Route  SubCUTAneous Administered By  [place]          lanreotide (SOMATULINE DEPOT) injection syringe 120 mg     Admin Date  06/25/2020 Action  Given Dose  120 mg Route  SubCUTAneous Administered By  [place]                Pt tolerated treatment well. No s/s of adverse reaction noted. 1000  Pt discharged in no acute distress.  Next appointment:    Future Appointments   Date Time Provider Mark De La Paz   7/23/2020  9:00 AM [facility] NURSE 4 [address] REG

## 2020-06-25 NOTE — PROGRESS NOTES
Michael Bland is a 69 y.o. male here for one month  follow up for:  Chief Complaint   Patient presents with    Cancer     Neuroendocrine/met    Chemotherapy   Pt on Afinitor, Somatuline depot/Xgeva    1. Have you been to the ER, urgent care clinic since your last visit? Hospitalized since your last visit? 2. Have you seen or consulted any other health care providers outside of the [address] [place] [address] since your last visit? Include any pap smears or colon screening.

## 2020-07-16 RX ORDER — LANREOTIDE ACETATE 120 MG/.5ML
120 INJECTION SUBCUTANEOUS ONCE
Status: CANCELLED | OUTPATIENT
Start: 2020-08-20

## 2020-07-16 RX ORDER — LANREOTIDE ACETATE 120 MG/.5ML
120 INJECTION SUBCUTANEOUS ONCE
Status: CANCELLED | OUTPATIENT
Start: 2020-10-15

## 2020-07-16 RX ORDER — LANREOTIDE ACETATE 120 MG/.5ML
120 INJECTION SUBCUTANEOUS ONCE
Status: CANCELLED | OUTPATIENT
Start: 2020-09-17

## 2020-07-23 ENCOUNTER — HOSPITAL ENCOUNTER (OUTPATIENT)
Dept: INFUSION THERAPY | Age: 75
Discharge: HOME | End: 2020-07-23
Payer: MEDICARE

## 2020-07-23 ENCOUNTER — OFFICE VISIT (OUTPATIENT)
Dept: ONCOLOGY | Age: 75
End: 2020-07-23

## 2020-07-23 VITALS
RESPIRATION RATE: 18 BRPM | SYSTOLIC BLOOD PRESSURE: 154 MMHG | HEIGHT: 72 IN | TEMPERATURE: 98.1 F | BODY MASS INDEX: 22.35 KG/M2 | DIASTOLIC BLOOD PRESSURE: 82 MMHG | WEIGHT: 165 LBS | OXYGEN SATURATION: 100 % | HEART RATE: 60 BPM

## 2020-07-23 VITALS
BODY MASS INDEX: 22.43 KG/M2 | OXYGEN SATURATION: 100 % | RESPIRATION RATE: 18 BRPM | WEIGHT: 165.6 LBS | HEART RATE: 60 BPM | SYSTOLIC BLOOD PRESSURE: 154 MMHG | HEIGHT: 72 IN | TEMPERATURE: 98.1 F | DIASTOLIC BLOOD PRESSURE: 82 MMHG

## 2020-07-23 DIAGNOSIS — C7B.8 NEUROENDOCRINE CARCINOMA METASTATIC TO BONE (HCC): Primary | ICD-10-CM

## 2020-07-23 DIAGNOSIS — R19.7 DIARRHEA, UNSPECIFIED TYPE: ICD-10-CM

## 2020-07-23 DIAGNOSIS — C7A.8 NEUROENDOCRINE CARCINOMA METASTATIC TO BONE (HCC): Primary | ICD-10-CM

## 2020-07-23 LAB
ALBUMIN SERPL-MCNC: 3.3 G/DL (ref 3.5–5)
ALBUMIN/GLOB SERPL: 1 {RATIO} (ref 1.1–2.2)
ALP SERPL-CCNC: 62 U/L (ref 45–117)
ALT SERPL-CCNC: 22 U/L (ref 12–78)
ANION GAP SERPL CALC-SCNC: 4 MMOL/L (ref 5–15)
AST SERPL-CCNC: 19 U/L (ref 15–37)
BASOPHILS # BLD: 0 K/UL (ref 0–0.1)
BASOPHILS NFR BLD: 1 % (ref 0–1)
BILIRUB SERPL-MCNC: 1.3 MG/DL (ref 0.2–1)
BUN SERPL-MCNC: 19 MG/DL (ref 6–20)
BUN/CREAT SERPL: 14 (ref 12–20)
CALCIUM SERPL-MCNC: 8.2 MG/DL (ref 8.5–10.1)
CHLORIDE SERPL-SCNC: 111 MMOL/L (ref 97–108)
CHOLEST SERPL-MCNC: 200 MG/DL
CO2 SERPL-SCNC: 27 MMOL/L (ref 21–32)
CREAT SERPL-MCNC: 1.32 MG/DL (ref 0.7–1.3)
DIFFERENTIAL METHOD BLD: ABNORMAL
EOSINOPHIL # BLD: 0.2 K/UL (ref 0–0.4)
EOSINOPHIL NFR BLD: 5 % (ref 0–7)
ERYTHROCYTE [DISTWIDTH] IN BLOOD BY AUTOMATED COUNT: 14.6 % (ref 11.5–14.5)
EST. AVERAGE GLUCOSE BLD GHB EST-MCNC: 128 MG/DL
GLOBULIN SER CALC-MCNC: 3.4 G/DL (ref 2–4)
GLUCOSE SERPL-MCNC: 131 MG/DL (ref 65–100)
HBA1C MFR BLD: 6.1 % (ref 4–5.6)
HCT VFR BLD AUTO: 34.5 % (ref 36.6–50.3)
HDLC SERPL-MCNC: 46 MG/DL
HDLC SERPL: 4.3 {RATIO} (ref 0–5)
HGB BLD-MCNC: 10.8 G/DL (ref 12.1–17)
IMM GRANULOCYTES # BLD AUTO: 0 K/UL (ref 0–0.04)
IMM GRANULOCYTES NFR BLD AUTO: 0 % (ref 0–0.5)
LDLC SERPL CALC-MCNC: 115.2 MG/DL (ref 0–100)
LIPID PROFILE,FLP: ABNORMAL
LYMPHOCYTES # BLD: 0.6 K/UL (ref 0.8–3.5)
LYMPHOCYTES NFR BLD: 17 % (ref 12–49)
MAGNESIUM SERPL-MCNC: 2.2 MG/DL (ref 1.6–2.4)
MCH RBC QN AUTO: 24.4 PG (ref 26–34)
MCHC RBC AUTO-ENTMCNC: 31.3 G/DL (ref 30–36.5)
MCV RBC AUTO: 77.9 FL (ref 80–99)
MONOCYTES # BLD: 0.3 K/UL (ref 0–1)
MONOCYTES NFR BLD: 8 % (ref 5–13)
NEUTS SEG # BLD: 2.7 K/UL (ref 1.8–8)
NEUTS SEG NFR BLD: 69 % (ref 32–75)
NRBC # BLD: 0 K/UL (ref 0–0.01)
NRBC BLD-RTO: 0 PER 100 WBC
PHOSPHATE SERPL-MCNC: 2.7 MG/DL (ref 2.6–4.7)
PLATELET # BLD AUTO: 126 K/UL (ref 150–400)
PMV BLD AUTO: 10.5 FL (ref 8.9–12.9)
POTASSIUM SERPL-SCNC: 3.7 MMOL/L (ref 3.5–5.1)
PROT SERPL-MCNC: 6.7 G/DL (ref 6.4–8.2)
PROT UR-MCNC: 13 MG/DL (ref 0–11.9)
RBC # BLD AUTO: 4.43 M/UL (ref 4.1–5.7)
RBC MORPH BLD: ABNORMAL
SODIUM SERPL-SCNC: 142 MMOL/L (ref 136–145)
TRIGL SERPL-MCNC: 194 MG/DL (ref ?–150)
VLDLC SERPL CALC-MCNC: 38.8 MG/DL
WBC # BLD AUTO: 3.8 K/UL (ref 4.1–11.1)

## 2020-07-23 PROCEDURE — 84156 ASSAY OF PROTEIN URINE: CPT

## 2020-07-23 PROCEDURE — 96372 THER/PROPH/DIAG INJ SC/IM: CPT

## 2020-07-23 PROCEDURE — 83735 ASSAY OF MAGNESIUM: CPT

## 2020-07-23 PROCEDURE — 96402 CHEMO HORMON ANTINEOPL SQ/IM: CPT

## 2020-07-23 PROCEDURE — 36415 COLL VENOUS BLD VENIPUNCTURE: CPT

## 2020-07-23 PROCEDURE — 80061 LIPID PANEL: CPT

## 2020-07-23 PROCEDURE — 86316 IMMUNOASSAY TUMOR OTHER: CPT

## 2020-07-23 PROCEDURE — 80053 COMPREHEN METABOLIC PANEL: CPT

## 2020-07-23 PROCEDURE — 74011250636 HC RX REV CODE- 250/636: Performed by: INTERNAL MEDICINE

## 2020-07-23 PROCEDURE — 85025 COMPLETE CBC W/AUTO DIFF WBC: CPT

## 2020-07-23 PROCEDURE — 83036 HEMOGLOBIN GLYCOSYLATED A1C: CPT

## 2020-07-23 PROCEDURE — 84100 ASSAY OF PHOSPHORUS: CPT

## 2020-07-23 RX ORDER — LANREOTIDE ACETATE 120 MG/.5ML
120 INJECTION SUBCUTANEOUS ONCE
Status: COMPLETED | OUTPATIENT
Start: 2020-07-23 | End: 2020-07-23

## 2020-07-23 RX ADMIN — DENOSUMAB 120 MG: 120 INJECTION SUBCUTANEOUS at 10:27

## 2020-07-23 RX ADMIN — LANREOTIDE ACETATE 120 MG: 120 INJECTION SUBCUTANEOUS at 10:19

## 2020-07-23 NOTE — PROGRESS NOTES
Dani Hyman is a 69 y.o. male here for follow up for:  Chief Complaint   Patient presents with    Cancer     neuroendocrine/met    Chemotherapy   Pt on Afinitor/Somatuline depot/Xgeva    1. Have you been to the ER, urgent care clinic since your last visit? Hospitalized since your last visit? no  2. Have you seen or consulted any other health care providers outside of the [address] [place] [address] since your last visit? Include any pap smears or colon screening. Dr Nikki Watters for check up. Pt states usually about 3 days before shot he feels bad. He is still losing weight. Lost 3 lbs since last visit. Needs some advice on how to keep his weight on.

## 2020-07-23 NOTE — PROGRESS NOTES
[address], [address] [place] [address] Domo Joyceu, [address] [facility]  [phone]       Oncology Follow up Note      Patient: Thai Hameed MRN: [id number]  SSN: [id number]    YOB: 1945  Age: 69 y.o. Sex: male        Diagnosis:     1. 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 Afinitor 10 mg + Somatuline Depot 120 mg SC - started Afinitor 3/30/2019  2. Somatuline Depot 120 mg SC - 05/2016 - 11/2018; Restarted 3/4/2019 -    (treatment held per patient request since11/16/2018)   3. Xgeva 120mg SC     Subjective:      Thai Hameed is a 69 y.o. male with a diagnosis of metastatic neuroendocrine cancer. He started experiencing pain in both groins and the abdomen. A CT of the abdomen showed retroperitoneal adenopathy. CT guided biopsy of the retroperitoneal LN reveals a dx of well differentiated neuroendocrine carcinoma. Bone scan shows disease in the bone. Mr. Tj Lee received Somatuline and had good control of disease. He took a break from therapy in Nov 2018. He went on an extended vacation until the beginning of February to [facility]. CT showed progression of disease in the nodes. Mr. Tj Lee resumed therapy with Somatuline with the addition of Afinitor. Symptoms such as bone pains, diarrhea and fatigue improved  He took a break from [address] [place] [address] in October 2019, but repeat scans showed slight progression of disease. He resumed Afinitor in November 2019. Scans show stable disease. He continues to have intermittent diarrhea and is concerned about weight loss.        Review of Systems:    Constitutional: fatigue  Eyes: negative  Ears, Nose, Mouth, Throat, and Face: negative  Respiratory: negative  Cardiovascular: negative  Gastrointestinal: diarrhea  Genitourinary: urinary incontinence  Integument/Breast: negative  Hematologic/Lymphatic: negative  Musculoskeletal: back pain  Neurological: negative      Past Medical History:   Diagnosis Date    Adverse effect of anesthesia     low HR and very slow to wakeup    Cancer ([address] 2016    neuroendocrine, retroperitoneal LN involvement    Diarrhea     Frequent urination     Hypertension     Poor appetite     Unspecified adverse effect of anesthesia     \"hard to put to sleep and slow to wake up-heart rate dropped very low\"     Past Surgical History:   Procedure Laterality Date    COLONOSCOPY N/A 2016    COLONOSCOPY performed by Brett Humphrey MD at [address] [place] [address]  2016         HX ORTHOPAEDIC      knee scope left knee    HX OTHER SURGICAL  2013    excision of scalp cyst     UPPER GI ENDOSCOPY,BIOPSY  2016           Family History   Problem Relation Age of Onset    Cancer Mother     Stroke Sister      Social History     Tobacco Use    Smoking status: Former Smoker     Packs/day: 1.00     Years: 8.00     Pack years: 8.00     Last attempt to quit: 1972     Years since quittin.2    Smokeless tobacco: Never Used   Substance Use Topics    Alcohol use: No      Prior to Admission medications    Medication Sig Start Date End Date Taking? Authorizing Provider   meclizine (ANTIVERT) 25 mg tablet Take 1 Tab by mouth three (3) times daily as needed for Dizziness. 20  Yes Mirela Xiong,    ondansetron (Zofran ODT) 4 mg disintegrating tablet Take 1 Tab by mouth every eight (8) hours as needed for Nausea. 20  Yes Mirela Xiong DO   everolimus (AFINITOR) 10 mg tab Take 1 Tab by mouth daily. 2/3/20  Yes Erika Child MD   lisinopril (PRINIVIL, ZESTRIL) 20 mg tablet Take 1 Tab by mouth daily. Patient taking differently: Take 40 mg by mouth daily. 19  Yes Erika Child MD   tamsulosin [facility]) 0.4 mg capsule Take 0.4 mg by mouth daily.    Yes Provider, Historical          No Known Allergies        Objective:     Visit Vitals  BP 154/82   Pulse 60   Temp 98.1 °F (36.7 °C)   Resp 18   Ht 6' (1.829 m)   Wt 165 lb (74.8 kg)   SpO2 100%   BMI 22.38 kg/m²       Pain Scale: 0 - No pain/10  Pain Location:       Physical Exam:     GENERAL: alert, cooperative  EYE: negative  LYMPHATIC: Cervical, supraclavicular, and axillary nodes normal.   THROAT & NECK: normal and no erythema or exudates noted. LUNG: clear to auscultation bilaterally  HEART: regular rate and rhythm  ABDOMEN: soft, non-tender  EXTREMITIES: no cyanosis or edema  SKIN: Normal.  NEUROLOGIC: negative       IMAGING:       I reviewed the imaging personally. Slight decrease of disease in the retroperitoneal nodes. Lab Results   Component Value Date/Time    WBC 3.8 (L) 07/23/2020 09:06 AM    Hemoglobin (POC) 14.6 12/21/2017 09:10 AM    HGB 10.8 (L) 07/23/2020 09:06 AM    Hematocrit (POC) 40 11/14/2019 09:15 AM    HCT 34.5 (L) 07/23/2020 09:06 AM    PLATELET 645 (L) 26/79/1084 09:06 AM    MCV 77.9 (L) 07/23/2020 09:06 AM       Lab Results   Component Value Date/Time    Sodium 142 07/23/2020 09:06 AM    Potassium 3.7 07/23/2020 09:06 AM    Chloride 111 (H) 07/23/2020 09:06 AM    CO2 27 07/23/2020 09:06 AM    Anion gap 4 (L) 07/23/2020 09:06 AM    Glucose 131 (H) 07/23/2020 09:06 AM    BUN 19 07/23/2020 09:06 AM    Creatinine 1.32 (H) 07/23/2020 09:06 AM    BUN/Creatinine ratio 14 07/23/2020 09:06 AM    GFR est AA >60 07/23/2020 09:06 AM    GFR est non-AA 53 (L) 07/23/2020 09:06 AM    Calcium 8.2 (L) 07/23/2020 09:06 AM    Bilirubin, total 1.3 (H) 07/23/2020 09:06 AM    Alk. phosphatase 62 07/23/2020 09:06 AM    Protein, total 6.7 07/23/2020 09:06 AM    Albumin 3.3 (L) 07/23/2020 09:06 AM    Globulin 3.4 07/23/2020 09:06 AM    A-G Ratio 1.0 (L) 07/23/2020 09:06 AM    ALT (SGPT) 22 07/23/2020 09:06 AM           Assessment:     1.  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 Had excellent disease control. He experienced a number of side effects including hot flashes, weight loss etc  He decided to take a break from therapy. CT showed disease progression in retroperitoneum and abdomen. Restarted Somatuline. Added Afinitor. Flushing, appetite, diarrhea, back pain are all better. Stopped Afinitor in Oct 2019  CT showed progression in retroperitoneal nodes    He is now back on Everolimus 10 mg in addition to [employer] treatment   + diarrhea  A detailed system by system evaluation of side effect was performed to assess chemotherapy related toxicity. Blood counts are acceptable. Results reviewed with the patient. CT Abd Pelv, NM bone scan (4/28/2020): Improvement in the retroperitoneal nodes. 2. Bone metastasis    > Denosumab      3. Urinary incontinence    BPH  Managed by Dr. Wally Melgar      4. Diarrhea    Imodium as needed      5. Orthostasis    Observation      6. HTN, uncontrolled    May need to add Amlodipine to Lisinopril. 7. Weight loss    Referral to dietician      Plan:       > Continue Afinitor  > Continue Denosumab  > Continue Somatuline  > Imodium as needed  > Dietician consult  > Gallium scan  > Chromogranin level today  > Follow-up in 3 months         Signed by: Bean Jaime MD                     July 23, 2020        CC. Edinson Butts MD  CC.  Feliberto Palafox MD

## 2020-07-23 NOTE — PROGRESS NOTES
8000 [facility] Visit Note    900- Pt arrived at [facility] ambulatory and in no distress for Lanreotide/Xgeva. Assessment completed, no new complaints voiced. Patient denies any recent or upcoming dental work. Patient over to MD office for appt. Labs Obtained - Lipid Panel, HgbA1C, Urine Protein, Phos, Mag, CMP, CBC w/ diff, Chromogronin A -- some results still pending, see connectcare    Recent Results (from the past 12 hour(s))   CBC WITH AUTOMATED DIFF    Collection Time: 07/23/20  9:06 AM   Result Value Ref Range    WBC 3.8 (L) 4.1 - 11.1 K/uL    RBC 4.43 4.10 - 5.70 M/uL    HGB 10.8 (L) 12.1 - 17.0 g/dL    HCT 34.5 (L) 36.6 - 50.3 %    MCV 77.9 (L) 80.0 - 99.0 FL    MCH 24.4 (L) 26.0 - 34.0 PG    MCHC 31.3 30.0 - 36.5 g/dL    RDW 14.6 (H) 11.5 - 14.5 %    PLATELET 533 (L) 015 - 400 K/uL    MPV 10.5 8.9 - 12.9 FL    NRBC 0.0 0  WBC    ABSOLUTE NRBC 0.00 0.00 - 0.01 K/uL    NEUTROPHILS 69 32 - 75 %    LYMPHOCYTES 17 12 - 49 %    MONOCYTES 8 5 - 13 %    EOSINOPHILS 5 0 - 7 %    BASOPHILS 1 0 - 1 %    IMMATURE GRANULOCYTES 0 0.0 - 0.5 %    ABS. NEUTROPHILS 2.7 1.8 - 8.0 K/UL    ABS. LYMPHOCYTES 0.6 (L) 0.8 - 3.5 K/UL    ABS. MONOCYTES 0.3 0.0 - 1.0 K/UL    ABS. EOSINOPHILS 0.2 0.0 - 0.4 K/UL    ABS. BASOPHILS 0.0 0.0 - 0.1 K/UL    ABS. IMM.  GRANS. 0.0 0.00 - 0.04 K/UL    DF SMEAR SCANNED      RBC COMMENTS MICROCYTOSIS  1+       METABOLIC PANEL, COMPREHENSIVE    Collection Time: 07/23/20  9:06 AM   Result Value Ref Range    Sodium 142 136 - 145 mmol/L    Potassium 3.7 3.5 - 5.1 mmol/L    Chloride 111 (H) 97 - 108 mmol/L    CO2 27 21 - 32 mmol/L    Anion gap 4 (L) 5 - 15 mmol/L    Glucose 131 (H) 65 - 100 mg/dL    BUN 19 6 - 20 MG/DL    Creatinine 1.32 (H) 0.70 - 1.30 MG/DL    BUN/Creatinine ratio 14 12 - 20      GFR est AA >60 >60 ml/min/1.73m2    GFR est non-AA 53 (L) >60 ml/min/1.73m2    Calcium 8.2 (L) 8.5 - 10.1 MG/DL    Bilirubin, total 1.3 (H) 0.2 - 1.0 MG/DL    ALT (SGPT) 22 12 - 78 U/L    AST (SGOT) 19 15 - 37 U/L    Alk. phosphatase 62 45 - 117 U/L    Protein, total 6.7 6.4 - 8.2 g/dL    Albumin 3.3 (L) 3.5 - 5.0 g/dL    Globulin 3.4 2.0 - 4.0 g/dL    A-G Ratio 1.0 (L) 1.1 - 2.2     MAGNESIUM    Collection Time: 07/23/20  9:06 AM   Result Value Ref Range    Magnesium 2.2 1.6 - 2.4 mg/dL   PHOSPHORUS    Collection Time: 07/23/20  9:06 AM   Result Value Ref Range    Phosphorus 2.7 2.6 - 4.7 MG/DL   PROTEIN URINE, RANDOM    Collection Time: 07/23/20  9:06 AM   Result Value Ref Range    Protein, urine random 13 (H) 0.0 - 11.9 mg/dL         Visit Vitals  /82   Pulse 60   Temp 98.1 °F (36.7 °C)   Resp 18   Ht 6' (1.829 m)   Wt 75.1 kg (165 lb 9.6 oz)   SpO2 100%   BMI 22.46 kg/m²       Creatinine Clearance - 52.4  Corrected Calcium - 8.7    Medications received:  Lanreotide 120 mg deep SQ to right hip  Xgeva 120 mg SQ to left arm    1030- Tolerated treatment well, no adverse reaction noted. D/Cd from [facility] ambulatory and in no distress accompanied by self.   Next appt 8/20/20

## 2020-07-23 NOTE — LETTER
7/23/20 Patient: Thai Hameed YOB: 1945 Date of Visit: 7/23/2020 Allyn Umana MD 
[id number] [address] [address] VIA Facsimile: [phone] Dear Allyn Umana MD, Thank you for referring Mr. Ирина Ren to [address] [place] [address] for evaluation. My notes for this consultation are attached. If you have questions, please do not hesitate to call me. I look forward to following your patient along with you.  
 
 
Sincerely, 
 
Sebas Shah MD

## 2020-07-24 ENCOUNTER — TELEPHONE (OUTPATIENT)
Dept: ONCOLOGY | Age: 75
End: 2020-07-24

## 2020-07-24 DIAGNOSIS — C7A.8 NEUROENDOCRINE CARCINOMA METASTATIC TO BONE (HCC): Primary | ICD-10-CM

## 2020-07-24 DIAGNOSIS — C7B.8 NEUROENDOCRINE CARCINOMA METASTATIC TO BONE (HCC): Primary | ICD-10-CM

## 2020-07-24 NOTE — TELEPHONE ENCOUNTER
[facility] at [address] [facility], [address] [facility]   W: [phone]  F: [phone]      Medical Nutrition Therapy  Nutrition Referral:    Referral received regarding weight loss. Called and spoke with patient,  explained that RD is available to address nutrition throughout the spectrum of care. He reports he has always struggled to gain weight. He reports losing about 30 pounds. He is making milkshakes with boost shakes, eating larger portions. We discussed strategies to help prevent further weight loss. · Eating small amounts several times a day verses large meals. · Add protein and calories to favorite foods  (Ex. adding non-fat dry milk powder, butters, oils, whole milk, butter, cheese, avocado, condiments, heavy whipping cream,  etc)  · Keep nutrient-dense foods close at hand and snack frequently   · Discussed consumption of nutrition supplements per day   · Ideas to make more calorically dense without increasing volume.         Wt Readings from Last 5 Encounters:   07/23/20 165 lb (74.8 kg)   07/23/20 165 lb 9.6 oz (75.1 kg)   06/25/20 168 lb 3.2 oz (76.3 kg)   05/28/20 169 lb 6.4 oz (76.8 kg)   04/02/20 170 lb 14.4 oz (77.5 kg)       Estimated Nutrition Needs:   Calorie Range: 2244-2618kcal/day    Protein Range: 75-85g/day     Fluid Needs: 2200ml       Signed By: Beth Blake, [address] [place] [address], [address] [place] [address] , [address] [facility]

## 2020-07-27 LAB — CGA SERPL-SCNC: 509.8 NG/ML (ref 0–101.8)

## 2020-08-06 ENCOUNTER — HOSPITAL ENCOUNTER (OUTPATIENT)
Dept: PET IMAGING | Age: 75
Discharge: HOME | End: 2020-08-06
Attending: INTERNAL MEDICINE
Payer: MEDICARE

## 2020-08-06 VITALS — HEIGHT: 71 IN | WEIGHT: 165 LBS | BODY MASS INDEX: 23.1 KG/M2

## 2020-08-06 DIAGNOSIS — C7B.8 NEUROENDOCRINE CARCINOMA METASTATIC TO BONE (HCC): ICD-10-CM

## 2020-08-06 DIAGNOSIS — C7A.8 NEUROENDOCRINE CARCINOMA METASTATIC TO BONE (HCC): ICD-10-CM

## 2020-08-06 PROCEDURE — 78815 PET IMAGE W/CT SKULL-THIGH: CPT

## 2020-08-06 RX ORDER — SODIUM CHLORIDE 0.9 % (FLUSH) 0.9 %
10 SYRINGE (ML) INJECTION
Status: COMPLETED | OUTPATIENT
Start: 2020-08-06 | End: 2020-08-06

## 2020-08-06 RX ADMIN — Medication 10 ML: at 11:46

## 2020-08-12 ENCOUNTER — TELEPHONE (OUTPATIENT)
Dept: ONCOLOGY | Age: 75
End: 2020-08-12

## 2020-08-20 ENCOUNTER — TELEPHONE (OUTPATIENT)
Dept: ONCOLOGY | Age: 75
End: 2020-08-20

## 2020-08-20 ENCOUNTER — HOSPITAL ENCOUNTER (OUTPATIENT)
Dept: INFUSION THERAPY | Age: 75
Discharge: HOME | End: 2020-08-20
Payer: MEDICARE

## 2020-08-20 VITALS
OXYGEN SATURATION: 100 % | DIASTOLIC BLOOD PRESSURE: 80 MMHG | WEIGHT: 167.8 LBS | BODY MASS INDEX: 22.73 KG/M2 | HEIGHT: 72 IN | SYSTOLIC BLOOD PRESSURE: 146 MMHG | HEART RATE: 77 BPM | TEMPERATURE: 97.7 F | RESPIRATION RATE: 18 BRPM

## 2020-08-20 DIAGNOSIS — C7A.8 NEUROENDOCRINE CARCINOMA METASTATIC TO BONE (HCC): Primary | ICD-10-CM

## 2020-08-20 DIAGNOSIS — C7B.8 NEUROENDOCRINE CARCINOMA METASTATIC TO BONE (HCC): Primary | ICD-10-CM

## 2020-08-20 LAB
ALBUMIN SERPL-MCNC: 3.5 G/DL (ref 3.5–5)
ALBUMIN/GLOB SERPL: 1 {RATIO} (ref 1.1–2.2)
ALP SERPL-CCNC: 69 U/L (ref 45–117)
ALT SERPL-CCNC: 24 U/L (ref 12–78)
ANION GAP SERPL CALC-SCNC: 2 MMOL/L (ref 5–15)
AST SERPL-CCNC: 23 U/L (ref 15–37)
BASOPHILS # BLD: 0.1 K/UL (ref 0–0.1)
BASOPHILS NFR BLD: 2 % (ref 0–1)
BILIRUB SERPL-MCNC: 0.9 MG/DL (ref 0.2–1)
BUN SERPL-MCNC: 28 MG/DL (ref 6–20)
BUN/CREAT SERPL: 18 (ref 12–20)
CALCIUM SERPL-MCNC: 8.6 MG/DL (ref 8.5–10.1)
CHLORIDE SERPL-SCNC: 112 MMOL/L (ref 97–108)
CO2 SERPL-SCNC: 28 MMOL/L (ref 21–32)
CREAT SERPL-MCNC: 1.55 MG/DL (ref 0.7–1.3)
DIFFERENTIAL METHOD BLD: ABNORMAL
EOSINOPHIL # BLD: 0.3 K/UL (ref 0–0.4)
EOSINOPHIL NFR BLD: 7 % (ref 0–7)
ERYTHROCYTE [DISTWIDTH] IN BLOOD BY AUTOMATED COUNT: 14.6 % (ref 11.5–14.5)
GLOBULIN SER CALC-MCNC: 3.5 G/DL (ref 2–4)
GLUCOSE SERPL-MCNC: 112 MG/DL (ref 65–100)
HCT VFR BLD AUTO: 35.8 % (ref 36.6–50.3)
HGB BLD-MCNC: 11.4 G/DL (ref 12.1–17)
IMM GRANULOCYTES # BLD AUTO: 0 K/UL (ref 0–0.04)
IMM GRANULOCYTES NFR BLD AUTO: 0 % (ref 0–0.5)
LYMPHOCYTES # BLD: 0.6 K/UL (ref 0.8–3.5)
LYMPHOCYTES NFR BLD: 12 % (ref 12–49)
MAGNESIUM SERPL-MCNC: 2.5 MG/DL (ref 1.6–2.4)
MCH RBC QN AUTO: 24.8 PG (ref 26–34)
MCHC RBC AUTO-ENTMCNC: 31.8 G/DL (ref 30–36.5)
MCV RBC AUTO: 78 FL (ref 80–99)
MONOCYTES # BLD: 0.2 K/UL (ref 0–1)
MONOCYTES NFR BLD: 5 % (ref 5–13)
NEUTS BAND NFR BLD MANUAL: 4 %
NEUTS SEG # BLD: 3.6 K/UL (ref 1.8–8)
NEUTS SEG NFR BLD: 70 % (ref 32–75)
NRBC # BLD: 0 K/UL (ref 0–0.01)
NRBC BLD-RTO: 0 PER 100 WBC
PHOSPHATE SERPL-MCNC: 3.3 MG/DL (ref 2.6–4.7)
PLATELET # BLD AUTO: 148 K/UL (ref 150–400)
PMV BLD AUTO: 10.5 FL (ref 8.9–12.9)
POTASSIUM SERPL-SCNC: 3.8 MMOL/L (ref 3.5–5.1)
PROT SERPL-MCNC: 7 G/DL (ref 6.4–8.2)
RBC # BLD AUTO: 4.59 M/UL (ref 4.1–5.7)
RBC MORPH BLD: ABNORMAL
SODIUM SERPL-SCNC: 142 MMOL/L (ref 136–145)
WBC # BLD AUTO: 4.8 K/UL (ref 4.1–11.1)

## 2020-08-20 PROCEDURE — 96372 THER/PROPH/DIAG INJ SC/IM: CPT

## 2020-08-20 PROCEDURE — 74011250636 HC RX REV CODE- 250/636: Performed by: INTERNAL MEDICINE

## 2020-08-20 PROCEDURE — 96402 CHEMO HORMON ANTINEOPL SQ/IM: CPT

## 2020-08-20 PROCEDURE — 84100 ASSAY OF PHOSPHORUS: CPT

## 2020-08-20 PROCEDURE — 85025 COMPLETE CBC W/AUTO DIFF WBC: CPT

## 2020-08-20 PROCEDURE — 83735 ASSAY OF MAGNESIUM: CPT

## 2020-08-20 PROCEDURE — 80053 COMPREHEN METABOLIC PANEL: CPT

## 2020-08-20 PROCEDURE — 36415 COLL VENOUS BLD VENIPUNCTURE: CPT

## 2020-08-20 RX ORDER — LANREOTIDE ACETATE 120 MG/.5ML
120 INJECTION SUBCUTANEOUS ONCE
Status: COMPLETED | OUTPATIENT
Start: 2020-08-20 | End: 2020-08-20

## 2020-08-20 RX ADMIN — DENOSUMAB 120 MG: 120 INJECTION SUBCUTANEOUS at 10:18

## 2020-08-20 RX ADMIN — LANREOTIDE ACETATE 120 MG: 120 INJECTION SUBCUTANEOUS at 10:24

## 2020-08-20 NOTE — PROGRESS NOTES
8000 [facility] Visit Note    216- Pt arrived at [facility] ambulatory and in no distress for Xgeva/Lanreotide. Assessment completed, no new complaints voiced. Patient denies any recent or upcoming dental work. Labs Obtained - CBC w/ diff, CMP, Mag, Phos  Recent Results (from the past 12 hour(s))   CBC WITH AUTOMATED DIFF    Collection Time: 08/20/20  9:10 AM   Result Value Ref Range    WBC 4.8 4.1 - 11.1 K/uL    RBC 4.59 4. 10 - 5.70 M/uL    HGB 11.4 (L) 12.1 - 17.0 g/dL    HCT 35.8 (L) 36.6 - 50.3 %    MCV 78.0 (L) 80.0 - 99.0 FL    MCH 24.8 (L) 26.0 - 34.0 PG    MCHC 31.8 30.0 - 36.5 g/dL    RDW 14.6 (H) 11.5 - 14.5 %    PLATELET 082 (L) 016 - 400 K/uL    MPV 10.5 8.9 - 12.9 FL    NRBC 0.0 0  WBC    ABSOLUTE NRBC 0.00 0.00 - 0.01 K/uL    NEUTROPHILS 70 32 - 75 %    BAND NEUTROPHILS 4 %    LYMPHOCYTES 12 12 - 49 %    MONOCYTES 5 5 - 13 %    EOSINOPHILS 7 0 - 7 %    BASOPHILS 2 (H) 0 - 1 %    IMMATURE GRANULOCYTES 0 0.0 - 0.5 %    ABS. NEUTROPHILS 3.6 1.8 - 8.0 K/UL    ABS. LYMPHOCYTES 0.6 (L) 0.8 - 3.5 K/UL    ABS. MONOCYTES 0.2 0.0 - 1.0 K/UL    ABS. EOSINOPHILS 0.3 0.0 - 0.4 K/UL    ABS. BASOPHILS 0.1 0.0 - 0.1 K/UL    ABS. IMM. GRANS. 0.0 0.00 - 0.04 K/UL    DF MANUAL      RBC COMMENTS MICROCYTOSIS  PRESENT       METABOLIC PANEL, COMPREHENSIVE    Collection Time: 08/20/20  9:10 AM   Result Value Ref Range    Sodium 142 136 - 145 mmol/L    Potassium 3.8 3.5 - 5.1 mmol/L    Chloride 112 (H) 97 - 108 mmol/L    CO2 28 21 - 32 mmol/L    Anion gap 2 (L) 5 - 15 mmol/L    Glucose 112 (H) 65 - 100 mg/dL    BUN 28 (H) 6 - 20 MG/DL    Creatinine 1.55 (H) 0.70 - 1.30 MG/DL    BUN/Creatinine ratio 18 12 - 20      GFR est AA 54 (L) >60 ml/min/1.73m2    GFR est non-AA 44 (L) >60 ml/min/1.73m2    Calcium 8.6 8.5 - 10.1 MG/DL    Bilirubin, total 0.9 0.2 - 1.0 MG/DL    ALT (SGPT) 24 12 - 78 U/L    AST (SGOT) 23 15 - 37 U/L    Alk.  phosphatase 69 45 - 117 U/L    Protein, total 7.0 6.4 - 8.2 g/dL    Albumin 3.5 3.5 - 5.0 g/dL    Globulin 3.5 2.0 - 4.0 g/dL    A-G Ratio 1.0 (L) 1.1 - 2.2     MAGNESIUM    Collection Time: 08/20/20  9:10 AM   Result Value Ref Range    Magnesium 2.5 (H) 1.6 - 2.4 mg/dL   PHOSPHORUS    Collection Time: 08/20/20  9:10 AM   Result Value Ref Range    Phosphorus 3.3 2.6 - 4.7 MG/DL       Visit Vitals  /80   Pulse 77   Temp 97.7 °F (36.5 °C)   Resp 18   Ht 6' (1.829 m)   Wt 76.1 kg (167 lb 12.8 oz)   SpO2 100%   BMI 22.76 kg/m²       Calcium - 8.6  CrCl = 45    Medications received:  Xgeva 120 mg SQ to left arm  Lanreotide 120 mg deep SQ to left buttock    1025- Tolerated treatment well, no adverse reaction noted. D/Cd from [facility] ambulatory and in no distress accompanied by self.   Next appt 9/17/20

## 2020-08-20 NOTE — TELEPHONE ENCOUNTER
Called patient to inform him that per [employer] Patient Assistance approval letter that was faxed to our office in February 2020 he is approved for free drug until the end of December 2020 and at that time [employer] will reevaluate and this information will also be sent to Misbah Wells who helps assist patients with oral medications and she can track for pt. Pt verbalized understanding with no further questions or concerns. DATE:  12/03/2018



LOCATION:  364, bed A.



SUBJECTIVE:  This is a 54-year-old female, seen and examined in rounds

today with less abdominal pain, but with generalized jaundice, somewhat

tolerating oral liquid.



No chest pain or palpitation, but persistent abdominal pain with mild

nausea and generalized weakness.  No reported active bleeding.  The most

recent lab results done today showed hemoglobin of 10.6, hematocrit 31.4

with thrombocytopenia of 124.  CO2 content 18 indicative of metabolic

acidosis.  BUN of 26, creatinine 0.6 with blood glucose level 251, calcium

7.9, total bilirubin down to 11.2, most likely in response to the patient's

steroid intake IV recently.  AST is still elevated at 97, alkaline

phosphatase 331 with low albumin of 3.1.  The patient still has elevated

CEA at 5.5 with CA 19-9 to 89.3.



The most recent abdominal ultrasound report is seen.



PHYSICAL EXAMINATION:

GENERAL:  A 54-year-old female, appears to be awake, alert.

VITAL SIGNS:  Afebrile with pulse of 56, respiratory rate 20 to 22, blood

pressure of 136/66.

HEENT:  Showed pale, dry oral mucous membrane.  Bilateral icteric sclerae.

LUNGS:  Few scattered crepitation.  Decreased air entry at bases.

HEART:  Positive S1 and S2.

ABDOMEN:  Soft with mild generalized tenderness.  No mass or organomegaly. 

No rebound tenderness or guarding, but spleen is palpable.

EXTREMITIES:  Status post bilateral above-the-knee amputation.

NEUROLOGICAL:  No reported new neurological deficits, sensory or motor.



IMPRESSION:

1.  Hepatic sarcoidosis.

2.  Jaundice with status post biliary stent insertion.

3.  Abnormal liver function test with jaundice secondary to above most

likely.

4.  Electrolyte imbalance.

5.  Dehydration with prerenal azotemia.

6.  Multiple past medical history including, but not limited to

hypertension, poorly-controlled diabetes mellitus, cardiac arrhythmia,

status post cardiac stent insertion, status post endoscopic retrograde

cholangiopancreatography with biliary stent insertion, peripheral vascular

disease with bilateral above-the-knee amputation with hyperlipidemia.



SUGGESTIONS:

1.  Agree with your plan.

2.  Guaiac all the stool daily x3.

3.  If there is further elevation of total bilirubin with persistent right

upper quadrant pain, repeat ERCP with biliary stent exchange should be kept

in mind, otherwise close observation to follow.







__________________________________________

Jeffrey Albert MD



DD:  12/03/2018 10:22:39

DT:  12/03/2018 11:52:52

[facility] # [id number]

## 2020-08-20 NOTE — TELEPHONE ENCOUNTER
Dorothy spoke with pt the other day. I am pretty sure we can not be proactive until it expires or close to expiration date. Scooter Ureña can you double check on this and reassure pt?

## 2020-08-20 NOTE — TELEPHONE ENCOUNTER
Patient called graciela Lopez - said his assistance is supposed to be for 6 mos but he got a call saying it would be good through Dec and that another form needed to be addressed.

## 2020-09-17 ENCOUNTER — TELEPHONE (OUTPATIENT)
Dept: ONCOLOGY | Age: 75
End: 2020-09-17

## 2020-09-17 ENCOUNTER — HOSPITAL ENCOUNTER (OUTPATIENT)
Dept: INFUSION THERAPY | Age: 75
Discharge: HOME | End: 2020-09-17
Payer: MEDICARE

## 2020-09-17 VITALS
BODY MASS INDEX: 22.08 KG/M2 | HEART RATE: 62 BPM | TEMPERATURE: 97.3 F | DIASTOLIC BLOOD PRESSURE: 83 MMHG | SYSTOLIC BLOOD PRESSURE: 157 MMHG | HEIGHT: 72 IN | WEIGHT: 163 LBS | RESPIRATION RATE: 16 BRPM

## 2020-09-17 DIAGNOSIS — C7A.8 NEUROENDOCRINE CARCINOMA METASTATIC TO BONE (HCC): Primary | ICD-10-CM

## 2020-09-17 DIAGNOSIS — R79.89 ELEVATED SERUM CREATININE: Primary | ICD-10-CM

## 2020-09-17 DIAGNOSIS — C7B.8 NEUROENDOCRINE CARCINOMA METASTATIC TO BONE (HCC): ICD-10-CM

## 2020-09-17 DIAGNOSIS — C7A.8 NEUROENDOCRINE CARCINOMA METASTATIC TO BONE (HCC): ICD-10-CM

## 2020-09-17 DIAGNOSIS — C7B.8 NEUROENDOCRINE CARCINOMA METASTATIC TO BONE (HCC): Primary | ICD-10-CM

## 2020-09-17 LAB
ALBUMIN SERPL-MCNC: 3.3 G/DL (ref 3.5–5)
ALBUMIN/GLOB SERPL: 0.9 {RATIO} (ref 1.1–2.2)
ALP SERPL-CCNC: 57 U/L (ref 45–117)
ALT SERPL-CCNC: 19 U/L (ref 12–78)
ANION GAP BLD CALC-SCNC: 16 MMOL/L (ref 10–20)
APPEARANCE UR: CLEAR
AST SERPL-CCNC: 26 U/L (ref 15–37)
BACTERIA URNS QL MICRO: NEGATIVE /HPF
BASOPHILS # BLD: 0.1 K/UL (ref 0–0.1)
BASOPHILS NFR BLD: 2 % (ref 0–1)
BILIRUB DIRECT SERPL-MCNC: 0.2 MG/DL (ref 0–0.2)
BILIRUB SERPL-MCNC: 0.7 MG/DL (ref 0.2–1)
BILIRUB UR QL: NEGATIVE
BUN BLD-MCNC: 42 MG/DL (ref 9–20)
CA-I BLD-MCNC: 1.25 MMOL/L (ref 1.12–1.32)
CAOX CRY URNS QL MICRO: ABNORMAL
CHLORIDE BLD-SCNC: 115 MMOL/L (ref 98–107)
CHOLEST SERPL-MCNC: 163 MG/DL
CO2 BLD-SCNC: 16 MMOL/L (ref 21–32)
COLOR UR: ABNORMAL
CREAT BLD-MCNC: 2.7 MG/DL (ref 0.6–1.3)
CREAT UR-MCNC: 106 MG/DL
DIFFERENTIAL METHOD BLD: ABNORMAL
EOSINOPHIL # BLD: 0.1 K/UL (ref 0–0.4)
EOSINOPHIL #/AREA URNS HPF: NEGATIVE /[HPF]
EOSINOPHIL NFR BLD: 2 % (ref 0–7)
EPITH CASTS URNS QL MICRO: ABNORMAL /LPF
EPITH CASTS URNS QL MICRO: ABNORMAL /LPF
ERYTHROCYTE [DISTWIDTH] IN BLOOD BY AUTOMATED COUNT: 14.4 % (ref 11.5–14.5)
EST. AVERAGE GLUCOSE BLD GHB EST-MCNC: 123 MG/DL
GLOBULIN SER CALC-MCNC: 3.5 G/DL (ref 2–4)
GLUCOSE BLD-MCNC: 153 MG/DL (ref 65–100)
GLUCOSE UR STRIP.AUTO-MCNC: NEGATIVE MG/DL
GRAN CASTS URNS QL MICRO: ABNORMAL /LPF
HBA1C MFR BLD: 5.9 % (ref 4–5.6)
HCT VFR BLD AUTO: 31.7 % (ref 36.6–50.3)
HCT VFR BLD CALC: 28 % (ref 36.6–50.3)
HDLC SERPL-MCNC: 54 MG/DL
HDLC SERPL: 3 {RATIO} (ref 0–5)
HGB BLD-MCNC: 10.1 G/DL (ref 12.1–17)
HGB UR QL STRIP: NEGATIVE
IMM GRANULOCYTES # BLD AUTO: 0 K/UL (ref 0–0.04)
IMM GRANULOCYTES NFR BLD AUTO: 0 % (ref 0–0.5)
KETONES UR QL STRIP.AUTO: NEGATIVE MG/DL
LDLC SERPL CALC-MCNC: 88.6 MG/DL (ref 0–100)
LEUKOCYTE ESTERASE UR QL STRIP.AUTO: NEGATIVE
LIPID PROFILE,FLP: NORMAL
LYMPHOCYTES # BLD: 0.5 K/UL (ref 0.8–3.5)
LYMPHOCYTES NFR BLD: 17 % (ref 12–49)
MAGNESIUM SERPL-MCNC: 2.1 MG/DL (ref 1.6–2.4)
MCH RBC QN AUTO: 24.8 PG (ref 26–34)
MCHC RBC AUTO-ENTMCNC: 31.9 G/DL (ref 30–36.5)
MCV RBC AUTO: 77.7 FL (ref 80–99)
MONOCYTES # BLD: 0.5 K/UL (ref 0–1)
MONOCYTES NFR BLD: 16 % (ref 5–13)
NEUTS BAND NFR BLD MANUAL: 2 %
NEUTS SEG # BLD: 1.7 K/UL (ref 1.8–8)
NEUTS SEG NFR BLD: 61 % (ref 32–75)
NITRITE UR QL STRIP.AUTO: NEGATIVE
NRBC # BLD: 0 K/UL (ref 0–0.01)
NRBC BLD-RTO: 0 PER 100 WBC
PH UR STRIP: 5.5 [PH] (ref 5–8)
PHOSPHATE SERPL-MCNC: 3.8 MG/DL (ref 2.6–4.7)
PLATELET # BLD AUTO: 122 K/UL (ref 150–400)
PMV BLD AUTO: 10.5 FL (ref 8.9–12.9)
POTASSIUM BLD-SCNC: 3.5 MMOL/L (ref 3.5–5.1)
PROT SERPL-MCNC: 6.8 G/DL (ref 6.4–8.2)
PROT UR STRIP-MCNC: ABNORMAL MG/DL
PROT UR-MCNC: 35 MG/DL (ref 0–11.9)
RBC # BLD AUTO: 4.08 M/UL (ref 4.1–5.7)
RBC #/AREA URNS HPF: ABNORMAL /HPF (ref 0–5)
RBC MORPH BLD: ABNORMAL
SERVICE CMNT-IMP: ABNORMAL
SODIUM BLD-SCNC: 143 MMOL/L (ref 136–145)
SODIUM UR-SCNC: 42 MMOL/L
SP GR UR REFRACTOMETRY: 1.01 (ref 1–1.03)
TRIGL SERPL-MCNC: 102 MG/DL (ref ?–150)
UA: UC IF INDICATED,UAUC: ABNORMAL
UROBILINOGEN UR QL STRIP.AUTO: 0.2 EU/DL (ref 0.2–1)
VLDLC SERPL CALC-MCNC: 20.4 MG/DL
WBC # BLD AUTO: 2.9 K/UL (ref 4.1–11.1)
WBC CASTS URNS QL MICRO: ABNORMAL /LPF
WBC URNS QL MICRO: ABNORMAL /HPF (ref 0–4)

## 2020-09-17 PROCEDURE — 84156 ASSAY OF PROTEIN URINE: CPT

## 2020-09-17 PROCEDURE — 83036 HEMOGLOBIN GLYCOSYLATED A1C: CPT

## 2020-09-17 PROCEDURE — 96360 HYDRATION IV INFUSION INIT: CPT

## 2020-09-17 PROCEDURE — 80076 HEPATIC FUNCTION PANEL: CPT

## 2020-09-17 PROCEDURE — 87205 SMEAR GRAM STAIN: CPT

## 2020-09-17 PROCEDURE — 36415 COLL VENOUS BLD VENIPUNCTURE: CPT

## 2020-09-17 PROCEDURE — 84300 ASSAY OF URINE SODIUM: CPT

## 2020-09-17 PROCEDURE — 81001 URINALYSIS AUTO W/SCOPE: CPT

## 2020-09-17 PROCEDURE — 82570 ASSAY OF URINE CREATININE: CPT

## 2020-09-17 PROCEDURE — 96372 THER/PROPH/DIAG INJ SC/IM: CPT

## 2020-09-17 PROCEDURE — 84100 ASSAY OF PHOSPHORUS: CPT

## 2020-09-17 PROCEDURE — 85025 COMPLETE CBC W/AUTO DIFF WBC: CPT

## 2020-09-17 PROCEDURE — 80061 LIPID PANEL: CPT

## 2020-09-17 PROCEDURE — 80047 BASIC METABLC PNL IONIZED CA: CPT

## 2020-09-17 PROCEDURE — 74011250636 HC RX REV CODE- 250/636: Performed by: INTERNAL MEDICINE

## 2020-09-17 PROCEDURE — 83735 ASSAY OF MAGNESIUM: CPT

## 2020-09-17 RX ORDER — SODIUM CHLORIDE 9 MG/ML
1000 INJECTION, SOLUTION INTRAVENOUS ONCE
Status: COMPLETED | OUTPATIENT
Start: 2020-09-17 | End: 2020-09-17

## 2020-09-17 RX ORDER — LANREOTIDE ACETATE 120 MG/.5ML
120 INJECTION SUBCUTANEOUS ONCE
Status: COMPLETED | OUTPATIENT
Start: 2020-09-17 | End: 2020-09-17

## 2020-09-17 RX ADMIN — SODIUM CHLORIDE 1000 ML: 900 INJECTION, SOLUTION INTRAVENOUS at 10:04

## 2020-09-17 RX ADMIN — LANREOTIDE ACETATE 120 MG: 120 INJECTION SUBCUTANEOUS at 09:54

## 2020-09-17 NOTE — PROGRESS NOTES
[facility] VISIT NOTE    0900  Pt arrived at [facility] ambulatory and in no distress for Lanreotide and Xgeva. Patient denies any recent or future dental procedures, but says he went to the dentist last week because he's been having pain in his front tooth when biting since the dental procedure done 3 months ago. Dentist gave him an abx for one week and pain medication, but has no plan to do any further procedures. Assessment completed, pt c/o continued weight loss and 2-3 loose stools every morning which has been going on for years. Labs drawn peripherally. Blood pressure (!) 157/83, pulse 62, temperature 97.3 °F (36.3 °C), resp. rate 16, height 6' (1.829 m), weight 73.9 kg (163 lb). Notified Rehana Duran NP of patient's high creatinine (2.7) and dental problems. Her orders are to hold Xgeva today, give 1 liter NS IV over one hour, obtain additional urine labs and schedule the patient for a kidney ultrasound. Patient verbalized understanding. PIV placed in left arm without difficulty. Medications received:  Lanreotide right buttocks  500 ml NS (attempted 1 liter, but IV infiltrated after 500 mL and patient did not want to stay and have another IV placed)    Tolerated treatment well, no adverse reaction noted. PIV flushed and removed. PIV site is slightly swollen, but no redness or pain. Encouraged patient to use a warm compress as needed for pain relief. 1045  D/C'd from [facility] ambulatory and in no distress.  Next appointment is 10/15/20 at [address] [place] [address].

## 2020-09-17 NOTE — TELEPHONE ENCOUNTER
Case d/w MD.  Pt is dehydrated. Increase PO intake. Recheck 2 weeks the CMP. HIPAA verified by two patient identifiers. He prefers to go [facility]. Will ask for Mihaela to order this. He says in the morning he was taking 2 after watery stool and then 1 intermittently if remembers. He was placed on antibiotics for his tooth, he was on it approx. 1 week and took last one today. Informed him this could be related to antibiotics. He says he is averaging 1 watery stool during the night and then 4-6 during the day.

## 2020-09-17 NOTE — TELEPHONE ENCOUNTER
Spoke with Cathryn RAJAN in [facility] regarding creatinine level. Spoke with MD.  Will order urine sodium, creat, UA, urine eosinophils, and 1 liter NS, also ultrasound of kidneys. VORB FROM DR Caba Page US KUB., ULTRASOUND COMPLETE. Cathryn RN will inform pt.

## 2020-09-30 ENCOUNTER — HOSPITAL ENCOUNTER (OUTPATIENT)
Dept: INFUSION THERAPY | Age: 75
Discharge: HOME | End: 2020-09-30
Payer: MEDICARE

## 2020-09-30 VITALS
SYSTOLIC BLOOD PRESSURE: 173 MMHG | RESPIRATION RATE: 18 BRPM | DIASTOLIC BLOOD PRESSURE: 87 MMHG | HEART RATE: 60 BPM | WEIGHT: 166.8 LBS | TEMPERATURE: 97.4 F | BODY MASS INDEX: 22.62 KG/M2

## 2020-09-30 DIAGNOSIS — C7A.8 NEUROENDOCRINE CARCINOMA METASTATIC TO BONE (HCC): Primary | ICD-10-CM

## 2020-09-30 DIAGNOSIS — C7B.8 NEUROENDOCRINE CARCINOMA METASTATIC TO BONE (HCC): Primary | ICD-10-CM

## 2020-09-30 LAB
ALBUMIN SERPL-MCNC: 3.2 G/DL (ref 3.5–5)
ALBUMIN/GLOB SERPL: 0.9 {RATIO} (ref 1.1–2.2)
ALP SERPL-CCNC: 67 U/L (ref 45–117)
ALT SERPL-CCNC: 20 U/L (ref 12–78)
ANION GAP SERPL CALC-SCNC: 3 MMOL/L (ref 5–15)
AST SERPL-CCNC: 22 U/L (ref 15–37)
BILIRUB SERPL-MCNC: 0.8 MG/DL (ref 0.2–1)
BUN SERPL-MCNC: 28 MG/DL (ref 6–20)
BUN/CREAT SERPL: 19 (ref 12–20)
CALCIUM SERPL-MCNC: 8.8 MG/DL (ref 8.5–10.1)
CHLORIDE SERPL-SCNC: 108 MMOL/L (ref 97–108)
CO2 SERPL-SCNC: 31 MMOL/L (ref 21–32)
CREAT SERPL-MCNC: 1.46 MG/DL (ref 0.7–1.3)
GLOBULIN SER CALC-MCNC: 3.4 G/DL (ref 2–4)
GLUCOSE SERPL-MCNC: 193 MG/DL (ref 65–100)
POTASSIUM SERPL-SCNC: 3.7 MMOL/L (ref 3.5–5.1)
PROT SERPL-MCNC: 6.6 G/DL (ref 6.4–8.2)
SODIUM SERPL-SCNC: 142 MMOL/L (ref 136–145)

## 2020-09-30 PROCEDURE — 80053 COMPREHEN METABOLIC PANEL: CPT

## 2020-09-30 PROCEDURE — 36415 COLL VENOUS BLD VENIPUNCTURE: CPT

## 2020-10-15 ENCOUNTER — HOSPITAL ENCOUNTER (OUTPATIENT)
Dept: INFUSION THERAPY | Age: 75
Discharge: HOME | End: 2020-10-15
Payer: MEDICARE

## 2020-10-15 ENCOUNTER — OFFICE VISIT (OUTPATIENT)
Dept: ONCOLOGY | Age: 75
End: 2020-10-15
Payer: MEDICARE

## 2020-10-15 VITALS
SYSTOLIC BLOOD PRESSURE: 180 MMHG | HEIGHT: 72 IN | DIASTOLIC BLOOD PRESSURE: 90 MMHG | RESPIRATION RATE: 16 BRPM | HEART RATE: 67 BPM | BODY MASS INDEX: 22.28 KG/M2 | WEIGHT: 164.5 LBS | TEMPERATURE: 97.4 F

## 2020-10-15 VITALS
HEART RATE: 67 BPM | WEIGHT: 164 LBS | BODY MASS INDEX: 22.21 KG/M2 | TEMPERATURE: 97.4 F | DIASTOLIC BLOOD PRESSURE: 90 MMHG | RESPIRATION RATE: 16 BRPM | HEIGHT: 72 IN | SYSTOLIC BLOOD PRESSURE: 180 MMHG

## 2020-10-15 DIAGNOSIS — C7A.8 NEUROENDOCRINE CARCINOMA METASTATIC TO BONE (HCC): Primary | ICD-10-CM

## 2020-10-15 DIAGNOSIS — R19.7 DIARRHEA, UNSPECIFIED TYPE: ICD-10-CM

## 2020-10-15 DIAGNOSIS — C7B.8 NEUROENDOCRINE CARCINOMA METASTATIC TO BONE (HCC): Primary | ICD-10-CM

## 2020-10-15 DIAGNOSIS — C79.51 BONE METASTASIS (HCC): ICD-10-CM

## 2020-10-15 LAB
ALBUMIN SERPL-MCNC: 3.3 G/DL (ref 3.5–5)
ALBUMIN/GLOB SERPL: 0.9 {RATIO} (ref 1.1–2.2)
ALP SERPL-CCNC: 74 U/L (ref 45–117)
ALT SERPL-CCNC: 24 U/L (ref 12–78)
ANION GAP BLD CALC-SCNC: 17 MMOL/L (ref 10–20)
AST SERPL-CCNC: 27 U/L (ref 15–37)
BASOPHILS # BLD: 0.1 K/UL (ref 0–0.1)
BASOPHILS NFR BLD: 2 % (ref 0–1)
BILIRUB DIRECT SERPL-MCNC: 0.1 MG/DL (ref 0–0.2)
BILIRUB SERPL-MCNC: 0.8 MG/DL (ref 0.2–1)
BUN BLD-MCNC: 22 MG/DL (ref 9–20)
CA-I BLD-MCNC: 1.21 MMOL/L (ref 1.12–1.32)
CHLORIDE BLD-SCNC: 108 MMOL/L (ref 98–107)
CO2 BLD-SCNC: 25 MMOL/L (ref 21–32)
CREAT BLD-MCNC: 1.2 MG/DL (ref 0.6–1.3)
DIFFERENTIAL METHOD BLD: ABNORMAL
EOSINOPHIL # BLD: 0.2 K/UL (ref 0–0.4)
EOSINOPHIL NFR BLD: 5 % (ref 0–7)
ERYTHROCYTE [DISTWIDTH] IN BLOOD BY AUTOMATED COUNT: 13.8 % (ref 11.5–14.5)
GLOBULIN SER CALC-MCNC: 3.6 G/DL (ref 2–4)
GLUCOSE BLD-MCNC: 114 MG/DL (ref 65–100)
HCT VFR BLD AUTO: 32.3 % (ref 36.6–50.3)
HCT VFR BLD CALC: 31 % (ref 36.6–50.3)
HGB BLD-MCNC: 10 G/DL (ref 12.1–17)
IMM GRANULOCYTES # BLD AUTO: 0 K/UL (ref 0–0.04)
IMM GRANULOCYTES NFR BLD AUTO: 0 % (ref 0–0.5)
LYMPHOCYTES # BLD: 0.6 K/UL (ref 0.8–3.5)
LYMPHOCYTES NFR BLD: 14 % (ref 12–49)
MAGNESIUM SERPL-MCNC: 2.2 MG/DL (ref 1.6–2.4)
MCH RBC QN AUTO: 23.9 PG (ref 26–34)
MCHC RBC AUTO-ENTMCNC: 31 G/DL (ref 30–36.5)
MCV RBC AUTO: 77.1 FL (ref 80–99)
MONOCYTES # BLD: 0.4 K/UL (ref 0–1)
MONOCYTES NFR BLD: 10 % (ref 5–13)
NEUTS SEG # BLD: 2.8 K/UL (ref 1.8–8)
NEUTS SEG NFR BLD: 70 % (ref 32–75)
NRBC # BLD: 0 K/UL (ref 0–0.01)
NRBC BLD-RTO: 0 PER 100 WBC
PHOSPHATE SERPL-MCNC: 2.7 MG/DL (ref 2.6–4.7)
PLATELET # BLD AUTO: 159 K/UL (ref 150–400)
PMV BLD AUTO: 10.2 FL (ref 8.9–12.9)
POTASSIUM BLD-SCNC: 3.4 MMOL/L (ref 3.5–5.1)
PROT SERPL-MCNC: 6.9 G/DL (ref 6.4–8.2)
RBC # BLD AUTO: 4.19 M/UL (ref 4.1–5.7)
SERVICE CMNT-IMP: ABNORMAL
SODIUM BLD-SCNC: 146 MMOL/L (ref 136–145)
WBC # BLD AUTO: 4 K/UL (ref 4.1–11.1)

## 2020-10-15 PROCEDURE — 80076 HEPATIC FUNCTION PANEL: CPT

## 2020-10-15 PROCEDURE — 36415 COLL VENOUS BLD VENIPUNCTURE: CPT

## 2020-10-15 PROCEDURE — 74011250636 HC RX REV CODE- 250/636: Performed by: INTERNAL MEDICINE

## 2020-10-15 PROCEDURE — 96372 THER/PROPH/DIAG INJ SC/IM: CPT

## 2020-10-15 PROCEDURE — G8510 SCR DEP NEG, NO PLAN REQD: HCPCS | Performed by: INTERNAL MEDICINE

## 2020-10-15 PROCEDURE — G8427 DOCREV CUR MEDS BY ELIG CLIN: HCPCS | Performed by: INTERNAL MEDICINE

## 2020-10-15 PROCEDURE — G8755 DIAS BP > OR = 90: HCPCS | Performed by: INTERNAL MEDICINE

## 2020-10-15 PROCEDURE — 80047 BASIC METABLC PNL IONIZED CA: CPT

## 2020-10-15 PROCEDURE — 83735 ASSAY OF MAGNESIUM: CPT

## 2020-10-15 PROCEDURE — 99215 OFFICE O/P EST HI 40 MIN: CPT | Performed by: INTERNAL MEDICINE

## 2020-10-15 PROCEDURE — G8536 NO DOC ELDER MAL SCRN: HCPCS | Performed by: INTERNAL MEDICINE

## 2020-10-15 PROCEDURE — G8420 CALC BMI NORM PARAMETERS: HCPCS | Performed by: INTERNAL MEDICINE

## 2020-10-15 PROCEDURE — 84100 ASSAY OF PHOSPHORUS: CPT

## 2020-10-15 PROCEDURE — 85025 COMPLETE CBC W/AUTO DIFF WBC: CPT

## 2020-10-15 PROCEDURE — G8753 SYS BP > OR = 140: HCPCS | Performed by: INTERNAL MEDICINE

## 2020-10-15 PROCEDURE — 3017F COLORECTAL CA SCREEN DOC REV: CPT | Performed by: INTERNAL MEDICINE

## 2020-10-15 PROCEDURE — 1101F PT FALLS ASSESS-DOCD LE1/YR: CPT | Performed by: INTERNAL MEDICINE

## 2020-10-15 RX ORDER — LANREOTIDE ACETATE 120 MG/.5ML
120 INJECTION SUBCUTANEOUS ONCE
Status: CANCELLED | OUTPATIENT
Start: 2020-11-12

## 2020-10-15 RX ORDER — LANREOTIDE ACETATE 120 MG/.5ML
120 INJECTION SUBCUTANEOUS ONCE
Status: COMPLETED | OUTPATIENT
Start: 2020-10-15 | End: 2020-10-15

## 2020-10-15 RX ADMIN — DENOSUMAB 120 MG: 120 INJECTION SUBCUTANEOUS at 09:33

## 2020-10-15 RX ADMIN — LANREOTIDE ACETATE 120 MG: 120 INJECTION SUBCUTANEOUS at 09:36

## 2020-10-15 NOTE — PROGRESS NOTES
Alex Hamm is a 69 y.o. male here for follow up for:  Chief Complaint   Patient presents with    Cancer     neuroendocrine cancer    Chemotherapy   Cycle 22 Day 1 Xgeva, Somatuline [place]. Afinitor    1. Have you been to the ER, urgent care clinic since your last visit? Hospitalized since your last visit? no  2. Have you seen or consulted any other health care providers outside of the [address] [place] [address] since your last visit? Include any pap smears or colon screening. no    Pt states he does have a lot of hot flashes. Some days worse than others. He has been trying to put on some weight but cannot. He says he can feel his heartbeat more some mornings but will lay back down and will go away.

## 2020-10-15 NOTE — PROGRESS NOTES
2001 [facility]  [address], [address] [place] [address], [facility] Domo Mullins, [address] [facility]  [phone]       Oncology Follow up Note      Patient: Tamara Florian MRN: [id number]  SSN: [id number]    YOB: 1945  Age: 69 y.o. Sex: male        Diagnosis:     1. 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 Afinitor 10 mg + Somatuline Depot 120 mg SC - started Afinitor 3/30/2019  2. Somatuline Depot 120 mg SC - 05/2016 - 11/2018; Restarted 3/4/2019 -    (treatment held per patient request since11/16/2018)   3. Xgeva 120mg SC     Subjective:      Tamara Florian is a 69 y.o. male with a diagnosis of metastatic neuroendocrine cancer. He started experiencing pain in both groins and the abdomen. A CT of the abdomen showed retroperitoneal adenopathy. CT guided biopsy of the retroperitoneal LN reveals a dx of well differentiated neuroendocrine carcinoma. Bone scan shows disease in the bone. Mr. Queen Lagos received Somatuline and had good control of disease. He took a break from therapy in Nov 2018. He went on an extended vacation until the beginning of February to [facility]. CT showed progression of disease in the nodes. Mr. Queen Lagos resumed therapy with Somatuline with the addition of Afinitor. Symptoms such as bone pains, diarrhea and fatigue improved  He took a break from [address] [place] [address] in October 2019, but repeat scans showed slight progression of disease. He resumed Afinitor in November 2019. Scans show stable disease. He reports hot flashes and continues to have a difficult time gaining weight. Denies back pain.        Review of Systems:    Constitutional: fatigue, hot flashes  Eyes: negative  Ears, Nose, Mouth, Throat, and Face: negative  Respiratory: negative  Cardiovascular: negative  Gastrointestinal: diarrhea  Genitourinary: urinary incontinence  Integument/Breast: negative  Hematologic/Lymphatic: negative  Musculoskeletal: back pain  Neurological: negative      Past Medical History:   Diagnosis Date    Adverse effect of anesthesia     low HR and very slow to wakeup    Cancer ([address] 2016    neuroendocrine, retroperitoneal LN involvement    Diarrhea     Frequent urination     Hypertension     Poor appetite     Unspecified adverse effect of anesthesia     \"hard to put to sleep and slow to wake up-heart rate dropped very low\"     Past Surgical History:   Procedure Laterality Date    COLONOSCOPY N/A 2016    COLONOSCOPY performed by Gene Gaytan MD at [address] [place] [address]  2016         HX ORTHOPAEDIC      knee scope left knee    HX OTHER SURGICAL  2013    excision of scalp cyst     UPPER GI ENDOSCOPY,BIOPSY  2016           Family History   Problem Relation Age of Onset    Cancer Mother     Stroke Sister      Social History     Tobacco Use    Smoking status: Former Smoker     Packs/day: 1.00     Years: 8.00     Pack years: 8.00     Last attempt to quit: 1972     Years since quittin.4    Smokeless tobacco: Never Used   Substance Use Topics    Alcohol use: No      Prior to Admission medications    Medication Sig Start Date End Date Taking? Authorizing Provider   everolimus (Afinitor) 10 mg tab Take 1 Tab by mouth daily. 20  Yes Vicenta Ngoueira MD   meclizine (ANTIVERT) 25 mg tablet Take 1 Tab by mouth three (3) times daily as needed for Dizziness. 20  Yes Johanna Singletary,    ondansetron (Zofran ODT) 4 mg disintegrating tablet Take 1 Tab by mouth every eight (8) hours as needed for Nausea. 20  Yes Johanna Singletary DO   lisinopril (PRINIVIL, ZESTRIL) 20 mg tablet Take 1 Tab by mouth daily. Patient taking differently: Take 40 mg by mouth daily. 19  Yes Vicenta Nogueira MD   tamsulosin [facility]) 0.4 mg capsule Take 0.4 mg by mouth daily.    Yes Provider, Historical          No Known Allergies        Objective:     Visit Vitals  BP (!) 180/90   Pulse 67   Temp 97.4 °F (36.3 °C)   Resp 16   Ht 6' (1.829 m)   Wt 164 lb (74.4 kg)   BMI 22.24 kg/m²       Pain Scale: 0 - No pain/10  Pain Location:       Physical Exam:     GENERAL: alert, cooperative  EYE: negative  LYMPHATIC: Cervical, supraclavicular, and axillary nodes normal.   THROAT & NECK: normal and no erythema or exudates noted. LUNG: clear to auscultation bilaterally  HEART: regular rate and rhythm  ABDOMEN: soft, non-tender  EXTREMITIES: no cyanosis or edema  SKIN: Normal.  NEUROLOGIC: negative       IMAGING:       I reviewed the imaging personally. Slight decrease of disease in the retroperitoneal nodes. Lab Results   Component Value Date/Time    WBC 4.0 (L) 10/15/2020 09:19 AM    Hemoglobin (POC) 14.6 12/21/2017 09:10 AM    HGB 10.0 (L) 10/15/2020 09:19 AM    Hematocrit (POC) 31 (L) 10/15/2020 09:21 AM    HCT 32.3 (L) 10/15/2020 09:19 AM    PLATELET 218 78/01/8501 09:19 AM    MCV 77.1 (L) 10/15/2020 09:19 AM       Lab Results   Component Value Date/Time    Sodium 142 09/30/2020 09:07 AM    Potassium 3.7 09/30/2020 09:07 AM    Chloride 108 09/30/2020 09:07 AM    CO2 31 09/30/2020 09:07 AM    Anion gap 3 (L) 09/30/2020 09:07 AM    Glucose 193 (H) 09/30/2020 09:07 AM    BUN 28 (H) 09/30/2020 09:07 AM    Creatinine 1.46 (H) 09/30/2020 09:07 AM    BUN/Creatinine ratio 19 09/30/2020 09:07 AM    GFR est AA 57 (L) 09/30/2020 09:07 AM    GFR est non-AA 47 (L) 09/30/2020 09:07 AM    Calcium 8.8 09/30/2020 09:07 AM    Bilirubin, total 0.8 10/15/2020 09:19 AM    Alk. phosphatase 74 10/15/2020 09:19 AM    Protein, total 6.9 10/15/2020 09:19 AM    Albumin 3.3 (L) 10/15/2020 09:19 AM    Globulin 3.6 10/15/2020 09:19 AM    A-G Ratio 0.9 (L) 10/15/2020 09:19 AM    ALT (SGPT) 24 10/15/2020 09:19 AM           Assessment:     1.  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 Had excellent disease control. He experienced a number of side effects including hot flashes, weight loss etc  He decided to take a break from therapy. CT showed disease progression in retroperitoneum and abdomen. Restarted Somatuline. Added Afinitor. Flushing, appetite, diarrhea, back pain are all better. Stopped Afinitor in Oct 2019  CT showed progression in retroperitoneal nodes    He is now back on Everolimus 10 mg in addition to [employer] treatment   + diarrhea - manageable  A detailed system by system evaluation of side effect was performed to assess chemotherapy related toxicity. Blood counts are acceptable. Results reviewed with the patient. CT Abd Pelv, NM bone scan (4/28/2020): Improvement in the retroperitoneal nodes. Gallium PET showed significant disease in the nodes both above and below the diaphragm and bones. We would use CT to follow the disease and reserve Gallium PET for consideration of DOTATE treatment in the future. 2. Bone metastasis    > Denosumab      3. Urinary incontinence    BPH  Managed by Dr. Maine Hurst      4. Diarrhea    Imodium as needed      5. Orthostasis    Observation      6. HTN, uncontrolled    May need to add Amlodipine to Lisinopril. 7. Weight loss    Referral to dietician      Plan:       > Continue Afinitor  > Continue Denosumab  > Continue Somatuline  > Imodium as needed  > CT Abd Pelv, NM bone scan in November  > Follow-up in 3 months       Signed by: Jing Rivas MD                     October 15, 2020      CC. Ailyn Jeong MD  CC.  Kali Rizo MD

## 2020-10-15 NOTE — LETTER
10/15/20 Patient: Anibal Turpin YOB: 1945 Date of Visit: 10/15/2020 Talha Leonard MD 
[address] [address] [address] VIA Facsimile: [phone] Dear Talha Leonard MD, Thank you for referring Mr. Citlali Alonso to [address] [place] [address] for evaluation. My notes for this consultation are attached. If you have questions, please do not hesitate to call me. I look forward to following your patient along with you.  
 
 
Sincerely, 
 
Rizwan Gunn MD

## 2020-10-15 NOTE — PROGRESS NOTES
[address] [facility] Visit Note    5550 Pt arrived at [facility] ambulatory and in no distress for Xgeva/Lanreotide. Assessment unremarkable except BP elevated. Labs obtained- CBC with diff, Hepatic Function Panel, Chem 8, Magnesium, and Phosphorus. Visit Vitals  BP (!) 180/90 (BP 1 Location: Right arm, BP Patient Position: Sitting)   Pulse 67   Temp 97.4 °F (36.3 °C)   Resp 16   Ht 6' (1.829 m)   Wt 74.6 kg (164 lb 8 oz)   BMI 22.31 kg/m²     Recent Results (from the past 12 hour(s))   CBC WITH AUTOMATED DIFF    Collection Time: 10/15/20  9:19 AM   Result Value Ref Range    WBC 4.0 (L) 4.1 - 11.1 K/uL    RBC 4.19 4. 10 - 5.70 M/uL    HGB 10.0 (L) 12.1 - 17.0 g/dL    HCT 32.3 (L) 36.6 - 50.3 %    MCV 77.1 (L) 80.0 - 99.0 FL    MCH 23.9 (L) 26.0 - 34.0 PG    MCHC 31.0 30.0 - 36.5 g/dL    RDW 13.8 11.5 - 14.5 %    PLATELET 749 396 - 546 K/uL    MPV 10.2 8.9 - 12.9 FL    NRBC 0.0 0  WBC    ABSOLUTE NRBC 0.00 0.00 - 0.01 K/uL    NEUTROPHILS 70 32 - 75 %    LYMPHOCYTES 14 12 - 49 %    MONOCYTES 10 5 - 13 %    EOSINOPHILS 5 0 - 7 %    BASOPHILS 2 (H) 0 - 1 %    IMMATURE GRANULOCYTES 0 0.0 - 0.5 %    ABS. NEUTROPHILS 2.8 1.8 - 8.0 K/UL    ABS. LYMPHOCYTES 0.6 (L) 0.8 - 3.5 K/UL    ABS. MONOCYTES 0.4 0.0 - 1.0 K/UL    ABS. EOSINOPHILS 0.2 0.0 - 0.4 K/UL    ABS. BASOPHILS 0.1 0.0 - 0.1 K/UL    ABS. IMM. GRANS. 0.0 0.00 - 0.04 K/UL    DF AUTOMATED     PHOSPHORUS    Collection Time: 10/15/20  9:19 AM   Result Value Ref Range    Phosphorus 2.7 2.6 - 4.7 MG/DL   MAGNESIUM    Collection Time: 10/15/20  9:19 AM   Result Value Ref Range    Magnesium 2.2 1.6 - 2.4 mg/dL   HEPATIC FUNCTION PANEL    Collection Time: 10/15/20  9:19 AM   Result Value Ref Range    Protein, total 6.9 6.4 - 8.2 g/dL    Albumin 3.3 (L) 3.5 - 5.0 g/dL    Globulin 3.6 2.0 - 4.0 g/dL    A-G Ratio 0.9 (L) 1.1 - 2.2      Bilirubin, total 0.8 0.2 - 1.0 MG/DL    Bilirubin, direct 0.1 0.0 - 0.2 MG/DL    Alk.  phosphatase 74 45 - 117 U/L    AST (SGOT) 27 15 - 37 U/L    ALT (SGPT) 24 12 - 78 U/L   POC CHEM8    Collection Time: 10/15/20  9:21 AM   Result Value Ref Range    Calcium, ionized (POC) 1.21 1.12 - 1.32 mmol/L    Sodium (POC) 146 (H) 136 - 145 mmol/L    Potassium (POC) 3.4 (L) 3.5 - 5.1 mmol/L    Chloride (POC) 108 (H) 98 - 107 mmol/L    CO2 (POC) 25 21 - 32 mmol/L    Anion gap (POC) 17 10 - 20 mmol/L    Glucose (POC) 114 (H) 65 - 100 mg/dL    BUN (POC) 22 (H) 9 - 20 mg/dL    Creatinine (POC) 1.2 0.6 - 1.3 mg/dL    GFRAA, POC >60 >60 ml/min/1.73m2    GFRNA, POC 59 (L) >60 ml/min/1.73m2    Hematocrit (POC) 31 (L) 36.6 - 50.3 %    Comment Notified RN or MD immediately by        Ionized Calcium 1.21    Medications received:  Xgeva 120 mg SQ in left arm  Lanreotide 120 mg SQ in left buttock    0945 Tolerated treatment well, no adverse reaction noted. D/Cd from [facility] ambulatory and in no distress accompanied by self. Next appt 11/12/20 @ 0900.

## 2020-11-10 ENCOUNTER — HOSPITAL ENCOUNTER (OUTPATIENT)
Dept: NUCLEAR MEDICINE | Age: 75
Discharge: HOME | End: 2020-11-10
Attending: INTERNAL MEDICINE
Payer: MEDICARE

## 2020-11-10 ENCOUNTER — HOSPITAL ENCOUNTER (OUTPATIENT)
Dept: CT IMAGING | Age: 75
Discharge: HOME | End: 2020-11-10
Attending: INTERNAL MEDICINE
Payer: MEDICARE

## 2020-11-10 DIAGNOSIS — C7A.8 NEUROENDOCRINE CARCINOMA METASTATIC TO BONE (HCC): ICD-10-CM

## 2020-11-10 DIAGNOSIS — C7B.8 NEUROENDOCRINE CARCINOMA METASTATIC TO BONE (HCC): ICD-10-CM

## 2020-11-10 PROCEDURE — 74177 CT ABD & PELVIS W/CONTRAST: CPT

## 2020-11-10 PROCEDURE — 74011000636 HC RX REV CODE- 636: Performed by: INTERNAL MEDICINE

## 2020-11-10 PROCEDURE — 78306 BONE IMAGING WHOLE BODY: CPT

## 2020-11-10 RX ORDER — SODIUM CHLORIDE 0.9 % (FLUSH) 0.9 %
10 SYRINGE (ML) INJECTION
Status: COMPLETED | OUTPATIENT
Start: 2020-11-10 | End: 2020-11-10

## 2020-11-10 RX ADMIN — IOHEXOL 50 ML: 240 INJECTION, SOLUTION INTRATHECAL; INTRAVASCULAR; INTRAVENOUS; ORAL at 10:22

## 2020-11-10 RX ADMIN — Medication 10 ML: at 10:22

## 2020-11-10 RX ADMIN — IOPAMIDOL 100 ML: 755 INJECTION, SOLUTION INTRAVENOUS at 10:22

## 2020-11-11 NOTE — PROGRESS NOTES
Julian Pompa is a 69 y.o. male here for follow up for neuroendocrine cancer. Chemo today:  Cycle 23 Day 1 Xgeva/Somatuline Depot    1. Have you been to the ER, urgent care clinic since your last visit? Hospitalized since your last visit? no    2. Have you seen or consulted any other health care providers outside of the [address] [place] [address] since your last visit? Include any pap smears or colon screening. no    Pt still having hot flashes. Sometimes he just does not feel good and will lay down for about 10 minutes and helps. Will see PCP next week.

## 2020-11-12 ENCOUNTER — HOSPITAL ENCOUNTER (OUTPATIENT)
Dept: INFUSION THERAPY | Age: 75
Discharge: HOME | End: 2020-11-12
Payer: MEDICARE

## 2020-11-12 ENCOUNTER — OFFICE VISIT (OUTPATIENT)
Dept: ONCOLOGY | Age: 75
End: 2020-11-12
Payer: MEDICARE

## 2020-11-12 VITALS
DIASTOLIC BLOOD PRESSURE: 91 MMHG | RESPIRATION RATE: 18 BRPM | HEART RATE: 73 BPM | TEMPERATURE: 97.3 F | BODY MASS INDEX: 22.32 KG/M2 | SYSTOLIC BLOOD PRESSURE: 172 MMHG | WEIGHT: 164.8 LBS | HEIGHT: 72 IN

## 2020-11-12 VITALS
DIASTOLIC BLOOD PRESSURE: 91 MMHG | RESPIRATION RATE: 18 BRPM | WEIGHT: 164.8 LBS | HEIGHT: 72 IN | SYSTOLIC BLOOD PRESSURE: 172 MMHG | TEMPERATURE: 97.3 F | BODY MASS INDEX: 22.32 KG/M2 | HEART RATE: 73 BPM

## 2020-11-12 DIAGNOSIS — T45.1X5A ANTINEOPLASTIC CHEMOTHERAPY INDUCED ANEMIA: ICD-10-CM

## 2020-11-12 DIAGNOSIS — C7A.8 NEUROENDOCRINE CARCINOMA METASTATIC TO BONE (HCC): Primary | ICD-10-CM

## 2020-11-12 DIAGNOSIS — D64.81 ANTINEOPLASTIC CHEMOTHERAPY INDUCED ANEMIA: ICD-10-CM

## 2020-11-12 DIAGNOSIS — C7B.8 NEUROENDOCRINE CARCINOMA METASTATIC TO BONE (HCC): Primary | ICD-10-CM

## 2020-11-12 DIAGNOSIS — R19.7 DIARRHEA, UNSPECIFIED TYPE: ICD-10-CM

## 2020-11-12 DIAGNOSIS — C79.51 BONE METASTASIS (HCC): ICD-10-CM

## 2020-11-12 LAB
ALBUMIN SERPL-MCNC: 3.5 G/DL (ref 3.5–5)
ALBUMIN/GLOB SERPL: 1 {RATIO} (ref 1.1–2.2)
ALP SERPL-CCNC: 71 U/L (ref 45–117)
ALT SERPL-CCNC: 21 U/L (ref 12–78)
ANION GAP BLD CALC-SCNC: 16 MMOL/L (ref 10–20)
AST SERPL-CCNC: 24 U/L (ref 15–37)
BASOPHILS # BLD: 0 K/UL (ref 0–0.1)
BASOPHILS NFR BLD: 1 % (ref 0–1)
BILIRUB DIRECT SERPL-MCNC: 0.2 MG/DL (ref 0–0.2)
BILIRUB SERPL-MCNC: 0.9 MG/DL (ref 0.2–1)
BUN BLD-MCNC: 20 MG/DL (ref 9–20)
CA-I BLD-MCNC: 1.2 MMOL/L (ref 1.12–1.32)
CHLORIDE BLD-SCNC: 107 MMOL/L (ref 98–107)
CHOLEST SERPL-MCNC: 218 MG/DL
CO2 BLD-SCNC: 24 MMOL/L (ref 21–32)
CREAT BLD-MCNC: 1.3 MG/DL (ref 0.6–1.3)
DIFFERENTIAL METHOD BLD: ABNORMAL
EOSINOPHIL # BLD: 0.2 K/UL (ref 0–0.4)
EOSINOPHIL NFR BLD: 4 % (ref 0–7)
ERYTHROCYTE [DISTWIDTH] IN BLOOD BY AUTOMATED COUNT: 14 % (ref 11.5–14.5)
EST. AVERAGE GLUCOSE BLD GHB EST-MCNC: 128 MG/DL
GLOBULIN SER CALC-MCNC: 3.5 G/DL (ref 2–4)
GLUCOSE BLD-MCNC: 130 MG/DL (ref 65–100)
HBA1C MFR BLD: 6.1 % (ref 4–5.6)
HCT VFR BLD AUTO: 31.9 % (ref 36.6–50.3)
HCT VFR BLD CALC: 30 % (ref 36.6–50.3)
HDLC SERPL-MCNC: 55 MG/DL
HDLC SERPL: 4 {RATIO} (ref 0–5)
HGB BLD-MCNC: 10.1 G/DL (ref 12.1–17)
IMM GRANULOCYTES # BLD AUTO: 0 K/UL (ref 0–0.04)
IMM GRANULOCYTES NFR BLD AUTO: 0 % (ref 0–0.5)
LDLC SERPL CALC-MCNC: 128.2 MG/DL (ref 0–100)
LIPID PROFILE,FLP: ABNORMAL
LYMPHOCYTES # BLD: 0.6 K/UL (ref 0.8–3.5)
LYMPHOCYTES NFR BLD: 17 % (ref 12–49)
MAGNESIUM SERPL-MCNC: 2.2 MG/DL (ref 1.6–2.4)
MCH RBC QN AUTO: 24.1 PG (ref 26–34)
MCHC RBC AUTO-ENTMCNC: 31.7 G/DL (ref 30–36.5)
MCV RBC AUTO: 76.1 FL (ref 80–99)
MONOCYTES # BLD: 0.3 K/UL (ref 0–1)
MONOCYTES NFR BLD: 9 % (ref 5–13)
NEUTS SEG # BLD: 2.7 K/UL (ref 1.8–8)
NEUTS SEG NFR BLD: 69 % (ref 32–75)
NRBC # BLD: 0 K/UL (ref 0–0.01)
NRBC BLD-RTO: 0 PER 100 WBC
PHOSPHATE SERPL-MCNC: 3.2 MG/DL (ref 2.6–4.7)
PLATELET # BLD AUTO: 135 K/UL (ref 150–400)
PMV BLD AUTO: 10.4 FL (ref 8.9–12.9)
POTASSIUM BLD-SCNC: 3.1 MMOL/L (ref 3.5–5.1)
PROT SERPL-MCNC: 7 G/DL (ref 6.4–8.2)
PROT UR-MCNC: 16 MG/DL (ref 0–11.9)
RBC # BLD AUTO: 4.19 M/UL (ref 4.1–5.7)
RBC MORPH BLD: ABNORMAL
SERVICE CMNT-IMP: ABNORMAL
SODIUM BLD-SCNC: 142 MMOL/L (ref 136–145)
TRIGL SERPL-MCNC: 174 MG/DL (ref ?–150)
VLDLC SERPL CALC-MCNC: 34.8 MG/DL
WBC # BLD AUTO: 3.8 K/UL (ref 4.1–11.1)

## 2020-11-12 PROCEDURE — 80076 HEPATIC FUNCTION PANEL: CPT

## 2020-11-12 PROCEDURE — 36415 COLL VENOUS BLD VENIPUNCTURE: CPT

## 2020-11-12 PROCEDURE — 74011250636 HC RX REV CODE- 250/636: Performed by: INTERNAL MEDICINE

## 2020-11-12 PROCEDURE — G8755 DIAS BP > OR = 90: HCPCS | Performed by: INTERNAL MEDICINE

## 2020-11-12 PROCEDURE — 96402 CHEMO HORMON ANTINEOPL SQ/IM: CPT

## 2020-11-12 PROCEDURE — 3017F COLORECTAL CA SCREEN DOC REV: CPT | Performed by: INTERNAL MEDICINE

## 2020-11-12 PROCEDURE — G8427 DOCREV CUR MEDS BY ELIG CLIN: HCPCS | Performed by: INTERNAL MEDICINE

## 2020-11-12 PROCEDURE — G8753 SYS BP > OR = 140: HCPCS | Performed by: INTERNAL MEDICINE

## 2020-11-12 PROCEDURE — 96372 THER/PROPH/DIAG INJ SC/IM: CPT

## 2020-11-12 PROCEDURE — 84156 ASSAY OF PROTEIN URINE: CPT

## 2020-11-12 PROCEDURE — G8432 DEP SCR NOT DOC, RNG: HCPCS | Performed by: INTERNAL MEDICINE

## 2020-11-12 PROCEDURE — 85025 COMPLETE CBC W/AUTO DIFF WBC: CPT

## 2020-11-12 PROCEDURE — 84100 ASSAY OF PHOSPHORUS: CPT

## 2020-11-12 PROCEDURE — G8536 NO DOC ELDER MAL SCRN: HCPCS | Performed by: INTERNAL MEDICINE

## 2020-11-12 PROCEDURE — 80061 LIPID PANEL: CPT

## 2020-11-12 PROCEDURE — 80047 BASIC METABLC PNL IONIZED CA: CPT

## 2020-11-12 PROCEDURE — 83036 HEMOGLOBIN GLYCOSYLATED A1C: CPT

## 2020-11-12 PROCEDURE — 83735 ASSAY OF MAGNESIUM: CPT

## 2020-11-12 PROCEDURE — 99215 OFFICE O/P EST HI 40 MIN: CPT | Performed by: INTERNAL MEDICINE

## 2020-11-12 PROCEDURE — 1101F PT FALLS ASSESS-DOCD LE1/YR: CPT | Performed by: INTERNAL MEDICINE

## 2020-11-12 PROCEDURE — G8420 CALC BMI NORM PARAMETERS: HCPCS | Performed by: INTERNAL MEDICINE

## 2020-11-12 RX ORDER — LANREOTIDE ACETATE 120 MG/.5ML
120 INJECTION SUBCUTANEOUS ONCE
Status: COMPLETED | OUTPATIENT
Start: 2020-11-12 | End: 2020-11-12

## 2020-11-12 RX ADMIN — DENOSUMAB 120 MG: 120 INJECTION SUBCUTANEOUS at 09:41

## 2020-11-12 RX ADMIN — LANREOTIDE ACETATE 120 MG: 120 INJECTION SUBCUTANEOUS at 09:41

## 2020-11-12 NOTE — PROGRESS NOTES
[facility]  [address], [address] [facility], [facility] Domo Mullins, [address] [facility]  [phone]       Oncology Follow up Note      Patient: Ike Perez MRN: [id number]  SSN: [id number]    YOB: 1945  Age: 69 y.o. Sex: male        Diagnosis:     1. 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 Afinitor 10 mg + Somatuline Depot 120 mg SC - started Afinitor 3/30/2019  2. Somatuline Depot 120 mg SC - 05/2016 - 11/2018; Restarted 3/4/2019 -    (treatment held per patient request since11/16/2018)   3. Xgeva 120mg SC     Subjective:      Ike Perez is a 69 y.o. male with a diagnosis of metastatic neuroendocrine cancer. He started experiencing pain in both groins and the abdomen. A CT of the abdomen showed retroperitoneal adenopathy. CT guided biopsy of the retroperitoneal LN reveals a dx of well differentiated neuroendocrine carcinoma. Bone scan shows disease in the bone. Mr. Eliza Zapien received Somatuline and had good control of disease. He took a break from therapy in Nov 2018. He went on an extended vacation until the beginning of February to [facility]. CT showed progression of disease in the nodes. Mr. Eliza Zapien resumed therapy with Somatuline with the addition of Afinitor. Symptoms such as bone pains, diarrhea and fatigue improved  He took a break from [address] [place] [address] in October 2019, but repeat scans showed slight progression of disease. He resumed Afinitor in November 2019. Scans show stable disease. He reports hot flashes and continues to have a difficult time gaining weight. Denies back pain. His appetite is good. He experiences dizziness, hot flashes and urinary frequency.          Review of Systems:    Constitutional: fatigue, hot flashes  Eyes: negative  Ears, Nose, Mouth, Throat, and Face: negative  Respiratory: negative  Cardiovascular: negative  Gastrointestinal: diarrhea  Genitourinary: urinary incontinence  Integument/Breast: negative  Hematologic/Lymphatic: negative  Musculoskeletal: back pain  Neurological: negative      Past Medical History:   Diagnosis Date    Adverse effect of anesthesia     low HR and very slow to wakeup    Cancer ([address] 2016    neuroendocrine, retroperitoneal LN involvement    Diarrhea     Frequent urination     Hypertension     Poor appetite     Unspecified adverse effect of anesthesia     \"hard to put to sleep and slow to wake up-heart rate dropped very low\"     Past Surgical History:   Procedure Laterality Date    COLONOSCOPY N/A 2016    COLONOSCOPY performed by Shasta Arizmendi MD at [address] [place] [address]  2016         HX ORTHOPAEDIC      knee scope left knee    HX OTHER SURGICAL  2013    excision of scalp cyst     UPPER GI ENDOSCOPY,BIOPSY  2016           Family History   Problem Relation Age of Onset    Cancer Mother     Stroke Sister      Social History     Tobacco Use    Smoking status: Former Smoker     Packs/day: 1.00     Years: 8.00     Pack years: 8.00     Last attempt to quit: 1972     Years since quittin.5    Smokeless tobacco: Never Used   Substance Use Topics    Alcohol use: No      Prior to Admission medications    Medication Sig Start Date End Date Taking? Authorizing Provider   everolimus (Afinitor) 10 mg tab Take 1 Tab by mouth daily. 20  Yes Luis Antonio Franz MD   meclizine (ANTIVERT) 25 mg tablet Take 1 Tab by mouth three (3) times daily as needed for Dizziness. 20  Yes Lela Hood DO   ondansetron (Zofran ODT) 4 mg disintegrating tablet Take 1 Tab by mouth every eight (8) hours as needed for Nausea. 20  Yes Lela Hood DO   lisinopril (PRINIVIL, ZESTRIL) 20 mg tablet Take 1 Tab by mouth daily. Patient taking differently: Take 40 mg by mouth daily.  19  Yes Luis Antonio Franz MD   tamsulosin (FLOMAX) 0.4 mg capsule Take 0.4 mg by mouth daily. Yes Provider, Historical          No Known Allergies        Objective:     Visit Vitals  BP (!) 172/91   Pulse 73   Temp 97.3 °F (36.3 °C)   Resp 18   Ht 6' (1.829 m)   Wt 164 lb 12.8 oz (74.8 kg)   BMI 22.35 kg/m²       Pain Scale: 0 - No pain/10  Pain Location:       Physical Exam:     GENERAL: alert, cooperative, thin  EYE: negative, no pallor or icterus  LYMPHATIC: Cervical, supraclavicular, and axillary nodes normal.   THROAT & NECK: normal and no erythema or exudates noted. LUNG: clear to auscultation bilaterally  HEART: regular rate and rhythm, no murmur  ABDOMEN: soft, non-tender on palpation  EXTREMITIES: no cyanosis or edema  SKIN: Normal. No rash or ecchymosis  NEUROLOGIC: negative, no motor or sensory deficits.          IMAGING:     CT Results (most recent):  Results from Hospital Encounter encounter on 11/10/20   CT ABD PELV W CONT    Narrative EXAM: CT ABD PELV W CONT    INDICATION: Restaging disease neuroendocrine carcinoma    COMPARISON: April 28, 2020     CONTRAST: 100 mL of Isovue-370. TECHNIQUE:   Following the uneventful intravenous administration of contrast, thin axial  images were obtained through the abdomen and pelvis. Coronal and sagittal  reconstructions were generated. Oral contrast was administered. CT dose  reduction was achieved through use of a standardized protocol tailored for this  examination and automatic exposure control for dose modulation. FINDINGS:   LOWER THORAX: Interstitial disease mainly at the right lung base likely stable. LIVER: Small area ,  3 mm, of low density within the inferior aspect of the  right lobe of the liver is too small to characterize and could have been present  on the prior examination. Follow-up as clinically appropriate. BILIARY TREE: Gallbladder is within normal limits. CBD is not dilated. SPLEEN: within normal limits. PANCREAS: No mass or ductal dilatation. ADRENALS: Unremarkable.   KIDNEYS: Nonobstructing lower pole left renal calculus. There is some prominence  of the collecting system and left ureter. These may be related to the bladder  distention. Other etiologies not excluded. Wall thickening in the bladder is  more prominent close to the left ureterovesical junction. STOMACH: Unremarkable. SMALL BOWEL: No dilatation or wall thickening. COLON: Apparent wall thickening in the cecum is stable. APPENDIX: Appears normal.  PERITONEUM: Calcified mesenteric lesion once again demonstrated. No significant  interval change in size when considering different technique and location. RETROPERITONEUM: Prominent lymphadenopathy mainly to the left of the aorta  appears unchanged in size. Prostate enlarged with implants,  URINARY BLADDER: Distended with wall thickening suggestive of a chronic outlet  obstruction. BONES: No destructive bone lesion. ABDOMINAL WALL: No mass or hernia. ADDITIONAL COMMENTS: N/A      Impression IMPRESSION:    1. Stable mesenteric mass with retroperitoneal adenopathy. 2. Small liver lesion likely cysts but too small to characterize as above. 3. Prostate enlargement with bladder wall thickening and prominence of the left  ureter as above. 4. Apparent wall thickening cecum as above. I reviewed the imaging personally. Stable disease in the retroperitoneal nodes.        Lab Results   Component Value Date/Time    WBC 3.8 (L) 11/12/2020 09:18 AM    Hemoglobin (POC) 14.6 12/21/2017 09:10 AM    HGB 10.1 (L) 11/12/2020 09:18 AM    Hematocrit (POC) 30 (L) 11/12/2020 09:22 AM    HCT 31.9 (L) 11/12/2020 09:18 AM    PLATELET 945 (L) 12/04/6757 09:18 AM    MCV 76.1 (L) 11/12/2020 09:18 AM       Lab Results   Component Value Date/Time    Sodium 142 09/30/2020 09:07 AM    Potassium 3.7 09/30/2020 09:07 AM    Chloride 108 09/30/2020 09:07 AM    CO2 31 09/30/2020 09:07 AM    Anion gap 3 (L) 09/30/2020 09:07 AM    Glucose 193 (H) 09/30/2020 09:07 AM    BUN 28 (H) 09/30/2020 09:07 AM Creatinine 1.46 (H) 09/30/2020 09:07 AM    BUN/Creatinine ratio 19 09/30/2020 09:07 AM    GFR est AA 57 (L) 09/30/2020 09:07 AM    GFR est non-AA 47 (L) 09/30/2020 09:07 AM    Calcium 8.8 09/30/2020 09:07 AM    Bilirubin, total 0.9 11/12/2020 09:18 AM    Alk. phosphatase 71 11/12/2020 09:18 AM    Protein, total 7.0 11/12/2020 09:18 AM    Albumin 3.5 11/12/2020 09:18 AM    Globulin 3.5 11/12/2020 09:18 AM    A-G Ratio 1.0 (L) 11/12/2020 09:18 AM    ALT (SGPT) 21 11/12/2020 09:18 AM           Assessment:     1. 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 Had excellent disease control. He experienced a number of side effects including hot flashes, weight loss etc  He decided to take a break from therapy. CT showed disease progression in retroperitoneum and abdomen. Restarted Somatuline. Added Afinitor. Flushing, appetite, diarrhea, back pain are all better. Stopped Afinitor in Oct 2019  CT showed progression in retroperitoneal nodes    He is now back on Everolimus 10 mg in addition to [employer] treatment   + diarrhea - manageable  + fatigue - manageable, he [place] not going [place] the golf [place] these days. + hot flashes - manageable    Weight is stable  Appetite is good    A detailed system by system evaluation of side effect was performed to assess chemotherapy related toxicity. Blood counts are acceptable. Results reviewed with the patient. CT Abd Pelv, NM bone scan (11/2020): Stable disease    Gallium PET showed significant disease in the nodes both above and below the diaphragm and bones. We would use CT to follow the disease and reserve Gallium PET for consideration of DOTATE treatment in the future. 2. Bone metastasis    > Denosumab      3.  Urinary incontinence    BPH  Managed by  Mike      4. Diarrhea    Imodium as needed      5. Orthostasis    Observation      6. HTN, uncontrolled    May need to add Amlodipine to Lisinopril. 7. Anemia from systemic therapy    Observation      Plan:       > Continue Afinitor  > Continue Denosumab  > Continue Somatuline  > Imodium as needed  > Follow-up in 2 months       Signed by: Joao Jiménez MD                     November 12, 2020      CC. Allan Archibald MD  CC.  Ronald Bang MD

## 2020-11-12 NOTE — PROGRESS NOTES
[facility] Progress Note    0905  Pt arrived in stable condition and in no distress for Xgeva SQ & Lanreotide SQ    Assessment completed. Labs obtained per order and sent for processing. Patient Vitals for the past 12 hrs:   Temp Pulse Resp BP   11/12/20 0856 97.3 °F (36.3 °C) 73 18 (!) 172/91        Recent Results (from the past 12 hour(s))   CBC WITH AUTOMATED DIFF    Collection Time: 11/12/20  9:18 AM   Result Value Ref Range    WBC 3.8 (L) 4.1 - 11.1 K/uL    RBC 4.19 4. 10 - 5.70 M/uL    HGB 10.1 (L) 12.1 - 17.0 g/dL    HCT 31.9 (L) 36.6 - 50.3 %    MCV 76.1 (L) 80.0 - 99.0 FL    MCH 24.1 (L) 26.0 - 34.0 PG    MCHC 31.7 30.0 - 36.5 g/dL    RDW 14.0 11.5 - 14.5 %    PLATELET 343 (L) 785 - 400 K/uL    MPV 10.4 8.9 - 12.9 FL    NRBC 0.0 0  WBC    ABSOLUTE NRBC 0.00 0.00 - 0.01 K/uL    NEUTROPHILS 69 32 - 75 %    LYMPHOCYTES 17 12 - 49 %    MONOCYTES 9 5 - 13 %    EOSINOPHILS 4 0 - 7 %    BASOPHILS 1 0 - 1 %    IMMATURE GRANULOCYTES 0 0.0 - 0.5 %    ABS. NEUTROPHILS 2.7 1.8 - 8.0 K/UL    ABS. LYMPHOCYTES 0.6 (L) 0.8 - 3.5 K/UL    ABS. MONOCYTES 0.3 0.0 - 1.0 K/UL    ABS. EOSINOPHILS 0.2 0.0 - 0.4 K/UL    ABS. BASOPHILS 0.0 0.0 - 0.1 K/UL    ABS. IMM. GRANS. 0.0 0.00 - 0.04 K/UL    DF SMEAR SCANNED      RBC COMMENTS MICROCYTOSIS  1+       HEPATIC FUNCTION PANEL    Collection Time: 11/12/20  9:18 AM   Result Value Ref Range    Protein, total 7.0 6.4 - 8.2 g/dL    Albumin 3.5 3.5 - 5.0 g/dL    Globulin 3.5 2.0 - 4.0 g/dL    A-G Ratio 1.0 (L) 1.1 - 2.2      Bilirubin, total 0.9 0.2 - 1.0 MG/DL    Bilirubin, direct 0.2 0.0 - 0.2 MG/DL    Alk.  phosphatase 71 45 - 117 U/L    AST (SGOT) 24 15 - 37 U/L    ALT (SGPT) 21 12 - 78 U/L   MAGNESIUM    Collection Time: 11/12/20  9:18 AM   Result Value Ref Range    Magnesium 2.2 1.6 - 2.4 mg/dL   PHOSPHORUS    Collection Time: 11/12/20  9:18 AM   Result Value Ref Range    Phosphorus 3.2 2.6 - 4.7 MG/DL   PROTEIN URINE, RANDOM    Collection Time: 11/12/20 9:18 AM   Result Value Ref Range    Protein, urine random 16 (H) 0.0 - 11.9 mg/dL   POC CHEM8    Collection Time: 11/12/20  9:22 AM   Result Value Ref Range    Calcium, ionized (POC) 1.20 1. 12 - 1.32 mmol/L    Sodium (POC) 142 136 - 145 mmol/L    Potassium (POC) 3.1 (L) 3.5 - 5.1 mmol/L    Chloride (POC) 107 98 - 107 mmol/L    CO2 (POC) 24 21 - 32 mmol/L    Anion gap (POC) 16 10 - 20 mmol/L    Glucose (POC) 130 (H) 65 - 100 mg/dL    BUN (POC) 20 9 - 20 mg/dL    Creatinine (POC) 1.3 0.6 - 1.3 mg/dL    GFRAA, POC >60 >60 ml/min/1.73m2    GFRNA, POC 54 (L) >60 ml/min/1.73m2    Hematocrit (POC) 30 (L) 36.6 - 50.3 %    Comment Comment Not Indicated. The following medications administered:  Medications Administered     denosumab (XGEVA) injection 120 mg     Admin Date  11/12/2020 Action  Given Dose  120 mg Route  SubCUTAneous Administered By  Viki Canales RN          lanreotide (SOMATULINE DEPOT) injection syringe 120 mg     Admin Date  11/12/2020 Action  Given Dose  120 mg Route  SubCUTAneous Administered By  Viki Canales, RN                Pt tolerated treatment well. No s/s of adverse reaction noted. Pt provided with education on possible side effects of medication along with discharge instructions. Pt verbalized understanding. 0945  Pt discharged in no acute distress.  Next appointment:    Future Appointments   Date Time Provider Mark De La Paz   12/10/2020  9:00 AM MAYDA [facility]FUSION NURSE 4 [facility] REG   1/7/2021  9:00 AM [facility]DA INFUSION NURSE 4 [facility] REG   2/4/2021  9:00 AM [facility]DA INFUSION NURSE 4 [facility]

## 2020-11-12 NOTE — LETTER
11/12/20 Patient: Loulou Fields YOB: 1945 Date of Visit: 11/12/2020 Remi Roque MD 
[facility] [address] [address] VIA Facsimile: [phone] Dear Remi Roque MD, Thank you for referring Mr. Alice Fallon to [address] [place] [address] for evaluation. My notes for this consultation are attached. If you have questions, please do not hesitate to call me. I look forward to following your patient along with you.  
 
 
Sincerely, 
 
Cristy Barakat MD

## 2020-11-24 ENCOUNTER — TELEPHONE (OUTPATIENT)
Dept: INFUSION THERAPY | Age: 75
End: 2020-11-24

## 2020-11-24 NOTE — TELEPHONE ENCOUNTER
[facility] Navigator Encounter    11/24/2020- Received patient's portion of his [employer] re-enrollment application for his medication, Afinitor. I have completed the provider portion and will obtain Dr. Yesenia Akers and fax to [place] for processing. Faxed 11/24/2020.     1/25/2021- Called [employer] to check status of patient's application and per rep, application is still in \"benefit verification phase\" with insurance and once completed, a decision will be made. Per rep, patient is still able to fill his medication this year while his 2021 re-enrollment nadia is being processed and patient actually called today to schedule next shipment for Afinitor. 1/29/2021- Received approval letter for this patient's re-enrollment into [employer]'s free drug program for Afinitor. Patient notified.

## 2020-12-03 RX ORDER — LANREOTIDE ACETATE 120 MG/.5ML
120 INJECTION SUBCUTANEOUS ONCE
Status: CANCELLED | OUTPATIENT
Start: 2021-01-07

## 2020-12-10 ENCOUNTER — HOSPITAL ENCOUNTER (OUTPATIENT)
Dept: INFUSION THERAPY | Age: 75
Discharge: HOME | End: 2020-12-10
Payer: MEDICARE

## 2020-12-10 DIAGNOSIS — C7A.8 NEUROENDOCRINE CARCINOMA METASTATIC TO BONE (HCC): Primary | ICD-10-CM

## 2020-12-10 DIAGNOSIS — C7B.8 NEUROENDOCRINE CARCINOMA METASTATIC TO BONE (HCC): Primary | ICD-10-CM

## 2020-12-10 LAB
ALBUMIN SERPL-MCNC: 3.3 G/DL (ref 3.5–5)
ALBUMIN/GLOB SERPL: 1 {RATIO} (ref 1.1–2.2)
ALP SERPL-CCNC: 67 U/L (ref 45–117)
ALT SERPL-CCNC: 20 U/L (ref 12–78)
ANION GAP BLD CALC-SCNC: 13 MMOL/L (ref 10–20)
AST SERPL-CCNC: 21 U/L (ref 15–37)
BASOPHILS # BLD: 0 K/UL (ref 0–0.1)
BASOPHILS NFR BLD: 1 % (ref 0–1)
BILIRUB DIRECT SERPL-MCNC: 0.1 MG/DL (ref 0–0.2)
BILIRUB SERPL-MCNC: 0.7 MG/DL (ref 0.2–1)
BUN BLD-MCNC: 24 MG/DL (ref 9–20)
CA-I BLD-MCNC: 1.17 MMOL/L (ref 1.12–1.32)
CHLORIDE BLD-SCNC: 110 MMOL/L (ref 98–107)
CO2 BLD-SCNC: 25 MMOL/L (ref 21–32)
CREAT BLD-MCNC: 1.3 MG/DL (ref 0.6–1.3)
DIFFERENTIAL METHOD BLD: ABNORMAL
EOSINOPHIL # BLD: 0.2 K/UL (ref 0–0.4)
EOSINOPHIL NFR BLD: 5 % (ref 0–7)
ERYTHROCYTE [DISTWIDTH] IN BLOOD BY AUTOMATED COUNT: 14.1 % (ref 11.5–14.5)
GLOBULIN SER CALC-MCNC: 3.2 G/DL (ref 2–4)
GLUCOSE BLD-MCNC: 143 MG/DL (ref 65–100)
HCT VFR BLD AUTO: 31.1 % (ref 36.6–50.3)
HCT VFR BLD CALC: 29 % (ref 36.6–50.3)
HGB BLD-MCNC: 9.8 G/DL (ref 12.1–17)
IMM GRANULOCYTES # BLD AUTO: 0 K/UL (ref 0–0.04)
IMM GRANULOCYTES NFR BLD AUTO: 0 % (ref 0–0.5)
LYMPHOCYTES # BLD: 0.6 K/UL (ref 0.8–3.5)
LYMPHOCYTES NFR BLD: 16 % (ref 12–49)
MAGNESIUM SERPL-MCNC: 2 MG/DL (ref 1.6–2.4)
MCH RBC QN AUTO: 24 PG (ref 26–34)
MCHC RBC AUTO-ENTMCNC: 31.5 G/DL (ref 30–36.5)
MCV RBC AUTO: 76.2 FL (ref 80–99)
MONOCYTES # BLD: 0.3 K/UL (ref 0–1)
MONOCYTES NFR BLD: 9 % (ref 5–13)
NEUTS SEG # BLD: 2.6 K/UL (ref 1.8–8)
NEUTS SEG NFR BLD: 69 % (ref 32–75)
NRBC # BLD: 0 K/UL (ref 0–0.01)
NRBC BLD-RTO: 0 PER 100 WBC
PHOSPHATE SERPL-MCNC: 3.1 MG/DL (ref 2.6–4.7)
PLATELET # BLD AUTO: 154 K/UL (ref 150–400)
PMV BLD AUTO: 10.2 FL (ref 8.9–12.9)
POTASSIUM BLD-SCNC: 3.4 MMOL/L (ref 3.5–5.1)
PROT SERPL-MCNC: 6.5 G/DL (ref 6.4–8.2)
RBC # BLD AUTO: 4.08 M/UL (ref 4.1–5.7)
RBC MORPH BLD: ABNORMAL
RBC MORPH BLD: ABNORMAL
SERVICE CMNT-IMP: ABNORMAL
SODIUM BLD-SCNC: 144 MMOL/L (ref 136–145)
WBC # BLD AUTO: 3.7 K/UL (ref 4.1–11.1)

## 2020-12-10 PROCEDURE — 83735 ASSAY OF MAGNESIUM: CPT

## 2020-12-10 PROCEDURE — 80076 HEPATIC FUNCTION PANEL: CPT

## 2020-12-10 PROCEDURE — 74011250636 HC RX REV CODE- 250/636: Performed by: INTERNAL MEDICINE

## 2020-12-10 PROCEDURE — 80047 BASIC METABLC PNL IONIZED CA: CPT

## 2020-12-10 PROCEDURE — 96372 THER/PROPH/DIAG INJ SC/IM: CPT

## 2020-12-10 PROCEDURE — 84100 ASSAY OF PHOSPHORUS: CPT

## 2020-12-10 PROCEDURE — 36415 COLL VENOUS BLD VENIPUNCTURE: CPT

## 2020-12-10 PROCEDURE — 85025 COMPLETE CBC W/AUTO DIFF WBC: CPT

## 2020-12-10 RX ORDER — LANREOTIDE ACETATE 120 MG/.5ML
120 INJECTION SUBCUTANEOUS ONCE
Status: COMPLETED | OUTPATIENT
Start: 2020-12-10 | End: 2020-12-10

## 2020-12-10 RX ADMIN — LANREOTIDE ACETATE 120 MG: 120 INJECTION SUBCUTANEOUS at 09:08

## 2020-12-10 RX ADMIN — DENOSUMAB 120 MG: 120 INJECTION SUBCUTANEOUS at 09:08

## 2020-12-10 NOTE — PROGRESS NOTES
Pt arrived to [facility] ambulatory in no acute distress at 0845 for Somatuline Depot/Xgeva.  Assessment unremarkable except mild nausea. Labs obtained, CBC, Mag, Phos, Chem8, hepatic panel. Visit Vitals  BP (!) (P) 170/92 (BP 1 Location: Left arm, BP Patient Position: Sitting)   Pulse (P) 71   Temp (P) 97.5 °F (36.4 °C)   Resp (P) 18   Ht (P) 6' (1.829 m)   Wt (P) 74.7 kg (164 lb 11.2 oz)   BMI (P) 22.34 kg/m²     Recent Results (from the past 12 hour(s))   CBC WITH AUTOMATED DIFF    Collection Time: 12/10/20  8:56 AM   Result Value Ref Range    WBC 3.7 (L) 4.1 - 11.1 K/uL    RBC 4.08 (L) 4.10 - 5.70 M/uL    HGB 9.8 (L) 12.1 - 17.0 g/dL    HCT 31.1 (L) 36.6 - 50.3 %    MCV 76.2 (L) 80.0 - 99.0 FL    MCH 24.0 (L) 26.0 - 34.0 PG    MCHC 31.5 30.0 - 36.5 g/dL    RDW 14.1 11.5 - 14.5 %    PLATELET 635 549 - 079 K/uL    MPV 10.2 8.9 - 12.9 FL    NRBC 0.0 0  WBC    ABSOLUTE NRBC 0.00 0.00 - 0.01 K/uL    NEUTROPHILS PENDING %    LYMPHOCYTES PENDING %    MONOCYTES PENDING %    EOSINOPHILS PENDING %    BASOPHILS PENDING %    IMMATURE GRANULOCYTES PENDING %    ABS. NEUTROPHILS PENDING K/UL    ABS. LYMPHOCYTES PENDING K/UL    ABS. MONOCYTES PENDING K/UL    ABS. EOSINOPHILS PENDING K/UL    ABS. BASOPHILS PENDING K/UL    ABS. IMM. GRANS. PENDING K/UL    DF PENDING    HEPATIC FUNCTION PANEL    Collection Time: 12/10/20  8:56 AM   Result Value Ref Range    Protein, total 6.5 6.4 - 8.2 g/dL    Albumin 3.3 (L) 3.5 - 5.0 g/dL    Globulin 3.2 2.0 - 4.0 g/dL    A-G Ratio 1.0 (L) 1.1 - 2.2      Bilirubin, total 0.7 0.2 - 1.0 MG/DL    Bilirubin, direct 0.1 0.0 - 0.2 MG/DL    Alk.  phosphatase 67 45 - 117 U/L    AST (SGOT) 21 15 - 37 U/L    ALT (SGPT) 20 12 - 78 U/L   MAGNESIUM    Collection Time: 12/10/20  8:56 AM   Result Value Ref Range    Magnesium 2.0 1.6 - 2.4 mg/dL   PHOSPHORUS    Collection Time: 12/10/20  8:56 AM   Result Value Ref Range    Phosphorus 3.1 2.6 - 4.7 MG/DL   POC CHEM8    Collection Time: 12/10/20  8:58 AM   Result Value Ref Range    Calcium, ionized (POC) 1.17 1.12 - 1.32 mmol/L    Sodium (POC) 144 136 - 145 mmol/L    Potassium (POC) 3.4 (L) 3.5 - 5.1 mmol/L    Chloride (POC) 110 (H) 98 - 107 mmol/L    CO2 (POC) 25 21 - 32 mmol/L    Anion gap (POC) 13 10 - 20 mmol/L    Glucose (POC) 143 (H) 65 - 100 mg/dL    BUN (POC) 24 (H) 9 - 20 mg/dL    Creatinine (POC) 1.3 0.6 - 1.3 mg/dL    GFRAA, POC >60 >60 ml/min/1.73m2    GFRNA, POC 54 (L) >60 ml/min/1.73m2    Hematocrit (POC) 29 (L) 36.6 - 50.3 %    Comment Comment Not Indicated. See [facility] for pending results. The following medications administered:  Xgeva 120mg SQ in L arm  Somatuline Depot 120mg SQ in L GM    Pt tolerated treatment well. Pt discharged ambulatory in no acute distress at 0910, accompanied by self. Next appointment 1/7/21 at 0900.

## 2021-01-01 ENCOUNTER — TELEPHONE (OUTPATIENT)
Dept: ONCOLOGY | Age: 76
End: 2021-01-01

## 2021-01-01 ENCOUNTER — TELEPHONE (OUTPATIENT)
Dept: PALLATIVE CARE | Age: 76
End: 2021-01-01

## 2021-01-01 ENCOUNTER — HOME CARE VISIT (OUTPATIENT)
Dept: HOSPICE | Facility: HOSPICE | Age: 76
End: 2021-01-01
Payer: MEDICARE

## 2021-01-01 ENCOUNTER — OFFICE VISIT (OUTPATIENT)
Dept: ONCOLOGY | Age: 76
End: 2021-01-01
Payer: MEDICARE

## 2021-01-01 ENCOUNTER — HOSPITAL ENCOUNTER (OUTPATIENT)
Dept: INFUSION THERAPY | Age: 76
Discharge: HOME | End: 2021-10-14
Payer: MEDICARE

## 2021-01-01 ENCOUNTER — APPOINTMENT (OUTPATIENT)
Dept: INFUSION THERAPY | Age: 76
End: 2021-01-01

## 2021-01-01 ENCOUNTER — HOSPITAL ENCOUNTER (INPATIENT)
Age: 76
LOS: 3 days | Discharge: HOSPICE | DRG: 312 | End: 2021-12-27
Attending: EMERGENCY MEDICINE | Admitting: INTERNAL MEDICINE
Payer: MEDICARE

## 2021-01-01 ENCOUNTER — APPOINTMENT (OUTPATIENT)
Dept: NON INVASIVE DIAGNOSTICS | Age: 76
DRG: 312 | End: 2021-01-01
Attending: INTERNAL MEDICINE
Payer: MEDICARE

## 2021-01-01 ENCOUNTER — HOSPICE ADMISSION (OUTPATIENT)
Dept: HOSPICE | Facility: HOSPICE | Age: 76
End: 2021-01-01

## 2021-01-01 ENCOUNTER — HOSPITAL ENCOUNTER (OUTPATIENT)
Dept: INFUSION THERAPY | Age: 76
Discharge: HOME | End: 2021-11-11
Payer: MEDICARE

## 2021-01-01 ENCOUNTER — APPOINTMENT (OUTPATIENT)
Dept: CT IMAGING | Age: 76
DRG: 312 | End: 2021-01-01
Attending: EMERGENCY MEDICINE
Payer: MEDICARE

## 2021-01-01 ENCOUNTER — HOSPICE ADMISSION (OUTPATIENT)
Dept: HOSPICE | Facility: HOSPICE | Age: 76
End: 2021-01-01
Payer: MEDICARE

## 2021-01-01 ENCOUNTER — HOSPITAL ENCOUNTER (OUTPATIENT)
Dept: INFUSION THERAPY | Age: 76
Discharge: HOME | End: 2021-09-16
Payer: MEDICARE

## 2021-01-01 ENCOUNTER — HOME CARE VISIT (OUTPATIENT)
Dept: SCHEDULING | Facility: HOME HEALTH | Age: 76
End: 2021-01-01
Payer: MEDICARE

## 2021-01-01 ENCOUNTER — HOSPITAL ENCOUNTER (OUTPATIENT)
Dept: INFUSION THERAPY | Age: 76
Discharge: HOME | End: 2021-07-22
Payer: MEDICARE

## 2021-01-01 ENCOUNTER — APPOINTMENT (OUTPATIENT)
Dept: CT IMAGING | Age: 76
DRG: 872 | End: 2021-01-01
Attending: HOSPITALIST
Payer: MEDICARE

## 2021-01-01 ENCOUNTER — HOSPITAL ENCOUNTER (OUTPATIENT)
Dept: INFUSION THERAPY | Age: 76
Discharge: HOME | End: 2021-06-24
Payer: MEDICARE

## 2021-01-01 ENCOUNTER — HOSPITAL ENCOUNTER (OUTPATIENT)
Dept: INFUSION THERAPY | Age: 76
Discharge: HOME | End: 2021-04-29
Payer: MEDICARE

## 2021-01-01 ENCOUNTER — HOSPITAL ENCOUNTER (OUTPATIENT)
Dept: INFUSION THERAPY | Age: 76
Discharge: HOME | End: 2021-05-27
Payer: MEDICARE

## 2021-01-01 ENCOUNTER — HOSPITAL ENCOUNTER (EMERGENCY)
Age: 76
Discharge: HOME | End: 2021-08-11
Attending: EMERGENCY MEDICINE
Payer: MEDICARE

## 2021-01-01 ENCOUNTER — HOSPITAL ENCOUNTER (OUTPATIENT)
Dept: INFUSION THERAPY | Age: 76
Discharge: HOME | End: 2021-12-09
Payer: MEDICARE

## 2021-01-01 ENCOUNTER — OFFICE VISIT (OUTPATIENT)
Dept: PALLATIVE CARE | Age: 76
End: 2021-01-01
Payer: MEDICARE

## 2021-01-01 ENCOUNTER — HOSPITAL ENCOUNTER (OUTPATIENT)
Dept: PET IMAGING | Age: 76
Discharge: HOME | End: 2021-08-04
Attending: INTERNAL MEDICINE
Payer: MEDICARE

## 2021-01-01 ENCOUNTER — HOSPITAL ENCOUNTER (INPATIENT)
Age: 76
LOS: 4 days | Discharge: HOME HEALTH | DRG: 872 | End: 2021-12-20
Attending: EMERGENCY MEDICINE | Admitting: HOSPITALIST
Payer: MEDICARE

## 2021-01-01 ENCOUNTER — HOSPITAL ENCOUNTER (OUTPATIENT)
Dept: INFUSION THERAPY | Age: 76
Discharge: HOME | End: 2021-08-19
Payer: MEDICARE

## 2021-01-01 ENCOUNTER — APPOINTMENT (OUTPATIENT)
Dept: ULTRASOUND IMAGING | Age: 76
DRG: 872 | End: 2021-01-01
Attending: UROLOGY
Payer: MEDICARE

## 2021-01-01 ENCOUNTER — APPOINTMENT (OUTPATIENT)
Dept: GENERAL RADIOLOGY | Age: 76
DRG: 872 | End: 2021-01-01
Attending: EMERGENCY MEDICINE
Payer: MEDICARE

## 2021-01-01 ENCOUNTER — APPOINTMENT (OUTPATIENT)
Dept: GENERAL RADIOLOGY | Age: 76
DRG: 312 | End: 2021-01-01
Attending: EMERGENCY MEDICINE
Payer: MEDICARE

## 2021-01-01 VITALS
DIASTOLIC BLOOD PRESSURE: 85 MMHG | OXYGEN SATURATION: 99 % | HEART RATE: 96 BPM | SYSTOLIC BLOOD PRESSURE: 160 MMHG | RESPIRATION RATE: 16 BRPM

## 2021-01-01 VITALS
OXYGEN SATURATION: 100 % | DIASTOLIC BLOOD PRESSURE: 98 MMHG | WEIGHT: 161.7 LBS | HEIGHT: 72 IN | RESPIRATION RATE: 18 BRPM | SYSTOLIC BLOOD PRESSURE: 159 MMHG | BODY MASS INDEX: 21.9 KG/M2 | HEART RATE: 82 BPM | TEMPERATURE: 98 F

## 2021-01-01 VITALS
BODY MASS INDEX: 22.29 KG/M2 | RESPIRATION RATE: 18 BRPM | HEART RATE: 64 BPM | TEMPERATURE: 98.1 F | OXYGEN SATURATION: 100 % | DIASTOLIC BLOOD PRESSURE: 86 MMHG | SYSTOLIC BLOOD PRESSURE: 132 MMHG | WEIGHT: 164.6 LBS | HEIGHT: 72 IN

## 2021-01-01 VITALS
HEART RATE: 55 BPM | BODY MASS INDEX: 22.32 KG/M2 | SYSTOLIC BLOOD PRESSURE: 183 MMHG | OXYGEN SATURATION: 98 % | HEIGHT: 72 IN | RESPIRATION RATE: 18 BRPM | TEMPERATURE: 96.3 F | DIASTOLIC BLOOD PRESSURE: 89 MMHG | WEIGHT: 164.8 LBS

## 2021-01-01 VITALS
TEMPERATURE: 98.8 F | HEART RATE: 62 BPM | SYSTOLIC BLOOD PRESSURE: 160 MMHG | OXYGEN SATURATION: 99 % | DIASTOLIC BLOOD PRESSURE: 92 MMHG

## 2021-01-01 VITALS
DIASTOLIC BLOOD PRESSURE: 86 MMHG | TEMPERATURE: 97.8 F | HEART RATE: 85 BPM | HEIGHT: 72 IN | SYSTOLIC BLOOD PRESSURE: 135 MMHG | WEIGHT: 163.1 LBS | BODY MASS INDEX: 22.09 KG/M2 | RESPIRATION RATE: 18 BRPM | OXYGEN SATURATION: 100 %

## 2021-01-01 VITALS
SYSTOLIC BLOOD PRESSURE: 156 MMHG | TEMPERATURE: 97.9 F | WEIGHT: 159.83 LBS | BODY MASS INDEX: 21.68 KG/M2 | DIASTOLIC BLOOD PRESSURE: 89 MMHG | OXYGEN SATURATION: 95 % | RESPIRATION RATE: 16 BRPM | HEART RATE: 64 BPM

## 2021-01-01 VITALS
BODY MASS INDEX: 22.27 KG/M2 | SYSTOLIC BLOOD PRESSURE: 185 MMHG | HEART RATE: 54 BPM | HEIGHT: 72 IN | TEMPERATURE: 98.1 F | WEIGHT: 164.4 LBS | RESPIRATION RATE: 18 BRPM | OXYGEN SATURATION: 100 % | DIASTOLIC BLOOD PRESSURE: 93 MMHG

## 2021-01-01 VITALS
BODY MASS INDEX: 21.67 KG/M2 | DIASTOLIC BLOOD PRESSURE: 87 MMHG | TEMPERATURE: 98.4 F | HEART RATE: 72 BPM | RESPIRATION RATE: 18 BRPM | HEIGHT: 72 IN | SYSTOLIC BLOOD PRESSURE: 155 MMHG | WEIGHT: 160 LBS | OXYGEN SATURATION: 99 %

## 2021-01-01 VITALS
OXYGEN SATURATION: 98 % | TEMPERATURE: 98 F | WEIGHT: 159.8 LBS | RESPIRATION RATE: 18 BRPM | DIASTOLIC BLOOD PRESSURE: 89 MMHG | HEART RATE: 88 BPM | BODY MASS INDEX: 21.67 KG/M2 | SYSTOLIC BLOOD PRESSURE: 146 MMHG

## 2021-01-01 VITALS
SYSTOLIC BLOOD PRESSURE: 186 MMHG | HEIGHT: 72 IN | RESPIRATION RATE: 20 BRPM | DIASTOLIC BLOOD PRESSURE: 89 MMHG | BODY MASS INDEX: 21.67 KG/M2 | HEART RATE: 60 BPM | TEMPERATURE: 98 F | WEIGHT: 160 LBS | OXYGEN SATURATION: 98 %

## 2021-01-01 VITALS
RESPIRATION RATE: 16 BRPM | HEIGHT: 72 IN | WEIGHT: 164 LBS | SYSTOLIC BLOOD PRESSURE: 131 MMHG | DIASTOLIC BLOOD PRESSURE: 76 MMHG | BODY MASS INDEX: 22.21 KG/M2 | OXYGEN SATURATION: 99 % | TEMPERATURE: 98.1 F | HEART RATE: 66 BPM

## 2021-01-01 VITALS
HEIGHT: 72 IN | WEIGHT: 165 LBS | SYSTOLIC BLOOD PRESSURE: 171 MMHG | HEART RATE: 60 BPM | TEMPERATURE: 98.3 F | OXYGEN SATURATION: 100 % | DIASTOLIC BLOOD PRESSURE: 97 MMHG | BODY MASS INDEX: 22.35 KG/M2

## 2021-01-01 VITALS
RESPIRATION RATE: 18 BRPM | HEART RATE: 55 BPM | TEMPERATURE: 97.9 F | WEIGHT: 162.5 LBS | DIASTOLIC BLOOD PRESSURE: 93 MMHG | SYSTOLIC BLOOD PRESSURE: 145 MMHG | OXYGEN SATURATION: 100 % | BODY MASS INDEX: 22.04 KG/M2

## 2021-01-01 VITALS
HEIGHT: 72 IN | TEMPERATURE: 96.7 F | DIASTOLIC BLOOD PRESSURE: 87 MMHG | OXYGEN SATURATION: 99 % | WEIGHT: 163.4 LBS | SYSTOLIC BLOOD PRESSURE: 153 MMHG | RESPIRATION RATE: 18 BRPM | HEART RATE: 88 BPM | BODY MASS INDEX: 22.13 KG/M2

## 2021-01-01 VITALS
RESPIRATION RATE: 18 BRPM | BODY MASS INDEX: 21.74 KG/M2 | HEIGHT: 72 IN | WEIGHT: 160.5 LBS | OXYGEN SATURATION: 99 % | TEMPERATURE: 98.4 F | SYSTOLIC BLOOD PRESSURE: 155 MMHG | HEART RATE: 72 BPM | DIASTOLIC BLOOD PRESSURE: 87 MMHG

## 2021-01-01 VITALS
WEIGHT: 165.3 LBS | TEMPERATURE: 98.3 F | SYSTOLIC BLOOD PRESSURE: 171 MMHG | HEART RATE: 60 BPM | OXYGEN SATURATION: 100 % | DIASTOLIC BLOOD PRESSURE: 97 MMHG | HEIGHT: 72 IN | BODY MASS INDEX: 22.39 KG/M2

## 2021-01-01 VITALS — BODY MASS INDEX: 21.94 KG/M2 | HEIGHT: 72 IN | WEIGHT: 162 LBS

## 2021-01-01 VITALS
BODY MASS INDEX: 21.94 KG/M2 | HEART RATE: 67 BPM | TEMPERATURE: 97.2 F | SYSTOLIC BLOOD PRESSURE: 173 MMHG | RESPIRATION RATE: 18 BRPM | HEIGHT: 72 IN | WEIGHT: 162 LBS | OXYGEN SATURATION: 98 % | DIASTOLIC BLOOD PRESSURE: 99 MMHG

## 2021-01-01 VITALS
OXYGEN SATURATION: 98 % | HEART RATE: 66 BPM | DIASTOLIC BLOOD PRESSURE: 90 MMHG | SYSTOLIC BLOOD PRESSURE: 143 MMHG | RESPIRATION RATE: 16 BRPM

## 2021-01-01 VITALS
TEMPERATURE: 97.8 F | SYSTOLIC BLOOD PRESSURE: 135 MMHG | RESPIRATION RATE: 18 BRPM | BODY MASS INDEX: 22.08 KG/M2 | OXYGEN SATURATION: 100 % | HEIGHT: 72 IN | WEIGHT: 163 LBS | DIASTOLIC BLOOD PRESSURE: 86 MMHG | HEART RATE: 85 BPM

## 2021-01-01 VITALS
DIASTOLIC BLOOD PRESSURE: 94 MMHG | BODY MASS INDEX: 22.27 KG/M2 | TEMPERATURE: 96.4 F | RESPIRATION RATE: 18 BRPM | OXYGEN SATURATION: 99 % | SYSTOLIC BLOOD PRESSURE: 149 MMHG | HEART RATE: 63 BPM | WEIGHT: 164.4 LBS | HEIGHT: 72 IN

## 2021-01-01 VITALS
RESPIRATION RATE: 16 BRPM | HEART RATE: 56 BPM | OXYGEN SATURATION: 98 % | SYSTOLIC BLOOD PRESSURE: 156 MMHG | DIASTOLIC BLOOD PRESSURE: 93 MMHG

## 2021-01-01 DIAGNOSIS — E86.0 MILD DEHYDRATION: ICD-10-CM

## 2021-01-01 DIAGNOSIS — E87.20 SEVERE SEPSIS WITH LACTIC ACIDOSIS (HCC): Primary | ICD-10-CM

## 2021-01-01 DIAGNOSIS — C7B.8 NEUROENDOCRINE CARCINOMA METASTATIC TO BONE (HCC): Primary | ICD-10-CM

## 2021-01-01 DIAGNOSIS — M87.180: ICD-10-CM

## 2021-01-01 DIAGNOSIS — C7B.8 NEUROENDOCRINE CARCINOMA METASTATIC TO BONE (HCC): ICD-10-CM

## 2021-01-01 DIAGNOSIS — C79.51 PAIN FROM BONE METASTASES (HCC): ICD-10-CM

## 2021-01-01 DIAGNOSIS — C7A.8 NEUROENDOCRINE CARCINOMA METASTATIC TO BONE (HCC): ICD-10-CM

## 2021-01-01 DIAGNOSIS — M54.12 CERVICAL RADICULOPATHY: Primary | ICD-10-CM

## 2021-01-01 DIAGNOSIS — M54.12 CERVICAL RADICULOPATHY: ICD-10-CM

## 2021-01-01 DIAGNOSIS — G89.3 PAIN FROM BONE METASTASES (HCC): ICD-10-CM

## 2021-01-01 DIAGNOSIS — C7A.8 NEUROENDOCRINE CARCINOMA METASTATIC TO BONE (HCC): Primary | ICD-10-CM

## 2021-01-01 DIAGNOSIS — R55 SYNCOPE AND COLLAPSE: ICD-10-CM

## 2021-01-01 DIAGNOSIS — G89.3 CANCER ASSOCIATED PAIN: ICD-10-CM

## 2021-01-01 DIAGNOSIS — C7A.8 NEUROENDOCRINE CARCINOMA METASTATIC TO MULTIPLE SITES (HCC): ICD-10-CM

## 2021-01-01 DIAGNOSIS — A41.9 SEVERE SEPSIS WITH LACTIC ACIDOSIS (HCC): Primary | ICD-10-CM

## 2021-01-01 DIAGNOSIS — R93.89 ABNORMAL MRI: ICD-10-CM

## 2021-01-01 DIAGNOSIS — K59.1 FUNCTIONAL DIARRHEA: ICD-10-CM

## 2021-01-01 DIAGNOSIS — R31.0 GROSS HEMATURIA: ICD-10-CM

## 2021-01-01 DIAGNOSIS — G89.3 CANCER RELATED PAIN: ICD-10-CM

## 2021-01-01 DIAGNOSIS — R65.20 SEVERE SEPSIS WITH LACTIC ACIDOSIS (HCC): Primary | ICD-10-CM

## 2021-01-01 DIAGNOSIS — R33.9 URINARY RETENTION: ICD-10-CM

## 2021-01-01 DIAGNOSIS — R79.89 ELEVATED LACTIC ACID LEVEL: ICD-10-CM

## 2021-01-01 DIAGNOSIS — F41.8 ANXIETY ABOUT HEALTH: ICD-10-CM

## 2021-01-01 DIAGNOSIS — R53.83 FATIGUE, UNSPECIFIED TYPE: ICD-10-CM

## 2021-01-01 DIAGNOSIS — E34.0 CARCINOID SYNDROME (HCC): ICD-10-CM

## 2021-01-01 DIAGNOSIS — K12.30 MUCOSITIS: ICD-10-CM

## 2021-01-01 DIAGNOSIS — R53.0 NEOPLASTIC MALIGNANT RELATED FATIGUE: ICD-10-CM

## 2021-01-01 DIAGNOSIS — R55 SYNCOPE AND COLLAPSE: Primary | ICD-10-CM

## 2021-01-01 DIAGNOSIS — R19.7 DIARRHEA, UNSPECIFIED TYPE: ICD-10-CM

## 2021-01-01 DIAGNOSIS — R31.9 URINARY TRACT INFECTION WITH HEMATURIA, SITE UNSPECIFIED: ICD-10-CM

## 2021-01-01 DIAGNOSIS — I10 HYPERTENSION, UNSPECIFIED TYPE: ICD-10-CM

## 2021-01-01 DIAGNOSIS — R42 DIZZINESS: ICD-10-CM

## 2021-01-01 DIAGNOSIS — Z51.11 ENCOUNTER FOR ANTINEOPLASTIC CHEMOTHERAPY: ICD-10-CM

## 2021-01-01 DIAGNOSIS — T67.5XXA HEAT EXHAUSTION, INITIAL ENCOUNTER: ICD-10-CM

## 2021-01-01 DIAGNOSIS — N39.0 URINARY TRACT INFECTION WITH HEMATURIA, SITE UNSPECIFIED: ICD-10-CM

## 2021-01-01 DIAGNOSIS — C7B.8 NEUROENDOCRINE CARCINOMA METASTATIC TO MULTIPLE SITES (HCC): ICD-10-CM

## 2021-01-01 DIAGNOSIS — I95.9 HYPOTENSION, UNSPECIFIED HYPOTENSION TYPE: Primary | ICD-10-CM

## 2021-01-01 DIAGNOSIS — G47.01 INSOMNIA DUE TO MEDICAL CONDITION: ICD-10-CM

## 2021-01-01 LAB
ABO + RH BLD: NORMAL
ALBUMIN SERPL-MCNC: 2 G/DL (ref 3.5–5)
ALBUMIN SERPL-MCNC: 2.2 G/DL (ref 3.5–5)
ALBUMIN SERPL-MCNC: 2.7 G/DL (ref 3.5–5)
ALBUMIN SERPL-MCNC: 2.8 G/DL (ref 3.5–5)
ALBUMIN SERPL-MCNC: 3 G/DL (ref 3.5–5)
ALBUMIN SERPL-MCNC: 3.1 G/DL (ref 3.5–5)
ALBUMIN SERPL-MCNC: 3.2 G/DL (ref 3.5–5)
ALBUMIN SERPL-MCNC: 3.2 G/DL (ref 3.5–5)
ALBUMIN SERPL-MCNC: 3.3 G/DL (ref 3.5–5)
ALBUMIN SERPL-MCNC: 3.3 G/DL (ref 3.5–5)
ALBUMIN SERPL-MCNC: 3.4 G/DL (ref 3.5–5)
ALBUMIN/GLOB SERPL: 0.6 {RATIO} (ref 1.1–2.2)
ALBUMIN/GLOB SERPL: 0.6 {RATIO} (ref 1.1–2.2)
ALBUMIN/GLOB SERPL: 0.7 {RATIO} (ref 1.1–2.2)
ALBUMIN/GLOB SERPL: 0.8 {RATIO} (ref 1.1–2.2)
ALBUMIN/GLOB SERPL: 0.9 {RATIO} (ref 1.1–2.2)
ALP SERPL-CCNC: 100 U/L (ref 45–117)
ALP SERPL-CCNC: 105 U/L (ref 45–117)
ALP SERPL-CCNC: 118 U/L (ref 45–117)
ALP SERPL-CCNC: 124 U/L (ref 45–117)
ALP SERPL-CCNC: 127 U/L (ref 45–117)
ALP SERPL-CCNC: 164 U/L (ref 45–117)
ALP SERPL-CCNC: 169 U/L (ref 45–117)
ALP SERPL-CCNC: 78 U/L (ref 45–117)
ALP SERPL-CCNC: 86 U/L (ref 45–117)
ALP SERPL-CCNC: 88 U/L (ref 45–117)
ALP SERPL-CCNC: 88 U/L (ref 45–117)
ALP SERPL-CCNC: 90 U/L (ref 45–117)
ALP SERPL-CCNC: 90 U/L (ref 45–117)
ALT SERPL-CCNC: 18 U/L (ref 12–78)
ALT SERPL-CCNC: 18 U/L (ref 12–78)
ALT SERPL-CCNC: 20 U/L (ref 12–78)
ALT SERPL-CCNC: 21 U/L (ref 12–78)
ALT SERPL-CCNC: 24 U/L (ref 12–78)
ALT SERPL-CCNC: 25 U/L (ref 12–78)
ALT SERPL-CCNC: 26 U/L (ref 12–78)
ALT SERPL-CCNC: 30 U/L (ref 12–78)
ALT SERPL-CCNC: 32 U/L (ref 12–78)
ALT SERPL-CCNC: 32 U/L (ref 12–78)
ALT SERPL-CCNC: 35 U/L (ref 12–78)
ALT SERPL-CCNC: 62 U/L (ref 12–78)
ALT SERPL-CCNC: 69 U/L (ref 12–78)
ANION GAP BLD CALC-SCNC: 10 MMOL/L (ref 10–20)
ANION GAP BLD CALC-SCNC: 10 MMOL/L (ref 10–20)
ANION GAP SERPL CALC-SCNC: 12 MMOL/L (ref 5–15)
ANION GAP SERPL CALC-SCNC: 3 MMOL/L (ref 5–15)
ANION GAP SERPL CALC-SCNC: 5 MMOL/L (ref 5–15)
ANION GAP SERPL CALC-SCNC: 6 MMOL/L (ref 5–15)
ANION GAP SERPL CALC-SCNC: 8 MMOL/L (ref 5–15)
ANION GAP SERPL CALC-SCNC: 9 MMOL/L (ref 5–15)
APPEARANCE UR: ABNORMAL
APPEARANCE UR: ABNORMAL
AST SERPL-CCNC: 20 U/L (ref 15–37)
AST SERPL-CCNC: 21 U/L (ref 15–37)
AST SERPL-CCNC: 24 U/L (ref 15–37)
AST SERPL-CCNC: 24 U/L (ref 15–37)
AST SERPL-CCNC: 25 U/L (ref 15–37)
AST SERPL-CCNC: 26 U/L (ref 15–37)
AST SERPL-CCNC: 28 U/L (ref 15–37)
AST SERPL-CCNC: 29 U/L (ref 15–37)
AST SERPL-CCNC: 40 U/L (ref 15–37)
AST SERPL-CCNC: 65 U/L (ref 15–37)
AST SERPL-CCNC: 80 U/L (ref 15–37)
ATRIAL RATE: 112 BPM
ATRIAL RATE: 54 BPM
ATRIAL RATE: 94 BPM
BACTERIA SPEC CULT: ABNORMAL
BACTERIA SPEC CULT: NORMAL
BACTERIA URNS QL MICRO: ABNORMAL /HPF
BACTERIA URNS QL MICRO: NEGATIVE /HPF
BASE DEFICIT BLD-SCNC: 0.8 MMOL/L
BASE DEFICIT BLD-SCNC: 2.4 MMOL/L
BASE DEFICIT BLD-SCNC: 5.1 MMOL/L
BASOPHILS # BLD: 0 K/UL (ref 0–0.1)
BASOPHILS # BLD: 0.1 K/UL (ref 0–0.1)
BASOPHILS # BLD: 0.2 K/UL (ref 0–0.1)
BASOPHILS NFR BLD: 0 % (ref 0–1)
BASOPHILS NFR BLD: 1 % (ref 0–1)
BASOPHILS NFR BLD: 3 % (ref 0–1)
BILIRUB DIRECT SERPL-MCNC: 0.1 MG/DL (ref 0–0.2)
BILIRUB DIRECT SERPL-MCNC: <0.1 MG/DL (ref 0–0.2)
BILIRUB SERPL-MCNC: 0.4 MG/DL (ref 0.2–1)
BILIRUB SERPL-MCNC: 0.6 MG/DL (ref 0.2–1)
BILIRUB SERPL-MCNC: 0.6 MG/DL (ref 0.2–1)
BILIRUB SERPL-MCNC: 0.7 MG/DL (ref 0.2–1)
BILIRUB SERPL-MCNC: 0.8 MG/DL (ref 0.2–1)
BILIRUB SERPL-MCNC: 0.9 MG/DL (ref 0.2–1)
BILIRUB SERPL-MCNC: 1.4 MG/DL (ref 0.2–1)
BILIRUB UR QL CFM: NEGATIVE
BILIRUB UR QL: NEGATIVE
BLOOD GROUP ANTIBODIES SERPL: NORMAL
BUN BLD-MCNC: 28 MG/DL (ref 9–20)
BUN SERPL-MCNC: 13 MG/DL (ref 6–20)
BUN SERPL-MCNC: 16 MG/DL (ref 6–20)
BUN SERPL-MCNC: 17 MG/DL (ref 6–20)
BUN SERPL-MCNC: 18 MG/DL (ref 6–20)
BUN SERPL-MCNC: 21 MG/DL (ref 6–20)
BUN SERPL-MCNC: 24 MG/DL (ref 6–20)
BUN SERPL-MCNC: 25 MG/DL (ref 6–20)
BUN SERPL-MCNC: 25 MG/DL (ref 6–20)
BUN SERPL-MCNC: 28 MG/DL (ref 6–20)
BUN SERPL-MCNC: 30 MG/DL (ref 6–20)
BUN SERPL-MCNC: 31 MG/DL (ref 6–20)
BUN SERPL-MCNC: 33 MG/DL (ref 6–20)
BUN SERPL-MCNC: 36 MG/DL (ref 6–20)
BUN SERPL-MCNC: 38 MG/DL (ref 6–20)
BUN SERPL-MCNC: 43 MG/DL (ref 6–20)
BUN/CREAT SERPL: 10 (ref 12–20)
BUN/CREAT SERPL: 11 (ref 12–20)
BUN/CREAT SERPL: 11 (ref 12–20)
BUN/CREAT SERPL: 14 (ref 12–20)
BUN/CREAT SERPL: 16 (ref 12–20)
BUN/CREAT SERPL: 18 (ref 12–20)
BUN/CREAT SERPL: 19 (ref 12–20)
BUN/CREAT SERPL: 20 (ref 12–20)
BUN/CREAT SERPL: 21 (ref 12–20)
BUN/CREAT SERPL: 22 (ref 12–20)
BUN/CREAT SERPL: 9 (ref 12–20)
CA-I BLD-MCNC: 1.05 MMOL/L (ref 1.12–1.32)
CA-I BLD-MCNC: 1.11 MMOL/L (ref 1.12–1.32)
CA-I BLD-MCNC: 1.16 MMOL/L (ref 1.12–1.32)
CA-I BLD-MCNC: 1.17 MMOL/L (ref 1.12–1.32)
CA-I BLD-MCNC: 1.23 MMOL/L (ref 1.12–1.32)
CALCIUM SERPL-MCNC: 7.1 MG/DL (ref 8.5–10.1)
CALCIUM SERPL-MCNC: 7.1 MG/DL (ref 8.5–10.1)
CALCIUM SERPL-MCNC: 7.2 MG/DL (ref 8.5–10.1)
CALCIUM SERPL-MCNC: 7.5 MG/DL (ref 8.5–10.1)
CALCIUM SERPL-MCNC: 7.7 MG/DL (ref 8.5–10.1)
CALCIUM SERPL-MCNC: 8.4 MG/DL (ref 8.5–10.1)
CALCIUM SERPL-MCNC: 8.5 MG/DL (ref 8.5–10.1)
CALCIUM SERPL-MCNC: 8.6 MG/DL (ref 8.5–10.1)
CALCIUM SERPL-MCNC: 8.7 MG/DL (ref 8.5–10.1)
CALCIUM SERPL-MCNC: 8.8 MG/DL (ref 8.5–10.1)
CALCIUM SERPL-MCNC: 9 MG/DL (ref 8.5–10.1)
CALCULATED P AXIS, ECG09: 41 DEGREES
CALCULATED P AXIS, ECG09: 59 DEGREES
CALCULATED P AXIS, ECG09: 59 DEGREES
CALCULATED R AXIS, ECG10: 40 DEGREES
CALCULATED R AXIS, ECG10: 74 DEGREES
CALCULATED R AXIS, ECG10: 93 DEGREES
CALCULATED T AXIS, ECG11: 54 DEGREES
CALCULATED T AXIS, ECG11: 65 DEGREES
CALCULATED T AXIS, ECG11: 66 DEGREES
CC UR VC: ABNORMAL
CHLORIDE BLD-SCNC: 106 MMOL/L (ref 98–107)
CHLORIDE BLD-SCNC: 108 MMOL/L (ref 98–107)
CHLORIDE BLD-SCNC: 108 MMOL/L (ref 98–107)
CHLORIDE BLD-SCNC: 110 MMOL/L (ref 100–108)
CHLORIDE BLD-SCNC: 113 MMOL/L (ref 100–108)
CHLORIDE SERPL-SCNC: 107 MMOL/L (ref 97–108)
CHLORIDE SERPL-SCNC: 108 MMOL/L (ref 97–108)
CHLORIDE SERPL-SCNC: 109 MMOL/L (ref 97–108)
CHLORIDE SERPL-SCNC: 110 MMOL/L (ref 97–108)
CHLORIDE SERPL-SCNC: 111 MMOL/L (ref 97–108)
CHLORIDE SERPL-SCNC: 112 MMOL/L (ref 97–108)
CHLORIDE SERPL-SCNC: 113 MMOL/L (ref 97–108)
CHLORIDE SERPL-SCNC: 114 MMOL/L (ref 97–108)
CHLORIDE SERPL-SCNC: 116 MMOL/L (ref 97–108)
CHOLEST SERPL-MCNC: 166 MG/DL
CHOLEST SERPL-MCNC: 192 MG/DL
CHOLEST SERPL-MCNC: 208 MG/DL
CHOLEST SERPL-MCNC: 218 MG/DL
CO2 BLD-SCNC: 19 MMOL/L (ref 19–24)
CO2 BLD-SCNC: 24 MMOL/L (ref 19–24)
CO2 BLD-SCNC: 26.1 MMOL/L (ref 21–32)
CO2 BLD-SCNC: 27.4 MMOL/L (ref 21–32)
CO2 SERPL-SCNC: 18 MMOL/L (ref 21–32)
CO2 SERPL-SCNC: 19 MMOL/L (ref 21–32)
CO2 SERPL-SCNC: 20 MMOL/L (ref 21–32)
CO2 SERPL-SCNC: 20 MMOL/L (ref 21–32)
CO2 SERPL-SCNC: 24 MMOL/L (ref 21–32)
CO2 SERPL-SCNC: 24 MMOL/L (ref 21–32)
CO2 SERPL-SCNC: 26 MMOL/L (ref 21–32)
CO2 SERPL-SCNC: 27 MMOL/L (ref 21–32)
CO2 SERPL-SCNC: 28 MMOL/L (ref 21–32)
CO2 SERPL-SCNC: 29 MMOL/L (ref 21–32)
COLOR UR: ABNORMAL
COLOR UR: ABNORMAL
COVID-19 RAPID TEST, COVR: NOT DETECTED
CREAT BLD-MCNC: 1.29 MG/DL (ref 0.6–1.3)
CREAT BLD-MCNC: 1.56 MG/DL (ref 0.6–1.3)
CREAT BLD-MCNC: 1.8 MG/DL (ref 0.6–1.3)
CREAT SERPL-MCNC: 1.37 MG/DL (ref 0.7–1.3)
CREAT SERPL-MCNC: 1.39 MG/DL (ref 0.7–1.3)
CREAT SERPL-MCNC: 1.42 MG/DL (ref 0.7–1.3)
CREAT SERPL-MCNC: 1.43 MG/DL (ref 0.7–1.3)
CREAT SERPL-MCNC: 1.49 MG/DL (ref 0.7–1.3)
CREAT SERPL-MCNC: 1.5 MG/DL (ref 0.7–1.3)
CREAT SERPL-MCNC: 1.54 MG/DL (ref 0.7–1.3)
CREAT SERPL-MCNC: 1.56 MG/DL (ref 0.7–1.3)
CREAT SERPL-MCNC: 1.59 MG/DL (ref 0.7–1.3)
CREAT SERPL-MCNC: 1.6 MG/DL (ref 0.7–1.3)
CREAT SERPL-MCNC: 1.65 MG/DL (ref 0.7–1.3)
CREAT SERPL-MCNC: 1.66 MG/DL (ref 0.7–1.3)
CREAT SERPL-MCNC: 1.98 MG/DL (ref 0.7–1.3)
CREAT SERPL-MCNC: 2.04 MG/DL (ref 0.7–1.3)
CREAT SERPL-MCNC: 2.62 MG/DL (ref 0.7–1.3)
CREAT UR-MCNC: 1.7 MG/DL (ref 0.6–1.3)
CREAT UR-MCNC: 2 MG/DL (ref 0.6–1.3)
D DIMER PPP FEU-MCNC: 8.14 MG/L FEU (ref 0–0.65)
DIAGNOSIS, 93000: NORMAL
DIFFERENTIAL METHOD BLD: ABNORMAL
ECHO AV MEAN GRADIENT: 4 MMHG
ECHO AV MEAN VELOCITY: 0.9 M/S
ECHO AV PEAK GRADIENT: 6 MMHG
ECHO AV PEAK VELOCITY: 1.2 M/S
ECHO AV VELOCITY RATIO: 0.58
ECHO AV VTI: 27.8 CM
ECHO LV E' LATERAL VELOCITY: 7 CM/S
ECHO LV E' SEPTAL VELOCITY: 7 CM/S
ECHO LV FRACTIONAL SHORTENING: 23 % (ref 28–44)
ECHO LV INTERNAL DIMENSION DIASTOLE INDEX: 2.68 CM/M2
ECHO LV INTERNAL DIMENSION DIASTOLIC: 5.2 CM (ref 4.2–5.9)
ECHO LV INTERNAL DIMENSION SYSTOLIC INDEX: 2.06 CM/M2
ECHO LV INTERNAL DIMENSION SYSTOLIC: 4 CM
ECHO LV IVSD: 1.3 CM (ref 0.6–1)
ECHO LV MASS 2D: 263.4 G (ref 88–224)
ECHO LV MASS INDEX 2D: 135.8 G/M2 (ref 49–115)
ECHO LV POSTERIOR WALL DIASTOLIC: 1.2 CM (ref 0.6–1)
ECHO LV RELATIVE WALL THICKNESS RATIO: 0.46
ECHO LVOT AV VTI INDEX: 0.58
ECHO LVOT MEAN GRADIENT: 1 MMHG
ECHO LVOT PEAK GRADIENT: 2 MMHG
ECHO LVOT PEAK VELOCITY: 0.7 M/S
ECHO LVOT VTI: 16 CM
ECHO MV A VELOCITY: 0.78 M/S
ECHO MV E DECELERATION TIME (DT): 123 MS
ECHO MV E VELOCITY: 0.58 M/S
ECHO MV E/A RATIO: 0.74
ECHO MV E/E' LATERAL: 8.29
ECHO MV E/E' RATIO (AVERAGED): 8.29
ECHO MV E/E' SEPTAL: 8.29
ECHO MV LVOT VTI INDEX: 1.41
ECHO MV MAX VELOCITY: 0.9 M/S
ECHO MV MEAN GRADIENT: 1 MMHG
ECHO MV MEAN VELOCITY: 0.4 M/S
ECHO MV PEAK GRADIENT: 3 MMHG
ECHO MV REGURGITANT PEAK GRADIENT: 23 MMHG
ECHO MV REGURGITANT PEAK VELOCITY: 2.4 M/S
ECHO MV VTI: 22.6 CM
ECHO PV MAX VELOCITY: 0.9 M/S
ECHO PV PEAK GRADIENT: 3 MMHG
EOSINOPHIL # BLD: 0 K/UL (ref 0–0.4)
EOSINOPHIL # BLD: 0.1 K/UL (ref 0–0.4)
EOSINOPHIL # BLD: 0.1 K/UL (ref 0–0.4)
EOSINOPHIL # BLD: 0.2 K/UL (ref 0–0.4)
EOSINOPHIL # BLD: 0.3 K/UL (ref 0–0.4)
EOSINOPHIL # BLD: 0.4 K/UL (ref 0–0.4)
EOSINOPHIL # BLD: 0.5 K/UL (ref 0–0.4)
EOSINOPHIL NFR BLD: 1 % (ref 0–7)
EOSINOPHIL NFR BLD: 2 % (ref 0–7)
EOSINOPHIL NFR BLD: 3 % (ref 0–7)
EOSINOPHIL NFR BLD: 4 % (ref 0–7)
EOSINOPHIL NFR BLD: 5 % (ref 0–7)
EOSINOPHIL NFR BLD: 5 % (ref 0–7)
EOSINOPHIL NFR BLD: 6 % (ref 0–7)
EOSINOPHIL NFR BLD: 6 % (ref 0–7)
EPITH CASTS URNS QL MICRO: ABNORMAL /LPF
EPITH CASTS URNS QL MICRO: ABNORMAL /LPF
ERYTHROCYTE [DISTWIDTH] IN BLOOD BY AUTOMATED COUNT: 13.8 % (ref 11.5–14.5)
ERYTHROCYTE [DISTWIDTH] IN BLOOD BY AUTOMATED COUNT: 13.8 % (ref 11.5–14.5)
ERYTHROCYTE [DISTWIDTH] IN BLOOD BY AUTOMATED COUNT: 14 % (ref 11.5–14.5)
ERYTHROCYTE [DISTWIDTH] IN BLOOD BY AUTOMATED COUNT: 14.1 % (ref 11.5–14.5)
ERYTHROCYTE [DISTWIDTH] IN BLOOD BY AUTOMATED COUNT: 14.1 % (ref 11.5–14.5)
ERYTHROCYTE [DISTWIDTH] IN BLOOD BY AUTOMATED COUNT: 14.2 % (ref 11.5–14.5)
ERYTHROCYTE [DISTWIDTH] IN BLOOD BY AUTOMATED COUNT: 14.3 % (ref 11.5–14.5)
ERYTHROCYTE [DISTWIDTH] IN BLOOD BY AUTOMATED COUNT: 14.5 % (ref 11.5–14.5)
ERYTHROCYTE [DISTWIDTH] IN BLOOD BY AUTOMATED COUNT: 15 % (ref 11.5–14.5)
ERYTHROCYTE [DISTWIDTH] IN BLOOD BY AUTOMATED COUNT: 15 % (ref 11.5–14.5)
ERYTHROCYTE [DISTWIDTH] IN BLOOD BY AUTOMATED COUNT: 15.1 % (ref 11.5–14.5)
ERYTHROCYTE [DISTWIDTH] IN BLOOD BY AUTOMATED COUNT: 15.1 % (ref 11.5–14.5)
ERYTHROCYTE [DISTWIDTH] IN BLOOD BY AUTOMATED COUNT: 15.4 % (ref 11.5–14.5)
ERYTHROCYTE [DISTWIDTH] IN BLOOD BY AUTOMATED COUNT: 15.6 % (ref 11.5–14.5)
ERYTHROCYTE [DISTWIDTH] IN BLOOD BY AUTOMATED COUNT: 15.6 % (ref 11.5–14.5)
ERYTHROCYTE [DISTWIDTH] IN BLOOD BY AUTOMATED COUNT: 16 % (ref 11.5–14.5)
ERYTHROCYTE [DISTWIDTH] IN BLOOD BY AUTOMATED COUNT: 16.1 % (ref 11.5–14.5)
ERYTHROCYTE [DISTWIDTH] IN BLOOD BY AUTOMATED COUNT: 16.3 % (ref 11.5–14.5)
EST. AVERAGE GLUCOSE BLD GHB EST-MCNC: 126 MG/DL
EST. AVERAGE GLUCOSE BLD GHB EST-MCNC: 128 MG/DL
EST. AVERAGE GLUCOSE BLD GHB EST-MCNC: 131 MG/DL
EST. AVERAGE GLUCOSE BLD GHB EST-MCNC: 134 MG/DL
ETHANOL SERPL-MCNC: <10 MG/DL
ETHANOL SERPL-MCNC: <10 MG/DL
FERRITIN SERPL-MCNC: 150 NG/ML (ref 26–388)
FLUAV AG NPH QL IA: NEGATIVE
FLUBV AG NOSE QL IA: NEGATIVE
GLOBULIN SER CALC-MCNC: 3.1 G/DL (ref 2–4)
GLOBULIN SER CALC-MCNC: 3.4 G/DL (ref 2–4)
GLOBULIN SER CALC-MCNC: 3.7 G/DL (ref 2–4)
GLOBULIN SER CALC-MCNC: 3.7 G/DL (ref 2–4)
GLOBULIN SER CALC-MCNC: 3.8 G/DL (ref 2–4)
GLOBULIN SER CALC-MCNC: 3.9 G/DL (ref 2–4)
GLOBULIN SER CALC-MCNC: 4 G/DL (ref 2–4)
GLOBULIN SER CALC-MCNC: 4 G/DL (ref 2–4)
GLOBULIN SER CALC-MCNC: 4.1 G/DL (ref 2–4)
GLUCOSE BLD STRIP.AUTO-MCNC: 102 MG/DL (ref 74–106)
GLUCOSE BLD STRIP.AUTO-MCNC: 146 MG/DL (ref 74–106)
GLUCOSE BLD STRIP.AUTO-MCNC: 191 MG/DL (ref 65–117)
GLUCOSE BLD-MCNC: 169 MG/DL (ref 65–100)
GLUCOSE BLD-MCNC: 182 MG/DL (ref 65–100)
GLUCOSE BLD-MCNC: 185 MG/DL (ref 65–100)
GLUCOSE SERPL-MCNC: 100 MG/DL (ref 65–100)
GLUCOSE SERPL-MCNC: 104 MG/DL (ref 65–100)
GLUCOSE SERPL-MCNC: 112 MG/DL (ref 65–100)
GLUCOSE SERPL-MCNC: 112 MG/DL (ref 65–100)
GLUCOSE SERPL-MCNC: 114 MG/DL (ref 65–100)
GLUCOSE SERPL-MCNC: 114 MG/DL (ref 65–100)
GLUCOSE SERPL-MCNC: 123 MG/DL (ref 65–100)
GLUCOSE SERPL-MCNC: 130 MG/DL (ref 65–100)
GLUCOSE SERPL-MCNC: 132 MG/DL (ref 65–100)
GLUCOSE SERPL-MCNC: 176 MG/DL (ref 65–100)
GLUCOSE SERPL-MCNC: 176 MG/DL (ref 65–100)
GLUCOSE SERPL-MCNC: 187 MG/DL (ref 65–100)
GLUCOSE SERPL-MCNC: 64 MG/DL (ref 65–100)
GLUCOSE SERPL-MCNC: 85 MG/DL (ref 65–100)
GLUCOSE SERPL-MCNC: 96 MG/DL (ref 65–100)
GLUCOSE UR STRIP.AUTO-MCNC: NEGATIVE MG/DL
GLUCOSE UR STRIP.AUTO-MCNC: NEGATIVE MG/DL
HBA1C MFR BLD: 6 % (ref 4–5.6)
HBA1C MFR BLD: 6.1 % (ref 4–5.6)
HBA1C MFR BLD: 6.2 % (ref 4–5.6)
HBA1C MFR BLD: 6.3 % (ref 4–5.6)
HCO3 BLD-SCNC: 25.5 MMOL/L (ref 22–26)
HCO3 BLDA-SCNC: 19 MMOL/L
HCO3 BLDA-SCNC: 23 MMOL/L
HCT VFR BLD AUTO: 23.5 % (ref 36.6–50.3)
HCT VFR BLD AUTO: 23.7 % (ref 36.6–50.3)
HCT VFR BLD AUTO: 24.2 % (ref 36.6–50.3)
HCT VFR BLD AUTO: 24.6 % (ref 36.6–50.3)
HCT VFR BLD AUTO: 24.9 % (ref 36.6–50.3)
HCT VFR BLD AUTO: 25.6 % (ref 36.6–50.3)
HCT VFR BLD AUTO: 25.8 % (ref 36.6–50.3)
HCT VFR BLD AUTO: 26.2 % (ref 36.6–50.3)
HCT VFR BLD AUTO: 30.5 % (ref 36.6–50.3)
HCT VFR BLD AUTO: 31.2 % (ref 36.6–50.3)
HCT VFR BLD AUTO: 31.9 % (ref 36.6–50.3)
HCT VFR BLD AUTO: 33.2 % (ref 36.6–50.3)
HCT VFR BLD AUTO: 33.3 % (ref 36.6–50.3)
HCT VFR BLD AUTO: 34.4 % (ref 36.6–50.3)
HCT VFR BLD AUTO: 34.4 % (ref 36.6–50.3)
HCT VFR BLD AUTO: 35.8 % (ref 36.6–50.3)
HCT VFR BLD AUTO: 35.9 % (ref 36.6–50.3)
HCT VFR BLD AUTO: 35.9 % (ref 36.6–50.3)
HCT VFR BLD AUTO: 36 % (ref 36.6–50.3)
HCT VFR BLD AUTO: 36.2 % (ref 36.6–50.3)
HCT VFR BLD CALC: 30 % (ref 36.6–50.3)
HDLC SERPL-MCNC: 48 MG/DL
HDLC SERPL-MCNC: 51 MG/DL
HDLC SERPL-MCNC: 52 MG/DL
HDLC SERPL-MCNC: 59 MG/DL
HDLC SERPL: 2.8 {RATIO} (ref 0–5)
HDLC SERPL: 4 {RATIO} (ref 0–5)
HDLC SERPL: 4 {RATIO} (ref 0–5)
HDLC SERPL: 4.3 {RATIO} (ref 0–5)
HGB BLD-MCNC: 10.1 G/DL (ref 12.1–17)
HGB BLD-MCNC: 10.3 G/DL (ref 12.1–17)
HGB BLD-MCNC: 10.4 G/DL (ref 12.1–17)
HGB BLD-MCNC: 10.4 G/DL (ref 12.1–17)
HGB BLD-MCNC: 10.5 G/DL (ref 12.1–17)
HGB BLD-MCNC: 11.2 G/DL (ref 12.1–17)
HGB BLD-MCNC: 11.2 G/DL (ref 12.1–17)
HGB BLD-MCNC: 11.3 G/DL (ref 12.1–17)
HGB BLD-MCNC: 7.4 G/DL (ref 12.1–17)
HGB BLD-MCNC: 7.5 G/DL (ref 12.1–17)
HGB BLD-MCNC: 7.6 G/DL (ref 12.1–17)
HGB BLD-MCNC: 7.8 G/DL (ref 12.1–17)
HGB BLD-MCNC: 7.8 G/DL (ref 12.1–17)
HGB BLD-MCNC: 8.1 G/DL (ref 12.1–17)
HGB BLD-MCNC: 8.1 G/DL (ref 12.1–17)
HGB BLD-MCNC: 8.2 G/DL (ref 12.1–17)
HGB BLD-MCNC: 9.6 G/DL (ref 12.1–17)
HGB BLD-MCNC: 9.7 G/DL (ref 12.1–17)
HGB UR QL STRIP: ABNORMAL
HGB UR QL STRIP: ABNORMAL
IMM GRANULOCYTES # BLD AUTO: 0 K/UL (ref 0–0.04)
IMM GRANULOCYTES # BLD AUTO: 0.1 K/UL (ref 0–0.04)
IMM GRANULOCYTES # BLD AUTO: 0.1 K/UL (ref 0–0.04)
IMM GRANULOCYTES # BLD AUTO: 0.2 K/UL (ref 0–0.04)
IMM GRANULOCYTES # BLD AUTO: 0.2 K/UL (ref 0–0.04)
IMM GRANULOCYTES NFR BLD AUTO: 0 % (ref 0–0.5)
IMM GRANULOCYTES NFR BLD AUTO: 1 % (ref 0–0.5)
IMM GRANULOCYTES NFR BLD AUTO: 1 % (ref 0–0.5)
IMM GRANULOCYTES NFR BLD AUTO: 2 % (ref 0–0.5)
IMM GRANULOCYTES NFR BLD AUTO: 2 % (ref 0–0.5)
IRON SATN MFR SERPL: 14 % (ref 20–50)
IRON SERPL-MCNC: 26 UG/DL (ref 35–150)
KETONES UR QL STRIP.AUTO: ABNORMAL MG/DL
KETONES UR QL STRIP.AUTO: NEGATIVE MG/DL
LACTATE BLD-SCNC: 1.43 MMOL/L (ref 0.4–2)
LACTATE BLD-SCNC: 1.45 MMOL/L (ref 0.4–2)
LACTATE BLD-SCNC: 3.41 MMOL/L (ref 0.4–2)
LACTATE BLD-SCNC: 5.27 MMOL/L (ref 0.4–2)
LACTATE SERPL-SCNC: 1.6 MMOL/L (ref 0.4–2)
LDLC SERPL CALC-MCNC: 110.6 MG/DL (ref 0–100)
LDLC SERPL CALC-MCNC: 128.2 MG/DL (ref 0–100)
LDLC SERPL CALC-MCNC: 131.8 MG/DL (ref 0–100)
LDLC SERPL CALC-MCNC: 71.4 MG/DL (ref 0–100)
LEUKOCYTE ESTERASE UR QL STRIP.AUTO: ABNORMAL
LEUKOCYTE ESTERASE UR QL STRIP.AUTO: ABNORMAL
LYMPHOCYTES # BLD: 0.3 K/UL (ref 0.8–3.5)
LYMPHOCYTES # BLD: 0.4 K/UL (ref 0.8–3.5)
LYMPHOCYTES # BLD: 0.5 K/UL (ref 0.8–3.5)
LYMPHOCYTES # BLD: 0.5 K/UL (ref 0.8–3.5)
LYMPHOCYTES # BLD: 0.6 K/UL (ref 0.8–3.5)
LYMPHOCYTES # BLD: 0.7 K/UL (ref 0.8–3.5)
LYMPHOCYTES # BLD: 0.8 K/UL (ref 0.8–3.5)
LYMPHOCYTES # BLD: 2 K/UL (ref 0.8–3.5)
LYMPHOCYTES NFR BLD: 10 % (ref 12–49)
LYMPHOCYTES NFR BLD: 11 % (ref 12–49)
LYMPHOCYTES NFR BLD: 12 % (ref 12–49)
LYMPHOCYTES NFR BLD: 13 % (ref 12–49)
LYMPHOCYTES NFR BLD: 14 % (ref 12–49)
LYMPHOCYTES NFR BLD: 16 % (ref 12–49)
LYMPHOCYTES NFR BLD: 16 % (ref 12–49)
LYMPHOCYTES NFR BLD: 2 % (ref 12–49)
LYMPHOCYTES NFR BLD: 21 % (ref 12–49)
LYMPHOCYTES NFR BLD: 3 % (ref 12–49)
LYMPHOCYTES NFR BLD: 4 % (ref 12–49)
LYMPHOCYTES NFR BLD: 4 % (ref 12–49)
LYMPHOCYTES NFR BLD: 6 % (ref 12–49)
LYMPHOCYTES NFR BLD: 7 % (ref 12–49)
LYMPHOCYTES NFR BLD: 7 % (ref 12–49)
LYMPHOCYTES NFR BLD: 9 % (ref 12–49)
MAGNESIUM SERPL-MCNC: 1.7 MG/DL (ref 1.6–2.4)
MAGNESIUM SERPL-MCNC: 1.8 MG/DL (ref 1.6–2.4)
MAGNESIUM SERPL-MCNC: 2.1 MG/DL (ref 1.6–2.4)
MAGNESIUM SERPL-MCNC: 2.2 MG/DL (ref 1.6–2.4)
MAGNESIUM SERPL-MCNC: 2.3 MG/DL (ref 1.6–2.4)
MCH RBC QN AUTO: 23 PG (ref 26–34)
MCH RBC QN AUTO: 23.1 PG (ref 26–34)
MCH RBC QN AUTO: 23.7 PG (ref 26–34)
MCH RBC QN AUTO: 23.7 PG (ref 26–34)
MCH RBC QN AUTO: 23.8 PG (ref 26–34)
MCH RBC QN AUTO: 23.9 PG (ref 26–34)
MCH RBC QN AUTO: 24 PG (ref 26–34)
MCH RBC QN AUTO: 24.1 PG (ref 26–34)
MCH RBC QN AUTO: 24.2 PG (ref 26–34)
MCH RBC QN AUTO: 24.3 PG (ref 26–34)
MCH RBC QN AUTO: 24.4 PG (ref 26–34)
MCH RBC QN AUTO: 24.4 PG (ref 26–34)
MCH RBC QN AUTO: 24.5 PG (ref 26–34)
MCH RBC QN AUTO: 24.5 PG (ref 26–34)
MCH RBC QN AUTO: 24.6 PG (ref 26–34)
MCHC RBC AUTO-ENTMCNC: 30.2 G/DL (ref 30–36.5)
MCHC RBC AUTO-ENTMCNC: 30.5 G/DL (ref 30–36.5)
MCHC RBC AUTO-ENTMCNC: 30.9 G/DL (ref 30–36.5)
MCHC RBC AUTO-ENTMCNC: 31 G/DL (ref 30–36.5)
MCHC RBC AUTO-ENTMCNC: 31.1 G/DL (ref 30–36.5)
MCHC RBC AUTO-ENTMCNC: 31.2 G/DL (ref 30–36.5)
MCHC RBC AUTO-ENTMCNC: 31.2 G/DL (ref 30–36.5)
MCHC RBC AUTO-ENTMCNC: 31.3 G/DL (ref 30–36.5)
MCHC RBC AUTO-ENTMCNC: 31.4 G/DL (ref 30–36.5)
MCHC RBC AUTO-ENTMCNC: 31.4 G/DL (ref 30–36.5)
MCHC RBC AUTO-ENTMCNC: 31.5 G/DL (ref 30–36.5)
MCHC RBC AUTO-ENTMCNC: 31.6 G/DL (ref 30–36.5)
MCHC RBC AUTO-ENTMCNC: 31.6 G/DL (ref 30–36.5)
MCHC RBC AUTO-ENTMCNC: 31.7 G/DL (ref 30–36.5)
MCHC RBC AUTO-ENTMCNC: 31.7 G/DL (ref 30–36.5)
MCHC RBC AUTO-ENTMCNC: 32.1 G/DL (ref 30–36.5)
MCV RBC AUTO: 74.8 FL (ref 80–99)
MCV RBC AUTO: 75.3 FL (ref 80–99)
MCV RBC AUTO: 75.3 FL (ref 80–99)
MCV RBC AUTO: 75.6 FL (ref 80–99)
MCV RBC AUTO: 75.9 FL (ref 80–99)
MCV RBC AUTO: 76 FL (ref 80–99)
MCV RBC AUTO: 76.3 FL (ref 80–99)
MCV RBC AUTO: 76.6 FL (ref 80–99)
MCV RBC AUTO: 76.8 FL (ref 80–99)
MCV RBC AUTO: 76.9 FL (ref 80–99)
MCV RBC AUTO: 77 FL (ref 80–99)
MCV RBC AUTO: 77 FL (ref 80–99)
MCV RBC AUTO: 77.4 FL (ref 80–99)
MCV RBC AUTO: 77.4 FL (ref 80–99)
MCV RBC AUTO: 77.5 FL (ref 80–99)
MCV RBC AUTO: 77.8 FL (ref 80–99)
MCV RBC AUTO: 78.2 FL (ref 80–99)
MCV RBC AUTO: 79 FL (ref 80–99)
METAMYELOCYTES NFR BLD MANUAL: 1 %
METAMYELOCYTES NFR BLD MANUAL: 2 %
MONOCYTES # BLD: 0 K/UL (ref 0–1)
MONOCYTES # BLD: 0.4 K/UL (ref 0–1)
MONOCYTES # BLD: 0.5 K/UL (ref 0–1)
MONOCYTES # BLD: 0.6 K/UL (ref 0–1)
MONOCYTES # BLD: 0.6 K/UL (ref 0–1)
MONOCYTES # BLD: 0.7 K/UL (ref 0–1)
MONOCYTES # BLD: 0.8 K/UL (ref 0–1)
MONOCYTES # BLD: 1.1 K/UL (ref 0–1)
MONOCYTES # BLD: 1.2 K/UL (ref 0–1)
MONOCYTES NFR BLD: 1 % (ref 5–13)
MONOCYTES NFR BLD: 10 % (ref 5–13)
MONOCYTES NFR BLD: 11 % (ref 5–13)
MONOCYTES NFR BLD: 5 % (ref 5–13)
MONOCYTES NFR BLD: 6 % (ref 5–13)
MONOCYTES NFR BLD: 7 % (ref 5–13)
MONOCYTES NFR BLD: 8 % (ref 5–13)
MONOCYTES NFR BLD: 9 % (ref 5–13)
MONOCYTES NFR BLD: 9 % (ref 5–13)
MYELOCYTES NFR BLD MANUAL: 1 %
MYELOCYTES NFR BLD MANUAL: 1 %
NEUTS BAND NFR BLD MANUAL: 2 %
NEUTS BAND NFR BLD MANUAL: 3 %
NEUTS BAND NFR BLD MANUAL: 5 %
NEUTS BAND NFR BLD MANUAL: 9 %
NEUTS BAND NFR BLD MANUAL: 9 %
NEUTS SEG # BLD: 10.9 K/UL (ref 1.8–8)
NEUTS SEG # BLD: 14.2 K/UL (ref 1.8–8)
NEUTS SEG # BLD: 14.2 K/UL (ref 1.8–8)
NEUTS SEG # BLD: 2.2 K/UL (ref 1.8–8)
NEUTS SEG # BLD: 2.9 K/UL (ref 1.8–8)
NEUTS SEG # BLD: 3 K/UL (ref 1.8–8)
NEUTS SEG # BLD: 3.2 K/UL (ref 1.8–8)
NEUTS SEG # BLD: 3.3 K/UL (ref 1.8–8)
NEUTS SEG # BLD: 3.5 K/UL (ref 1.8–8)
NEUTS SEG # BLD: 3.8 K/UL (ref 1.8–8)
NEUTS SEG # BLD: 4.2 K/UL (ref 1.8–8)
NEUTS SEG # BLD: 4.4 K/UL (ref 1.8–8)
NEUTS SEG # BLD: 4.8 K/UL (ref 1.8–8)
NEUTS SEG # BLD: 6.2 K/UL (ref 1.8–8)
NEUTS SEG # BLD: 6.3 K/UL (ref 1.8–8)
NEUTS SEG # BLD: 7.2 K/UL (ref 1.8–8)
NEUTS SEG # BLD: 7.9 K/UL (ref 1.8–8)
NEUTS SEG # BLD: 8.9 K/UL (ref 1.8–8)
NEUTS SEG NFR BLD: 59 % (ref 32–75)
NEUTS SEG NFR BLD: 69 % (ref 32–75)
NEUTS SEG NFR BLD: 71 % (ref 32–75)
NEUTS SEG NFR BLD: 73 % (ref 32–75)
NEUTS SEG NFR BLD: 73 % (ref 32–75)
NEUTS SEG NFR BLD: 74 % (ref 32–75)
NEUTS SEG NFR BLD: 76 % (ref 32–75)
NEUTS SEG NFR BLD: 77 % (ref 32–75)
NEUTS SEG NFR BLD: 77 % (ref 32–75)
NEUTS SEG NFR BLD: 78 % (ref 32–75)
NEUTS SEG NFR BLD: 79 % (ref 32–75)
NEUTS SEG NFR BLD: 79 % (ref 32–75)
NEUTS SEG NFR BLD: 83 % (ref 32–75)
NEUTS SEG NFR BLD: 84 % (ref 32–75)
NEUTS SEG NFR BLD: 86 % (ref 32–75)
NEUTS SEG NFR BLD: 86 % (ref 32–75)
NITRITE UR QL STRIP.AUTO: NEGATIVE
NITRITE UR QL STRIP.AUTO: POSITIVE
NRBC # BLD: 0 K/UL (ref 0–0.01)
NRBC BLD-RTO: 0 PER 100 WBC
P-R INTERVAL, ECG05: 148 MS
P-R INTERVAL, ECG05: 166 MS
P-R INTERVAL, ECG05: 186 MS
PCO2 BLD: 48 MMHG (ref 35–45)
PCO2 BLDV: 28.3 MMHG (ref 41–51)
PCO2 BLDV: 41.4 MMHG (ref 41–51)
PH BLD: 7.33 [PH] (ref 7.35–7.45)
PH BLDV: 7.35 [PH] (ref 7.32–7.42)
PH BLDV: 7.43 [PH] (ref 7.32–7.42)
PH UR STRIP: 6.5 [PH] (ref 5–8)
PH UR STRIP: 7 [PH] (ref 5–8)
PHOSPHATE SERPL-MCNC: 2.6 MG/DL (ref 2.6–4.7)
PHOSPHATE SERPL-MCNC: 2.9 MG/DL (ref 2.6–4.7)
PHOSPHATE SERPL-MCNC: 3 MG/DL (ref 2.6–4.7)
PHOSPHATE SERPL-MCNC: 3.1 MG/DL (ref 2.6–4.7)
PHOSPHATE SERPL-MCNC: 3.4 MG/DL (ref 2.6–4.7)
PHOSPHATE SERPL-MCNC: 3.5 MG/DL (ref 2.6–4.7)
PHOSPHATE SERPL-MCNC: 3.5 MG/DL (ref 2.6–4.7)
PLATELET # BLD AUTO: 125 K/UL (ref 150–400)
PLATELET # BLD AUTO: 148 K/UL (ref 150–400)
PLATELET # BLD AUTO: 151 K/UL (ref 150–400)
PLATELET # BLD AUTO: 160 K/UL (ref 150–400)
PLATELET # BLD AUTO: 167 K/UL (ref 150–400)
PLATELET # BLD AUTO: 169 K/UL (ref 150–400)
PLATELET # BLD AUTO: 177 K/UL (ref 150–400)
PLATELET # BLD AUTO: 179 K/UL (ref 150–400)
PLATELET # BLD AUTO: 180 K/UL (ref 150–400)
PLATELET # BLD AUTO: 184 K/UL (ref 150–400)
PLATELET # BLD AUTO: 189 K/UL (ref 150–400)
PLATELET # BLD AUTO: 192 K/UL (ref 150–400)
PLATELET # BLD AUTO: 194 K/UL (ref 150–400)
PLATELET # BLD AUTO: 222 K/UL (ref 150–400)
PLATELET # BLD AUTO: 241 K/UL (ref 150–400)
PLATELET # BLD AUTO: 250 K/UL (ref 150–400)
PLATELET # BLD AUTO: 257 K/UL (ref 150–400)
PLATELET # BLD AUTO: 434 K/UL (ref 150–400)
PLATELET COMMENTS,PCOM: ABNORMAL
PMV BLD AUTO: 10 FL (ref 8.9–12.9)
PMV BLD AUTO: 10 FL (ref 8.9–12.9)
PMV BLD AUTO: 10.1 FL (ref 8.9–12.9)
PMV BLD AUTO: 10.1 FL (ref 8.9–12.9)
PMV BLD AUTO: 10.4 FL (ref 8.9–12.9)
PMV BLD AUTO: 9.4 FL (ref 8.9–12.9)
PMV BLD AUTO: 9.5 FL (ref 8.9–12.9)
PMV BLD AUTO: 9.5 FL (ref 8.9–12.9)
PMV BLD AUTO: 9.6 FL (ref 8.9–12.9)
PMV BLD AUTO: 9.8 FL (ref 8.9–12.9)
PMV BLD AUTO: 9.9 FL (ref 8.9–12.9)
PO2 BLD: 18 MMHG (ref 80–100)
PO2 BLDV: 32 MMHG (ref 25–40)
PO2 BLDV: <13 MMHG (ref 25–40)
POTASSIUM BLD-SCNC: 3.1 MMOL/L (ref 3.5–5.1)
POTASSIUM BLD-SCNC: 3.4 MMOL/L (ref 3.5–5.5)
POTASSIUM BLD-SCNC: 3.5 MMOL/L (ref 3.5–5.1)
POTASSIUM BLD-SCNC: 3.6 MMOL/L (ref 3.5–5.1)
POTASSIUM BLD-SCNC: 5.1 MMOL/L (ref 3.5–5.5)
POTASSIUM SERPL-SCNC: 3.1 MMOL/L (ref 3.5–5.1)
POTASSIUM SERPL-SCNC: 3.1 MMOL/L (ref 3.5–5.1)
POTASSIUM SERPL-SCNC: 3.2 MMOL/L (ref 3.5–5.1)
POTASSIUM SERPL-SCNC: 3.3 MMOL/L (ref 3.5–5.1)
POTASSIUM SERPL-SCNC: 3.4 MMOL/L (ref 3.5–5.1)
POTASSIUM SERPL-SCNC: 3.4 MMOL/L (ref 3.5–5.1)
POTASSIUM SERPL-SCNC: 3.5 MMOL/L (ref 3.5–5.1)
POTASSIUM SERPL-SCNC: 3.5 MMOL/L (ref 3.5–5.1)
POTASSIUM SERPL-SCNC: 3.6 MMOL/L (ref 3.5–5.1)
POTASSIUM SERPL-SCNC: 3.7 MMOL/L (ref 3.5–5.1)
POTASSIUM SERPL-SCNC: 3.7 MMOL/L (ref 3.5–5.1)
POTASSIUM SERPL-SCNC: 3.8 MMOL/L (ref 3.5–5.1)
POTASSIUM SERPL-SCNC: 4 MMOL/L (ref 3.5–5.1)
POTASSIUM SERPL-SCNC: 4.1 MMOL/L (ref 3.5–5.1)
POTASSIUM SERPL-SCNC: 4.2 MMOL/L (ref 3.5–5.1)
PROCALCITONIN SERPL-MCNC: 132.59 NG/ML
PROCALCITONIN SERPL-MCNC: 209.98 NG/ML
PROCALCITONIN SERPL-MCNC: 23.33 NG/ML
PROCALCITONIN SERPL-MCNC: 4.63 NG/ML
PROCALCITONIN SERPL-MCNC: 45.93 NG/ML
PROCALCITONIN SERPL-MCNC: 52.61 NG/ML
PROMYELOCYTES NFR BLD MANUAL: 1 %
PROT SERPL-MCNC: 5.1 G/DL (ref 6.4–8.2)
PROT SERPL-MCNC: 6 G/DL (ref 6.4–8.2)
PROT SERPL-MCNC: 6.6 G/DL (ref 6.4–8.2)
PROT SERPL-MCNC: 6.6 G/DL (ref 6.4–8.2)
PROT SERPL-MCNC: 6.8 G/DL (ref 6.4–8.2)
PROT SERPL-MCNC: 7 G/DL (ref 6.4–8.2)
PROT SERPL-MCNC: 7.1 G/DL (ref 6.4–8.2)
PROT SERPL-MCNC: 7.2 G/DL (ref 6.4–8.2)
PROT SERPL-MCNC: 7.3 G/DL (ref 6.4–8.2)
PROT UR STRIP-MCNC: 300 MG/DL
PROT UR STRIP-MCNC: >300 MG/DL
PROT UR-MCNC: 19 MG/DL (ref 0–11.9)
PROT UR-MCNC: 24 MG/DL (ref 0–11.9)
PROT UR-MCNC: 29 MG/DL (ref 0–11.9)
PROT UR-MCNC: 52 MG/DL (ref 0–11.9)
PSA SERPL-MCNC: 8.9 NG/ML (ref 0.01–4)
Q-T INTERVAL, ECG07: 320 MS
Q-T INTERVAL, ECG07: 390 MS
Q-T INTERVAL, ECG07: 506 MS
QRS DURATION, ECG06: 88 MS
QRS DURATION, ECG06: 88 MS
QRS DURATION, ECG06: 94 MS
QTC CALCULATION (BEZET), ECG08: 436 MS
QTC CALCULATION (BEZET), ECG08: 479 MS
QTC CALCULATION (BEZET), ECG08: 487 MS
RBC # BLD AUTO: 3.02 M/UL (ref 4.1–5.7)
RBC # BLD AUTO: 3.17 M/UL (ref 4.1–5.7)
RBC # BLD AUTO: 3.18 M/UL (ref 4.1–5.7)
RBC # BLD AUTO: 3.33 M/UL (ref 4.1–5.7)
RBC # BLD AUTO: 3.35 M/UL (ref 4.1–5.7)
RBC # BLD AUTO: 3.42 M/UL (ref 4.1–5.7)
RBC # BLD AUTO: 3.95 M/UL (ref 4.1–5.7)
RBC # BLD AUTO: 4.05 M/UL (ref 4.1–5.7)
RBC # BLD AUTO: 4.22 M/UL (ref 4.1–5.7)
RBC # BLD AUTO: 4.26 M/UL (ref 4.1–5.7)
RBC # BLD AUTO: 4.29 M/UL (ref 4.1–5.7)
RBC # BLD AUTO: 4.51 M/UL (ref 4.1–5.7)
RBC # BLD AUTO: 4.57 M/UL (ref 4.1–5.7)
RBC # BLD AUTO: 4.63 M/UL (ref 4.1–5.7)
RBC # BLD AUTO: 4.69 M/UL (ref 4.1–5.7)
RBC # BLD AUTO: 4.7 M/UL (ref 4.1–5.7)
RBC # BLD AUTO: 4.71 M/UL (ref 4.1–5.7)
RBC # BLD AUTO: 4.73 M/UL (ref 4.1–5.7)
RBC #/AREA URNS HPF: >100 /HPF (ref 0–5)
RBC #/AREA URNS HPF: >100 /HPF (ref 0–5)
RBC MORPH BLD: ABNORMAL
SAO2 % BLD: 22.6 % (ref 92–97)
SERVICE CMNT-IMP: ABNORMAL
SERVICE CMNT-IMP: NORMAL
SODIUM BLD-SCNC: 141 MMOL/L (ref 136–145)
SODIUM BLD-SCNC: 142 MMOL/L (ref 136–145)
SODIUM BLD-SCNC: 143 MMOL/L (ref 136–145)
SODIUM BLD-SCNC: 144 MMOL/L (ref 136–145)
SODIUM BLD-SCNC: 147 MMOL/L (ref 136–145)
SODIUM SERPL-SCNC: 138 MMOL/L (ref 136–145)
SODIUM SERPL-SCNC: 141 MMOL/L (ref 136–145)
SODIUM SERPL-SCNC: 142 MMOL/L (ref 136–145)
SODIUM SERPL-SCNC: 143 MMOL/L (ref 136–145)
SODIUM SERPL-SCNC: 144 MMOL/L (ref 136–145)
SODIUM SERPL-SCNC: 144 MMOL/L (ref 136–145)
SOURCE, COVRS: NORMAL
SP GR UR REFRACTOMETRY: 1.01 (ref 1–1.03)
SP GR UR REFRACTOMETRY: 1.02 (ref 1–1.03)
SPECIMEN EXP DATE BLD: NORMAL
SPECIMEN SITE: ABNORMAL
SPECIMEN SITE: ABNORMAL
SPECIMEN TYPE: ABNORMAL
TIBC SERPL-MCNC: 183 UG/DL (ref 250–450)
TRIGL SERPL-MCNC: 139 MG/DL (ref ?–150)
TRIGL SERPL-MCNC: 167 MG/DL (ref ?–150)
TRIGL SERPL-MCNC: 176 MG/DL (ref ?–150)
TRIGL SERPL-MCNC: 178 MG/DL (ref ?–150)
TROPONIN-HIGH SENSITIVITY: 15 NG/L (ref 0–76)
TROPONIN-HIGH SENSITIVITY: 17 NG/L (ref 0–76)
TROPONIN-HIGH SENSITIVITY: 58 NG/L (ref 0–76)
UA: UC IF INDICATED,UAUC: ABNORMAL
UA: UC IF INDICATED,UAUC: ABNORMAL
UROBILINOGEN UR QL STRIP.AUTO: 0.2 EU/DL (ref 0.2–1)
UROBILINOGEN UR QL STRIP.AUTO: 1 EU/DL (ref 0.2–1)
VENTRICULAR RATE, ECG03: 112 BPM
VENTRICULAR RATE, ECG03: 54 BPM
VENTRICULAR RATE, ECG03: 94 BPM
VLDLC SERPL CALC-MCNC: 27.8 MG/DL
VLDLC SERPL CALC-MCNC: 33.4 MG/DL
VLDLC SERPL CALC-MCNC: 35.2 MG/DL
VLDLC SERPL CALC-MCNC: 35.6 MG/DL
WBC # BLD AUTO: 10.3 K/UL (ref 4.1–11.1)
WBC # BLD AUTO: 12.6 K/UL (ref 4.1–11.1)
WBC # BLD AUTO: 16.3 K/UL (ref 4.1–11.1)
WBC # BLD AUTO: 16.5 K/UL (ref 4.1–11.1)
WBC # BLD AUTO: 2.6 K/UL (ref 4.1–11.1)
WBC # BLD AUTO: 4 K/UL (ref 4.1–11.1)
WBC # BLD AUTO: 4.1 K/UL (ref 4.1–11.1)
WBC # BLD AUTO: 4.5 K/UL (ref 4.1–11.1)
WBC # BLD AUTO: 4.8 K/UL (ref 4.1–11.1)
WBC # BLD AUTO: 5.1 K/UL (ref 4.1–11.1)
WBC # BLD AUTO: 5.3 K/UL (ref 4.1–11.1)
WBC # BLD AUTO: 5.6 K/UL (ref 4.1–11.1)
WBC # BLD AUTO: 5.8 K/UL (ref 4.1–11.1)
WBC # BLD AUTO: 6.2 K/UL (ref 4.1–11.1)
WBC # BLD AUTO: 8 K/UL (ref 4.1–11.1)
WBC # BLD AUTO: 9.1 K/UL (ref 4.1–11.1)
WBC # BLD AUTO: 9.6 K/UL (ref 4.1–11.1)
WBC # BLD AUTO: 9.7 K/UL (ref 4.1–11.1)
WBC MORPH BLD: ABNORMAL
WBC URNS QL MICRO: >100 /HPF (ref 0–4)
WBC URNS QL MICRO: ABNORMAL /HPF (ref 0–4)

## 2021-01-01 PROCEDURE — 74011250636 HC RX REV CODE- 250/636: Performed by: EMERGENCY MEDICINE

## 2021-01-01 PROCEDURE — 1101F PT FALLS ASSESS-DOCD LE1/YR: CPT | Performed by: FAMILY MEDICINE

## 2021-01-01 PROCEDURE — 83735 ASSAY OF MAGNESIUM: CPT

## 2021-01-01 PROCEDURE — G0378 HOSPITAL OBSERVATION PER HR: HCPCS

## 2021-01-01 PROCEDURE — G0299 HHS/HOSPICE OF RN EA 15 MIN: HCPCS

## 2021-01-01 PROCEDURE — 80053 COMPREHEN METABOLIC PANEL: CPT

## 2021-01-01 PROCEDURE — 87635 SARS-COV-2 COVID-19 AMP PRB: CPT

## 2021-01-01 PROCEDURE — G8420 CALC BMI NORM PARAMETERS: HCPCS | Performed by: INTERNAL MEDICINE

## 2021-01-01 PROCEDURE — 84484 ASSAY OF TROPONIN QUANT: CPT

## 2021-01-01 PROCEDURE — 78815 PET IMAGE W/CT SKULL-THIGH: CPT

## 2021-01-01 PROCEDURE — 74011250636 HC RX REV CODE- 250/636: Performed by: INTERNAL MEDICINE

## 2021-01-01 PROCEDURE — G8536 NO DOC ELDER MAL SCRN: HCPCS | Performed by: INTERNAL MEDICINE

## 2021-01-01 PROCEDURE — 36415 COLL VENOUS BLD VENIPUNCTURE: CPT

## 2021-01-01 PROCEDURE — 99285 EMERGENCY DEPT VISIT HI MDM: CPT

## 2021-01-01 PROCEDURE — 96372 THER/PROPH/DIAG INJ SC/IM: CPT

## 2021-01-01 PROCEDURE — 74011250637 HC RX REV CODE- 250/637: Performed by: NURSE PRACTITIONER

## 2021-01-01 PROCEDURE — G8427 DOCREV CUR MEDS BY ELIG CLIN: HCPCS | Performed by: FAMILY MEDICINE

## 2021-01-01 PROCEDURE — 74011250637 HC RX REV CODE- 250/637: Performed by: INTERNAL MEDICINE

## 2021-01-01 PROCEDURE — 99214 OFFICE O/P EST MOD 30 MIN: CPT | Performed by: INTERNAL MEDICINE

## 2021-01-01 PROCEDURE — 96365 THER/PROPH/DIAG IV INF INIT: CPT

## 2021-01-01 PROCEDURE — 93306 TTE W/DOPPLER COMPLETE: CPT

## 2021-01-01 PROCEDURE — 85025 COMPLETE CBC W/AUTO DIFF WBC: CPT

## 2021-01-01 PROCEDURE — 74011000250 HC RX REV CODE- 250: Performed by: INTERNAL MEDICINE

## 2021-01-01 PROCEDURE — 74011250637 HC RX REV CODE- 250/637: Performed by: HOSPITALIST

## 2021-01-01 PROCEDURE — 65660000000 HC RM CCU STEPDOWN

## 2021-01-01 PROCEDURE — 80048 BASIC METABOLIC PNL TOTAL CA: CPT

## 2021-01-01 PROCEDURE — 74011000636 HC RX REV CODE- 636: Performed by: INTERNAL MEDICINE

## 2021-01-01 PROCEDURE — 3017F COLORECTAL CA SCREEN DOC REV: CPT | Performed by: INTERNAL MEDICINE

## 2021-01-01 PROCEDURE — 83605 ASSAY OF LACTIC ACID: CPT

## 2021-01-01 PROCEDURE — 93005 ELECTROCARDIOGRAM TRACING: CPT

## 2021-01-01 PROCEDURE — 0651 HSPC ROUTINE HOME CARE

## 2021-01-01 PROCEDURE — 1101F PT FALLS ASSESS-DOCD LE1/YR: CPT | Performed by: INTERNAL MEDICINE

## 2021-01-01 PROCEDURE — 83036 HEMOGLOBIN GLYCOSYLATED A1C: CPT

## 2021-01-01 PROCEDURE — 82728 ASSAY OF FERRITIN: CPT

## 2021-01-01 PROCEDURE — 3336500001 HSPC ELECTION

## 2021-01-01 PROCEDURE — 74011250636 HC RX REV CODE- 250/636: Performed by: HOSPITALIST

## 2021-01-01 PROCEDURE — 84100 ASSAY OF PHOSPHORUS: CPT

## 2021-01-01 PROCEDURE — G8432 DEP SCR NOT DOC, RNG: HCPCS | Performed by: INTERNAL MEDICINE

## 2021-01-01 PROCEDURE — 80061 LIPID PANEL: CPT

## 2021-01-01 PROCEDURE — 84156 ASSAY OF PROTEIN URINE: CPT

## 2021-01-01 PROCEDURE — G8427 DOCREV CUR MEDS BY ELIG CLIN: HCPCS | Performed by: INTERNAL MEDICINE

## 2021-01-01 PROCEDURE — 74011000258 HC RX REV CODE- 258: Performed by: INTERNAL MEDICINE

## 2021-01-01 PROCEDURE — 87040 BLOOD CULTURE FOR BACTERIA: CPT

## 2021-01-01 PROCEDURE — 82803 BLOOD GASES ANY COMBINATION: CPT

## 2021-01-01 PROCEDURE — 74011000258 HC RX REV CODE- 258: Performed by: EMERGENCY MEDICINE

## 2021-01-01 PROCEDURE — 83540 ASSAY OF IRON: CPT

## 2021-01-01 PROCEDURE — 97167 OT EVAL HIGH COMPLEX 60 MIN: CPT

## 2021-01-01 PROCEDURE — 82077 ASSAY SPEC XCP UR&BREATH IA: CPT

## 2021-01-01 PROCEDURE — 3017F COLORECTAL CA SCREEN DOC REV: CPT | Performed by: FAMILY MEDICINE

## 2021-01-01 PROCEDURE — 80047 BASIC METABLC PNL IONIZED CA: CPT

## 2021-01-01 PROCEDURE — 87086 URINE CULTURE/COLONY COUNT: CPT

## 2021-01-01 PROCEDURE — G8510 SCR DEP NEG, NO PLAN REQD: HCPCS | Performed by: INTERNAL MEDICINE

## 2021-01-01 PROCEDURE — 84145 PROCALCITONIN (PCT): CPT

## 2021-01-01 PROCEDURE — 82947 ASSAY GLUCOSE BLOOD QUANT: CPT

## 2021-01-01 PROCEDURE — G8510 SCR DEP NEG, NO PLAN REQD: HCPCS | Performed by: FAMILY MEDICINE

## 2021-01-01 PROCEDURE — 76700 US EXAM ABDOM COMPLETE: CPT

## 2021-01-01 PROCEDURE — 51702 INSERT TEMP BLADDER CATH: CPT

## 2021-01-01 PROCEDURE — 86901 BLOOD TYPING SEROLOGIC RH(D): CPT

## 2021-01-01 PROCEDURE — G8536 NO DOC ELDER MAL SCRN: HCPCS | Performed by: FAMILY MEDICINE

## 2021-01-01 PROCEDURE — 84153 ASSAY OF PSA TOTAL: CPT

## 2021-01-01 PROCEDURE — 93306 TTE W/DOPPLER COMPLETE: CPT | Performed by: INTERNAL MEDICINE

## 2021-01-01 PROCEDURE — 97535 SELF CARE MNGMENT TRAINING: CPT

## 2021-01-01 PROCEDURE — G0155 HHCP-SVS OF CSW,EA 15 MIN: HCPCS

## 2021-01-01 PROCEDURE — 81001 URINALYSIS AUTO W/SCOPE: CPT

## 2021-01-01 PROCEDURE — 99214 OFFICE O/P EST MOD 30 MIN: CPT | Performed by: FAMILY MEDICINE

## 2021-01-01 PROCEDURE — 74176 CT ABD & PELVIS W/O CONTRAST: CPT

## 2021-01-01 PROCEDURE — 80076 HEPATIC FUNCTION PANEL: CPT

## 2021-01-01 PROCEDURE — 87186 SC STD MICRODIL/AGAR DIL: CPT

## 2021-01-01 PROCEDURE — P9047 ALBUMIN (HUMAN), 25%, 50ML: HCPCS | Performed by: HOSPITALIST

## 2021-01-01 PROCEDURE — 2709999900 HC NON-CHARGEABLE SUPPLY

## 2021-01-01 PROCEDURE — 74011250637 HC RX REV CODE- 250/637: Performed by: EMERGENCY MEDICINE

## 2021-01-01 PROCEDURE — 97162 PT EVAL MOD COMPLEX 30 MIN: CPT

## 2021-01-01 PROCEDURE — 99215 OFFICE O/P EST HI 40 MIN: CPT | Performed by: INTERNAL MEDICINE

## 2021-01-01 PROCEDURE — 74011000250 HC RX REV CODE- 250: Performed by: HOSPITALIST

## 2021-01-01 PROCEDURE — 74011000250 HC RX REV CODE- 250: Performed by: UROLOGY

## 2021-01-01 PROCEDURE — 87077 CULTURE AEROBIC IDENTIFY: CPT

## 2021-01-01 PROCEDURE — 71275 CT ANGIOGRAPHY CHEST: CPT

## 2021-01-01 PROCEDURE — 96401 CHEMO ANTI-NEOPL SQ/IM: CPT

## 2021-01-01 PROCEDURE — 74011000250 HC RX REV CODE- 250: Performed by: NURSE PRACTITIONER

## 2021-01-01 PROCEDURE — 97530 THERAPEUTIC ACTIVITIES: CPT

## 2021-01-01 PROCEDURE — 82962 GLUCOSE BLOOD TEST: CPT

## 2021-01-01 PROCEDURE — 51798 US URINE CAPACITY MEASURE: CPT

## 2021-01-01 PROCEDURE — 87804 INFLUENZA ASSAY W/OPTIC: CPT

## 2021-01-01 PROCEDURE — 71045 X-RAY EXAM CHEST 1 VIEW: CPT

## 2021-01-01 PROCEDURE — 99215 OFFICE O/P EST HI 40 MIN: CPT | Performed by: FAMILY MEDICINE

## 2021-01-01 PROCEDURE — G8420 CALC BMI NORM PARAMETERS: HCPCS | Performed by: FAMILY MEDICINE

## 2021-01-01 PROCEDURE — 94761 N-INVAS EAR/PLS OXIMETRY MLT: CPT

## 2021-01-01 PROCEDURE — 85379 FIBRIN DEGRADATION QUANT: CPT

## 2021-01-01 PROCEDURE — HOSPICE MEDICATION HC HH HOSPICE MEDICATION

## 2021-01-01 PROCEDURE — 74011250636 HC RX REV CODE- 250/636: Performed by: NURSE PRACTITIONER

## 2021-01-01 PROCEDURE — 84295 ASSAY OF SERUM SODIUM: CPT

## 2021-01-01 PROCEDURE — 99205 OFFICE O/P NEW HI 60 MIN: CPT | Performed by: INTERNAL MEDICINE

## 2021-01-01 PROCEDURE — 85014 HEMATOCRIT: CPT

## 2021-01-01 PROCEDURE — 97161 PT EVAL LOW COMPLEX 20 MIN: CPT

## 2021-01-01 RX ORDER — OXYCODONE HYDROCHLORIDE 5 MG/1
5 TABLET ORAL
Status: DISCONTINUED | OUTPATIENT
Start: 2021-01-01 | End: 2021-01-01 | Stop reason: HOSPADM

## 2021-01-01 RX ORDER — ONDANSETRON 4 MG/1
4 TABLET, ORALLY DISINTEGRATING ORAL
Status: DISCONTINUED | OUTPATIENT
Start: 2021-01-01 | End: 2021-01-01 | Stop reason: HOSPADM

## 2021-01-01 RX ORDER — ACETAMINOPHEN 325 MG/1
650 TABLET ORAL
Status: DISCONTINUED | OUTPATIENT
Start: 2021-01-01 | End: 2021-01-01 | Stop reason: HOSPADM

## 2021-01-01 RX ORDER — HYDRALAZINE HYDROCHLORIDE 20 MG/ML
10 INJECTION INTRAMUSCULAR; INTRAVENOUS
Status: DISCONTINUED | OUTPATIENT
Start: 2021-01-01 | End: 2021-01-01 | Stop reason: HOSPADM

## 2021-01-01 RX ORDER — POTASSIUM CHLORIDE AND SODIUM CHLORIDE 450; 150 MG/100ML; MG/100ML
INJECTION, SOLUTION INTRAVENOUS CONTINUOUS
Status: DISCONTINUED | OUTPATIENT
Start: 2021-01-01 | End: 2021-01-01

## 2021-01-01 RX ORDER — SODIUM CHLORIDE 0.9 % (FLUSH) 0.9 %
5-10 SYRINGE (ML) INJECTION AS NEEDED
Status: DISCONTINUED | OUTPATIENT
Start: 2021-01-01 | End: 2021-01-01 | Stop reason: HOSPADM

## 2021-01-01 RX ORDER — LEVOFLOXACIN 750 MG/1
750 TABLET ORAL
Status: DISCONTINUED | OUTPATIENT
Start: 2021-01-01 | End: 2021-01-01 | Stop reason: HOSPADM

## 2021-01-01 RX ORDER — LANREOTIDE ACETATE 120 MG/.5ML
120 INJECTION SUBCUTANEOUS ONCE
Status: COMPLETED | OUTPATIENT
Start: 2021-01-01 | End: 2021-01-01

## 2021-01-01 RX ORDER — TELOTRISTAT ETHYL 250 MG/1
250 TABLET ORAL 3 TIMES DAILY
Qty: 270 TABLET | Refills: 11 | Status: SHIPPED | OUTPATIENT
Start: 2021-01-01 | End: 2021-01-01

## 2021-01-01 RX ORDER — LISINOPRIL 5 MG/1
5 TABLET ORAL DAILY
Status: DISCONTINUED | OUTPATIENT
Start: 2021-01-01 | End: 2021-01-01 | Stop reason: HOSPADM

## 2021-01-01 RX ORDER — GABAPENTIN 100 MG/1
100 CAPSULE ORAL 3 TIMES DAILY
Status: DISCONTINUED | OUTPATIENT
Start: 2021-01-01 | End: 2021-01-01 | Stop reason: HOSPADM

## 2021-01-01 RX ORDER — LIDOCAINE HYDROCHLORIDE 20 MG/ML
JELLY TOPICAL AS NEEDED
Status: DISCONTINUED | OUTPATIENT
Start: 2021-01-01 | End: 2021-01-01 | Stop reason: HOSPADM

## 2021-01-01 RX ORDER — SODIUM CHLORIDE 0.9 % (FLUSH) 0.9 %
5-40 SYRINGE (ML) INJECTION EVERY 8 HOURS
Status: DISCONTINUED | OUTPATIENT
Start: 2021-01-01 | End: 2021-01-01 | Stop reason: HOSPADM

## 2021-01-01 RX ORDER — OXYCODONE HYDROCHLORIDE 5 MG/1
5 TABLET ORAL
Qty: 60 TABLET | Refills: 0 | Status: SHIPPED | OUTPATIENT
Start: 2021-01-01 | End: 2021-01-01 | Stop reason: SDUPTHER

## 2021-01-01 RX ORDER — GABAPENTIN 100 MG/1
100 CAPSULE ORAL
Qty: 15 CAPSULE | Refills: 0 | Status: SHIPPED | OUTPATIENT
Start: 2021-01-01 | End: 2021-01-01

## 2021-01-01 RX ORDER — SODIUM CHLORIDE 0.9 % (FLUSH) 0.9 %
10 SYRINGE (ML) INJECTION
Status: COMPLETED | OUTPATIENT
Start: 2021-01-01 | End: 2021-01-01

## 2021-01-01 RX ORDER — POTASSIUM CHLORIDE 750 MG/1
20 TABLET, FILM COATED, EXTENDED RELEASE ORAL
Status: COMPLETED | OUTPATIENT
Start: 2021-01-01 | End: 2021-01-01

## 2021-01-01 RX ORDER — MECLIZINE HYDROCHLORIDE 25 MG/1
25 TABLET ORAL
Qty: 90 TABLET | Refills: 0 | Status: SHIPPED | OUTPATIENT
Start: 2021-01-01 | End: 2021-01-01

## 2021-01-01 RX ORDER — POLYETHYLENE GLYCOL 3350 17 G/17G
17 POWDER, FOR SOLUTION ORAL DAILY
Status: DISCONTINUED | OUTPATIENT
Start: 2021-01-01 | End: 2021-01-01

## 2021-01-01 RX ORDER — LIDOCAINE HYDROCHLORIDE 20 MG/ML
JELLY TOPICAL ONCE
Status: COMPLETED | OUTPATIENT
Start: 2021-01-01 | End: 2021-01-01

## 2021-01-01 RX ORDER — LISINOPRIL 20 MG/1
20 TABLET ORAL DAILY
Qty: 30 TABLET | Refills: 0 | Status: ON HOLD | OUTPATIENT
Start: 2021-01-01 | End: 2021-01-01 | Stop reason: SDUPTHER

## 2021-01-01 RX ORDER — AMOXICILLIN 250 MG
1 CAPSULE ORAL 2 TIMES DAILY
Status: DISCONTINUED | OUTPATIENT
Start: 2021-01-01 | End: 2021-01-01

## 2021-01-01 RX ORDER — GABAPENTIN 100 MG/1
100 CAPSULE ORAL 3 TIMES DAILY
Qty: 90 CAPSULE | Refills: 1 | Status: SHIPPED | OUTPATIENT
Start: 2021-01-01 | End: 2021-01-01 | Stop reason: SDUPTHER

## 2021-01-01 RX ORDER — SODIUM CHLORIDE 9 MG/ML
100 INJECTION, SOLUTION INTRAVENOUS CONTINUOUS
Status: DISCONTINUED | OUTPATIENT
Start: 2021-01-01 | End: 2021-01-01

## 2021-01-01 RX ORDER — FERROUS SULFATE 325(65) MG
325 TABLET, DELAYED RELEASE (ENTERIC COATED) ORAL DAILY
Qty: 30 TABLET | Refills: 0 | Status: SHIPPED | OUTPATIENT
Start: 2021-01-01 | End: 2022-01-01

## 2021-01-01 RX ORDER — POLYETHYLENE GLYCOL 3350 17 G/17G
17 POWDER, FOR SOLUTION ORAL
Status: DISCONTINUED | OUTPATIENT
Start: 2021-01-01 | End: 2021-01-01 | Stop reason: HOSPADM

## 2021-01-01 RX ORDER — FLUDROCORTISONE ACETATE 0.1 MG/1
0.1 TABLET ORAL DAILY
Status: DISCONTINUED | OUTPATIENT
Start: 2021-01-01 | End: 2021-01-01

## 2021-01-01 RX ORDER — LISINOPRIL 5 MG/1
5 TABLET ORAL DAILY
Qty: 30 TABLET | Refills: 0 | Status: SHIPPED | OUTPATIENT
Start: 2021-01-01 | End: 2022-01-01

## 2021-01-01 RX ORDER — TELOTRISTAT ETHYL 250 MG/1
250 TABLET ORAL 3 TIMES DAILY
Qty: 270 TABLET | Refills: 11 | Status: SHIPPED | OUTPATIENT
Start: 2021-01-01 | End: 2021-01-01 | Stop reason: SDUPTHER

## 2021-01-01 RX ORDER — GABAPENTIN 100 MG/1
100 CAPSULE ORAL 3 TIMES DAILY
Status: ON HOLD | COMMUNITY
End: 2021-01-01 | Stop reason: SDUPTHER

## 2021-01-01 RX ORDER — LEVOFLOXACIN 5 MG/ML
750 INJECTION, SOLUTION INTRAVENOUS
Status: DISCONTINUED | OUTPATIENT
Start: 2021-01-01 | End: 2021-01-01

## 2021-01-01 RX ORDER — OXYCODONE HYDROCHLORIDE 5 MG/1
5 TABLET ORAL
Qty: 30 TABLET | Refills: 0 | Status: SHIPPED | OUTPATIENT
Start: 2021-01-01 | End: 2021-01-01

## 2021-01-01 RX ORDER — HYDROMORPHONE HYDROCHLORIDE 1 MG/ML
0.5 INJECTION, SOLUTION INTRAMUSCULAR; INTRAVENOUS; SUBCUTANEOUS ONCE
Status: COMPLETED | OUTPATIENT
Start: 2021-01-01 | End: 2021-01-01

## 2021-01-01 RX ORDER — EVEROLIMUS 10 MG/1
10 TABLET ORAL DAILY
Qty: 30 TABLET | Refills: 6 | Status: SHIPPED | OUTPATIENT
Start: 2021-01-01

## 2021-01-01 RX ORDER — AMOXICILLIN 500 MG/1
CAPSULE ORAL
COMMUNITY
Start: 2021-01-01 | End: 2021-01-01

## 2021-01-01 RX ORDER — GABAPENTIN 100 MG/1
100 CAPSULE ORAL 3 TIMES DAILY
Qty: 90 CAPSULE | Refills: 0 | Status: SHIPPED | OUTPATIENT
Start: 2021-01-01

## 2021-01-01 RX ORDER — ACETAMINOPHEN 500 MG
1000 TABLET ORAL ONCE
Status: COMPLETED | OUTPATIENT
Start: 2021-01-01 | End: 2021-01-01

## 2021-01-01 RX ORDER — LEVOFLOXACIN 750 MG/1
750 TABLET ORAL
Qty: 5 TABLET | Refills: 0 | Status: SHIPPED | OUTPATIENT
Start: 2021-01-01 | End: 2022-01-01

## 2021-01-01 RX ORDER — ONDANSETRON 4 MG/1
4 TABLET, ORALLY DISINTEGRATING ORAL
Qty: 30 TABLET | Refills: 0 | Status: SHIPPED | OUTPATIENT
Start: 2021-01-01 | End: 2021-01-01

## 2021-01-01 RX ORDER — LISINOPRIL 20 MG/1
20 TABLET ORAL 2 TIMES DAILY
Status: ON HOLD | COMMUNITY
End: 2021-01-01 | Stop reason: SDUPTHER

## 2021-01-01 RX ORDER — OXYCODONE HYDROCHLORIDE 5 MG/1
5 TABLET ORAL
Status: DISCONTINUED | OUTPATIENT
Start: 2021-01-01 | End: 2021-01-01

## 2021-01-01 RX ORDER — LEVOFLOXACIN 750 MG/1
750 TABLET ORAL DAILY
Qty: 12 TABLET | Refills: 0 | Status: ON HOLD | OUTPATIENT
Start: 2021-01-01 | End: 2021-01-01 | Stop reason: SDUPTHER

## 2021-01-01 RX ORDER — FLUDROCORTISONE ACETATE 0.1 MG/1
0.1 TABLET ORAL DAILY
Qty: 30 TABLET | Refills: 0 | Status: SHIPPED | OUTPATIENT
Start: 2021-01-01 | End: 2021-01-01

## 2021-01-01 RX ORDER — GABAPENTIN 100 MG/1
100 CAPSULE ORAL 3 TIMES DAILY
Qty: 90 CAPSULE | Refills: 1 | Status: SHIPPED | OUTPATIENT
Start: 2021-01-01 | End: 2021-01-01

## 2021-01-01 RX ORDER — OXYCODONE HYDROCHLORIDE 5 MG/1
5 TABLET ORAL
Status: COMPLETED | OUTPATIENT
Start: 2021-01-01 | End: 2021-01-01

## 2021-01-01 RX ORDER — EVEROLIMUS 10 MG/1
10 TABLET ORAL DAILY
Qty: 30 TABLET | Refills: 6 | Status: SHIPPED | OUTPATIENT
Start: 2021-01-01 | End: 2021-01-01 | Stop reason: SDUPTHER

## 2021-01-01 RX ORDER — LISINOPRIL 20 MG/1
20 TABLET ORAL DAILY
Status: DISCONTINUED | OUTPATIENT
Start: 2021-01-01 | End: 2021-01-01

## 2021-01-01 RX ORDER — LISINOPRIL 20 MG/1
20 TABLET ORAL DAILY
Qty: 30 TABLET | Refills: 0 | Status: SHIPPED | OUTPATIENT
Start: 2021-01-01 | End: 2021-01-01 | Stop reason: SDUPTHER

## 2021-01-01 RX ORDER — DEXAMETHASONE 0.5 MG/5ML
ELIXIR ORAL
Qty: 237 ML | Refills: 1 | Status: SHIPPED | OUTPATIENT
Start: 2021-01-01 | End: 2021-01-01

## 2021-01-01 RX ORDER — MECLIZINE HYDROCHLORIDE 25 MG/1
25 TABLET ORAL
COMMUNITY
End: 2021-01-01 | Stop reason: SDUPTHER

## 2021-01-01 RX ORDER — POLYETHYLENE GLYCOL 3350 17 G/17G
17 POWDER, FOR SOLUTION ORAL DAILY PRN
Status: DISCONTINUED | OUTPATIENT
Start: 2021-01-01 | End: 2021-01-01

## 2021-01-01 RX ORDER — SODIUM BICARBONATE 1 MEQ/ML
50 SYRINGE (ML) INTRAVENOUS ONCE
Status: COMPLETED | OUTPATIENT
Start: 2021-01-01 | End: 2021-01-01

## 2021-01-01 RX ORDER — MECLIZINE HCL 12.5 MG 12.5 MG/1
12.5 TABLET ORAL
Status: DISCONTINUED | OUTPATIENT
Start: 2021-01-01 | End: 2021-01-01 | Stop reason: HOSPADM

## 2021-01-01 RX ORDER — AMLODIPINE BESYLATE 5 MG/1
5 TABLET ORAL ONCE
Status: COMPLETED | OUTPATIENT
Start: 2021-01-01 | End: 2021-01-01

## 2021-01-01 RX ORDER — SODIUM CHLORIDE 0.9 % (FLUSH) 0.9 %
5-40 SYRINGE (ML) INJECTION AS NEEDED
Status: DISCONTINUED | OUTPATIENT
Start: 2021-01-01 | End: 2021-01-01 | Stop reason: SDUPTHER

## 2021-01-01 RX ORDER — GABAPENTIN 100 MG/1
CAPSULE ORAL
Qty: 90 CAPSULE | Refills: 0 | Status: SHIPPED | OUTPATIENT
Start: 2021-01-01 | End: 2021-01-01

## 2021-01-01 RX ORDER — ONDANSETRON 2 MG/ML
4 INJECTION INTRAMUSCULAR; INTRAVENOUS
Status: DISCONTINUED | OUTPATIENT
Start: 2021-01-01 | End: 2021-01-01 | Stop reason: HOSPADM

## 2021-01-01 RX ORDER — MECLIZINE HCL 12.5 MG 12.5 MG/1
12.5 TABLET ORAL
Qty: 30 TABLET | Refills: 0 | Status: SHIPPED | OUTPATIENT
Start: 2021-01-01 | End: 2022-01-01

## 2021-01-01 RX ORDER — LEVOFLOXACIN 750 MG/1
750 TABLET ORAL
Status: DISCONTINUED | OUTPATIENT
Start: 2021-01-01 | End: 2021-01-01

## 2021-01-01 RX ORDER — METOPROLOL TARTRATE 25 MG/1
TABLET, FILM COATED ORAL
Qty: 180 TABLET | Refills: 1 | Status: SHIPPED | OUTPATIENT
Start: 2021-01-01 | End: 2021-01-01 | Stop reason: SDUPTHER

## 2021-01-01 RX ORDER — LEVOFLOXACIN 750 MG/1
750 TABLET ORAL DAILY
Qty: 12 TABLET | Refills: 0 | Status: SHIPPED | OUTPATIENT
Start: 2021-01-01 | End: 2021-01-01 | Stop reason: SDUPTHER

## 2021-01-01 RX ORDER — LIDOCAINE 4 G/100G
1 PATCH TOPICAL EVERY 24 HOURS
Status: DISCONTINUED | OUTPATIENT
Start: 2021-01-01 | End: 2021-01-01 | Stop reason: HOSPADM

## 2021-01-01 RX ORDER — LEVOFLOXACIN 5 MG/ML
750 INJECTION, SOLUTION INTRAVENOUS EVERY 24 HOURS
Status: DISCONTINUED | OUTPATIENT
Start: 2021-01-01 | End: 2021-01-01

## 2021-01-01 RX ORDER — LANREOTIDE ACETATE 120 MG/.5ML
120 INJECTION SUBCUTANEOUS ONCE
Status: CANCELLED | OUTPATIENT
Start: 2021-01-01 | End: 2021-01-01

## 2021-01-01 RX ORDER — ENOXAPARIN SODIUM 100 MG/ML
40 INJECTION SUBCUTANEOUS
Status: DISCONTINUED | OUTPATIENT
Start: 2021-01-01 | End: 2021-01-01 | Stop reason: HOSPADM

## 2021-01-01 RX ORDER — GABAPENTIN 100 MG/1
100 CAPSULE ORAL 3 TIMES DAILY
Qty: 90 CAPSULE | Refills: 0 | Status: CANCELLED | OUTPATIENT
Start: 2021-01-01

## 2021-01-01 RX ORDER — TRAMADOL HYDROCHLORIDE 50 MG/1
50 TABLET ORAL ONCE
Status: COMPLETED | OUTPATIENT
Start: 2021-01-01 | End: 2021-01-01

## 2021-01-01 RX ORDER — LEVOFLOXACIN 5 MG/ML
750 INJECTION, SOLUTION INTRAVENOUS EVERY 24 HOURS
Status: DISCONTINUED | OUTPATIENT
Start: 2021-01-01 | End: 2021-01-01 | Stop reason: HOSPADM

## 2021-01-01 RX ORDER — METOPROLOL TARTRATE 25 MG/1
TABLET, FILM COATED ORAL
Qty: 180 TABLET | Refills: 1 | Status: SHIPPED | OUTPATIENT
Start: 2021-01-01 | End: 2021-01-01

## 2021-01-01 RX ORDER — METOPROLOL TARTRATE 25 MG/1
25 TABLET, FILM COATED ORAL 2 TIMES DAILY
Qty: 60 TABLET | Refills: 3 | Status: SHIPPED | OUTPATIENT
Start: 2021-01-01 | End: 2021-01-01

## 2021-01-01 RX ORDER — ACETAMINOPHEN 650 MG/1
650 SUPPOSITORY RECTAL
Status: DISCONTINUED | OUTPATIENT
Start: 2021-01-01 | End: 2021-01-01 | Stop reason: HOSPADM

## 2021-01-01 RX ORDER — ALBUMIN HUMAN 250 G/1000ML
25 SOLUTION INTRAVENOUS ONCE
Status: COMPLETED | OUTPATIENT
Start: 2021-01-01 | End: 2021-01-01

## 2021-01-01 RX ORDER — GABAPENTIN 100 MG/1
200 CAPSULE ORAL 3 TIMES DAILY
Qty: 180 CAPSULE | Refills: 0 | Status: SHIPPED | OUTPATIENT
Start: 2021-01-01 | End: 2021-01-01

## 2021-01-01 RX ORDER — ACETAMINOPHEN 325 MG/1
650 TABLET ORAL
Status: DISCONTINUED | OUTPATIENT
Start: 2021-01-01 | End: 2021-01-01

## 2021-01-01 RX ADMIN — ENOXAPARIN SODIUM 40 MG: 100 INJECTION SUBCUTANEOUS at 08:14

## 2021-01-01 RX ADMIN — SODIUM CHLORIDE 1000 ML: 9 INJECTION, SOLUTION INTRAVENOUS at 06:08

## 2021-01-01 RX ADMIN — DENOSUMAB 120 MG: 120 INJECTION SUBCUTANEOUS at 09:29

## 2021-01-01 RX ADMIN — GABAPENTIN 100 MG: 100 CAPSULE ORAL at 21:33

## 2021-01-01 RX ADMIN — SODIUM CHLORIDE 100 ML/HR: 9 INJECTION, SOLUTION INTRAVENOUS at 04:32

## 2021-01-01 RX ADMIN — GABAPENTIN 100 MG: 100 CAPSULE ORAL at 21:08

## 2021-01-01 RX ADMIN — OXYCODONE 5 MG: 5 TABLET ORAL at 07:38

## 2021-01-01 RX ADMIN — GABAPENTIN 100 MG: 100 CAPSULE ORAL at 09:04

## 2021-01-01 RX ADMIN — OXYCODONE 5 MG: 5 TABLET ORAL at 16:02

## 2021-01-01 RX ADMIN — LEVOFLOXACIN 750 MG: 750 TABLET, FILM COATED ORAL at 13:55

## 2021-01-01 RX ADMIN — SODIUM CHLORIDE 1000 ML: 9 INJECTION, SOLUTION INTRAVENOUS at 06:09

## 2021-01-01 RX ADMIN — Medication 10 ML: at 17:36

## 2021-01-01 RX ADMIN — LISINOPRIL 5 MG: 5 TABLET ORAL at 09:03

## 2021-01-01 RX ADMIN — Medication 10 ML: at 15:31

## 2021-01-01 RX ADMIN — ACETAMINOPHEN 650 MG: 325 TABLET ORAL at 14:22

## 2021-01-01 RX ADMIN — GABAPENTIN 100 MG: 100 CAPSULE ORAL at 08:12

## 2021-01-01 RX ADMIN — TRAMADOL HYDROCHLORIDE 50 MG: 50 TABLET ORAL at 08:14

## 2021-01-01 RX ADMIN — GABAPENTIN 100 MG: 100 CAPSULE ORAL at 22:56

## 2021-01-01 RX ADMIN — GABAPENTIN 100 MG: 100 CAPSULE ORAL at 17:36

## 2021-01-01 RX ADMIN — MECLIZINE 12.5 MG: 12.5 TABLET ORAL at 21:42

## 2021-01-01 RX ADMIN — LANREOTIDE ACETATE 120 MG: 120 INJECTION SUBCUTANEOUS at 09:35

## 2021-01-01 RX ADMIN — GABAPENTIN 100 MG: 100 CAPSULE ORAL at 08:14

## 2021-01-01 RX ADMIN — Medication 10 ML: at 13:56

## 2021-01-01 RX ADMIN — CEFEPIME HYDROCHLORIDE 2 G: 2 INJECTION, POWDER, FOR SOLUTION INTRAVENOUS at 06:13

## 2021-01-01 RX ADMIN — SODIUM CHLORIDE 150 ML/HR: 9 INJECTION, SOLUTION INTRAVENOUS at 00:45

## 2021-01-01 RX ADMIN — Medication 10 ML: at 06:03

## 2021-01-01 RX ADMIN — FLUDROCORTISONE ACETATE 0.1 MG: 0.1 TABLET ORAL at 09:18

## 2021-01-01 RX ADMIN — ALBUMIN (HUMAN) 25 G: 0.25 INJECTION, SOLUTION INTRAVENOUS at 14:17

## 2021-01-01 RX ADMIN — SODIUM CHLORIDE 328 ML: 9 INJECTION, SOLUTION INTRAVENOUS at 06:09

## 2021-01-01 RX ADMIN — OXYCODONE 5 MG: 5 TABLET ORAL at 09:13

## 2021-01-01 RX ADMIN — SODIUM CHLORIDE, POTASSIUM CHLORIDE, SODIUM LACTATE AND CALCIUM CHLORIDE 1000 ML: 600; 310; 30; 20 INJECTION, SOLUTION INTRAVENOUS at 11:57

## 2021-01-01 RX ADMIN — AMLODIPINE BESYLATE 5 MG: 5 TABLET ORAL at 11:37

## 2021-01-01 RX ADMIN — DENOSUMAB 120 MG: 120 INJECTION SUBCUTANEOUS at 09:38

## 2021-01-01 RX ADMIN — SODIUM CHLORIDE 1000 ML: 9 INJECTION, SOLUTION INTRAVENOUS at 12:24

## 2021-01-01 RX ADMIN — SODIUM CHLORIDE 100 ML/HR: 9 INJECTION, SOLUTION INTRAVENOUS at 06:02

## 2021-01-01 RX ADMIN — SODIUM CHLORIDE 150 ML/HR: 9 INJECTION, SOLUTION INTRAVENOUS at 13:22

## 2021-01-01 RX ADMIN — Medication 10 ML: at 13:19

## 2021-01-01 RX ADMIN — Medication 10 ML: at 10:44

## 2021-01-01 RX ADMIN — GABAPENTIN 100 MG: 100 CAPSULE ORAL at 09:03

## 2021-01-01 RX ADMIN — SODIUM CHLORIDE 100 ML/HR: 9 INJECTION, SOLUTION INTRAVENOUS at 21:37

## 2021-01-01 RX ADMIN — GABAPENTIN 100 MG: 100 CAPSULE ORAL at 22:12

## 2021-01-01 RX ADMIN — LANREOTIDE ACETATE 120 MG: 120 INJECTION SUBCUTANEOUS at 09:24

## 2021-01-01 RX ADMIN — GABAPENTIN 100 MG: 100 CAPSULE ORAL at 08:31

## 2021-01-01 RX ADMIN — OXYCODONE 5 MG: 5 TABLET ORAL at 02:07

## 2021-01-01 RX ADMIN — OXYCODONE 5 MG: 5 TABLET ORAL at 05:48

## 2021-01-01 RX ADMIN — ACETAMINOPHEN 650 MG: 325 TABLET ORAL at 06:26

## 2021-01-01 RX ADMIN — SODIUM CHLORIDE 150 ML/HR: 9 INJECTION, SOLUTION INTRAVENOUS at 18:23

## 2021-01-01 RX ADMIN — OXYCODONE 5 MG: 5 TABLET ORAL at 15:31

## 2021-01-01 RX ADMIN — LANREOTIDE ACETATE 120 MG: 120 INJECTION SUBCUTANEOUS at 10:33

## 2021-01-01 RX ADMIN — OXYCODONE 5 MG: 5 TABLET ORAL at 22:11

## 2021-01-01 RX ADMIN — OXYCODONE 5 MG: 5 TABLET ORAL at 10:41

## 2021-01-01 RX ADMIN — GABAPENTIN 100 MG: 100 CAPSULE ORAL at 08:46

## 2021-01-01 RX ADMIN — GABAPENTIN 100 MG: 100 CAPSULE ORAL at 08:27

## 2021-01-01 RX ADMIN — SODIUM CHLORIDE 100 ML/HR: 9 INJECTION, SOLUTION INTRAVENOUS at 14:21

## 2021-01-01 RX ADMIN — SODIUM CHLORIDE, POTASSIUM CHLORIDE, SODIUM LACTATE AND CALCIUM CHLORIDE 1000 ML: 600; 310; 30; 20 INJECTION, SOLUTION INTRAVENOUS at 09:56

## 2021-01-01 RX ADMIN — LANREOTIDE ACETATE 120 MG: 120 INJECTION SUBCUTANEOUS at 09:41

## 2021-01-01 RX ADMIN — FLUDROCORTISONE ACETATE 0.1 MG: 0.1 TABLET ORAL at 16:39

## 2021-01-01 RX ADMIN — HYDRALAZINE HYDROCHLORIDE 10 MG: 20 INJECTION INTRAMUSCULAR; INTRAVENOUS at 06:12

## 2021-01-01 RX ADMIN — GABAPENTIN 100 MG: 100 CAPSULE ORAL at 21:22

## 2021-01-01 RX ADMIN — ONDANSETRON 4 MG: 2 INJECTION INTRAMUSCULAR; INTRAVENOUS at 21:37

## 2021-01-01 RX ADMIN — Medication 10 ML: at 21:34

## 2021-01-01 RX ADMIN — GABAPENTIN 100 MG: 100 CAPSULE ORAL at 22:24

## 2021-01-01 RX ADMIN — CEFEPIME HYDROCHLORIDE 2 G: 2 INJECTION, POWDER, FOR SOLUTION INTRAVENOUS at 06:05

## 2021-01-01 RX ADMIN — GABAPENTIN 100 MG: 100 CAPSULE ORAL at 15:31

## 2021-01-01 RX ADMIN — Medication 5 ML: at 06:00

## 2021-01-01 RX ADMIN — OXYCODONE 5 MG: 5 TABLET ORAL at 04:41

## 2021-01-01 RX ADMIN — SODIUM CHLORIDE 100 ML/HR: 9 INJECTION, SOLUTION INTRAVENOUS at 01:13

## 2021-01-01 RX ADMIN — Medication 10 ML: at 21:08

## 2021-01-01 RX ADMIN — LANREOTIDE ACETATE 120 MG: 120 INJECTION SUBCUTANEOUS at 09:15

## 2021-01-01 RX ADMIN — Medication 10 ML: at 06:13

## 2021-01-01 RX ADMIN — Medication 10 ML: at 21:47

## 2021-01-01 RX ADMIN — LANREOTIDE ACETATE 120 MG: 120 INJECTION SUBCUTANEOUS at 09:16

## 2021-01-01 RX ADMIN — Medication 10 ML: at 22:00

## 2021-01-01 RX ADMIN — GABAPENTIN 100 MG: 100 CAPSULE ORAL at 15:38

## 2021-01-01 RX ADMIN — LEVOFLOXACIN 750 MG: 750 TABLET, FILM COATED ORAL at 13:47

## 2021-01-01 RX ADMIN — LEVOFLOXACIN 750 MG: 5 INJECTION, SOLUTION INTRAVENOUS at 08:31

## 2021-01-01 RX ADMIN — POTASSIUM BICARBONATE 50 MEQ: 391 TABLET, EFFERVESCENT ORAL at 11:41

## 2021-01-01 RX ADMIN — OXYCODONE 5 MG: 5 TABLET ORAL at 01:12

## 2021-01-01 RX ADMIN — SODIUM BICARBONATE 50 MEQ: 84 INJECTION, SOLUTION INTRAVENOUS at 10:44

## 2021-01-01 RX ADMIN — SODIUM CHLORIDE 100 ML/HR: 9 INJECTION, SOLUTION INTRAVENOUS at 17:51

## 2021-01-01 RX ADMIN — LIDOCAINE HYDROCHLORIDE: 20 JELLY TOPICAL at 15:00

## 2021-01-01 RX ADMIN — GABAPENTIN 100 MG: 100 CAPSULE ORAL at 09:17

## 2021-01-01 RX ADMIN — GABAPENTIN 100 MG: 100 CAPSULE ORAL at 16:44

## 2021-01-01 RX ADMIN — LANREOTIDE ACETATE 120 MG: 120 INJECTION SUBCUTANEOUS at 09:40

## 2021-01-01 RX ADMIN — Medication 10 ML: at 13:24

## 2021-01-01 RX ADMIN — GABAPENTIN 100 MG: 100 CAPSULE ORAL at 16:02

## 2021-01-01 RX ADMIN — ENOXAPARIN SODIUM 40 MG: 100 INJECTION SUBCUTANEOUS at 09:17

## 2021-01-01 RX ADMIN — OXYCODONE 5 MG: 5 TABLET ORAL at 08:46

## 2021-01-01 RX ADMIN — ACETAMINOPHEN 650 MG: 325 TABLET ORAL at 09:16

## 2021-01-01 RX ADMIN — OXYCODONE 5 MG: 5 TABLET ORAL at 13:27

## 2021-01-01 RX ADMIN — GABAPENTIN 100 MG: 100 CAPSULE ORAL at 16:39

## 2021-01-01 RX ADMIN — GABAPENTIN 100 MG: 100 CAPSULE ORAL at 21:37

## 2021-01-01 RX ADMIN — SODIUM CHLORIDE 150 ML/HR: 9 INJECTION, SOLUTION INTRAVENOUS at 10:49

## 2021-01-01 RX ADMIN — PIPERACILLIN AND TAZOBACTAM 3.38 G: 3; .375 INJECTION, POWDER, LYOPHILIZED, FOR SOLUTION INTRAVENOUS at 10:04

## 2021-01-01 RX ADMIN — GABAPENTIN 100 MG: 100 CAPSULE ORAL at 07:38

## 2021-01-01 RX ADMIN — ACETAMINOPHEN 650 MG: 325 TABLET ORAL at 18:21

## 2021-01-01 RX ADMIN — FLUDROCORTISONE ACETATE 0.1 MG: 0.1 TABLET ORAL at 08:15

## 2021-01-01 RX ADMIN — GABAPENTIN 100 MG: 100 CAPSULE ORAL at 16:50

## 2021-01-01 RX ADMIN — ACETAMINOPHEN 650 MG: 325 TABLET ORAL at 02:30

## 2021-01-01 RX ADMIN — LEVOFLOXACIN 750 MG: 5 INJECTION, SOLUTION INTRAVENOUS at 10:48

## 2021-01-01 RX ADMIN — POTASSIUM CHLORIDE AND SODIUM CHLORIDE: 450; 150 INJECTION, SOLUTION INTRAVENOUS at 23:08

## 2021-01-01 RX ADMIN — LEVOFLOXACIN 750 MG: 750 TABLET, FILM COATED ORAL at 13:27

## 2021-01-01 RX ADMIN — GABAPENTIN 100 MG: 100 CAPSULE ORAL at 21:55

## 2021-01-01 RX ADMIN — Medication 10 ML: at 11:40

## 2021-01-01 RX ADMIN — GABAPENTIN 100 MG: 100 CAPSULE ORAL at 16:18

## 2021-01-01 RX ADMIN — LIDOCAINE HYDROCHLORIDE 1 ML: 20 JELLY TOPICAL at 02:44

## 2021-01-01 RX ADMIN — Medication 10 ML: at 13:54

## 2021-01-01 RX ADMIN — Medication 10 ML: at 05:46

## 2021-01-01 RX ADMIN — CEFEPIME HYDROCHLORIDE 2 G: 2 INJECTION, POWDER, FOR SOLUTION INTRAVENOUS at 06:03

## 2021-01-01 RX ADMIN — Medication 10 ML: at 23:08

## 2021-01-01 RX ADMIN — HYDROMORPHONE HYDROCHLORIDE 0.5 MG: 1 INJECTION, SOLUTION INTRAMUSCULAR; INTRAVENOUS; SUBCUTANEOUS at 21:22

## 2021-01-01 RX ADMIN — LANREOTIDE ACETATE 120 MG: 120 INJECTION SUBCUTANEOUS at 09:31

## 2021-01-01 RX ADMIN — ENOXAPARIN SODIUM 40 MG: 100 INJECTION SUBCUTANEOUS at 09:04

## 2021-01-01 RX ADMIN — OXYCODONE 5 MG: 5 TABLET ORAL at 09:08

## 2021-01-01 RX ADMIN — SODIUM CHLORIDE 150 ML/HR: 9 INJECTION, SOLUTION INTRAVENOUS at 07:32

## 2021-01-01 RX ADMIN — IOPAMIDOL 100 ML: 755 INJECTION, SOLUTION INTRAVENOUS at 14:01

## 2021-01-01 RX ADMIN — GABAPENTIN 100 MG: 100 CAPSULE ORAL at 21:42

## 2021-01-01 RX ADMIN — LEVOFLOXACIN 750 MG: 5 INJECTION, SOLUTION INTRAVENOUS at 08:46

## 2021-01-01 RX ADMIN — ENOXAPARIN SODIUM 40 MG: 100 INJECTION SUBCUTANEOUS at 09:03

## 2021-01-01 RX ADMIN — CEFEPIME HYDROCHLORIDE 2 G: 2 INJECTION, POWDER, FOR SOLUTION INTRAVENOUS at 05:56

## 2021-01-01 RX ADMIN — ACETAMINOPHEN 1000 MG: 500 TABLET ORAL at 07:19

## 2021-01-01 RX ADMIN — OXYCODONE 5 MG: 5 TABLET ORAL at 18:42

## 2021-01-01 RX ADMIN — POTASSIUM CHLORIDE 20 MEQ: 750 TABLET, FILM COATED, EXTENDED RELEASE ORAL at 07:38

## 2021-01-01 RX ADMIN — LANREOTIDE ACETATE 120 MG: 120 INJECTION SUBCUTANEOUS at 10:32

## 2021-01-01 RX ADMIN — ACETAMINOPHEN 650 MG: 325 TABLET ORAL at 21:37

## 2021-01-01 RX ADMIN — ENOXAPARIN SODIUM 40 MG: 100 INJECTION SUBCUTANEOUS at 08:11

## 2021-01-01 RX ADMIN — POTASSIUM CHLORIDE AND SODIUM CHLORIDE: 450; 150 INJECTION, SOLUTION INTRAVENOUS at 10:42

## 2021-01-01 RX ADMIN — GABAPENTIN 100 MG: 100 CAPSULE ORAL at 10:44

## 2021-01-01 RX ADMIN — CEFEPIME HYDROCHLORIDE 2 G: 2 INJECTION, POWDER, FOR SOLUTION INTRAVENOUS at 17:34

## 2021-01-01 RX ADMIN — GABAPENTIN 100 MG: 100 CAPSULE ORAL at 16:43

## 2021-01-05 NOTE — PROGRESS NOTES
Jean Roper is a 77 y.o. 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 Treatment today:  Denosumab and Lanreotide injections    1. Have you been to the ER, urgent care clinic since your last visit? Hospitalized since your last visit? no  2. Have you seen or consulted any other health care providers outside of the [address] [place] [address] since your last visit? Include any pap smears or colon screening. no    Pt states his whole right arm will go to sleep at times. Both hands have been swelling in the morning. Pressing on different spots on his right arm is tender.

## 2021-01-07 ENCOUNTER — TELEPHONE (OUTPATIENT)
Dept: ONCOLOGY | Age: 76
End: 2021-01-07

## 2021-01-07 ENCOUNTER — HOSPITAL ENCOUNTER (OUTPATIENT)
Dept: INFUSION THERAPY | Age: 76
Discharge: HOME | End: 2021-01-07
Payer: MEDICARE

## 2021-01-07 VITALS
TEMPERATURE: 97.8 F | HEART RATE: 93 BPM | OXYGEN SATURATION: 100 % | DIASTOLIC BLOOD PRESSURE: 97 MMHG | SYSTOLIC BLOOD PRESSURE: 156 MMHG | RESPIRATION RATE: 18 BRPM

## 2021-01-07 DIAGNOSIS — C7A.8 NEUROENDOCRINE CARCINOMA METASTATIC TO BONE (HCC): Primary | ICD-10-CM

## 2021-01-07 DIAGNOSIS — C7B.8 NEUROENDOCRINE CARCINOMA METASTATIC TO BONE (HCC): Primary | ICD-10-CM

## 2021-01-07 LAB
ALBUMIN SERPL-MCNC: 3.3 G/DL (ref 3.5–5)
ALBUMIN/GLOB SERPL: 0.9 {RATIO} (ref 1.1–2.2)
ALP SERPL-CCNC: 88 U/L (ref 45–117)
ALT SERPL-CCNC: 25 U/L (ref 12–78)
ANION GAP BLD CALC-SCNC: 15 MMOL/L (ref 10–20)
AST SERPL-CCNC: 20 U/L (ref 15–37)
BASOPHILS # BLD: 0.1 K/UL (ref 0–0.1)
BASOPHILS NFR BLD: 1 % (ref 0–1)
BILIRUB DIRECT SERPL-MCNC: 0.2 MG/DL (ref 0–0.2)
BILIRUB SERPL-MCNC: 0.8 MG/DL (ref 0.2–1)
BUN BLD-MCNC: 27 MG/DL (ref 9–20)
CA-I BLD-MCNC: 1.18 MMOL/L (ref 1.12–1.32)
CHLORIDE BLD-SCNC: 107 MMOL/L (ref 98–107)
CHOLEST SERPL-MCNC: 206 MG/DL
CO2 BLD-SCNC: 25 MMOL/L (ref 21–32)
CREAT BLD-MCNC: 1.3 MG/DL (ref 0.6–1.3)
DIFFERENTIAL METHOD BLD: ABNORMAL
EOSINOPHIL # BLD: 0.3 K/UL (ref 0–0.4)
EOSINOPHIL NFR BLD: 5 % (ref 0–7)
ERYTHROCYTE [DISTWIDTH] IN BLOOD BY AUTOMATED COUNT: 14.4 % (ref 11.5–14.5)
EST. AVERAGE GLUCOSE BLD GHB EST-MCNC: 120 MG/DL
GLOBULIN SER CALC-MCNC: 3.6 G/DL (ref 2–4)
GLUCOSE BLD-MCNC: 179 MG/DL (ref 65–100)
HBA1C MFR BLD: 5.8 % (ref 4–5.6)
HCT VFR BLD AUTO: 35.7 % (ref 36.6–50.3)
HCT VFR BLD CALC: 34 % (ref 36.6–50.3)
HDLC SERPL-MCNC: 54 MG/DL
HDLC SERPL: 3.8 {RATIO} (ref 0–5)
HGB BLD-MCNC: 10.9 G/DL (ref 12.1–17)
IMM GRANULOCYTES # BLD AUTO: 0 K/UL (ref 0–0.04)
IMM GRANULOCYTES NFR BLD AUTO: 0 % (ref 0–0.5)
LDLC SERPL CALC-MCNC: 127.6 MG/DL (ref 0–100)
LIPID PROFILE,FLP: ABNORMAL
LYMPHOCYTES # BLD: 0.6 K/UL (ref 0.8–3.5)
LYMPHOCYTES NFR BLD: 12 % (ref 12–49)
MAGNESIUM SERPL-MCNC: 2.2 MG/DL (ref 1.6–2.4)
MCH RBC QN AUTO: 23.4 PG (ref 26–34)
MCHC RBC AUTO-ENTMCNC: 30.5 G/DL (ref 30–36.5)
MCV RBC AUTO: 76.8 FL (ref 80–99)
MONOCYTES # BLD: 0.4 K/UL (ref 0–1)
MONOCYTES NFR BLD: 7 % (ref 5–13)
NEUTS SEG # BLD: 4 K/UL (ref 1.8–8)
NEUTS SEG NFR BLD: 75 % (ref 32–75)
NRBC # BLD: 0 K/UL (ref 0–0.01)
NRBC BLD-RTO: 0 PER 100 WBC
PHOSPHATE SERPL-MCNC: 4 MG/DL (ref 2.6–4.7)
PLATELET # BLD AUTO: 143 K/UL (ref 150–400)
PLATELET COMMENTS,PCOM: ABNORMAL
PMV BLD AUTO: 10.4 FL (ref 8.9–12.9)
POTASSIUM BLD-SCNC: 3.5 MMOL/L (ref 3.5–5.1)
PROT SERPL-MCNC: 6.9 G/DL (ref 6.4–8.2)
PROT UR-MCNC: 22 MG/DL (ref 0–11.9)
RBC # BLD AUTO: 4.65 M/UL (ref 4.1–5.7)
RBC MORPH BLD: ABNORMAL
RBC MORPH BLD: ABNORMAL
SERVICE CMNT-IMP: ABNORMAL
SODIUM BLD-SCNC: 142 MMOL/L (ref 136–145)
TRIGL SERPL-MCNC: 122 MG/DL (ref ?–150)
VLDLC SERPL CALC-MCNC: 24.4 MG/DL
WBC # BLD AUTO: 5.4 K/UL (ref 4.1–11.1)

## 2021-01-07 PROCEDURE — 84156 ASSAY OF PROTEIN URINE: CPT

## 2021-01-07 PROCEDURE — 80076 HEPATIC FUNCTION PANEL: CPT

## 2021-01-07 PROCEDURE — 80061 LIPID PANEL: CPT

## 2021-01-07 PROCEDURE — 85025 COMPLETE CBC W/AUTO DIFF WBC: CPT

## 2021-01-07 PROCEDURE — 80047 BASIC METABLC PNL IONIZED CA: CPT

## 2021-01-07 PROCEDURE — 96372 THER/PROPH/DIAG INJ SC/IM: CPT

## 2021-01-07 PROCEDURE — 83735 ASSAY OF MAGNESIUM: CPT

## 2021-01-07 PROCEDURE — 84100 ASSAY OF PHOSPHORUS: CPT

## 2021-01-07 PROCEDURE — 83036 HEMOGLOBIN GLYCOSYLATED A1C: CPT

## 2021-01-07 PROCEDURE — 74011250636 HC RX REV CODE- 250/636: Performed by: INTERNAL MEDICINE

## 2021-01-07 PROCEDURE — 36415 COLL VENOUS BLD VENIPUNCTURE: CPT

## 2021-01-07 PROCEDURE — 96401 CHEMO ANTI-NEOPL SQ/IM: CPT

## 2021-01-07 RX ORDER — LANREOTIDE ACETATE 120 MG/.5ML
120 INJECTION SUBCUTANEOUS ONCE
Status: COMPLETED | OUTPATIENT
Start: 2021-01-07 | End: 2021-01-07

## 2021-01-07 RX ADMIN — DENOSUMAB 120 MG: 120 INJECTION SUBCUTANEOUS at 09:08

## 2021-01-07 RX ADMIN — LANREOTIDE ACETATE 120 MG: 120 INJECTION SUBCUTANEOUS at 09:10

## 2021-01-07 NOTE — TELEPHONE ENCOUNTER
Patient appointment moved to 2/4 and he would like a callback regarding him getting the covid vaccine.     [phone]

## 2021-01-07 NOTE — PROGRESS NOTES
[facility] Progress Note    Date: 2021    Name: Ross Jimenez    MRN: [id number]         : 1945    Mr. Siddiqui Just arrived ambulatory at [address] [place] [address] and in no distress for Lanreotide/Xgeva. Assessment was completed, no acute issues at this time, no new complaints voiced. Covid Screening      1. Do you have any symptoms of COVID-19? SOB, coughing, fever, or generally not feeling well ? No  2. Have you been exposed to COVID-19 recently? No  3. Have you had any recent contact with family/friend that has a pending COVID test? No      Peripheral stick to left ac; + blood return noted, labs processed and sent for processing. Mr. Gee Ragland vitals were reviewed. Patient Vitals for the past 12 hrs:   Temp Pulse Resp BP SpO2   21 0845 97.8 °F (36.6 °C) 93 18 (!) 156/97 100 %       Lab results were obtained and reviewed. Recent Results (from the past 12 hour(s))   POC CHEM8    Collection Time: 21  9:03 AM   Result Value Ref Range    Calcium, ionized (POC) 1.18 1.12 - 1.32 mmol/L    Sodium (POC) 142 136 - 145 mmol/L    Potassium (POC) 3.5 3.5 - 5.1 mmol/L    Chloride (POC) 107 98 - 107 mmol/L    CO2 (POC) 25 21 - 32 mmol/L    Anion gap (POC) 15 10 - 20 mmol/L    Glucose (POC) 179 (H) 65 - 100 mg/dL    BUN (POC) 27 (H) 9 - 20 mg/dL    Creatinine (POC) 1.3 0.6 - 1.3 mg/dL    GFRAA, POC >60 >60 ml/min/1.73m2    GFRNA, POC 54 (L) >60 ml/min/1.73m2    Hematocrit (POC) 34 (L) 36.6 - 50.3 %    Comment Notified RN or MD immediately by        Patient labs within parameters for treatment. Pre-medications  were administered as ordered and chemotherapy was initiated.      Medication:  Medications Administered     denosumab (XGEVA) injection 120 mg     Admin Date  2021 Action  Given Dose  120 mg Route  SubCUTAneous Administered By  Sarah Schaffer RN          lanreotide (SOMATULINE DEPOT) injection syringe 120 mg     Admin Date  2021 Action  Given Dose  120 mg Route  SubCUTAneous Administered By  Mitzi Alonzo RN              Mr. George Cabrera tolerated treatment well and was discharged from [place] [address] in stable condition at [address] [place] [address]. He is aware of when to return for his next appointment.     Future Appointments   Date Time Provider Mark De La Paz   1/7/2021  9:00 AM MAYDA INFUSION NURSE 4 [address] REG   1/7/2021  9:15 AM Macie Mohs, MD [facility]   2/4/2021  9:00 AM MAYDA INFUSION NURSE 4 [address] REG       Luz Ballesteros  January 7, 2021

## 2021-01-28 RX ORDER — LANREOTIDE ACETATE 120 MG/.5ML
120 INJECTION SUBCUTANEOUS ONCE
Status: CANCELLED | OUTPATIENT
Start: 2021-03-04 | End: 2021-03-04

## 2021-02-04 ENCOUNTER — OFFICE VISIT (OUTPATIENT)
Dept: ONCOLOGY | Age: 76
End: 2021-02-04
Payer: MEDICARE

## 2021-02-04 ENCOUNTER — HOSPITAL ENCOUNTER (OUTPATIENT)
Dept: INFUSION THERAPY | Age: 76
Discharge: HOME | End: 2021-02-04
Payer: MEDICARE

## 2021-02-04 VITALS
BODY MASS INDEX: 22.39 KG/M2 | HEIGHT: 72 IN | SYSTOLIC BLOOD PRESSURE: 151 MMHG | RESPIRATION RATE: 18 BRPM | DIASTOLIC BLOOD PRESSURE: 90 MMHG | OXYGEN SATURATION: 100 % | WEIGHT: 165.3 LBS | HEART RATE: 73 BPM | TEMPERATURE: 97.6 F

## 2021-02-04 VITALS
TEMPERATURE: 97.8 F | WEIGHT: 165 LBS | OXYGEN SATURATION: 100 % | HEART RATE: 73 BPM | HEIGHT: 72 IN | DIASTOLIC BLOOD PRESSURE: 90 MMHG | BODY MASS INDEX: 22.35 KG/M2 | SYSTOLIC BLOOD PRESSURE: 151 MMHG | RESPIRATION RATE: 18 BRPM

## 2021-02-04 DIAGNOSIS — C79.51 BONE METASTASIS (HCC): ICD-10-CM

## 2021-02-04 DIAGNOSIS — C7A.8 NEUROENDOCRINE CARCINOMA METASTATIC TO BONE (HCC): Primary | ICD-10-CM

## 2021-02-04 DIAGNOSIS — D64.81 ANTINEOPLASTIC CHEMOTHERAPY INDUCED ANEMIA: ICD-10-CM

## 2021-02-04 DIAGNOSIS — C7B.8 NEUROENDOCRINE CARCINOMA METASTATIC TO BONE (HCC): Primary | ICD-10-CM

## 2021-02-04 DIAGNOSIS — T45.1X5A ANTINEOPLASTIC CHEMOTHERAPY INDUCED ANEMIA: ICD-10-CM

## 2021-02-04 LAB
ALBUMIN SERPL-MCNC: 3.2 G/DL (ref 3.5–5)
ALBUMIN/GLOB SERPL: 0.9 {RATIO} (ref 1.1–2.2)
ALP SERPL-CCNC: 79 U/L (ref 45–117)
ALT SERPL-CCNC: 27 U/L (ref 12–78)
ANION GAP BLD CALC-SCNC: 11 MMOL/L (ref 10–20)
AST SERPL-CCNC: 29 U/L (ref 15–37)
BASOPHILS # BLD: 0 K/UL (ref 0–0.1)
BASOPHILS NFR BLD: 1 % (ref 0–1)
BILIRUB DIRECT SERPL-MCNC: 0.1 MG/DL (ref 0–0.2)
BILIRUB SERPL-MCNC: 0.8 MG/DL (ref 0.2–1)
BUN BLD-MCNC: 21 MG/DL (ref 9–20)
CA-I BLD-MCNC: 1.21 MMOL/L (ref 1.12–1.32)
CHLORIDE BLD-SCNC: 106 MMOL/L (ref 98–107)
CO2 BLD-SCNC: 30 MMOL/L (ref 21–32)
CREAT BLD-MCNC: 1.4 MG/DL (ref 0.6–1.3)
DIFFERENTIAL METHOD BLD: ABNORMAL
EOSINOPHIL # BLD: 0.2 K/UL (ref 0–0.4)
EOSINOPHIL NFR BLD: 4 % (ref 0–7)
ERYTHROCYTE [DISTWIDTH] IN BLOOD BY AUTOMATED COUNT: 14.4 % (ref 11.5–14.5)
GLOBULIN SER CALC-MCNC: 3.5 G/DL (ref 2–4)
GLUCOSE BLD-MCNC: 106 MG/DL (ref 65–100)
HCT VFR BLD AUTO: 34.9 % (ref 36.6–50.3)
HCT VFR BLD CALC: 32 % (ref 36.6–50.3)
HGB BLD-MCNC: 10.8 G/DL (ref 12.1–17)
IMM GRANULOCYTES # BLD AUTO: 0 K/UL (ref 0–0.04)
IMM GRANULOCYTES NFR BLD AUTO: 0 % (ref 0–0.5)
LYMPHOCYTES # BLD: 0.5 K/UL (ref 0.8–3.5)
LYMPHOCYTES NFR BLD: 12 % (ref 12–49)
MAGNESIUM SERPL-MCNC: 2.4 MG/DL (ref 1.6–2.4)
MCH RBC QN AUTO: 23.5 PG (ref 26–34)
MCHC RBC AUTO-ENTMCNC: 30.9 G/DL (ref 30–36.5)
MCV RBC AUTO: 75.9 FL (ref 80–99)
MONOCYTES # BLD: 0.3 K/UL (ref 0–1)
MONOCYTES NFR BLD: 8 % (ref 5–13)
NEUTS SEG # BLD: 3.3 K/UL (ref 1.8–8)
NEUTS SEG NFR BLD: 75 % (ref 32–75)
NRBC # BLD: 0 K/UL (ref 0–0.01)
NRBC BLD-RTO: 0 PER 100 WBC
PHOSPHATE SERPL-MCNC: 2.8 MG/DL (ref 2.6–4.7)
PLATELET # BLD AUTO: 144 K/UL (ref 150–400)
PMV BLD AUTO: 10.4 FL (ref 8.9–12.9)
POTASSIUM BLD-SCNC: 3.7 MMOL/L (ref 3.5–5.1)
PROT SERPL-MCNC: 6.7 G/DL (ref 6.4–8.2)
RBC # BLD AUTO: 4.6 M/UL (ref 4.1–5.7)
RBC MORPH BLD: ABNORMAL
RBC MORPH BLD: ABNORMAL
SERVICE CMNT-IMP: ABNORMAL
SODIUM BLD-SCNC: 143 MMOL/L (ref 136–145)
WBC # BLD AUTO: 4.3 K/UL (ref 4.1–11.1)

## 2021-02-04 PROCEDURE — G8536 NO DOC ELDER MAL SCRN: HCPCS | Performed by: INTERNAL MEDICINE

## 2021-02-04 PROCEDURE — G8427 DOCREV CUR MEDS BY ELIG CLIN: HCPCS | Performed by: INTERNAL MEDICINE

## 2021-02-04 PROCEDURE — 85025 COMPLETE CBC W/AUTO DIFF WBC: CPT

## 2021-02-04 PROCEDURE — 36415 COLL VENOUS BLD VENIPUNCTURE: CPT

## 2021-02-04 PROCEDURE — 83735 ASSAY OF MAGNESIUM: CPT

## 2021-02-04 PROCEDURE — 99215 OFFICE O/P EST HI 40 MIN: CPT | Performed by: INTERNAL MEDICINE

## 2021-02-04 PROCEDURE — 74011250636 HC RX REV CODE- 250/636: Performed by: INTERNAL MEDICINE

## 2021-02-04 PROCEDURE — 96372 THER/PROPH/DIAG INJ SC/IM: CPT

## 2021-02-04 PROCEDURE — 80076 HEPATIC FUNCTION PANEL: CPT

## 2021-02-04 PROCEDURE — 1101F PT FALLS ASSESS-DOCD LE1/YR: CPT | Performed by: INTERNAL MEDICINE

## 2021-02-04 PROCEDURE — G8432 DEP SCR NOT DOC, RNG: HCPCS | Performed by: INTERNAL MEDICINE

## 2021-02-04 PROCEDURE — 80047 BASIC METABLC PNL IONIZED CA: CPT

## 2021-02-04 PROCEDURE — G8420 CALC BMI NORM PARAMETERS: HCPCS | Performed by: INTERNAL MEDICINE

## 2021-02-04 PROCEDURE — 3017F COLORECTAL CA SCREEN DOC REV: CPT | Performed by: INTERNAL MEDICINE

## 2021-02-04 PROCEDURE — 84100 ASSAY OF PHOSPHORUS: CPT

## 2021-02-04 RX ORDER — LANREOTIDE ACETATE 120 MG/.5ML
120 INJECTION SUBCUTANEOUS ONCE
Status: COMPLETED | OUTPATIENT
Start: 2021-02-04 | End: 2021-02-04

## 2021-02-04 RX ADMIN — LANREOTIDE ACETATE 120 MG: 120 INJECTION SUBCUTANEOUS at 09:32

## 2021-02-04 RX ADMIN — DENOSUMAB 120 MG: 120 INJECTION SUBCUTANEOUS at 09:32

## 2021-02-04 NOTE — PROGRESS NOTES
Outpatient [facility]fusion [facility] - Chemotherapy Progress Note    0747 - Pt admit to [address] [place] [address] for Lanreotide and Xgeva in stable condition. Assessment completed. Patient denied having any symptoms of COVID-19, i.e. SOB, coughing, fever, or generally not feeling well. Also denies having been exposed to COVID-19 recently or having had any recent contact with family/friend that has a pending COVID test.     Labs drawn peripherally R AC. Patient Vitals for the past 12 hrs:   Pulse Resp BP SpO2   02/04/21 0907 73 18 (!) 151/90 100 %        Recent Results (from the past 12 hour(s))   CBC WITH AUTOMATED DIFF    Collection Time: 02/04/21  9:16 AM   Result Value Ref Range    WBC 4.3 4.1 - 11.1 K/uL    RBC 4.60 4.10 - 5.70 M/uL    HGB 10.8 (L) 12.1 - 17.0 g/dL    HCT 34.9 (L) 36.6 - 50.3 %    MCV 75.9 (L) 80.0 - 99.0 FL    MCH 23.5 (L) 26.0 - 34.0 PG    MCHC 30.9 30.0 - 36.5 g/dL    RDW 14.4 11.5 - 14.5 %    PLATELET 977 (L) 032 - 400 K/uL    MPV 10.4 8.9 - 12.9 FL    NRBC 0.0 0  WBC    ABSOLUTE NRBC 0.00 0.00 - 0.01 K/uL    NEUTROPHILS 75 32 - 75 %    LYMPHOCYTES 12 12 - 49 %    MONOCYTES 8 5 - 13 %    EOSINOPHILS 4 0 - 7 %    BASOPHILS 1 0 - 1 %    IMMATURE GRANULOCYTES 0 0.0 - 0.5 %    ABS. NEUTROPHILS 3.3 1.8 - 8.0 K/UL    ABS. LYMPHOCYTES 0.5 (L) 0.8 - 3.5 K/UL    ABS. MONOCYTES 0.3 0.0 - 1.0 K/UL    ABS. EOSINOPHILS 0.2 0.0 - 0.4 K/UL    ABS. BASOPHILS 0.0 0.0 - 0.1 K/UL    ABS. IMM. GRANS. 0.0 0.00 - 0.04 K/UL    DF SMEAR SCANNED      RBC COMMENTS MICROCYTOSIS  1+        RBC COMMENTS HYPOCHROMIA  1+       HEPATIC FUNCTION PANEL    Collection Time: 02/04/21  9:16 AM   Result Value Ref Range    Protein, total 6.7 6.4 - 8.2 g/dL    Albumin 3.2 (L) 3.5 - 5.0 g/dL    Globulin 3.5 2.0 - 4.0 g/dL    A-G Ratio 0.9 (L) 1.1 - 2.2      Bilirubin, total 0.8 0.2 - 1.0 MG/DL    Bilirubin, direct 0.1 0.0 - 0.2 MG/DL    Alk.  phosphatase 79 45 - 117 U/L    AST (SGOT) 29 15 - 37 U/L    ALT (SGPT) 27 12 - 78 U/L   MAGNESIUM Collection Time: 02/04/21  9:16 AM   Result Value Ref Range    Magnesium 2.4 1.6 - 2.4 mg/dL   PHOSPHORUS    Collection Time: 02/04/21  9:16 AM   Result Value Ref Range    Phosphorus 2.8 2.6 - 4.7 MG/DL   POC CHEM8    Collection Time: 02/04/21  9:21 AM   Result Value Ref Range    Calcium, ionized (POC) 1.21 1.12 - 1.32 mmol/L    Sodium (POC) 143 136 - 145 mmol/L    Potassium (POC) 3.7 3.5 - 5.1 mmol/L    Chloride (POC) 106 98 - 107 mmol/L    CO2 (POC) 30 21 - 32 mmol/L    Anion gap (POC) 11 10 - 20 mmol/L    Glucose (POC) 106 (H) 65 - 100 mg/dL    BUN (POC) 21 (H) 9 - 20 mg/dL    Creatinine (POC) 1.4 (H) 0.6 - 1.3 mg/dL    GFRAA, POC >60 >60 ml/min/1.73m2    GFRNA, POC 50 (L) >60 ml/min/1.73m2    Hematocrit (POC) 32 (L) 36.6 - 50.3 %    Comment Comment Not Indicated.          Medications:  Medications Administered     denosumab (XGEVA) injection 120 mg     Admin Date  02/04/2021 Action  Given Dose  120 mg Route  SubCUTAneous Administered By  Geneva Brooks upper arm          lanreotide (SOMATULINE DEPOT) injection syringe 120 mg     Admin Date  02/04/2021 Action  Given Dose  120 mg Route  SubCUTAneous Administered By  Geneva Brooks given L upper outer quad                  0940 - D/c home in no distress. Pt aware of next [facility] appointment scheduled for: 3/4/21 at 0900.     Future Appointments   Date Time Provider Department Center   3/4/2021  9:00 AM [facility] INFUSION NURSE 4 [facility]   4/1/2021  9:00 AM MAYDA INFUSION NURSE 4 [facility]   4/29/2021  9:00 AM [place] INFUSION NURSE 4 [facility] REG

## 2021-02-04 NOTE — LETTER
2/4/2021 Patient: Nolan Casiano YOB: 1945 Date of Visit: 2/4/2021 Bull Peterson MD 
[address] [place] [address] [address] [phone] Via Fax: [phone] Dear Bull Peterson MD, Thank you for referring Mr. Rory Siddiqui to [address] [place] [address] for evaluation. My notes for this consultation are attached. If you have questions, please do not hesitate to call me. I look forward to following your patient along with you.  
 
 
Sincerely, 
 
Sandra Garcia MD

## 2021-02-04 NOTE — PROGRESS NOTES
[address] [facility]  at [address] [place] [address], [address] [place] [address], [address] [facility]  [phone]       Oncology Follow up Note      Patient: Mary Araujo MRN: [id number]  SSN: [id number]    YOB: 1945  Age: 77 y.o. Sex: male        Diagnosis:     1. 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 Afinitor 10 mg + Somatuline Depot 120 mg SC - started Afinitor 3/30/2019  2. Somatuline Depot 120 mg SC - 05/2016 - 11/2018; Restarted 3/4/2019 -    (treatment held per patient request since11/16/2018)   3. Xgeva 120mg SC     Subjective:      Mary Araujo is a 77 y.o. male with a diagnosis of metastatic neuroendocrine cancer. He started experiencing pain in both groins and the abdomen. A CT of the abdomen showed retroperitoneal adenopathy. CT guided biopsy of the retroperitoneal LN reveals a dx of well differentiated neuroendocrine carcinoma. Bone scan shows disease in the bone. Mr. Bhavik Springer received Somatuline and had good control of disease. He took a break from therapy in Nov 2018. He went on an extended vacation until the beginning of February to [facility]. CT showed progression of disease in the nodes. Mr. Bhavik Springer resumed therapy with Somatuline with the addition of Afinitor. Symptoms such as bone pains, diarrhea and fatigue improved  He took a break from [address] [place] [address] in October 2019, but repeat scans showed slight progression of disease. He resumed Afinitor in November 2019. Scans show stable disease. He reports hot flashes and continues to have a difficult time gaining weight. His appetite is good. He has been remodeling his bathroom. He is experiencing pain in the upper back in the left subscapular area and numbness in the left arm. The numbness in intermittent and is related to certain position of the arm.        Review of Systems:    Constitutional: fatigue, hot flashes  Eyes: negative  Ears, Nose, Mouth, Throat, and Face: negative  Respiratory: negative  Cardiovascular: negative  Gastrointestinal: diarrhea  Genitourinary: urinary incontinence  Integument/Breast: negative  Hematologic/Lymphatic: negative  Musculoskeletal: back pain  Neurological: negative    ROS was pulled in from a previous visit. No changes were made d/t symptoms remain the same. Past Medical History:   Diagnosis Date    Adverse effect of anesthesia     low HR and very slow to wakeup    Cancer ([address] 2016    neuroendocrine, retroperitoneal LN involvement    Diarrhea     Frequent urination     Hypertension     Poor appetite     Unspecified adverse effect of anesthesia     \"hard to put to sleep and slow to wake up-heart rate dropped very low\"     Past Surgical History:   Procedure Laterality Date    COLONOSCOPY N/A 2016    COLONOSCOPY performed by Taylor Samayoa MD at [address] [place] [address]  2016         HX ORTHOPAEDIC      knee scope left knee    HX OTHER SURGICAL  2013    excision of scalp cyst     UPPER GI ENDOSCOPY,BIOPSY  2016           Family History   Problem Relation Age of Onset    Cancer Mother     Stroke Sister      Social History     Tobacco Use    Smoking status: Former Smoker     Packs/day: 1.00     Years: 8.00     Pack years: 8.00     Quit date: 1972     Years since quittin.7    Smokeless tobacco: Never Used   Substance Use Topics    Alcohol use: No      Prior to Admission medications    Medication Sig Start Date End Date Taking? Authorizing Provider   everolimus (Afinitor) 10 mg tab Take 1 Tab by mouth daily. 20  Yes Pierre Lloyd MD   meclizine (ANTIVERT) 25 mg tablet Take 1 Tab by mouth three (3) times daily as needed for Dizziness. 20  Yes Alisa Sanchez DO   ondansetron (Zofran ODT) 4 mg disintegrating tablet Take 1 Tab by mouth every eight (8) hours as needed for Nausea.  20  Yes Alisa Sanchez DO   lisinopril (PRINIVIL, ZESTRIL) 20 mg tablet Take 1 Tab by mouth daily. Patient taking differently: Take 40 mg by mouth daily. 6/19/19  Yes Pierre Lloyd MD   tamsulosin [facility]) 0.4 mg capsule Take 0.4 mg by mouth daily. Yes Provider, Historical          No Known Allergies        Objective:     Visit Vitals  BP (!) 151/90   Pulse 73   Temp 97.8 °F (36.6 °C)   Resp 18   Ht 6' (1.829 m)   Wt 165 lb (74.8 kg)   SpO2 100%   BMI 22.38 kg/m²       Pain Scale: 0 - No pain/10  Pain Location:       Physical Exam:     GENERAL: alert, cooperative, thin  EYE: negative, no pallor or icterus  LYMPHATIC: Cervical, supraclavicular, and axillary nodes normal.   THROAT & NECK: normal and no erythema or exudates noted. LUNG: clear to auscultation bilaterally  HEART: regular rate and rhythm, no murmur  ABDOMEN: soft, non-tender on palpation  EXTREMITIES: no cyanosis or edema  SKIN: Normal. No rash or ecchymosis  NEUROLOGIC: negative, no motor or sensory deficits.      Physical exam was pulled in from a previous visit. No changes were made d/t physical exam remains the same. IMAGING:     CT Results (most recent):  Results from Hospital Encounter encounter on 11/10/20   CT ABD PELV W CONT    Narrative EXAM: CT ABD PELV W CONT    INDICATION: Restaging disease neuroendocrine carcinoma    COMPARISON: April 28, 2020     CONTRAST: 100 mL of Isovue-370. TECHNIQUE:   Following the uneventful intravenous administration of contrast, thin axial  images were obtained through the abdomen and pelvis. Coronal and sagittal  reconstructions were generated. Oral contrast was administered. CT dose  reduction was achieved through use of a standardized protocol tailored for this  examination and automatic exposure control for dose modulation. FINDINGS:   LOWER THORAX: Interstitial disease mainly at the right lung base likely stable.   LIVER: Small area ,  3 mm, of low density within the inferior aspect of the  right lobe of the liver is too small to characterize and could have been present  on the prior examination. Follow-up as clinically appropriate. BILIARY TREE: Gallbladder is within normal limits. CBD is not dilated. SPLEEN: within normal limits. PANCREAS: No mass or ductal dilatation. ADRENALS: Unremarkable. KIDNEYS: Nonobstructing lower pole left renal calculus. There is some prominence  of the collecting system and left ureter. These may be related to the bladder  distention. Other etiologies not excluded. Wall thickening in the bladder is  more prominent close to the left ureterovesical junction. STOMACH: Unremarkable. SMALL BOWEL: No dilatation or wall thickening. COLON: Apparent wall thickening in the cecum is stable. APPENDIX: Appears normal.  PERITONEUM: Calcified mesenteric lesion once again demonstrated. No significant  interval change in size when considering different technique and location. RETROPERITONEUM: Prominent lymphadenopathy mainly to the left of the aorta  appears unchanged in size. Prostate enlarged with implants,  URINARY BLADDER: Distended with wall thickening suggestive of a chronic outlet  obstruction. BONES: No destructive bone lesion. ABDOMINAL WALL: No mass or hernia. ADDITIONAL COMMENTS: N/A      Impression IMPRESSION:    1. Stable mesenteric mass with retroperitoneal adenopathy. 2. Small liver lesion likely cysts but too small to characterize as above. 3. Prostate enlargement with bladder wall thickening and prominence of the left  ureter as above. 4. Apparent wall thickening cecum as above. I reviewed the imaging personally. Stable disease in the retroperitoneal nodes.        Lab Results   Component Value Date/Time    WBC 5.4 01/07/2021 08:50 AM    Hemoglobin (POC) 14.6 12/21/2017 09:10 AM    HGB 10.9 (L) 01/07/2021 08:50 AM    Hematocrit (POC) 32 (L) 02/04/2021 09:21 AM    HCT 35.7 (L) 01/07/2021 08:50 AM    PLATELET 067 (L) 80/21/3715 08:50 AM    MCV 76.8 (L) 01/07/2021 08:50 AM Lab Results   Component Value Date/Time    Sodium 142 09/30/2020 09:07 AM    Potassium 3.7 09/30/2020 09:07 AM    Chloride 108 09/30/2020 09:07 AM    CO2 31 09/30/2020 09:07 AM    Anion gap 3 (L) 09/30/2020 09:07 AM    Glucose 193 (H) 09/30/2020 09:07 AM    BUN 28 (H) 09/30/2020 09:07 AM    Creatinine 1.46 (H) 09/30/2020 09:07 AM    BUN/Creatinine ratio 19 09/30/2020 09:07 AM    GFR est AA 57 (L) 09/30/2020 09:07 AM    GFR est non-AA 47 (L) 09/30/2020 09:07 AM    Calcium 8.8 09/30/2020 09:07 AM    Bilirubin, total 0.8 01/07/2021 08:50 AM    Alk. phosphatase 88 01/07/2021 08:50 AM    Protein, total 6.9 01/07/2021 08:50 AM    Albumin 3.3 (L) 01/07/2021 08:50 AM    Globulin 3.6 01/07/2021 08:50 AM    A-G Ratio 0.9 (L) 01/07/2021 08:50 AM    ALT (SGPT) 25 01/07/2021 08:50 AM           Assessment:     1. 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 Had excellent disease control. He experienced a number of side effects including hot flashes, weight loss etc  He decided to take a break from therapy. CT showed disease progression in retroperitoneum and abdomen. Restarted Somatuline. Added Afinitor. Flushing, appetite, diarrhea, back pain are all better. Stopped Afinitor in Oct 2019  CT showed progression in retroperitoneal nodes    He is now back on Everolimus 10 mg in addition to [employer] treatment   + diarrhea - manageable  + fatigue - manageable, he is not [place] the golf [place] these days. + hot flashes - manageable    Weight is stable  Appetite is good    A detailed system by system evaluation of side effect was performed to assess chemotherapy related toxicity. Blood counts are acceptable. Results reviewed with the patient. CT Abd Pelv, NM bone scan (11/2020):  Stable disease    Gallium PET showed significant disease in the nodes both above and below the diaphragm and bones. We would use CT to follow the disease and reserve Gallium PET for consideration of DOTATE treatment in the future. 2. Bone metastasis    > Denosumab      3. Urinary incontinence    BPH  Managed by Dr. Jazzy Nichols      4. Diarrhea    Imodium as needed      5. Orthostasis    Observation      6. HTN, uncontrolled    May need to add Amlodipine to Lisinopril. 7. Anemia from systemic therapy    Observation      8. Numbness in left arm and subscapular pain in the left side    I offered him referral to [facility] but he refuses at this time. Plan:       > Continue Afinitor  > Continue Denosumab  > Continue Somatuline  > Imodium as needed  > CT, bone scan   > Follow-up in 2 months      I performed a history and physical examination of the patient and discussed his management with the NPP. I reviewed the NPP note and agree with the documented findings and plan of care. The patient was seen in conjunction with Ms. Hong. Signed by: Ana Rosa Long MD                     February 4, 2021      CC. Santiago Pena MD  CC.  Elizabeth Muhammad MD

## 2021-02-05 DIAGNOSIS — C7B.8 NEUROENDOCRINE CARCINOMA METASTATIC TO BONE (HCC): Primary | ICD-10-CM

## 2021-02-05 DIAGNOSIS — C7A.8 NEUROENDOCRINE CARCINOMA METASTATIC TO BONE (HCC): Primary | ICD-10-CM

## 2021-02-05 NOTE — PROGRESS NOTES
PER REVA FROM DR Christian Marinelli NM BONE SCAN [address] BODY as a one time order. PER REVA FROM DR Christian Marinelli CT ABDPELV W CONTRAST as a one time order.

## 2021-02-17 ENCOUNTER — HOSPITAL ENCOUNTER (OUTPATIENT)
Dept: NUCLEAR MEDICINE | Age: 76
Discharge: HOME | End: 2021-02-17
Attending: INTERNAL MEDICINE
Payer: MEDICARE

## 2021-02-17 ENCOUNTER — HOSPITAL ENCOUNTER (OUTPATIENT)
Dept: CT IMAGING | Age: 76
Discharge: HOME | End: 2021-02-17
Attending: INTERNAL MEDICINE
Payer: MEDICARE

## 2021-02-17 DIAGNOSIS — C7A.8 NEUROENDOCRINE CARCINOMA METASTATIC TO BONE (HCC): ICD-10-CM

## 2021-02-17 DIAGNOSIS — C7B.8 NEUROENDOCRINE CARCINOMA METASTATIC TO BONE (HCC): ICD-10-CM

## 2021-02-17 PROCEDURE — 74177 CT ABD & PELVIS W/CONTRAST: CPT

## 2021-02-17 PROCEDURE — 78306 BONE IMAGING WHOLE BODY: CPT

## 2021-02-17 PROCEDURE — 74011000636 HC RX REV CODE- 636: Performed by: INTERNAL MEDICINE

## 2021-02-17 RX ADMIN — IOPAMIDOL 100 ML: 755 INJECTION, SOLUTION INTRAVENOUS at 10:18

## 2021-02-22 ENCOUNTER — TELEPHONE (OUTPATIENT)
Dept: ONCOLOGY | Age: 76
End: 2021-02-22

## 2021-02-22 DIAGNOSIS — C7B.8 NEUROENDOCRINE CARCINOMA METASTATIC TO BONE (HCC): Primary | ICD-10-CM

## 2021-02-22 DIAGNOSIS — K12.30 MUCOSITIS: ICD-10-CM

## 2021-02-22 DIAGNOSIS — C7A.8 NEUROENDOCRINE CARCINOMA METASTATIC TO BONE (HCC): Primary | ICD-10-CM

## 2021-02-22 RX ORDER — DEXAMETHASONE 0.5 MG/5ML
1 ELIXIR ORAL 4 TIMES DAILY
Qty: 237 ML | Refills: 1 | Status: SHIPPED | OUTPATIENT
Start: 2021-02-22 | End: 2021-01-01

## 2021-02-22 NOTE — TELEPHONE ENCOUNTER
Patient left message on ED [facility] nurse voicemail. States [facility] advises they have not heard back from the office about getting patient's Dexamethasone oral rinse. Patient advises [facility] number is [phone].

## 2021-02-22 NOTE — TELEPHONE ENCOUNTER
Pt reports he is doing the mouthwash every day but sometimes he will take it more, not to exceed 4 times a day, when sores are noted.       CECYB FROM DR Amol Reddy DEXAMETHASONE SOLUTION 0.5/5mL take 10ml by mouth four times daily, swish for 2 minutes then spit, dispense 237 mL r 1

## 2021-03-04 ENCOUNTER — HOSPITAL ENCOUNTER (OUTPATIENT)
Dept: INFUSION THERAPY | Age: 76
Discharge: HOME | End: 2021-03-04
Payer: MEDICARE

## 2021-03-04 VITALS
DIASTOLIC BLOOD PRESSURE: 99 MMHG | BODY MASS INDEX: 22.04 KG/M2 | TEMPERATURE: 97.8 F | HEART RATE: 87 BPM | HEIGHT: 72 IN | WEIGHT: 162.7 LBS | SYSTOLIC BLOOD PRESSURE: 153 MMHG | OXYGEN SATURATION: 100 % | RESPIRATION RATE: 18 BRPM

## 2021-03-04 DIAGNOSIS — C7A.8 NEUROENDOCRINE CARCINOMA METASTATIC TO BONE (HCC): Primary | ICD-10-CM

## 2021-03-04 DIAGNOSIS — C7B.8 NEUROENDOCRINE CARCINOMA METASTATIC TO BONE (HCC): Primary | ICD-10-CM

## 2021-03-04 LAB
ALBUMIN SERPL-MCNC: 3.3 G/DL (ref 3.5–5)
ALBUMIN/GLOB SERPL: 0.9 {RATIO} (ref 1.1–2.2)
ALP SERPL-CCNC: 83 U/L (ref 45–117)
ALT SERPL-CCNC: 19 U/L (ref 12–78)
ANION GAP SERPL CALC-SCNC: 5 MMOL/L (ref 5–15)
AST SERPL-CCNC: 17 U/L (ref 15–37)
BASOPHILS # BLD: 0 K/UL (ref 0–0.1)
BASOPHILS NFR BLD: 0 % (ref 0–1)
BILIRUB SERPL-MCNC: 1 MG/DL (ref 0.2–1)
BUN SERPL-MCNC: 22 MG/DL (ref 6–20)
BUN/CREAT SERPL: 15 (ref 12–20)
CALCIUM SERPL-MCNC: 8.6 MG/DL (ref 8.5–10.1)
CHLORIDE SERPL-SCNC: 107 MMOL/L (ref 97–108)
CHOLEST SERPL-MCNC: 203 MG/DL
CO2 SERPL-SCNC: 28 MMOL/L (ref 21–32)
CREAT SERPL-MCNC: 1.51 MG/DL (ref 0.7–1.3)
DIFFERENTIAL METHOD BLD: ABNORMAL
EOSINOPHIL # BLD: 0.2 K/UL (ref 0–0.4)
EOSINOPHIL NFR BLD: 5 % (ref 0–7)
ERYTHROCYTE [DISTWIDTH] IN BLOOD BY AUTOMATED COUNT: 14.6 % (ref 11.5–14.5)
EST. AVERAGE GLUCOSE BLD GHB EST-MCNC: 126 MG/DL
GLOBULIN SER CALC-MCNC: 3.7 G/DL (ref 2–4)
GLUCOSE SERPL-MCNC: 131 MG/DL (ref 65–100)
HBA1C MFR BLD: 6 % (ref 4–5.6)
HCT VFR BLD AUTO: 34.8 % (ref 36.6–50.3)
HDLC SERPL-MCNC: 61 MG/DL
HDLC SERPL: 3.3 {RATIO} (ref 0–5)
HGB BLD-MCNC: 10.6 G/DL (ref 12.1–17)
IMM GRANULOCYTES # BLD AUTO: 0 K/UL (ref 0–0.04)
IMM GRANULOCYTES NFR BLD AUTO: 0 % (ref 0–0.5)
LDLC SERPL CALC-MCNC: 116.4 MG/DL (ref 0–100)
LIPID PROFILE,FLP: ABNORMAL
LYMPHOCYTES # BLD: 0.6 K/UL (ref 0.8–3.5)
LYMPHOCYTES NFR BLD: 12 % (ref 12–49)
MAGNESIUM SERPL-MCNC: 2.3 MG/DL (ref 1.6–2.4)
MCH RBC QN AUTO: 22.9 PG (ref 26–34)
MCHC RBC AUTO-ENTMCNC: 30.5 G/DL (ref 30–36.5)
MCV RBC AUTO: 75.2 FL (ref 80–99)
MONOCYTES # BLD: 0.5 K/UL (ref 0–1)
MONOCYTES NFR BLD: 10 % (ref 5–13)
NEUTS SEG # BLD: 3.6 K/UL (ref 1.8–8)
NEUTS SEG NFR BLD: 73 % (ref 32–75)
NRBC # BLD: 0 K/UL (ref 0–0.01)
NRBC BLD-RTO: 0 PER 100 WBC
PHOSPHATE SERPL-MCNC: 3.7 MG/DL (ref 2.6–4.7)
PLATELET # BLD AUTO: 144 K/UL (ref 150–400)
PMV BLD AUTO: 10.1 FL (ref 8.9–12.9)
POTASSIUM SERPL-SCNC: 3.5 MMOL/L (ref 3.5–5.1)
PROT SERPL-MCNC: 7 G/DL (ref 6.4–8.2)
PROT UR-MCNC: 17 MG/DL (ref 0–11.9)
RBC # BLD AUTO: 4.63 M/UL (ref 4.1–5.7)
RBC MORPH BLD: ABNORMAL
SODIUM SERPL-SCNC: 140 MMOL/L (ref 136–145)
TRIGL SERPL-MCNC: 128 MG/DL (ref ?–150)
VLDLC SERPL CALC-MCNC: 25.6 MG/DL
WBC # BLD AUTO: 4.9 K/UL (ref 4.1–11.1)

## 2021-03-04 PROCEDURE — 84156 ASSAY OF PROTEIN URINE: CPT

## 2021-03-04 PROCEDURE — 80053 COMPREHEN METABOLIC PANEL: CPT

## 2021-03-04 PROCEDURE — 84100 ASSAY OF PHOSPHORUS: CPT

## 2021-03-04 PROCEDURE — 74011250636 HC RX REV CODE- 250/636: Performed by: INTERNAL MEDICINE

## 2021-03-04 PROCEDURE — 83036 HEMOGLOBIN GLYCOSYLATED A1C: CPT

## 2021-03-04 PROCEDURE — 83735 ASSAY OF MAGNESIUM: CPT

## 2021-03-04 PROCEDURE — 36415 COLL VENOUS BLD VENIPUNCTURE: CPT

## 2021-03-04 PROCEDURE — 80061 LIPID PANEL: CPT

## 2021-03-04 PROCEDURE — 96372 THER/PROPH/DIAG INJ SC/IM: CPT

## 2021-03-04 PROCEDURE — 85025 COMPLETE CBC W/AUTO DIFF WBC: CPT

## 2021-03-04 RX ORDER — LANREOTIDE ACETATE 120 MG/.5ML
120 INJECTION SUBCUTANEOUS ONCE
Status: COMPLETED | OUTPATIENT
Start: 2021-03-04 | End: 2021-03-04

## 2021-03-04 RX ADMIN — LANREOTIDE ACETATE 120 MG: 120 INJECTION SUBCUTANEOUS at 09:17

## 2021-03-04 NOTE — PROGRESS NOTES
[facility] Visit Note    3293 Pt arrived at [facility] ambulatory and in no distress for Lanreotide/Xgeva. Assessment completed- pt reports having 3 teeth extracted yesterday at the dentist.  Per patient, Xgeva dose should be held this month, and resume next month. MD office notified. Patient denied having any symptoms of COVID-19, i.e. SOB, coughing, fever, or generally not feeling well. Also denies having been exposed to COVID-19 recently or having had any recent contact with family/friend that has a pending COVID test.    Visit Vitals  BP (!) 153/99 (BP 1 Location: Left upper arm, BP Patient Position: Sitting)   Pulse 87   Temp 97.8 °F (36.6 °C)   Resp 18   Ht 6' (1.829 m)   Wt 73.8 kg (162 lb 11.2 oz)   SpO2 100%   BMI 22.07 kg/m²     Labs drawn- CBC with diff, CMP, Phos, Mag, HgbA1C, Lipid Panel, & Urine Protein. Recent Results (from the past 12 hour(s))   CBC WITH AUTOMATED DIFF    Collection Time: 03/04/21  9:02 AM   Result Value Ref Range    WBC 4.9 4.1 - 11.1 K/uL    RBC 4.63 4.10 - 5.70 M/uL    HGB 10.6 (L) 12.1 - 17.0 g/dL    HCT 34.8 (L) 36.6 - 50.3 %    MCV 75.2 (L) 80.0 - 99.0 FL    MCH 22.9 (L) 26.0 - 34.0 PG    MCHC 30.5 30.0 - 36.5 g/dL    RDW 14.6 (H) 11.5 - 14.5 %    PLATELET 767 (L) 404 - 400 K/uL    MPV 10.1 8.9 - 12.9 FL    NRBC 0.0 0  WBC    ABSOLUTE NRBC 0.00 0.00 - 0.01 K/uL    NEUTROPHILS 73 32 - 75 %    LYMPHOCYTES 12 12 - 49 %    MONOCYTES 10 5 - 13 %    EOSINOPHILS 5 0 - 7 %    BASOPHILS 0 0 - 1 %    IMMATURE GRANULOCYTES 0 0.0 - 0.5 %    ABS. NEUTROPHILS 3.6 1.8 - 8.0 K/UL    ABS. LYMPHOCYTES 0.6 (L) 0.8 - 3.5 K/UL    ABS. MONOCYTES 0.5 0.0 - 1.0 K/UL    ABS. EOSINOPHILS 0.2 0.0 - 0.4 K/UL    ABS. BASOPHILS 0.0 0.0 - 0.1 K/UL    ABS. IMM.  GRANS. 0.0 0.00 - 0.04 K/UL    DF SMEAR SCANNED      RBC COMMENTS MICROCYTOSIS  1+       PROTEIN URINE, RANDOM    Collection Time: 03/04/21  9:02 AM   Result Value Ref Range    Protein, urine random 17 (H) 0.0 - 11.9 mg/dL   METABOLIC PANEL, COMPREHENSIVE    Collection Time: 03/04/21  9:02 AM   Result Value Ref Range    Sodium 140 136 - 145 mmol/L    Potassium 3.5 3.5 - 5.1 mmol/L    Chloride 107 97 - 108 mmol/L    CO2 28 21 - 32 mmol/L    Anion gap 5 5 - 15 mmol/L    Glucose 131 (H) 65 - 100 mg/dL    BUN 22 (H) 6 - 20 MG/DL    Creatinine 1.51 (H) 0.70 - 1.30 MG/DL    BUN/Creatinine ratio 15 12 - 20      GFR est AA 55 (L) >60 ml/min/1.73m2    GFR est non-AA 45 (L) >60 ml/min/1.73m2    Calcium 8.6 8.5 - 10.1 MG/DL    Bilirubin, total 1.0 0.2 - 1.0 MG/DL    ALT (SGPT) 19 12 - 78 U/L    AST (SGOT) 17 15 - 37 U/L    Alk. phosphatase 83 45 - 117 U/L    Protein, total 7.0 6.4 - 8.2 g/dL    Albumin 3.3 (L) 3.5 - 5.0 g/dL    Globulin 3.7 2.0 - 4.0 g/dL    A-G Ratio 0.9 (L) 1.1 - 2.2     MAGNESIUM    Collection Time: 03/04/21  9:02 AM   Result Value Ref Range    Magnesium 2.3 1.6 - 2.4 mg/dL   PHOSPHORUS    Collection Time: 03/04/21  9:02 AM   Result Value Ref Range    Phosphorus 3.7 2.6 - 4.7 MG/DL       Medications received:  Lanreotide 120 mg SQ in right buttock    0925 Tolerated treatment well, no adverse reaction noted. D/Cd from [facility] ambulatory and in no distress accompanied by self. Next appt 4/1/21 @ 0900.

## 2021-03-12 ENCOUNTER — TELEPHONE (OUTPATIENT)
Dept: ONCOLOGY | Age: 76
End: 2021-03-12

## 2021-03-12 NOTE — TELEPHONE ENCOUNTER
Pt called and asked if he could get covid vaccine the day before his treatment day. Called pt back. HIPAA verified by two patient identifiers. He is aware.

## 2021-03-25 RX ORDER — LANREOTIDE ACETATE 120 MG/.5ML
120 INJECTION SUBCUTANEOUS ONCE
Status: CANCELLED | OUTPATIENT
Start: 2021-01-01 | End: 2021-01-01

## 2021-03-30 NOTE — PROGRESS NOTES
Josr Oreilly is a 77 y.o. 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 Treatment today:  Cycle 28 Day 1 Xgeva and Somatuline injections. He is on Afinitor. 1. Have you been to the ER, urgent care clinic since your last visit? Hospitalized since your last visit? no    2. Have you seen or consulted any other health care providers outside of the [address] [place] [address] since your last visit? Include any pap smears or colon screening. PCP Dr Cayetano Lyle for scans on right arm. Pt still have pain in tricep of right arm up to shoulder blade and down to hand. Feels like his hand is sleeping today. He did see his PCP and had scans done on right arm. Was put on Prednisone but did not help. Not sure of next steps. He will be getting his 2nd COVID vaccine tomorrow.

## 2021-04-01 ENCOUNTER — OFFICE VISIT (OUTPATIENT)
Dept: ONCOLOGY | Age: 76
End: 2021-04-01

## 2021-04-01 ENCOUNTER — HOSPITAL ENCOUNTER (OUTPATIENT)
Dept: INFUSION THERAPY | Age: 76
Discharge: HOME | End: 2021-04-01
Payer: MEDICARE

## 2021-04-01 ENCOUNTER — DOCUMENTATION ONLY (OUTPATIENT)
Dept: ONCOLOGY | Age: 76
End: 2021-04-01

## 2021-04-01 VITALS
SYSTOLIC BLOOD PRESSURE: 182 MMHG | TEMPERATURE: 97.5 F | WEIGHT: 165.2 LBS | BODY MASS INDEX: 22.37 KG/M2 | DIASTOLIC BLOOD PRESSURE: 98 MMHG | OXYGEN SATURATION: 100 % | HEIGHT: 72 IN | HEART RATE: 67 BPM | RESPIRATION RATE: 18 BRPM

## 2021-04-01 VITALS
TEMPERATURE: 97.5 F | WEIGHT: 165 LBS | DIASTOLIC BLOOD PRESSURE: 98 MMHG | OXYGEN SATURATION: 100 % | HEIGHT: 72 IN | RESPIRATION RATE: 18 BRPM | HEART RATE: 67 BPM | SYSTOLIC BLOOD PRESSURE: 155 MMHG | BODY MASS INDEX: 22.35 KG/M2

## 2021-04-01 DIAGNOSIS — C7A.8 NEUROENDOCRINE CARCINOMA METASTATIC TO BONE (HCC): Primary | ICD-10-CM

## 2021-04-01 DIAGNOSIS — T45.1X5A ANTINEOPLASTIC CHEMOTHERAPY INDUCED ANEMIA: ICD-10-CM

## 2021-04-01 DIAGNOSIS — C7B.8 NEUROENDOCRINE CARCINOMA METASTATIC TO BONE (HCC): Primary | ICD-10-CM

## 2021-04-01 DIAGNOSIS — D64.81 ANTINEOPLASTIC CHEMOTHERAPY INDUCED ANEMIA: ICD-10-CM

## 2021-04-01 DIAGNOSIS — R19.7 DIARRHEA, UNSPECIFIED TYPE: ICD-10-CM

## 2021-04-01 DIAGNOSIS — C79.51 BONE METASTASIS (HCC): ICD-10-CM

## 2021-04-01 LAB
ALBUMIN SERPL-MCNC: 3 G/DL (ref 3.5–5)
ALBUMIN/GLOB SERPL: 0.8 {RATIO} (ref 1.1–2.2)
ALP SERPL-CCNC: 77 U/L (ref 45–117)
ALT SERPL-CCNC: 24 U/L (ref 12–78)
ANION GAP BLD CALC-SCNC: 15 MMOL/L (ref 10–20)
AST SERPL-CCNC: 23 U/L (ref 15–37)
BASOPHILS # BLD: 0 K/UL (ref 0–0.1)
BASOPHILS NFR BLD: 0 % (ref 0–1)
BILIRUB DIRECT SERPL-MCNC: 0.2 MG/DL (ref 0–0.2)
BILIRUB SERPL-MCNC: 1 MG/DL (ref 0.2–1)
BUN BLD-MCNC: 25 MG/DL (ref 9–20)
CA-I BLD-MCNC: 1.23 MMOL/L (ref 1.12–1.32)
CHLORIDE BLD-SCNC: 102 MMOL/L (ref 98–107)
CO2 BLD-SCNC: 29 MMOL/L (ref 21–32)
CREAT BLD-MCNC: 1.5 MG/DL (ref 0.6–1.3)
DIFFERENTIAL METHOD BLD: ABNORMAL
EOSINOPHIL # BLD: 0.3 K/UL (ref 0–0.4)
EOSINOPHIL NFR BLD: 4 % (ref 0–7)
ERYTHROCYTE [DISTWIDTH] IN BLOOD BY AUTOMATED COUNT: 15.9 % (ref 11.5–14.5)
GLOBULIN SER CALC-MCNC: 3.6 G/DL (ref 2–4)
GLUCOSE BLD-MCNC: 139 MG/DL (ref 65–100)
HCT VFR BLD AUTO: 33.5 % (ref 36.6–50.3)
HCT VFR BLD CALC: 32 % (ref 36.6–50.3)
HGB BLD-MCNC: 10.3 G/DL (ref 12.1–17)
IMM GRANULOCYTES # BLD AUTO: 0 K/UL (ref 0–0.04)
IMM GRANULOCYTES NFR BLD AUTO: 0 % (ref 0–0.5)
LYMPHOCYTES # BLD: 0.6 K/UL (ref 0.8–3.5)
LYMPHOCYTES NFR BLD: 9 % (ref 12–49)
MAGNESIUM SERPL-MCNC: 2 MG/DL (ref 1.6–2.4)
MCH RBC QN AUTO: 23.3 PG (ref 26–34)
MCHC RBC AUTO-ENTMCNC: 30.7 G/DL (ref 30–36.5)
MCV RBC AUTO: 75.8 FL (ref 80–99)
MONOCYTES # BLD: 0.8 K/UL (ref 0–1)
MONOCYTES NFR BLD: 12 % (ref 5–13)
NEUTS SEG # BLD: 5.1 K/UL (ref 1.8–8)
NEUTS SEG NFR BLD: 75 % (ref 32–75)
NRBC # BLD: 0 K/UL (ref 0–0.01)
NRBC BLD-RTO: 0 PER 100 WBC
PHOSPHATE SERPL-MCNC: 3.8 MG/DL (ref 2.6–4.7)
PLATELET # BLD AUTO: 166 K/UL (ref 150–400)
PMV BLD AUTO: 10 FL (ref 8.9–12.9)
POTASSIUM BLD-SCNC: 3.1 MMOL/L (ref 3.5–5.1)
PROT SERPL-MCNC: 6.6 G/DL (ref 6.4–8.2)
RBC # BLD AUTO: 4.42 M/UL (ref 4.1–5.7)
RBC MORPH BLD: ABNORMAL
SERVICE CMNT-IMP: ABNORMAL
SODIUM BLD-SCNC: 141 MMOL/L (ref 136–145)
WBC # BLD AUTO: 6.8 K/UL (ref 4.1–11.1)

## 2021-04-01 PROCEDURE — G8432 DEP SCR NOT DOC, RNG: HCPCS | Performed by: INTERNAL MEDICINE

## 2021-04-01 PROCEDURE — 36415 COLL VENOUS BLD VENIPUNCTURE: CPT

## 2021-04-01 PROCEDURE — G8427 DOCREV CUR MEDS BY ELIG CLIN: HCPCS | Performed by: INTERNAL MEDICINE

## 2021-04-01 PROCEDURE — 96372 THER/PROPH/DIAG INJ SC/IM: CPT

## 2021-04-01 PROCEDURE — 3017F COLORECTAL CA SCREEN DOC REV: CPT | Performed by: INTERNAL MEDICINE

## 2021-04-01 PROCEDURE — 83735 ASSAY OF MAGNESIUM: CPT

## 2021-04-01 PROCEDURE — 85025 COMPLETE CBC W/AUTO DIFF WBC: CPT

## 2021-04-01 PROCEDURE — 84100 ASSAY OF PHOSPHORUS: CPT

## 2021-04-01 PROCEDURE — G8420 CALC BMI NORM PARAMETERS: HCPCS | Performed by: INTERNAL MEDICINE

## 2021-04-01 PROCEDURE — 74011250636 HC RX REV CODE- 250/636: Performed by: INTERNAL MEDICINE

## 2021-04-01 PROCEDURE — 99215 OFFICE O/P EST HI 40 MIN: CPT | Performed by: INTERNAL MEDICINE

## 2021-04-01 PROCEDURE — 80076 HEPATIC FUNCTION PANEL: CPT

## 2021-04-01 PROCEDURE — 80047 BASIC METABLC PNL IONIZED CA: CPT

## 2021-04-01 PROCEDURE — 1101F PT FALLS ASSESS-DOCD LE1/YR: CPT | Performed by: INTERNAL MEDICINE

## 2021-04-01 PROCEDURE — G8536 NO DOC ELDER MAL SCRN: HCPCS | Performed by: INTERNAL MEDICINE

## 2021-04-01 RX ORDER — LANREOTIDE ACETATE 120 MG/.5ML
120 INJECTION SUBCUTANEOUS ONCE
Status: COMPLETED | OUTPATIENT
Start: 2021-04-01 | End: 2021-04-01

## 2021-04-01 RX ORDER — METOPROLOL TARTRATE 25 MG/1
25 TABLET, FILM COATED ORAL 2 TIMES DAILY
Qty: 60 TAB | Refills: 3 | Status: SHIPPED | OUTPATIENT
Start: 2021-04-01 | End: 2021-01-01 | Stop reason: SDUPTHER

## 2021-04-01 RX ADMIN — DENOSUMAB 120 MG: 120 INJECTION SUBCUTANEOUS at 09:16

## 2021-04-01 RX ADMIN — LANREOTIDE ACETATE 120 MG: 120 INJECTION SUBCUTANEOUS at 09:18

## 2021-04-01 NOTE — PROGRESS NOTES
[facility] Visit Note    9420 Pt arrived at [facility] ambulatory and in no distress for Lanreotide & Xgeva. Assessment completed, no new complaints voiced. Pt reports recent dental work has healed. Patient denied having any symptoms of COVID-19, i.e. SOB, coughing, fever, or generally not feeling well. Also denies having been exposed to COVID-19 recently or having had any recent contact with family/friend that has a pending COVID test.    Labs drawn- CBC with diff, Hepatic Function Panel, Magnesium, Phosphorus, and Chem 8 I-stat. Visit Vitals  BP (!) 182/98 (BP 1 Location: Left upper arm, BP Patient Position: Sitting)   Pulse 67   Temp 97.5 °F (36.4 °C)   Resp 18   Ht 6' (1.829 m)   Wt 74.9 kg (165 lb 3.2 oz)   SpO2 100%   BMI 22.41 kg/m²     Recent Results (from the past 12 hour(s))   POC CHEM8    Collection Time: 04/01/21  9:04 AM   Result Value Ref Range    Calcium, ionized (POC) 1.23 1.12 - 1.32 mmol/L    Sodium (POC) 141 136 - 145 mmol/L    Potassium (POC) 3.1 (L) 3.5 - 5.1 mmol/L    Chloride (POC) 102 98 - 107 mmol/L    CO2 (POC) 29 21 - 32 mmol/L    Anion gap (POC) 15 10 - 20 mmol/L    Glucose (POC) 139 (H) 65 - 100 mg/dL    BUN (POC) 25 (H) 9 - 20 mg/dL    Creatinine (POC) 1.5 (H) 0.6 - 1.3 mg/dL    GFRAA, POC 55 (L) >60 ml/min/1.73m2    GFRNA, POC 46 (L) >60 ml/min/1.73m2    Hematocrit (POC) 32 (L) 36.6 - 50.3 %    Comment Notified RN or MD immediately by        Ionized Calcium 1.23  See [facility] for pending lab results. Medications received:  Xgeva 120 mg SQ in left arm  Lanreotide 120 mg SQ in left buttock    0920 Tolerated treatment well, no adverse reaction noted. D/Cd from [facility] ambulatory and in no distress accompanied by self. Next appt 4/29/21 0900.

## 2021-04-01 NOTE — LETTER
4/1/2021 Patient: Flavio Castañeda YOB: 1945 Date of Visit: 4/1/2021 Diane Shah MD 
[facility] [address] [address] Via Fax: [phone] Dear Diane Shah MD, Thank you for referring Mr. Leslie Alonzo to [address] [place] [address] for evaluation. My notes for this consultation are attached. If you have questions, please do not hesitate to call me. I look forward to following your patient along with you.  
 
 
Sincerely, 
 
Alma Carrasquillo MD

## 2021-04-12 NOTE — TELEPHONE ENCOUNTER
Received a VM on nursing line from pt stating he hasn't received medication that was prescribed by us 10 days ago. he said he called [employer] and they said something about a post office. He would like a call back. This nurse called pt back. HIPAA verified by two patient identifiers. Tracking number was provided to pt for the prescription     Advised we can send to local pharmacy if needed but not sure about insurance. He will call us back if needed.

## 2021-04-13 ENCOUNTER — TELEPHONE (OUTPATIENT)
Dept: ONCOLOGY | Age: 76
End: 2021-04-13

## 2021-04-13 NOTE — TELEPHONE ENCOUNTER
Sonu Fox called pt but no answer. Left detailed VM on his self-identified line explaining urology needs to clear pt per his device.

## 2021-04-13 NOTE — TELEPHONE ENCOUNTER
Patient left message on ED [facility] nurse voicemail. Patient's still having extreme pain in shoulder, to the point he can barely lift it. Advises that scheduling keeps telling him that they are trying to find out if \"MRI is compatible with what he has. \"  Increasingly frustrated since he has been waiting since the first for this.

## 2021-04-18 ENCOUNTER — HOSPITAL ENCOUNTER (OUTPATIENT)
Dept: MRI IMAGING | Age: 76
Discharge: HOME | End: 2021-04-18
Attending: INTERNAL MEDICINE
Payer: MEDICARE

## 2021-04-18 DIAGNOSIS — C79.51 BONE METASTASIS (HCC): ICD-10-CM

## 2021-04-18 DIAGNOSIS — C7B.8 NEUROENDOCRINE CARCINOMA METASTATIC TO BONE (HCC): ICD-10-CM

## 2021-04-18 DIAGNOSIS — C7A.8 NEUROENDOCRINE CARCINOMA METASTATIC TO BONE (HCC): ICD-10-CM

## 2021-04-18 PROCEDURE — 74011250636 HC RX REV CODE- 250/636: Performed by: INTERNAL MEDICINE

## 2021-04-18 PROCEDURE — 72156 MRI NECK SPINE W/O & W/DYE: CPT

## 2021-04-18 PROCEDURE — A9575 INJ GADOTERATE MEGLUMI 0.1ML: HCPCS | Performed by: INTERNAL MEDICINE

## 2021-04-18 PROCEDURE — 72157 MRI CHEST SPINE W/O & W/DYE: CPT

## 2021-04-18 RX ORDER — GADOTERATE MEGLUMINE 376.9 MG/ML
15 INJECTION INTRAVENOUS
Status: COMPLETED | OUTPATIENT
Start: 2021-04-18 | End: 2021-04-18

## 2021-04-18 RX ADMIN — GADOTERATE MEGLUMINE 15 ML: 376.9 INJECTION INTRAVENOUS at 16:09

## 2021-04-29 NOTE — TELEPHONE ENCOUNTER
Patient came into office stating he hasnt received a callback regarding his recent scans. He would like a callback ASAP regarding this.     [phone]

## 2021-04-29 NOTE — TELEPHONE ENCOUNTER
Spoke with . He said that compared to the gallium scan in 8/2020 things look stable. Should repeat gallium scan in 8/2021. He said he can't lay flat due to pain of his right arm, it stems from near his shoulder blade and then down his arm. This used to be noted of his left arm. He is wondering if the scan possibly shows anything that can cause this. I advised I will relay message to MD.    He isn't sleeping well and so he is tired during the day. Please advise.

## 2021-04-29 NOTE — PROGRESS NOTES
Outpatient Infusion Center Short Visit Progress Note    0829 Patient admitted to [facility] for Lanreotide/Xgeva ambulatory in stable condition. Assessment completed. No new concerns voiced. Covid Screening      1. Do you have any symptoms of COVID-19? SOB, coughing, fever, or generally not feeling well ? NO  2. Have you been exposed to COVID-19 recently? NO  3. Have you had any recent contact with family/friend that has a pending COVID test? NO    Vital Signs:  Patient Vitals for the past 12 hrs:   Temp Pulse Resp BP SpO2   04/29/21 0849 98.1 °F (36.7 °C) 64 18 132/86 100 %         Peripheral stick to left ac; + blood return noted, labs drawn and sent for processing. Lab Results:  Recent Results (from the past 12 hour(s))   METABOLIC PANEL, COMPREHENSIVE    Collection Time: 04/29/21  8:59 AM   Result Value Ref Range    Sodium 141 136 - 145 mmol/L    Potassium 3.5 3.5 - 5.1 mmol/L    Chloride 108 97 - 108 mmol/L    CO2 27 21 - 32 mmol/L    Anion gap 6 5 - 15 mmol/L    Glucose 187 (H) 65 - 100 mg/dL    BUN 30 (H) 6 - 20 MG/DL    Creatinine 1.66 (H) 0.70 - 1.30 MG/DL    BUN/Creatinine ratio 18 12 - 20      GFR est AA 49 (L) >60 ml/min/1.73m2    GFR est non-AA 41 (L) >60 ml/min/1.73m2    Calcium 8.5 8.5 - 10.1 MG/DL    Bilirubin, total 0.8 0.2 - 1.0 MG/DL    ALT (SGPT) 20 12 - 78 U/L    AST (SGOT) 20 15 - 37 U/L    Alk.  phosphatase 78 45 - 117 U/L    Protein, total 6.8 6.4 - 8.2 g/dL    Albumin 3.1 (L) 3.5 - 5.0 g/dL    Globulin 3.7 2.0 - 4.0 g/dL    A-G Ratio 0.8 (L) 1.1 - 2.2     CBC WITH AUTOMATED DIFF    Collection Time: 04/29/21  8:59 AM   Result Value Ref Range    WBC 5.3 4.1 - 11.1 K/uL    RBC 4.22 4.10 - 5.70 M/uL    HGB 10.1 (L) 12.1 - 17.0 g/dL    HCT 31.9 (L) 36.6 - 50.3 %    MCV 75.6 (L) 80.0 - 99.0 FL    MCH 23.9 (L) 26.0 - 34.0 PG    MCHC 31.7 30.0 - 36.5 g/dL    RDW 15.1 (H) 11.5 - 14.5 %    PLATELET 738 133 - 043 K/uL    MPV 10.0 8.9 - 12.9 FL    NRBC 0.0 0  WBC    ABSOLUTE NRBC 0.00 0.00 - 0.01 K/uL    NEUTROPHILS 73 32 - 75 %    LYMPHOCYTES 12 12 - 49 %    MONOCYTES 11 5 - 13 %    EOSINOPHILS 3 0 - 7 %    BASOPHILS 1 0 - 1 %    IMMATURE GRANULOCYTES 0 0.0 - 0.5 %    ABS. NEUTROPHILS 3.8 1.8 - 8.0 K/UL    ABS. LYMPHOCYTES 0.6 (L) 0.8 - 3.5 K/UL    ABS. MONOCYTES 0.6 0.0 - 1.0 K/UL    ABS. EOSINOPHILS 0.2 0.0 - 0.4 K/UL    ABS. BASOPHILS 0.1 0.0 - 0.1 K/UL    ABS. IMM. GRANS. 0.0 0.00 - 0.04 K/UL    DF SMEAR SCANNED      RBC COMMENTS ANISOCYTOSIS  1+        RBC COMMENTS MICROCYTOSIS  1+        RBC COMMENTS HYPOCHROMIA  1+       MAGNESIUM    Collection Time: 04/29/21  8:59 AM   Result Value Ref Range    Magnesium 2.1 1.6 - 2.4 mg/dL   PHOSPHORUS    Collection Time: 04/29/21  8:59 AM   Result Value Ref Range    Phosphorus 3.5 2.6 - 4.7 MG/DL   POC CHEM8    Collection Time: 04/29/21  9:03 AM   Result Value Ref Range    Calcium, ionized (POC) 1.23 1.12 - 1.32 mmol/L    Sodium (POC) 142 136 - 145 mmol/L    Potassium (POC) 3.6 3.5 - 5.1 mmol/L    Chloride (POC) 106 98 - 107 mmol/L    Glucose (POC) 185 (H) 65 - 100 mg/dL    BUN (POC) 28 (H) 9 - 20 mg/dL    Creatinine (POC) 1.80 (H) 0.6 - 1.3 mg/dL    GFRAA, POC 45 (L) >60 ml/min/1.73m2    GFRNA, POC 37 (L) >60 ml/min/1.73m2    Hematocrit (POC) 30 (L) 36.6 - 50.3 %    Comment Comment Not Indicated. Patient labs within parameters for treatment; labs delayed in crossing on istat CMP sent down. Medications:  Medications Administered     denosumab (XGEVA) injection 120 mg     Admin Date  04/29/2021 Action  Given Dose  120 mg Route  SubCUTAneous Administered By  Valentino Frame, RN          lanreotide (SOMATULINE DEPOT) injection syringe 120 mg     Admin Date  04/29/2021 Action  Given Dose  120 mg Route  SubCUTAneous Administered By  Valentino Frame, RN              Patient tolerated treatment well. Patient discharged from [facility] ambulatory in no distress at 0930. Patient aware of next appointment.     Future Appointments   Date Time Provider [place]   4/29/2021  9:00 AM [place] [facility] NURSE 4 [facility] REG   5/27/2021  9:00 AM [place] [facility] NURSE 4 [facility] REG   5/27/2021  9:15 AM Nydia Schofield MD ONC BS AMB   6/24/2021  9:00 AM [place] [facility] NURSE 4 [facility] REG   7/22/2021  9:00 AM [place] INFUSION NURSE 4 [facility] REG

## 2021-05-03 NOTE — PROGRESS NOTES
[facility]  [address], [address] [facility] [address], [address] [facility]  [phone]       Oncology Follow up Note        Patient: Zac Howard MRN: [id number]  SSN: [id number]    YOB: 1945  Age: 68 y.o. Sex: male        Diagnosis:     1. 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 Somatuline [place] 120 mg SC - 05/2016 - 11/2018   (treatment held per patient request since11/16/2018)  2. Xgeva 120mg SC, held since 11/2018    Subjective:      Zac Howard is a 68 y.o. male with a diagnosis of metastatic neuroendocrine cancer. He started experiencing pain in both groins and the abdomen. A CT of the abdomen showed retroperitoneal adenopathy. CT guided biopsy of the retroperitoneal LN reveals a dx of well differentiated neuroendocrine carcinoma. Bone scan shows disease in the bone. Mr. Linda Mccormick had numerous but mild side effects of Somatuline. He remained fatigued. He took a break from therapy in Nov 2018. He went on an extended vacation the beginning of February to [facility]. He returns today to discuss treatment options. CT shows progression of disease in the nodes.        Review of Systems:    Constitutional: fatigue, hot flashes, weight loss  Eyes: negative  Ears, Nose, Mouth, Throat, and Face: negative  Respiratory: negative  Cardiovascular: negative  Gastrointestinal: diarrhea, nausea  Genitourinary:negative  Integument/Breast: negative  Hematologic/Lymphatic: negative  Musculoskeletal: back pain  Neurological: negative      Past Medical History:   Diagnosis Date    Adverse effect of anesthesia     low HR and very slow to wakeup    Cancer ([facility] [address] 5/2016    neuroendocrine, retroperitoneal LN involvement    Diarrhea     Frequent urination     Hypertension     Poor appetite     Unspecified adverse effect of anesthesia     \"hard to put to sleep and slow to wake up-heart rate dropped very low\"     Past Surgical History:   Procedure Laterality Date    COLONOSCOPY N/A 2016    COLONOSCOPY performed by Shelton Cuello MD at [address] [place] [address]  2016         HX ORTHOPAEDIC      knee scope left knee    HX OTHER SURGICAL  2013    excision of scalp cyst     UPPER GI ENDOSCOPY,BIOPSY  2016           Family History   Problem Relation Age of Onset    Cancer Mother     Stroke Sister      Social History     Tobacco Use    Smoking status: Former Smoker     Packs/day: 1.00     Years: 8.00     Pack years: 8.00     Last attempt to quit: 1972     Years since quittin.8    Smokeless tobacco: Never Used   Substance Use Topics    Alcohol use: No      Prior to Admission medications    Medication Sig Start Date End Date Taking? Authorizing Provider   lisinopril (PRINIVIL, ZESTRIL) 20 mg tablet  18  Yes Provider, Historical   methocarbamol (ROBAXIN-750) 750 mg tablet Take 1 Tab by mouth four (4) times daily. 17   HUMA Lowry   lisinopril (PRINIVIL, ZESTRIL) 10 mg tablet Take 20 mg by mouth daily. Provider, Historical          No Known Allergies        Objective:     Vitals:    19 0917   BP: 130/70   Pulse: 81   Resp: 18   Temp: 98.5 °F (36.9 °C)   SpO2: 97%   Weight: 179 lb (81.2 kg)   Height: 6' (1.829 m)          Physical Exam:    GENERAL: alert, cooperative  EYE: negative  LYMPHATIC: Cervical, supraclavicular, and axillary nodes normal.   THROAT & NECK: normal and no erythema or exudates noted. LUNG: clear to auscultation bilaterally  HEART: regular rate and rhythm  ABDOMEN: soft, non-tender  EXTREMITIES: no cyanosis or edema  SKIN: Normal.  NEUROLOGIC: negative        IMAGING:     I personally reviewed the images. Enlarged and abnormal retroperitoneal adenopathy. Bone metastasis.    CT Results (most recent):  Results from Hospital Encounter encounter on 19   CT ABD PELV W CONT Narrative CT ABDOMEN AND PELVIS WITH CONTRAST. 2/25/2019 10:03 AM     INDICATION: Metastatic neuroendocrine carcinoma. HISTORY (per electronic medical record): Unknown primary, likely GI tract. Diagnosed in 2016 by retroperitoneal lymph node biopsy. Osseous metastases. COMPARISON: 2/26/2018, 4/27/2016. TECHNIQUE: CT of the abdomen and pelvis was performed after the administration  100 cc IV Isovue-370 and oral contrast. CT dose reduction was achieved through  use of a standardized protocol tailored for this examination and automatic  exposure control for dose modulation. FINDINGS:  Neuroendocrine carcinoma: A right para-aortic lymph node in the inferior  posterior mediastinum has increased slightly, and now measures 11 mm in short  axis, versus 9 mm in short axis on 2/26/2018. Left para-aortic, retroperitoneal  lymph nodes extend from the left diaphragmatic pleura to the left common iliac  artery. These have increased. The largest now measures 22 mm in short axis on  image 2-30, versus 18 mm on 2/26/2018. Left retroperitoneal adenopathy has  become more confluent in the interval as well: compare image [id number] today to  image [id number] on 2/26/2018. Though difficult to measure, the infiltrative soft tissue mass with  calcifications in the right upper quadrant root of the mesentery has probably  increased from 2/26/2018. Diffuse sclerotic osseous metastases are stable in size and number. Abdomen: Aside from subsegmental atelectasis in the right base, the lung bases  are clear. Incidental note is made of a noncalcified gallstone and focal fatty  infiltration along the fissure for the falciform ligament. No concerning hepatic  masses. Subcentimeter hypodense bilateral renal lesions are too small to  characterize, but likely represent cysts.  The distal esophagus, stomach,  duodenum, gallbladder, pancreas, spleen, adrenals, and kidneys are otherwise  normal.    The heart size is normal. The lack of a celiac axis is a normal variant; the  splenic and left hepatic arteries arise directly from the aorta. The left  gastric artery arises from the splenic artery. The right hepatic artery is  replaced from the superior mesenteric artery. Pelvis: The prostate is mildly enlarged and indents the bladder base. The  bladder is dilated, diverticulated, and trabeculated. Descending and sigmoid  diverticulosis are mild. The small bowel, ileocecal junction, appendix, and  colon are otherwise normal. No free air or fluid. Impression IMPRESSION:   1. Increased retroperitoneal and inferior mediastinal lori metastases. 2. Probably increased mesenteric mass. 3. Stable osseous metastases. I reviewed the imaging personally. Disease progression in the abdomen. Assessment:     1. 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 Had excellent disease control. He experienced a number of side effects including hot flashes, weight loss etc  He decided to take a break from therapy. Recent CT shows disease progression in retroperitoneum and abdomen. I will resume the treatment with Somatuline and add Everolimus. I am hoping to improve response with addition of Everolimus. I discussed the side effects of Everolimus including mouth sores, low blood counts, diarrhea and rash. He understands and agrees to take the medicine. 2. Bone metastasis    > Denosumab monthly        Plan:       > Resume Denosumab  > Resume Somatuline  > Start Afinitor 10 mg   > Educational packet discussed and given to patient by Jael Thompson NP  > Dexamethasone rinse sent to pharmacy  > Follow up in 1 month        Signed by: Ting Nuno MD                     February 27, 2019        CC. Marleni Salomon MD  CC.  Alisa Rendon MD 134

## 2021-05-04 NOTE — TELEPHONE ENCOUNTER
Called pt,Spoke with pt. HIPAA verified by two patient identifiers. Will refer to neurosurgery. CECYB FROM DR Sid Arriaga REFERRAL TO NEUROSURGERY TO EVALUATE FOR TREATMENT. METHOD.

## 2021-05-07 NOTE — TELEPHONE ENCOUNTER
VORB per provider, refill approved. Requested Prescriptions   Pending Prescriptions Disp Refills    dexAMETHasone (DECADRON) 0.5 mg/5 mL elixir [Pharmacy Med Name: DEXAMETHASONE 0.5 MG/5ML Elixir] 237 mL 1     Sig: SWISH 10 MILLILITERS BY MOUTH FOUR (4) TIMES DAILY FOR 2 MINUTES THEN SPIT.

## 2021-05-25 NOTE — PROGRESS NOTES
Joseph Basilio is a 77 y.o. 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 Treatment today:  Cycle 30 Day 1 Xgeva + Somatuline  He is on Afinitor. 1. Have you been to the ER, urgent care clinic since your last visit? Hospitalized since your last visit? no    2. Have you seen or consulted any other health care providers outside of the [address] [place] [address] since your last visit? Include any pap smears or colon screening. PT and oral surgeon to pull tooth that he had cap put on    He has been doing PT for his right arm pain. So far does not seem to be helping much. He has been seeing oral surgeon for issues with tooth. Had to have tooth pulled that he had cap put on and has had a hard time eating. On antibiotic for tooth infection.

## 2021-05-27 NOTE — PROGRESS NOTES
Outpatient Infusion Center Progress Note    0910 - Pt admit to [facility] for Lanreotide in stable condition. Assessment completed. Patient denied having any symptoms of COVID-19, i.e. SOB, coughing, fever, or generally not feeling well. Also denies having been exposed to COVID-19 recently or having had any recent contact with family/friend that has a pending COVID test.     Peripheral labs drawn L forearm. Patient Vitals for the past 12 hrs:   Temp Pulse BP SpO2   05/27/21 0911 98.3 °F (36.8 °C) 60 (!) 171/97 100 %       Recent Results (from the past 24 hour(s))   CBC WITH AUTOMATED DIFF    Collection Time: 05/27/21  9:27 AM   Result Value Ref Range    WBC 4.0 (L) 4.1 - 11.1 K/uL    RBC 4.05 (L) 4.10 - 5.70 M/uL    HGB 9.6 (L) 12.1 - 17.0 g/dL    HCT 30.5 (L) 36.6 - 50.3 %    MCV 75.3 (L) 80.0 - 99.0 FL    MCH 23.7 (L) 26.0 - 34.0 PG    MCHC 31.5 30.0 - 36.5 g/dL    RDW 15.1 (H) 11.5 - 14.5 %    PLATELET 980 (L) 982 - 400 K/uL    MPV 10.4 8.9 - 12.9 FL    NRBC 0.0 0  WBC    ABSOLUTE NRBC 0.00 0.00 - 0.01 K/uL    NEUTROPHILS 74 32 - 75 %    LYMPHOCYTES 11 (L) 12 - 49 %    MONOCYTES 10 5 - 13 %    EOSINOPHILS 4 0 - 7 %    BASOPHILS 1 0 - 1 %    IMMATURE GRANULOCYTES 0 0.0 - 0.5 %    ABS. NEUTROPHILS 3.0 1.8 - 8.0 K/UL    ABS. LYMPHOCYTES 0.4 (L) 0.8 - 3.5 K/UL    ABS. MONOCYTES 0.4 0.0 - 1.0 K/UL    ABS. EOSINOPHILS 0.2 0.0 - 0.4 K/UL    ABS. BASOPHILS 0.0 0.0 - 0.1 K/UL    ABS. IMM.  GRANS. 0.0 0.00 - 0.04 K/UL    DF SMEAR SCANNED      RBC COMMENTS NORMOCYTIC, NORMOCHROMIC     PROTEIN URINE, RANDOM    Collection Time: 05/27/21  9:27 AM   Result Value Ref Range    Protein, urine random 19 (H) 0.0 - 11.9 mg/dL   HEMOGLOBIN A1C WITH EAG    Collection Time: 05/27/21  9:27 AM   Result Value Ref Range    Hemoglobin A1c 6.3 (H) 4.0 - 5.6 %    Est. average glucose 134 mg/dL   LIPID PANEL    Collection Time: 05/27/21  9:27 AM   Result Value Ref Range    Cholesterol, total 208 (H) <200 MG/DL    Triglyceride 139 <150 MG/DL    HDL Cholesterol 52 MG/DL    LDL, calculated 128.2 (H) 0 - 100 MG/DL    VLDL, calculated 27.8 MG/DL    CHOL/HDL Ratio 4.0 0.0 - 5.0     METABOLIC PANEL, COMPREHENSIVE    Collection Time: 05/27/21  9:27 AM   Result Value Ref Range    Sodium 143 136 - 145 mmol/L    Potassium 3.3 (L) 3.5 - 5.1 mmol/L    Chloride 110 (H) 97 - 108 mmol/L    CO2 28 21 - 32 mmol/L    Anion gap 5 5 - 15 mmol/L    Glucose 130 (H) 65 - 100 mg/dL    BUN 28 (H) 6 - 20 MG/DL    Creatinine 1.43 (H) 0.70 - 1.30 MG/DL    BUN/Creatinine ratio 20 12 - 20      GFR est AA 59 (L) >60 ml/min/1.73m2    GFR est non-AA 48 (L) >60 ml/min/1.73m2    Calcium 8.4 (L) 8.5 - 10.1 MG/DL    Bilirubin, total 0.8 0.2 - 1.0 MG/DL    ALT (SGPT) 24 12 - 78 U/L    AST (SGOT) 24 15 - 37 U/L    Alk. phosphatase 90 45 - 117 U/L    Protein, total 6.6 6.4 - 8.2 g/dL    Albumin 3.2 (L) 3.5 - 5.0 g/dL    Globulin 3.4 2.0 - 4.0 g/dL    A-G Ratio 0.9 (L) 1.1 - 2.2     MAGNESIUM    Collection Time: 05/27/21  9:27 AM   Result Value Ref Range    Magnesium 2.2 1.6 - 2.4 mg/dL   PHOSPHORUS    Collection Time: 05/27/21  9:27 AM   Result Value Ref Range    Phosphorus 3.1 2.6 - 4.7 MG/DL       Medications Administered     lanreotide (SOMATULINE DEPOT) injection syringe 120 mg     Admin Date  05/27/2021 Action  Given Dose  120 mg Route  SubCUTAneous Administered By  Ailyn Powell                   (GIVEN L SIDE)                Zac Trevizo held today, per oral surgeon's instructions, as pt had additional dental work done a week ago. Pt tolerated treatment well. 0945 - D/c To appt w/ [place] in no distress. Pt aware of next [facility] appointment scheduled for: 6/24/21 at 0900.      Future Appointments   Date Time Provider [place]   5/27/2021  9:00 AM [place] [facility]ION NURSE 4 [facility] REG   5/27/2021  9:15 AM Deepa Stoner MD [facility]  AMB   6/24/2021  9:00 AM [place] INFUSION NURSE 4 [facility] REG   7/22/2021  9:00 AM [place] INFUSION NURSE 4 [facility] REG 8/4/2021 12:00 PM [facility] RCR PET DOSE 1 KODAK ROBLES   8/4/2021  1:00 PM [facility] RCR PET 1 KODAK ROBLES

## 2021-05-27 NOTE — PROGRESS NOTES
[address] [facility] [address], [address] [facility], 45 [facility], 200 [facility]  [phone]       Oncology Follow up Note      Patient: Analilia Carrasquillo MRN: [id number]  SSN: [id number]    YOB: 1945  Age: 77 y.o. Sex: male        Diagnosis:     1. 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 Afinitor 10 mg + Somatuline Depot 120 mg SC - started Afinitor 3/30/2019 -  2. Somatuline Depot 120 mg SC - 05/2016 - 11/2018; Restarted 3/4/2019 -    (treatment held per patient request since11/16/2018)   3. Xgeva 120mg SC     Subjective:      Analilia Carrasquillo is a 77 y.o. male with a diagnosis of metastatic neuroendocrine cancer. He started experiencing pain in both groins and the abdomen. A CT of the abdomen showed retroperitoneal adenopathy. CT guided biopsy of the retroperitoneal LN reveals a dx of well differentiated neuroendocrine carcinoma. Bone scan shows disease in the bone. Mr. Ariadna Pagan received Somatuline and had good control of disease. He took a break from therapy in Nov 2018. He went on an extended vacation until the beginning of February to [facility]. CT showed progression of disease in the nodes. Mr. Ariadna Pagan resumed therapy with Somatuline with the addition of Afinitor. Symptoms such as bone pains, diarrhea and fatigue improved  He took a break from [address] [place] [address] in October 2019, but repeat scans showed slight progression of disease. He resumed Afinitor in November 2019. He stopped Afinitor in Aug 2020. He remains on Somatuline. Mr. Ariadna Pagan returns today in follow up. He is suffering with increased frequency of diarrhea. He is also having bone pain in his right shoulder and scapular area which interferes with sleep. He is fatigued. Appetite is good and he maintains his weight.        Review of Systems:    Constitutional: fatigue, hot flashes  Eyes: negative  Ears, Nose, Mouth, Throat, and Face: negative  Respiratory: negative  Cardiovascular: negative  Gastrointestinal: diarrhea - increased  Genitourinary: urinary incontinence  Integument/Breast: negative  Hematologic/Lymphatic: negative  Musculoskeletal: bone pain  Neurological: negative        Past Medical History:   Diagnosis Date    Adverse effect of anesthesia     low HR and very slow to wakeup    Cancer ([facility] [address] 2016    neuroendocrine, retroperitoneal LN involvement    Diarrhea     Frequent urination     Hypertension     Poor appetite     Unspecified adverse effect of anesthesia     \"hard to put to sleep and slow to wake up-heart rate dropped very low\"     Past Surgical History:   Procedure Laterality Date    COLONOSCOPY N/A 2016    COLONOSCOPY performed by Helena Jordan MD at [address] [place] [address]  2016         HX ORTHOPAEDIC      knee scope left knee    HX OTHER SURGICAL  2013    excision of scalp cyst     UPPER GI ENDOSCOPY,BIOPSY  2016           Family History   Problem Relation Age of Onset    Cancer Mother     Stroke Sister      Social History     Tobacco Use    Smoking status: Former Smoker     Packs/day: 1.00     Years: 8.00     Pack years: 8.00     Quit date: 1972     Years since quittin.0    Smokeless tobacco: Never Used   Substance Use Topics    Alcohol use: No      Prior to Admission medications    Medication Sig Start Date End Date Taking? Authorizing Provider   oxyCODONE IR (ROXICODONE) 5 mg immediate release tablet Take 1 Tablet by mouth every four (4) hours as needed for Pain for up to 3 days. Max Daily Amount: 30 mg. 21 Yes Annamarie Hong NP   metoprolol tartrate (LOPRESSOR) 25 mg tablet Take 1 Tab by mouth two (2) times a day. 21  Yes Guy Harris MD   everolimus (Afinitor) 10 mg tab Take 1 Tab by mouth daily.  20  Yes Guy Harris MD   dexAMETHasone (DECADRON) 0.5 mg/5 mL elixir SWISH 10 MILLILITERS BY MOUTH FOUR (4) TIMES DAILY FOR 2 MINUTES THEN SPIT. Patient not taking: Reported on 5/27/2021 5/7/21   Kenia Chaudhry MD   meclizine (ANTIVERT) 25 mg tablet Take 1 Tab by mouth three (3) times daily as needed for Dizziness. Patient not taking: Reported on 5/27/2021 4/30/20   Sarina Pryor DO   ondansetron (Zofran ODT) 4 mg disintegrating tablet Take 1 Tab by mouth every eight (8) hours as needed for Nausea. Patient not taking: Reported on 5/27/2021 4/30/20   Sarina rPyor DO   lisinopril (PRINIVIL, ZESTRIL) 20 mg tablet Take 1 Tab by mouth daily. Patient not taking: Reported on 5/27/2021 6/19/19   Kenia Chaudhry MD   tamsulosin [facility]) 0.4 mg capsule Take 0.4 mg by mouth daily. Patient not taking: Reported on 5/27/2021    Provider, Historical          No Known Allergies        Objective:     Visit Vitals  BP (!) 171/97   Pulse 60   Temp 98.3 °F (36.8 °C)   Ht 6' (1.829 m)   Wt 165 lb (74.8 kg)   SpO2 100%   BMI 22.38 kg/m²     Pain Scale: 6/10    Physical Exam:     GENERAL: alert, cooperative, thin  EYE: negative, no pallor or icterus  LYMPHATIC: Cervical, supraclavicular, and axillary nodes normal.   THROAT & NECK: normal and no erythema or exudates noted. LUNG: clear to auscultation bilaterally  HEART: regular rate and rhythm, no murmur  ABDOMEN: soft, non-tender on palpation  EXTREMITIES: no cyanosis or edema  SKIN: Normal. No rash or ecchymosis  NEUROLOGIC: numbness in RUE starting from the scapula to the forearm      Physical exam was pulled in from a previous visit. No changes were made d/t physical exam remains the same. IMAGING:     CT Results (most recent):  Results from Hospital Encounter encounter on 02/17/21    CT ABD PELV W CONT    Narrative  EXAM: CT ABD PELV W CONT    INDICATION: restaging of disease endocrine carcinoma    COMPARISON: November 10, 2020    CONTRAST: 100 mL of Isovue-370.     TECHNIQUE:  Following the uneventful intravenous administration of contrast, thin axial  images were obtained through the abdomen and pelvis. Coronal and sagittal  reconstructions were generated. Oral contrast was administered. CT dose  reduction was achieved through use of a standardized protocol tailored for this  examination and automatic exposure control for dose modulation. FINDINGS:  LOWER THORAX: A small interstitial infiltrate at the a right lung base medially  appear more prominent. A diffuse basilar interstitial pattern is also slightly  more prominent. Please correlate clinically. This could be related to some  inflammatory changes. There is chronic lung disease. LIVER: No change  BILIARY TREE: Gallstones within a nondistended gallbladder. . CBD is not dilated. SPLEEN: within normal limits. PANCREAS: No mass or ductal dilatation. ADRENALS: Unremarkable. KIDNEYS: Left renal calcification lower pole unchanged. Prominence of both  ureters. Unchanged. STOMACH: Unremarkable. Mesenteric mass is unchanged. .  Some wall thickening in the cecum and possibly left upper quadrant jejunum  demonstrated. Unknown clinical significance. APPENDIX: Appears normal  PERITONEUM: No ascites or pneumoperitoneum. RETROPERITONEUM: No aortic aneurysm. Retroperitoneal adenopathy unchanged. URINARY BLADDER: No change bladder wall thickening, prostate hypertrophy with  implants. Prominence of the distal left ureter. BONES: Bone metastases unchanged. ABDOMINAL WALL: No mass or hernia. ADDITIONAL COMMENTS: N/A    Impression  1. Small basilar lung infiltrates should be followed up. 2. Stable mesenteric mass and retroperitoneal adenopathy. 3. Stable bone metastases. 4. Some wall thickening in the cecum stable, possible new wall thickening left  upper quadrant jejunum. I reviewed the imaging personally. Stable disease in the retroperitoneal nodes.        Lab Results   Component Value Date/Time    WBC 4.0 (L) 05/27/2021 09:27 AM    Hemoglobin (POC) 14.6 12/21/2017 09:10 AM    HGB 9.6 (L) 05/27/2021 09:27 AM    Hematocrit (POC) 30 (L) 04/29/2021 09:03 AM    HCT 30.5 (L) 05/27/2021 09:27 AM    PLATELET 973 (L) 80/58/9174 09:27 AM    MCV 75.3 (L) 05/27/2021 09:27 AM       Lab Results   Component Value Date/Time    Sodium 143 05/27/2021 09:27 AM    Potassium 3.3 (L) 05/27/2021 09:27 AM    Chloride 110 (H) 05/27/2021 09:27 AM    CO2 28 05/27/2021 09:27 AM    Anion gap 5 05/27/2021 09:27 AM    Glucose 130 (H) 05/27/2021 09:27 AM    BUN 28 (H) 05/27/2021 09:27 AM    Creatinine 1.43 (H) 05/27/2021 09:27 AM    BUN/Creatinine ratio 20 05/27/2021 09:27 AM    GFR est AA 59 (L) 05/27/2021 09:27 AM    GFR est non-AA 48 (L) 05/27/2021 09:27 AM    Calcium 8.4 (L) 05/27/2021 09:27 AM    Bilirubin, total 0.8 05/27/2021 09:27 AM    Alk. phosphatase 90 05/27/2021 09:27 AM    Protein, total 6.6 05/27/2021 09:27 AM    Albumin 3.2 (L) 05/27/2021 09:27 AM    Globulin 3.4 05/27/2021 09:27 AM    A-G Ratio 0.9 (L) 05/27/2021 09:27 AM    ALT (SGPT) 24 05/27/2021 09:27 AM     MRI Results (most recent):  Results from [place] encounter on 04/18/21    MRI CERV SPINE W WO CONT    Narrative  EXAM: MRI CERV SPINE W WO CONT  Clinical history: Neuroendocrine carcinoma metastatic to bone  INDICATION: . Neuroendocrine carcinoma metastatic to bone    COMPARISON: CT 12/26/2017    TECHNIQUE: MR imaging of the cervical spine was performed using the following  sequences: sagittal T1, T2, STIR;  axial T2, T1 prior to and following contrast  administration. CONTRAST: 15 mL of Dotarem. FINDINGS:    Few scattered foci of abnormal marrow signal intensity are demonstrated at the  tip of the dens C5 C7 T1 and T2. No significant posterior element abnormality is  identified. No abnormal intrathecal enhancement. Mild congenital narrowing of  the cervical canal. Vertebral body heights are maintained. The craniocervical junction is intact.  The course, caliber, and signal intensity  of the spinal cord are normal. There is no pathologic intrathecal enhancement. The paraspinal soft tissues are within normal limits. C2-C3: No herniation or stenosis. C3-C4: Moderate to severe facet arthropathy and hypertrophy on the left. Uncovertebral degenerative change on the left. Disc desiccation. The canal is  patent. Mild left foraminal stenosis. C4-C5: Moderate facet arthropathy on the left. Disc desiccation. Canal and  foramina are patent. C5-C6: Right lateral recess and right foraminal protrusion/osteophyte. Mild  facet arthropathy. Canal is patent. Moderate to severe right and mild to  moderate left foraminal stenosis. C6-C7: Large right lateral recess and right foraminal protrusion/osteophyte. Severe right foraminal stenosis. The canal is patent    C7-T1: Disc desiccation. Canal and foramina are patent. Impression  Scattered osseous metastatic disease is demonstrated at the dens, at C5, at C7  and at T1. Small foci of abnormal marrow signal intensity with associated  fracture or dislocation. Multilevel degenerative change of the cervical spine was greater on the right. Moderate to severe right foraminal stenosis at C5-6 and severe right foraminal  stenosis at C6-7. Additional, less severe degenerative findings are as described in detail above. Assessment:     1. 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 Had excellent disease control. He experienced a number of side effects including hot flashes, weight loss etc  He decided to take a break from therapy. CT showed disease progression in retroperitoneum and abdomen. Restarted Somatuline. Symptoms improved  He has been on and off Afinitor.  Last took in Aug 2020    Tolerating treatment   + diarrhea - worse  + fatigue - worse   + hot flashes - manageable  + bone pains in the right shoulder and arms    Weight is stable  Appetite is good    A detailed system by system evaluation of side effect was performed to assess chemotherapy related toxicity. Blood counts are acceptable. Results reviewed with the patient. CT Abd Pelv, NM bone scan (02/2021): Stable disease      2. Bone metastasis    > Denosumab  > MRI Thoracic and cervical spine ( 4/18/2021) - no neural compromise. Widespread skeletal metastasis.   > Oxycodone  > Palliative care referral      3. Urinary incontinence    BPH  Managed by Dr. Modesto Garcia      4. 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 Orthostasis    Observation      6. HTN, uncontrolled    May need to add Amlodipine to Lisinopril. 7. Anemia from systemic therapy    Observation      Plan:       > Continue Denosumab  > Continue Somatuline  > Imodium as needed   > Xermelo tid - send to specialty pharmcy  > oxycodone 5 mg q 4 hours sent to pharmacy  > Taking Metoprolol 25 mg bid  > PET scheduled for 8/4/2021  > Follow-up in 4 weeks  > Palliative care referral    I performed a history and physical examination of the patient and discussed his management with the NPP. I reviewed the NPP note and agree with the documented findings and plan of care. The patient was seen in conjunction with Ms. Hong. Mr. Milan Lepe is a gentleman with metastatic well differentiated neuroendocrine carcinoma. He is experiencing worsening diarrhea, bone pains and fatigue. His physical exam is normal. He will start taking Xermelo, oxycodone and see Palliative care specialist.       Signed by: Doreen Escobar MD                     May 27, 2021      CC. Ham Arzola MD  CC.  Sonu Rm MD

## 2021-05-27 NOTE — LETTER
5/27/2021 Patient: Vale Navarrete YOB: 1945 Date of Visit: 5/27/2021 Maria Teresa Garzon MD 
[facility] [employer] [facility] [address] [address] Via Fax: [phone] Dear Maria Teresa Garzon MD, Thank you for referring Mr. Ewelina Owusu to [address] [place] [address] for evaluation. My notes for this consultation are attached. If you have questions, please do not hesitate to call me. I look forward to following your patient along with you.  
 
 
Sincerely, 
 
Stu Mansfield MD

## 2021-05-28 NOTE — TELEPHONE ENCOUNTER
Pt needs specialty drug for diarrhea, will send to [facility] per his insurance plan.       VORB FROM DR Batista Child  TELOTRISTAT ETIPRATE (XERMELO) 250MG TAB TAKE 1 TAB BY MOUTH TID D 270 R 11  (90 DAY SUPPLY)

## 2021-05-28 NOTE — TELEPHONE ENCOUNTER
Palliative Medicine  Nursing Note  [phone]  Fax [phone]      Telephone Call  Patient Name: Arnulfo Yi  YOB: 1945    5/28/2021         Advance Care Planning 4/29/2021   Patient's Healthcare Decision Maker is: Legal Next of Kin   Confirm Advance Directive None   Patient Would Like to Complete Advance Directive No       Received outpatient Palliative Medicine referral from Dr. Lisa Avila to see patient for symptom management and supportive care. Chart  reviewed. Arnulfo Yi is a 77 y.o. male with a diagnosis of metastatic neuroendocrine cancer. He started experiencing pain in both groins and the abdomen. A CT of the abdomen showed retroperitoneal adenopathy. Pt's most recent office visit with Dr. Lisa Avila was 5/27/21. See Office Visit note for complete HPI, treatment history, and plan. ACP: Not on file                                                                                                                                 Nurse called pt and introduced Palliative Medicine services.       Appointment schedule for 6/16/21 @ 8:30 am    Information shared with Dr. Viry Bacon RN  Palliative Medicine

## 2021-05-28 NOTE — TELEPHONE ENCOUNTER
Prescription needed to go to [address], not the local pharmacy. juliettewarded Xermelo at this time to [facility].

## 2021-06-07 NOTE — TELEPHONE ENCOUNTER
This medication can not go through [facility], this RN previously sent there and was notified to send to [facility]. Please advise, not sure if a PA is what is needed or another specialty pharmacy. We haven't gotten a fax regarding this.

## 2021-06-07 NOTE — TELEPHONE ENCOUNTER
Patient left message on ED [facility] nurse voicemail. Stated that he got notification from [facility] that they cannot fill the Xermelo 250 mg tab. Would like it called in to [facility].  Please return call

## 2021-06-08 NOTE — TELEPHONE ENCOUNTER
[facility] Navigator Encounter     6/8/2021- [address] [facility] where Michelle Guo was sent and per rep, she does see the rx in system but she placed me on hold to check status with the specialty pharmacy. Per [facility], they do not dispense Xermelo and this needs to be dispensed by [facility] in [place], [place] and was transferred over to [facility]. Per [facility], patient is not enrolled in their system and they do not have an rx so one will need to be escribed and they will need demographics, insurance info and chart notes faxed as well. Will request No VILLASENOR RN, to send rx.

## 2021-06-15 NOTE — TELEPHONE ENCOUNTER
[facility] Navigator Encounter    6/15/2021- Left voicemail requesting a return call from patient to discuss the free drug program for his medication, Anjelica Raymond. Unfortunately patient has a high co-pay for this ($1,003) per [facility] after they were able to get prior authorization approval from insurance. Patient quickly returned my call and I discussed the application for free drug and patient would like to proceed. Patient will stop by Dr. Juan M Mims office tomorrow after his dr's apt in the morning to sign the application and to bring his [phone] tax return for proof of income. 6/16/2021- Patient stopped by office this morning to sign paperwork and I made a copy of his 2907 and faxed his application to [facility] for processing.      6/22/2021- Received [facility] approval letter for patient's medication, Anjelica Raymond. Called and spoke with patient to advise of free drug approval and provided the pharmacy's phone number to call and schedule the first shipment. Patient verbalized understanding and has no further questions/concerns.

## 2021-06-16 NOTE — PATIENT INSTRUCTIONS
Dear Loli Paul ,    It was a pleasure seeing you today in [facility] [address] [facility]    We will see you again in 2 weeks. If labs or imaging tests have been ordered for you today, please call the office  at [phone] 48 hours after completion to obtain the results. Your stated goal:   -To live well as long as you can    Your described symptoms were: Fatigue: 8 Drowsiness: 5   Depression: 3 Pain: 10   Anxiety: 7 Nausea: 5   Anorexia: 0 Dyspnea: 2   Best Well-Bein Constipation: No   Other Problem (Comment): 0       This is the plan we talked about:  1. Intractable right arm radiculopathy  -You have severe uncontrolled pain starting from your right shoulder radiating down your arm. You also have some tingling. This is likely from the cancer that has spread to C7. We talked about this in detail and you agreed on trying pain medications now. You were given oxycodone 5 mg but it made you sleepy and you were scared of taking the medicine.  -Start oxycodone 2.5 mg [half of 1 tablet] every 4 hours as needed for pain. It is okay if he sleeps a bit with the first few doses of pain medicine. 2.  Radiculopathy  -Start gabapentin 100 mg at bedtime. This will help with the radiating pain and the tingling sensation in your right arm.  -Continue physical therapy    3. Repeated oral/tooth infection  -You have been struggling with repeated tooth infection since 2020. You had several teeth removed and you think you have another infection. Please call the dentist today to be seen or get started on antibiotics right away. You will call your oromaxillary facial surgeon today. 4.  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 You go to the bathroom at least every 2 hours. -You are losing a lot of water and electrolytes this way.   -I am hoping that the opioids will slow your diarrhea down a bit.  -Dr. Geo Sam started you on a special medicine but this is not covered by your insurance/not available in pharmacies, Dr. Belkis Arthur team is working on this. 5.  Water and electrolytes  -You had one spell where you were very dizzy and could not move but it got better over a few minutes. -Keeping yourself hydrated with at least 100 ounces of water a day, lemonade, electrolyte drinks such as Gatorade/Powerade, electrolyte powder mixed in water, etc. is very important every day. -Please have at least 2 bananas per day. -Eat green leafy vegetables and fresh fruits and vegetables every day at least 2-3 servings per day which will help replace electrolytes that you are losing with constant diarrhea. 6.  Disease understanding and ACP  -We will discuss this during our next visit when your pain is better controlled. This is what you have shared with us about Advance Care Planning:      Primary Decision Maker: Patricia Ing - [phone]  Advance Care Planning 4/29/2021   Patient's Healthcare Decision Maker is: Legal Next of Kin   Confirm Advance Directive None   Patient Would Like to Complete Advance Directive No           The Palliative Medicine Team is here to support you and your family.        Sincerely,      Mara Mathis MD and the Palliative Medicine Team

## 2021-06-16 NOTE — PROGRESS NOTES
Palliative Medicine Office Visit  Palliative Medicine Nurse Check In  [phone]    Patient Name: Yareli Bonilla  YOB: 1945      Date of Office Visit: 6/16/2021    Patient states: \"  \"    From Check In Sheet (scanned in Media):  Has a medical provider talked with you about cardiopulmonary resuscitation (CPR)? [x] Yes   [] No   [] Unable to obtain    Nurse reminder to complete or update ACP FlowSheet:    Is ACP on the Problem List?    [] Yes    [x] No  IF ACP Document is ON FILE; Nurse to place ACP on Problem List     Is there an ACP Note in Chart Review/Note? [] Yes    [x] No   If NO: ALERT PROVIDER       Primary Decision MakerMargit Lane [phone]  Advance Care Planning 4/29/2021   Patient's Healthcare Decision Maker is: Legal Next of Kin   Confirm Advance Directive None   Patient Would Like to Complete Advance Directive No         Is there anything that we should know about you as a person in order to provide you the best care possible? Have you been to the ER, urgent care clinic since your last visit? [] Yes   [x] No   [] Unable to obtain    Have you been hospitalized since your last visit? [] Yes   [x] No   [] Unable to obtain    Have you seen or consulted any other health care providers outside of the [address] [place] [address] since your last visit?    [] Yes   [x] No   [] Unable to obtain    Functional status (describe):         Last BM: 6/16/2021     accessed (date): 6/16/2021    Bottle review (for opioid pain medication):  Medication 1:   Date filled:   Directions:   # filled:   # left:   # pills taking per day:  Last dose taken:    Medication 2:   Date filled:   Directions:   # filled:   # left:   # pills taking per day:  Last dose taken:    Medication 3:   Date filled:   Directions:   # filled:   # left:   # pills taking per day:  Last dose taken:    Medication 4:   Date filled:   Directions:   # filled:   # left:   # pills taking per day:  Last dose taken:

## 2021-06-16 NOTE — PROGRESS NOTES
Palliative Medicine Outpatient Services  [place]: [phone] ([phone])    Patient Name: Josr Oreilly  YOB: 1945    Date of Current Visit: 21  Location of Current Visit:    [] [facility] Office  [] [facility] Office  [x] [address] [place] [facility] Office  [] Home  []Synchronous real-time A/V virtual visit    Date of Initial Visit: 2021  Referral from: Dr. Prabhakar Merritt  Primary Care Physician: Kelly Bee MD      SUMMARY:   Josr Oreilly is a 77y.o. year old with a  history of neuroendocrine carcinoma initial diagnosis in 2016, followed by remission status post treatment, suffered progression of disease in 2018 and has been on treatment since, his most recent progression of disease is to the bones who was referred to Palliative Medicine by Dr. Prabhakar Merritt for management of pain and psychosocial support. .  The patients social history includes lives at home with his wife Brenden Poole. Has very good friends and family support. Current treatment-Afinitor    Initial Referral Intake note reviewed   PALLIATIVE DIAGNOSES:       ICD-10-CM ICD-9-CM    1. Cervical radiculopathy  M54.12 723.4 gabapentin (NEURONTIN) 100 mg capsule   2. Pain from bone metastases (HCC)  G89.3 338.3     C79.51     3. 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 Functional diarrhea  K59.1 564.5           PLAN:   Patient Instructions     Dear Josr Oreilly ,    It was a pleasure seeing you today in [facility] [address] [facility] clinic    We will see you again in 2 weeks. If labs or imaging tests have been ordered for you today, please call the office  at [phone] 48 hours after completion to obtain the results. Your stated goal:   -To live well as long as you can    Your described symptoms were: Fatigue: 8 Drowsiness: 5   Depression: 3 Pain: 10   Anxiety: 7 Nausea: 5   Anorexia: 0 Dyspnea: 2   Best Well-Bein Constipation: No   Other Problem (Comment): 0       This is the plan we talked about:  1.   Intractable right arm radiculopathy  -You have severe uncontrolled pain starting from your right shoulder radiating down your arm. You also have some tingling. This is likely from the cancer that has spread to C7. We talked about this in detail and you agreed on trying pain medications now. You were given oxycodone 5 mg but it made you sleepy and you were scared of taking the medicine.  -Start oxycodone 2.5 mg [half of 1 tablet] every 4 hours as needed for pain. It is okay if he sleeps a bit with the first few doses of pain medicine. 2.  Radiculopathy  -Start gabapentin 100 mg at bedtime. This will help with the radiating pain and the tingling sensation in your right arm.  -Continue physical therapy    3. Repeated oral/tooth infection  -You have been struggling with repeated tooth infection since September 2020. You had several teeth removed and you think you have another infection. Please call the dentist today to be seen or get started on antibiotics right away. You will call your oromaxillary facial surgeon today. 4.  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 You go to the bathroom at least every 2 hours. -You are losing a lot of water and electrolytes this way. -I am hoping that the opioids will slow your diarrhea down a bit.  -Dr. Patricia Robertson started you on a special medicine but this is not covered by your insurance/not available in pharmacies, Dr. Antonina Joel team is working on this. 5.  Water and electrolytes  -You had one spell where you were very dizzy and could not move but it got better over a few minutes. -Keeping yourself hydrated with at least 100 ounces of water a day, lemonade, electrolyte drinks such as Gatorade/Powerade, electrolyte powder mixed in water, etc. is very important every day. -Please have at least 2 bananas per day.   -Eat green leafy vegetables and fresh fruits and vegetables every day at least 2-3 servings per day which will help replace electrolytes that you are losing with constant diarrhea. 6.  Disease understanding and ACP  -We will discuss this during our next visit when your pain is better controlled. This is what you have shared with us about Advance Care Planning:      Primary Decision Maker: Gayathri [facility] [phone]  Advance Care Planning 4/29/2021   Patient's Healthcare Decision Maker is: Legal Next of Kin   Confirm Advance Directive None   Patient Would Like to Complete Advance Directive No           The Palliative Medicine Team is here to support you and your family. Sincerely,      Jean Smith MD and the Palliative Medicine Team       GOALS OF CARE / TREATMENT PREFERENCES:   [====Goals of Care====]  GOALS OF CARE:  Patient / health care proxy stated goals: See Patient Instructions / Summary    TREATMENT PREFERENCES:   Code Status:  [x] Attempt Resuscitation       [] Do Not Attempt Resuscitation    Advance Care Planning:  [x] The Pall [facility] Interdisciplinary Team has updated the ACP Navigator with Decision Maker and Patient Capacity      Primary Decision MakerCecil Narvaez [phone]  Advance Care Planning 4/29/2021   Patient's Healthcare Decision Maker is: Legal Next of Kin   Confirm Advance Directive None   Patient Would Like to Complete Advance Directive No       Other:  (If patient appropriate for POST, consider using PALLPOST smart phrase here)    The palliative care team has discussed with patient / health care proxy about goals of care / treatment preferences for patient.  [====Goals of Care====]     PRESCRIPTIONS GIVEN:     Medications Ordered Today   Medications    gabapentin (NEURONTIN) 100 mg capsule     Sig: Take 1 Capsule by mouth nightly. Max Daily Amount: 100 mg.      Dispense:  15 Capsule     Refill:  0           FOLLOW UP:     Future Appointments   Date Time Provider Mark De La Paz   6/24/2021  9:00 AM [facility] INFUSION NURSE 4 [facility]   6/24/2021  9:30 AM Gely Afua Cobb NP [facility] BS AMB   6/30/2021  1:30 PM Deepika Tyson MD [facility] BS AMB   7/22/2021  9:00 AM [place] INFUSION NURSE 4 [address] REG   8/4/2021 12:00 PM [address] [place] [address] RCR PET DOSE [address] TRAVIS   8/4/2021  1:00 PM [address] [place] [address] RCR PET 1 [facility] TRAVIS   8/19/2021  9:00 AM [place] INFUSION NURSE 4 [facility]           PHYSICIANS INVOLVED IN CARE:   Patient Care Team:  Chrissy Sanford MD as PCP - General (Family Medicine)  Chrissy Sanford MD as PCP - [facility] Empaneled Provider  Lizandro Hansen MD as Surgeon (General Surgery)       HISTORY:   Reviewed patient-completed ESAS and advance care planning form. Reviewed patient record in prescription monitoring program.    CHIEF COMPLAINT: No chief complaint on file. HPI/SUBJECTIVE:    The patient is: [x] Verbal / [] Nonverbal     Patient seen along with his wife today. Very pleasant gentleman. Right arm pain started about 3 months ago and it has been progressively worse. Pain starts in the right mid shoulder and radiates down his right arm. He started having tingling and numbness in the right arm over the last 6 weeks. He has been doing physical therapy which somewhat helps. He was started on oxycodone 5 mg but it made him very sleepy and he was very worried about taking opioids and therefore has not been taking it. Has been struggling with severe diarrhea for many many years. He has a bowel movement after every meal as well. He has had 2 spells of dizziness, one resulting in ER visit several months ago and 1 where the rescue squad was called but he recovered by the time they reached his home. Both times, he was outside doing something and called out to his wife for help due to feeling very poorly. He did not lose consciousness but did have word finding difficulty but recovered within a few minutes. Wife did not check the blood pressure when he was down. Last episode was several weeks ago.     Admits to not drinking enough water, likes Dr. Tony Manley. Has been struggling with repeated oral infections as well. Will call his oromaxillary facial surgeon today. Clinical Pain Assessment (nonverbal scale for nonverbal patients):   [++++ Clinical Pain Assessment++++]  [++++Pain Severity++++]: Pain: 10  [++++Pain Character++++]:  [++++Pain Duration++++]:  [++++Pain Effect++++]:  [++++Pain Factors++++]:  [++++Pain Frequency++++]:  [++++Pain Location++++]:  [++++ Clinical Pain Assessment++++]       FUNCTIONAL ASSESSMENT:     Palliative Performance Scale (PPS):  PPS: 80       PSYCHOSOCIAL/SPIRITUAL SCREENING:     Any spiritual / Adventism concerns:  [] Yes /  [x] No    Caregiver Burnout:  [] Yes /  [x] No /  [] No Caregiver Present      Anticipatory grief assessment:   [x] Normal  / [] Maladaptive       ESAS Anxiety: Anxiety: 7    ESAS Depression: Depression: 3       REVIEW OF SYSTEMS:     The following systems were [x] reviewed / [] unable to be reviewed  Systems: constitutional, ears/nose/mouth/throat, respiratory, gastrointestinal, genitourinary, musculoskeletal, integumentary, neurologic, psychiatric, endocrine. Positive findings noted below. Modified ESAS Completed by: provider   Fatigue: 8 Drowsiness: 5   Depression: 3 Pain: 10   Anxiety: 7 Nausea: 5   Anorexia: 0 Dyspnea: 2   Best Well-Bein Constipation: No   Other Problem (Comment): 0          PHYSICAL EXAM:     Wt Readings from Last 3 Encounters:   21 164 lb 12.8 oz (74.8 kg)   21 165 lb 4.8 oz (75 kg)   21 165 lb (74.8 kg)     Blood pressure (!) 183/89, pulse (!) 55, temperature (!) 96.3 °F (35.7 °C), temperature source Temporal, resp. rate 18, height 6' (1.829 m), weight 164 lb 12.8 oz (74.8 kg), SpO2 98 %. Last bowel movement: See Nursing Note    Constitutional: Alert, oriented x4  Eyes: pupils equal, anicteric  ENMT: no nasal discharge, dry mucous membranes, several missing teeth with a hole in the gum.   Cardiovascular: regular rhythm, distal pulses intact  Respiratory: breathing not labored, symmetric  Gastrointestinal: soft non-tender, +bowel sounds  Musculoskeletal: No tenderness on palpation but patient had limited mobility of his right arm due to pain. Skin: warm, dry  Neurologic: following commands, moving all extremities  Psychiatric: full affect, no hallucinations  Other:       HISTORY:     Past Medical History:   Diagnosis Date    Adverse effect of anesthesia     low HR and very slow to wakeup    Cancer ([address] 2016    neuroendocrine, retroperitoneal LN involvement    Diarrhea     Frequent urination     Hypertension     Poor appetite     Unspecified adverse effect of anesthesia     \"hard to put to sleep and slow to wake up-heart rate dropped very low\"      Past Surgical History:   Procedure Laterality Date    COLONOSCOPY N/A 2016    COLONOSCOPY performed by Hector Denton MD at [address] [place] [address]  2016         HX ORTHOPAEDIC      knee scope left knee    HX OTHER SURGICAL  2013    excision of scalp cyst     UPPER GI ENDOSCOPY,BIOPSY  2016           Family History   Problem Relation Age of Onset    Cancer Mother     Stroke Sister       History reviewed, no pertinent family history. Social History     Tobacco Use    Smoking status: Former Smoker     Packs/day: 1.00     Years: 8.00     Pack years: 8.00     Quit date: 1972     Years since quittin.1    Smokeless tobacco: Never Used   Substance Use Topics    Alcohol use: No     No Known Allergies   Current Outpatient Medications   Medication Sig    gabapentin (NEURONTIN) 100 mg capsule Take 1 Capsule by mouth nightly. Max Daily Amount: 100 mg.    metoprolol tartrate (LOPRESSOR) 25 mg tablet Take 1 Tab by mouth two (2) times a day.  everolimus (Afinitor) 10 mg tab Take 1 Tab by mouth daily.  telotristat etiprate (Xermelo) 250 mg tab Take 250 mg by mouth three (3) times daily for 90 days.  Indications: carcinoid syndrome diarrhea (Patient not taking: Reported on 6/16/2021)     No current facility-administered medications for this visit. LAB and other DATA REVIEWED:     Lab Results   Component Value Date/Time    WBC 4.0 (L) 05/27/2021 09:27 AM    HGB 9.6 (L) 05/27/2021 09:27 AM    PLATELET 300 (L) 31/22/7268 09:27 AM     Lab Results   Component Value Date/Time    Sodium 143 05/27/2021 09:27 AM    Potassium 3.3 (L) 05/27/2021 09:27 AM    Chloride 110 (H) 05/27/2021 09:27 AM    CO2 28 05/27/2021 09:27 AM    BUN 28 (H) 05/27/2021 09:27 AM    Creatinine 1.43 (H) 05/27/2021 09:27 AM    Calcium 8.4 (L) 05/27/2021 09:27 AM    Magnesium 2.2 05/27/2021 09:27 AM    Phosphorus 3.1 05/27/2021 09:27 AM      Lab Results   Component Value Date/Time    Alk. phosphatase 90 05/27/2021 09:27 AM    Protein, total 6.6 05/27/2021 09:27 AM    Albumin 3.2 (L) 05/27/2021 09:27 AM    Globulin 3.4 05/27/2021 09:27 AM     Lab Results   Component Value Date/Time    INR 1.0 04/29/2020 11:15 PM    Prothrombin time 10.6 04/29/2020 11:15 PM    aPTT 23.9 04/29/2020 11:15 PM      No results found for: IRON, FE, TIBC, IBCT, PSAT, FERR     Detail chart reviewed. Other specialists and referral provider notes reviewed. MRI C- spine, 4/21  IMPRESSION  Scattered osseous metastatic disease is demonstrated at the dens, at C5, at C7  and at T1. Small foci of abnormal marrow signal intensity with associated  fracture or dislocation.     Multilevel degenerative change of the cervical spine was greater on the right.     Moderate to severe right foraminal stenosis at C5-6 and severe right foraminal  stenosis at C6-7.     Additional, less severe degenerative findings are as described in detail above.      CONTROLLED SUBSTANCES SAFETY ASSESSMENT (IF ON CONTROLLED SUBSTANCES):     Reviewed opioid safety handout:  [x] Yes   [] No  24 hour opioid dose >150mg morphine equivalent/day:  [] Yes   [x] No  Benzodiazepines:  [] Yes   [x] No  Sleep apnea:  [] Yes   [x] No  Urine Toxicology Testing within last 6 months:  [] Yes   [x] No  History of or new aberrant medication taking behaviors:  [] Yes   [x] No  Has Narcan been prescribed [x] Yes   [] No          Total time: 70 minutes  Counseling / coordination time: 60 minutes  > 50% counseling / coordination?:  Yes    Please note that this dictation was completed with [employer], the computer voice recognition software. Quite often unanticipated grammatical, syntax, homophones, and other interpretive errors are inadvertently transcribed by the computer software. Please disregard these errors.   Please excuse any errors that have escaped final proofreading

## 2021-06-24 NOTE — PROGRESS NOTES
Outpatient Infusion Center Progress Note    0900 - Pt admit to [facility] for Lanreotide in stable condition. Assessment completed. Patient denied having any symptoms of COVID-19, i.e. SOB, coughing, fever, or generally not feeling well. Also denies having been exposed to COVID-19 recently or having had any recent contact with family/friend that has a pending COVID test.     Labs drawn peripherally L upper forearm w/o issue and sent to lab. See [facility] for pending results. Pt reports he has not been able to reach oral surgeon to find out if he can get Xgeva. Surgery was 5 weeks ago. Ann Miguel in Dr. Omero Irwin office aware. Patient Vitals for the past 12 hrs:   Temp Pulse Resp BP SpO2   06/24/21 0901 98.1 °F (36.7 °C) (!) 54 18 (!) 185/93 100 %        Recent Results (from the past 12 hour(s))   CBC WITH AUTOMATED DIFF    Collection Time: 06/24/21  9:04 AM   Result Value Ref Range    WBC 4.1 4.1 - 11.1 K/uL    RBC 4.57 4.10 - 5.70 M/uL    HGB 10.5 (L) 12.1 - 17.0 g/dL    HCT 34.4 (L) 36.6 - 50.3 %    MCV 75.3 (L) 80.0 - 99.0 FL    MCH 23.0 (L) 26.0 - 34.0 PG    MCHC 30.5 30.0 - 36.5 g/dL    RDW 14.5 11.5 - 14.5 %    PLATELET 316 (L) 239 - 400 K/uL    MPV 9.6 8.9 - 12.9 FL    NRBC 0.0 0  WBC    ABSOLUTE NRBC 0.00 0.00 - 0.01 K/uL    NEUTROPHILS PENDING %    LYMPHOCYTES PENDING %    MONOCYTES PENDING %    EOSINOPHILS PENDING %    BASOPHILS PENDING %    IMMATURE GRANULOCYTES PENDING %    ABS. NEUTROPHILS PENDING K/UL    ABS. LYMPHOCYTES PENDING K/UL    ABS. MONOCYTES PENDING K/UL    ABS. EOSINOPHILS PENDING K/UL    ABS. BASOPHILS PENDING K/UL    ABS. IMM. GRANS.  PENDING K/UL    DF PENDING    POC CHEM8    Collection Time: 06/24/21  9:11 AM   Result Value Ref Range    Calcium, ionized (POC) 1.11 (L) 1.12 - 1.32 mmol/L    Sodium (POC) 143 136 - 145 mmol/L    Potassium (POC) 3.5 3.5 - 5.1 mmol/L    Chloride (POC) 108 (H) 98 - 107 mmol/L    CO2 (POC) 26.1 21 - 32 mmol/L    Anion gap (POC) 10 10 - 20 mmol/L Glucose (POC) 169 (H) 65 - 100 mg/dL    Creatinine (POC) 1.56 (H) 0.6 - 1.3 mg/dL    GFRAA, POC 53 (L) >60 ml/min/1.73m2    GFRNA, POC 44 (L) >60 ml/min/1.73m2    Comment Comment Not Indicated. Medications:  Medications Administered     lanreotide (SOMATULINE DEPOT) injection syringe 120 mg     Admin Date  06/24/2021 Action  Given Dose  120 mg Route  SubCUTAneous Administered By  Amrit Sarai                 Pt tolerated treatment well. 0930 - D/c home in no distress. Pt aware of next [facility] appointment scheduled for: 7/22/21 at 0900.     Future Appointments   Date Time Provider Mark De La Paz   6/30/2021  1:30 PM Kenya Carrington MD [facility] BS AMB   7/22/2021  9:00 AM [place] INFUSION NURSE 4 [facility] REG   7/22/2021  9:15 AM SARAH Brenner BS AMB   8/4/2021 12:00 PM [facility] RCR PET DOSE 1 [facility] TRAVIS   8/4/2021  1:00 PM [facility] RCR PET 1 [facility] BRE[facility] TRAVIS   8/19/2021  9:00 AM [facility] INFUSION NURSE 4 [address] [place] [address]

## 2021-06-28 NOTE — TELEPHONE ENCOUNTER
Returned call to patient advised him to contact oral surgeons office for follow up on antibiotic for mouth , patient reports she still has infection in his mouth and has missed two treatments.

## 2021-06-28 NOTE — TELEPHONE ENCOUNTER
Alo Arellano is calling to provide the MD the antibiotic he was taking for his mouth. He was on Amoxicillin 500. States he missed another treatment due to this infection. Is calling asking what he should do or if MD would give him a different medication for infection.

## 2021-06-29 NOTE — TELEPHONE ENCOUNTER
[facility],4[address] Floor  Palliative Medicine   [phone]    Outpatient Palliative Social Work Note    Content  [x] Introduction to Palliative SW  [] Counseling or Psychosocial Support  [] Resource Referral   [] Advance Care Planning    Location  [] Clinic:   [] [facility]:   [] Virtual Visit  [x] Telephone Call    Narrative  SW called patient in introduction. SW introduced palliative medicine social work as a regular part of the palliative medicine team. SW described palliative medicine social work as bringing together mind, body, and spirit, in the context of family and community. SW described several means of support including community resource referral, supportive counseling, advance care planning discussion, and advocacy. Patient endorses no social work needs at this time. SW reinforced own availability and means of contact. Plan  SW to continue following as needed.        UNIQUE Dixon   Palliative Medicine   Supervisee in Social Work  [employer] [address]  [phone]    Time in: 11:00am  Time out: 11:10am

## 2021-06-30 NOTE — PATIENT INSTRUCTIONS
Dear Park Tee ,    It was a pleasure seeing you today in [facility] [address]  clinic    We will see you again in 6 weeks    If labs or imaging tests have been ordered for you today, please call the office  at [phone] 48 hours after completion to obtain the results. Your stated goal:   -To live well as long as you can    Your described symptoms were: Fatigue: 7 Drowsiness: 7   Depression: 3 Pain: 9   Anxiety: 9 Nausea: 9   Anorexia: 0 Dyspnea: 0   Best Well-Bein Constipation: No   Other Problem (Comment): 0       This is the plan we talked about:    1. Intractable right arm radiculopathy  -You have severe uncontrolled pain starting from your right shoulder radiating down your arm. You also have some tingling. This is likely from the cancer that has spread to C7. We talked about this in detail and you agreed on trying pain medications now. You were given oxycodone 5 mg but it made you sleepy and you were scared of taking the medicine.  - You take Oxycodone 5 mg at night time when you are in a lot of pain and this helps. You do not take it during the day because it makes it sleepy. 2.  Radiculopathy  -You are tolerating gabapentin 100 mg at night time, now increase the gabapentin 100 mg to afternoon and night time for 10 days and then increase 100 mg in the morning, noon and night.  -Continue physical therapy    3. Repeated oral/tooth infection  -You have been struggling with repeated tooth infection since 2020. You had several teeth removed and you think you have another infection. You are back on Amoxicillin antibiotic. 4.  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 You go to the bathroom at least every 2 hours. -You are losing a lot of water and electrolytes this way. -I am hoping that the opioids will slow your diarrhea down a bit.  -Dr. Adam Best started you on Brant Lo and this is helping.     5.  Water and electrolytes  -You had one spell where you were very dizzy and could not move but it got better over a few minutes. -Keeping yourself hydrated with at least 100 ounces of water a day, lemonade, electrolyte drinks such as Gatorade/Powerade, electrolyte powder mixed in water, etc. is very important every day. -Please have at least 2 bananas per day. -Eat green leafy vegetables and fresh fruits and vegetables every day at least 2-3 servings per day which will help replace electrolytes that you are losing with constant diarrhea. 6.  Disease understanding and ACP  -You completed an AMD nominating your wife as your mPOA today. This is what you have shared with us about Advance Care Planning:      Primary Decision Maker: Clarissa Vázquez - [phone]  Advance Care Planning 6/30/2021   Patient's Healthcare Decision Maker is: Verbal statement (Legal Next of Kin remains as decision maker)   Confirm Advance Directive None   Patient Would Like to Complete Advance Directive No           The Palliative Medicine Team is here to support you and your family.        Sincerely,      Danyelle Díaz MD and the Palliative Medicine Team

## 2021-06-30 NOTE — PROGRESS NOTES
Palliative Medicine Office Visit  Palliative Medicine Nurse Check In  [phone]    Patient Name: Matheus Barker  YOB: 1945      Date of Office Visit: 6/30/2021  Patient states: \"  \"    From Check In Sheet (scanned in Media):  Has a medical provider talked with you about cardiopulmonary resuscitation (CPR)? [x] Yes   [] No   [] Unable to obtain    Nurse reminder to complete or update ACP FlowSheet:    Is ACP on the Problem List?    [] Yes    [x] No  IF ACP Document is ON FILE; Nurse to place ACP on Problem List     Is there an ACP Note in Chart Review/Note? [] Yes    [x] No   If NO: ALERT PROVIDER       Primary Decision MakerEura Tripp [phone]  Advance Care Planning 6/30/2021   Patient's Healthcare Decision Maker is: Verbal statement (Legal Next of Kin remains as decision maker)   Confirm Advance Directive None   Patient Would Like to Complete Advance Directive No         Is there anything that we should know about you as a person in order to provide you the best care possible? Have you been to the ER, urgent care clinic since your last visit? [] Yes   [x] No   [] Unable to obtain    Have you been hospitalized since your last visit? [] Yes   [x] No   [] Unable to obtain    Have you seen or consulted any other health care providers outside of the [address] [place] [address] since your last visit?    [] Yes   [x] No   [] Unable to obtain    Functional status (describe):         Last BM:      accessed (date): 6/30/2021    Bottle review (for opioid pain medication):  Medication 1:   Date filled:   Directions:   # filled:   # left:   # pills taking per day:  Last dose taken:    Medication 2:   Date filled:   Directions:   # filled:   # left:   # pills taking per day:  Last dose taken:    Medication 3:   Date filled:   Directions:   # filled:   # left:   # pills taking per day:  Last dose taken:    Medication 4:   Date filled:   Directions:   # filled:   # left: # pills taking per day:  Last dose taken:

## 2021-07-19 NOTE — TELEPHONE ENCOUNTER
Patient calling stating he needs help. States that [address] has been trying to get in touch with Dr. Shai Todd office to approve a medication for blood pressure. HOwever they have been unable to get in touch with Dr. Shai Todd office. He is calling to see if Dr. Rosa Chavez will approve the refill. Request a call back to approve.

## 2021-07-19 NOTE — TELEPHONE ENCOUNTER
Returned call advised patient deferred medication request to pcp or Oncology , patient verbalized understanding

## 2021-07-22 NOTE — PROGRESS NOTES
[facility] Visit Note    0900 Pt arrived at [facility] and in no distress for Lanreotide/Xgeva. Assessment completed, pt reports he is cleared by his dentist to receive Dani Reeks today. Labs drawn- CBC with diff, Magnesium, Phosphorus, Hepatic Function Panel, Lipid Panel, Chem 8, HgbA1C, and Urine Protein. Patient denied having any symptoms of COVID-19, i.e. SOB, coughing, fever, or generally not feeling well. Also denies having been exposed to COVID-19 recently or having had any recent contact with family/friend that has a pending COVID test.    Visit Vitals  BP (!) 145/93 (BP 1 Location: Left upper arm, BP Patient Position: Sitting)   Pulse (!) 55   Temp 97.9 °F (36.6 °C)   Resp 18   Wt 73.7 kg (162 lb 8 oz)   SpO2 100%   BMI 22.04 kg/m²     Recent Results (from the past 12 hour(s))   CBC WITH AUTOMATED DIFF    Collection Time: 07/22/21  9:11 AM   Result Value Ref Range    WBC 4.5 4.1 - 11.1 K/uL    RBC 4.51 4.10 - 5.70 M/uL    HGB 10.4 (L) 12.1 - 17.0 g/dL    HCT 34.4 (L) 36.6 - 50.3 %    MCV 76.3 (L) 80.0 - 99.0 FL    MCH 23.1 (L) 26.0 - 34.0 PG    MCHC 30.2 30.0 - 36.5 g/dL    RDW 15.4 (H) 11.5 - 14.5 %    PLATELET 557 980 - 988 K/uL    MPV 10.0 8.9 - 12.9 FL    NRBC 0.0 0  WBC    ABSOLUTE NRBC 0.00 0.00 - 0.01 K/uL    NEUTROPHILS 71 32 - 75 %    LYMPHOCYTES 13 12 - 49 %    MONOCYTES 9 5 - 13 %    EOSINOPHILS 6 0 - 7 %    BASOPHILS 1 0 - 1 %    IMMATURE GRANULOCYTES 0 0.0 - 0.5 %    ABS. NEUTROPHILS 3.2 1.8 - 8.0 K/UL    ABS. LYMPHOCYTES 0.6 (L) 0.8 - 3.5 K/UL    ABS. MONOCYTES 0.4 0.0 - 1.0 K/UL    ABS. EOSINOPHILS 0.3 0.0 - 0.4 K/UL    ABS. BASOPHILS 0.0 0.0 - 0.1 K/UL    ABS. IMM.  GRANS. 0.0 0.00 - 0.04 K/UL    DF SMEAR SCANNED      RBC COMMENTS ANISOCYTOSIS  1+        RBC COMMENTS MICROCYTOSIS  1+       PROTEIN URINE, RANDOM    Collection Time: 07/22/21  9:11 AM   Result Value Ref Range    Protein, urine random 29 (H) 0.0 - 11.9 mg/dL   POC CHEM8    Collection Time: 07/22/21  9:14 AM   Result Value Ref Range    Calcium, ionized (POC) 1.16 1.12 - 1.32 mmol/L    Sodium (POC) 144 136 - 145 mmol/L    Potassium (POC) 3.1 (L) 3.5 - 5.1 mmol/L    Chloride (POC) 108 (H) 98 - 107 mmol/L    CO2 (POC) 27.4 21 - 32 mmol/L    Anion gap (POC) 10 10 - 20 mmol/L    Glucose (POC) 182 (H) 65 - 100 mg/dL    Creatinine (POC) 1.29 0.6 - 1.3 mg/dL    GFRAA, POC >60 >60 ml/min/1.73m2    GFRNA, POC 54 (L) >60 ml/min/1.73m2    Comment Comment Not Indicated. Ionized Calcium 1.16    Medications received:  Xgeva 120 mg SQ in left arm  Lanreotide 120 mg SQ in left buttock    0945 Tolerated treatment well, no adverse reaction noted. D/Cd from [facility] ambulatory and in no distress accompanied by spouse. Next appt 8/19/21 @ 0900.

## 2021-08-03 NOTE — TELEPHONE ENCOUNTER
Pt left VM asking for clarification of PET scan appointment. This RN called pt back and he has been made aware.

## 2021-08-11 NOTE — ED NOTES
0845-Patient given printed discharge instructions reviewed by the MD. Patient understands instructions/follow up recommendations. Patient ambulated out of ED on own via wheelchair to [facility] the [facility].

## 2021-08-11 NOTE — ED NOTES
1540-patient HR 38 on monitor; patient is asymptomatic at this time;MD notified and is at bedside now.

## 2021-08-11 NOTE — DISCHARGE INSTRUCTIONS
Your examination today overall is reassuring, and your laboratories and vital signs are reassuring as well. We recommend that you stop taking the metoprolol that was recently prescribed to you, and follow-up with your primary care doctor. Although you tend to have high blood pressure, your heart rate tends to be low, and metoprolol may not be the best choice for you. Talk to your doctor about other medications such as clonidine or amlodipine. It was a pleasure taking care of you at [facility] today. We know that when you come to [facility], you are entrusting us with your health, comfort, and safety. Our physicians and nurses honor that trust, and we truly appreciate the opportunity to care for you and your loved ones. We also value your feedback. If you receive a survey about your Emergency Department experience today, please fill it out. We care about our patients' feedback, and we listen to what you have to say. Thank you!

## 2021-08-11 NOTE — ED PROVIDER NOTES
EMERGENCY DEPARTMENT HISTORY AND PHYSICAL EXAM      Date: 8/11/2021  Patient Name: Zeus Reyes  Patient Age and Sex: 77 y.o. male    History of Presenting Illness     Chief Complaint   Patient presents with    Dizziness     EMS reports syncopal episode while outside, pt felt lightheaded and passed out. Witnessed event. Initial /70, IVF given with good response. Metoprolol recently added and lisinopril dose increased. Pt A&O       History Provided By: Patient and Patient's Wife    Ability to gather history was limited by:     HPI: Zeus Reyes, 77 y.o. male with history of metastatic neuroendocrine tumor, CKD, hypertension, bradycardia, presents after syncopal episode. Patient was working outside today (weather is extremely hot and humid today). He felt suddenly lightheaded and lost consciousness, lost control of bowels or bladder. Moderate severity incident. Per EMS initial blood pressure 106/70, patient was administered 1 L normal saline, felt much better. Also he started new blood pressure medications yesterday including metoprolol. He reports a baseline history of bradycardia. At the time of my H&P patient feels fine, has no symptoms. No chest pain or palpitations or headache. Location:    Quality:      Severity:    Duration:   Timing:      Context:    Modifying factors:   Associated symptoms:     Past History      The patient's medical, surgical, and social history on file were reviewed by me today.      The family history was reviewed by me today and was non-contributory, unless otherwise specified below:    Past Medical History:  Past Medical History:   Diagnosis Date    Adverse effect of anesthesia     low HR and very slow to wakeup    Cancer ([facility] [address] 5/2016    neuroendocrine, retroperitoneal LN involvement    Diarrhea     Frequent urination     Hypertension     Poor appetite     Unspecified adverse effect of anesthesia     \"hard to put to sleep and slow to wake up-heart rate dropped very low\"       Past Surgical History:  Past Surgical History:   Procedure Laterality Date    COLONOSCOPY N/A 2016    COLONOSCOPY performed by Edil Lauren MD at [address] [place] [address]  2016         HX ORTHOPAEDIC      knee scope left knee    HX OTHER SURGICAL  2013    excision of scalp cyst     UPPER GI ENDOSCOPY,BIOPSY  2016            Family History:  Family History   Problem Relation Age of Onset    Cancer Mother     Stroke Sister        Social History:  Social History     Tobacco Use    Smoking status: Former Smoker     Packs/day: 1.00     Years: 8.00     Pack years: 8.00     Quit date: 1972     Years since quittin.2    Smokeless tobacco: Never Used   Substance Use Topics    Alcohol use: No    Drug use: No       Current Medications:  No current facility-administered medications on file prior to encounter. Current Outpatient Medications on File Prior to Encounter   Medication Sig Dispense Refill    metoprolol tartrate (LOPRESSOR) 25 mg tablet TAKE 1 TABLET TWICE DAILY 180 Tablet 1    amoxicillin (AMOXIL) 500 mg capsule       gabapentin (NEURONTIN) 100 mg capsule Take 1 Capsule by mouth three (3) times daily. Max Daily Amount: 300 mg. 90 Capsule 1    ondansetron (ZOFRAN ODT) 4 mg disintegrating tablet Take 1 Tablet by mouth every eight (8) hours as needed for Nausea or Vomiting. 30 Tablet 0    gabapentin (NEURONTIN) 100 mg capsule Take 1 Capsule by mouth nightly. Max Daily Amount: 100 mg. 15 Capsule 0    telotristat etiprate (Xermelo) 250 mg tab Take 250 mg by mouth three (3) times daily for 90 days. Indications: carcinoid syndrome diarrhea (Patient not taking: Reported on 2021) 270 Tablet 11    everolimus (Afinitor) 10 mg tab Take 1 Tab by mouth daily.  30 Tab 6       Allergies:  No Known Allergies  Review of Systems    A complete ROS was reviewed by me today and was negative, unless otherwise specified below:    Review of Systems   Constitutional: Positive for fatigue. Negative for fever. Respiratory: Negative for shortness of breath. Cardiovascular: Negative for chest pain. Neurological: Positive for syncope. Negative for headaches. All other systems reviewed and are negative. Physical Exam   Vital Signs  Patient Vitals for the past 8 hrs:   Temp Pulse Resp BP SpO2   08/11/21 1630  (!) 47  (!) 168/90 98 %   08/11/21 1600  (!) 52  (!) 164/89 100 %   08/11/21 1530  (!) 42  (!) 157/87 99 %   08/11/21 1500  (!) 57  (!) 160/80 100 %   08/11/21 1430  (!) 55  (!) 158/87 100 %   08/11/21 1327 98.1 °F (36.7 °C) 64 16 125/70 96 %          Physical Exam  Vitals and nursing note reviewed. Constitutional:       General: He is not in acute distress. Appearance: Normal appearance. He is well-developed. He is not ill-appearing. HENT:      Head: Normocephalic and atraumatic. Eyes:      General:         Right eye: No discharge. Left eye: No discharge. Conjunctiva/sclera: Conjunctivae normal.   Cardiovascular:      Rate and Rhythm: Normal rate and regular rhythm. Heart sounds: Normal heart sounds. No murmur heard. Pulmonary:      Effort: Pulmonary effort is normal. No respiratory distress. Breath sounds: Normal breath sounds. No wheezing. Abdominal:      General: There is no distension. Palpations: Abdomen is soft. Tenderness: There is no abdominal tenderness. Musculoskeletal:         General: No deformity. Normal range of motion. Cervical back: Normal range of motion and neck supple. Skin:     General: Skin is warm and dry. Findings: No rash. Neurological:      General: No focal deficit present. Mental Status: He is alert and oriented to person, place, and time. Psychiatric:         Mood and Affect: Mood normal.         Behavior: Behavior normal.         Thought Content:  Thought content normal.         Diagnostic Study Results Labs  Recent Results (from the past 24 hour(s))   EKG, 12 LEAD, INITIAL    Collection Time: 08/11/21  2:31 PM   Result Value Ref Range    Ventricular Rate 54 BPM    Atrial Rate 54 BPM    P-R Interval 186 ms    QRS Duration 94 ms    Q-T Interval 506 ms    QTC Calculation (Bezet) 479 ms    Calculated P Axis 41 degrees    Calculated R Axis 40 degrees    Calculated T Axis 65 degrees    Diagnosis       Sinus bradycardia  Nonspecific ST segment changes  When compared with ECG of 29-APR-2020 23:15,  No significant change was found  Confirmed by Jos Parker ([id number]) on 8/11/2021 2:76:85 PM     METABOLIC PANEL, COMPREHENSIVE    Collection Time: 08/11/21  3:02 PM   Result Value Ref Range    Sodium 143 136 - 145 mmol/L    Potassium 3.5 3.5 - 5.1 mmol/L    Chloride 111 (H) 97 - 108 mmol/L    CO2 27 21 - 32 mmol/L    Anion gap 5 5 - 15 mmol/L    Glucose 104 (H) 65 - 100 mg/dL    BUN 25 (H) 6 - 20 MG/DL    Creatinine 1.60 (H) 0.70 - 1.30 MG/DL    BUN/Creatinine ratio 16 12 - 20      GFR est AA 51 (L) >60 ml/min/1.73m2    GFR est non-AA 42 (L) >60 ml/min/1.73m2    Calcium 8.4 (L) 8.5 - 10.1 MG/DL    Bilirubin, total 0.4 0.2 - 1.0 MG/DL    ALT (SGPT) 18 12 - 78 U/L    AST (SGOT) 20 15 - 37 U/L    Alk. phosphatase 88 45 - 117 U/L    Protein, total 6.8 6.4 - 8.2 g/dL    Albumin 3.1 (L) 3.5 - 5.0 g/dL    Globulin 3.7 2.0 - 4.0 g/dL    A-G Ratio 0.8 (L) 1.1 - 2.2     CBC WITH AUTOMATED DIFF    Collection Time: 08/11/21  3:02 PM   Result Value Ref Range    WBC 10.3 4.1 - 11.1 K/uL    RBC 4.29 4. 10 - 5.70 M/uL    HGB 10.3 (L) 12.1 - 17.0 g/dL    HCT 33.2 (L) 36.6 - 50.3 %    MCV 77.4 (L) 80.0 - 99.0 FL    MCH 24.0 (L) 26.0 - 34.0 PG    MCHC 31.0 30.0 - 36.5 g/dL    RDW 15.6 (H) 11.5 - 14.5 %    PLATELET 709 165 - 978 K/uL    MPV 9.8 8.9 - 12.9 FL    NRBC 0.0 0  WBC    ABSOLUTE NRBC 0.00 0.00 - 0.01 K/uL    NEUTROPHILS PENDING %    LYMPHOCYTES PENDING %    MONOCYTES PENDING %    EOSINOPHILS PENDING %    BASOPHILS PENDING % IMMATURE GRANULOCYTES PENDING %    ABS. NEUTROPHILS PENDING K/UL    ABS. LYMPHOCYTES PENDING K/UL    ABS. MONOCYTES PENDING K/UL    ABS. EOSINOPHILS PENDING K/UL    ABS. BASOPHILS PENDING K/UL    ABS. IMM. GRANS. PENDING K/UL    DF PENDING    ETHYL ALCOHOL    Collection Time: 08/11/21  3:02 PM   Result Value Ref Range    ALCOHOL(ETHYL),SERUM <10 <10 MG/DL       Radiologic Studies  No orders to display     CT Results  (Last 48 hours)    None        CXR Results  (Last 48 hours)    None          Billable Procedures   EKG    Date/Time: 8/11/2021 4:48 PM  Performed by: Clayton Shannon MD  Authorized by: Clayton Shannon MD     ECG reviewed by ED Physician in the absence of a cardiologist: yes    Interpretation:     Interpretation: non-specific    Rate:     ECG rate assessment: normal    Rhythm:     Rhythm: sinus rhythm    Ectopy:     Ectopy: none    QRS:     QRS axis:  Normal  ST segments:     ST segments:  Normal  T waves:     T waves: normal          Cardiac monitoring was ordered by me for this patient. // The indication was to monitor for dysrhythmias secondary to, or as a cause of, the patient's complaint of Syncope  // The rhythm revealed by the monitor, interpreted by me, was Normal sinus rhythm with bradycardia    Medical Decision Making     I reviewed the patient's most recent Emergency Dept notes and diagnostic tests in formulating my MDM on today's visit. Provider Notes (Medical Decision Making):   71-year-old male with metastatic cancer, hypertension, complaining of syncopal episode and collapse in the setting of working outside in very hot weather today. Currently feels fine after receiving fluids administered by EMS. Reassuring vital signs. Borderline bradycardia, which seems chronic and not severe. Hypertensive. Afebrile. Benign physical examination. No injuries, no abrasions or contusions. Normal cardiopulmonary and neurologic exam.    Laboratories are all reassuring.   EKG is reassuring. No concern for seizure or ACS or MI or intracranial hemorrhage. Can safely defer any CT imaging. Recommend the patient stop the metoprolol that he just started, due to his bradycardia and syncope today, follow-up with primary care to discuss other options such as clonidine. Advised to stay well-hydrated and stay in air conditioning during hot weather. Jael Butler MD  4:46 PM  2021     Consults:    Social History     Tobacco Use    Smoking status: Former Smoker     Packs/day: 1.00     Years: 8.00     Pack years: 8.00     Quit date: 1972     Years since quittin.2    Smokeless tobacco: Never Used   Substance Use Topics    Alcohol use: No    Drug use: No       Medications Administered during ED course:  Medications - No data to display       Prescriptions from today's ED visit:  Current Discharge Medication List         Diagnosis and Disposition     Disposition:  Discharged    Clinical Impression:   1. Syncope and collapse    2. Heat exhaustion, initial encounter    3. Mild dehydration        Attestation:  I personally performed the services described in this documentation on this date 2021 for patient Teto Barrett. Jael Butler MD        I was the first provider for this patient on this visit. To the best of my ability I reviewed relevant prior medical records, electrocardiograms, laboratories, and radiologic studies. The patient's presenting problems were discussed, and the patient was in agreement with the care plan formulated and outlined with them. Jael Butler MD    Please note that this dictation was completed with Dragon voice recognition software. Quite often unanticipated grammatical, syntax, homophones, and other interpretive errors are inadvertently transcribed by the computer software. Please disregard these errors and excuse any errors that have escaped final proofreading.

## 2021-08-16 NOTE — PROGRESS NOTES
Palliative Medicine Outpatient Services  [place]: [phone]    Patient Name: Teto Barrett  YOB: 1945    Date of Current Visit: 08/16/21  Location of Current Visit:    [] [address] [place] [address] Office  [] [facility] Office  [x] [address] [place] [address] Office  [] Home  []Synchronous real-time A/V virtual visit    Date of Initial Visit: 6/16/2021  Referral from: Dr. Mario Yusuf  Primary Care Physician: Lei Hughes MD      SUMMARY:   Teto Barrett is a 77y.o. year old with a  history of neuroendocrine carcinoma initial diagnosis in 2016, followed by remission status post treatment, suffered progression of disease in 2018 and has been on treatment since, his most recent progression of disease is to the bones who was referred to Palliative Medicine by Dr. Mario Yusuf for management of pain and psychosocial support. .  The patients social history includes lives at home with his wife Jacy Mclain. Has very good friends and family support. Current treatment-Afinitor     PALLIATIVE DIAGNOSES:       ICD-10-CM ICD-9-CM    1. Cervical radiculopathy  M54.12 723.4 gabapentin (NEURONTIN) 100 mg capsule   2. 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 Diarrhea, unspecified type  R19.7 787.91    4. Neoplastic malignant related fatigue  R53.0 780.79    5. Anxiety about health  F41.8 300.09           PLAN:   Patient Instructions   Dear Teto Barrett,     It was a pleasure seeing you today in [address] [place] [address]     This is the plan we talked about:     1. Intractable right arm radiculopathy  -You think your pain is about the same overall. You are starting to get some numbness in your fingers.  -Your pain bothers you the most at night.  -The gabapentin has helped a little. You are taking gabapentin 100 mg three times daily.  -We can try increasing your nighttime dose of gabapentin to 200 mg at night for one week. If you do well with that, then increase your morning gabapentin to 200 mg for one week.  If you do well with that, then increase your afternoon dose to 200 mg for one week. Please call me if this plan causes you any negative symptoms or effects. Please call me if you decide that you only want to increase the gabapentin at night and not during the other times of day. -You occasionally take an oxycodone 5 mg at night. You are hesitant to take opioids because the idea is scary to you. It does make you sleepy when you take it.  -Let us know when you have 5-10 pills of oxycodone and gabapentin left so we can refill those for you.     2.  Repeated oral/tooth infection  -You have been struggling with repeated tooth infection since September 2020. You had several teeth removed and you think you have another infection. You are back on Amoxicillin antibiotic.     3.  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    -You are losing a lot of water and electrolytes this way.  -Dr. Jeremy Jerez started you on Xermelo and this is helping.  -Your amoxicillin has been increasing your diarrhea.  -Hopefully your diarrhea will improve when you are done with the antibiotic.     4.  Water and electrolytes  -You went to the Emergency Department recently for dizziness. You stopped your metoprolol after that visit because your heart rate was low.  -I recommend following up with your Primary Care Physician about your Blood Pressure.  -Keeping yourself hydrated with at least 100 ounces of water a day, lemonade, electrolyte drinks such as Gatorade/Powerade, electrolyte powder mixed in water, etc. is very important every day. -Please have at least 2 bananas per day. -Eat green leafy vegetables and fresh fruits and vegetables every day at least 2-3 servings per day which will help replace electrolytes that you are losing with constant diarrhea. 5. Fatigue  -You have chronic fatigue.  -You prefer to not take too many medications, so you would like to hold off on medications for now.   -Please let me know if you change your mind. 6. Appetite:  -You think your appetite has been pretty good. 7. Mood:  -Sometimes you can feel lama.  -You do not take any medication for this.  -Increasing your gabapentin may help some with your anxiety.  -You would like to increase your walking on the treadmill if you can--you think this will help your mood. -You will try to think about some things you can do to keep yourself busy/active.  -Please let me know if you are interested in discussing medication for your mood. -Please let me know if you would like to speak with our . Follow up in 6 weeks. GOALS OF CARE / TREATMENT PREFERENCES:   [====Goals of Care====]  GOALS OF CARE:  Patient / health care proxy stated goals: See Patient Instructions / Summary    TREATMENT PREFERENCES:   Code Status:  [x] Attempt Resuscitation       [] Do Not Attempt Resuscitation    Advance Care Planning:  [x] The [facility] Interdisciplinary Team has updated the ACP Navigator with Decision Maker and Patient Capacity      [facility] - [phone]  Advance Care Planning 8/16/2021   Patient's Healthcare Decision Maker is: Named in scanned ACP document   Confirm Advance Directive Yes, on file   Patient Would Like to Complete Advance Directive -       Other:  (If patient appropriate for POST, consider using PALLPOST smart phrase here)    The palliative care team has discussed with patient / health care proxy about goals of care / treatment preferences for patient.  [====Goals of Care====]     PRESCRIPTIONS GIVEN:     Medications Ordered Today   Medications    gabapentin (NEURONTIN) 100 mg capsule     Sig: Take 1 Capsule by mouth three (3) times daily. Max Daily Amount: 300 mg.      Dispense:  90 Capsule     Refill:  1           FOLLOW UP:     Future Appointments   Date Time Provider Mark De [place]   8/19/2021  9:00 AM MINE LAGUNAS 1 [address] REG   8/19/2021  9:15 AM Rangel Palomino NP [facility] BS AMB   9/16/2021 10:00 AM BLOUNT FASTRACK 2 [facility]   9/28/2021 10:30 AM Mary Queen MD [facility] BS AMB   10/14/2021  9:00 AM [facility] FA[facility]ACK 1 [facility]           PHYSICIANS INVOLVED IN CARE:   Patient Care Team:  Aiden Lira MD as PCP - General (Family Medicine)  Aiden Lira MD as PCP - [facility] Empaneled Provider  Lesly Mcqueen MD as Surgeon (General Surgery)       HISTORY:   Reviewed patient-completed ESAS and advance care planning form. Reviewed patient record in prescription monitoring program.    CHIEF COMPLAINT:   Chief Complaint   Patient presents with    Other     pain from radiculopathy       HPI/SUBJECTIVE:    The patient is: [x] Verbal / [] Nonverbal     8/16: You think your pain is about the same overall. You are starting to get some numbness in your fingers. See plan for further details. He recently had a PET scan and was told the results showed improvement in disease. ---    Patient doing a bit better with regular oxycodone. Working with physical therapy and this is helping as well. See plan for details    ---    Patient seen along with his wife today. Very pleasant gentleman. Right arm pain started about 3 months ago and it has been progressively worse. Pain starts in the right mid shoulder and radiates down his right arm. He started having tingling and numbness in the right arm over the last 6 weeks. He has been doing physical therapy which somewhat helps. He was started on oxycodone 5 mg but it made him very sleepy and he was very worried about taking opioids and therefore has not been taking it. Has been struggling with severe diarrhea for many many years. He has a bowel movement after every meal as well. He has had 2 spells of dizziness, one resulting in ER visit several months ago and 1 where the rescue squad was called but he recovered by the time they reached his home.   Both times, he was outside doing something and called out to his wife for help due to feeling very poorly. He did not lose consciousness but did have word finding difficulty but recovered within a few minutes. Wife did not check the blood pressure when he was down. Last episode was several weeks ago. Admits to not drinking enough water, likes Dr. Lalito Lozoya. Has been struggling with repeated oral infections as well. Will call his oromaxillary facial surgeon today. Clinical Pain Assessment (nonverbal scale for nonverbal patients):   [++++ Clinical Pain Assessment++++]  [++++Pain Severity++++]: Pain: 7  [++++Pain Character++++]:  [++++Pain Duration++++]:  [++++Pain Effect++++]:  [++++Pain Factors++++]:  [++++Pain Frequency++++]:  [++++Pain Location++++]:  [++++ Clinical Pain Assessment++++]       FUNCTIONAL ASSESSMENT:     Palliative Performance Scale (PPS):  PPS: 70       PSYCHOSOCIAL/SPIRITUAL SCREENING:     Any spiritual / Hoahaoism concerns:  [] Yes /  [x] No    Caregiver Burnout:  [] Yes /  [x] No /  [] No Caregiver Present      Anticipatory grief assessment:   [x] Normal  / [] Maladaptive       ESAS Anxiety: Anxiety: 7    ESAS Depression: Depression: 2       REVIEW OF SYSTEMS:     The following systems were [x] reviewed / [] unable to be reviewed  Systems: constitutional, ears/nose/mouth/throat, respiratory, gastrointestinal, genitourinary, musculoskeletal, integumentary, neurologic, psychiatric, endocrine. Positive findings noted below. Modified ESAS Completed by: provider   Fatigue: 8 Drowsiness: 7   Depression: 2 Pain: 7   Anxiety: 7 Nausea: 0   Anorexia: 0 Dyspnea: 3   Best Well-Bein Constipation: No   Other Problem (Comment): 0          PHYSICAL EXAM:     Wt Readings from Last 3 Encounters:   21 164 lb 6.4 oz (74.6 kg)   21 164 lb (74.4 kg)   21 162 lb (73.5 kg)     Blood pressure (!) 149/94, pulse 63, temperature (!) 96.4 °F (35.8 °C), temperature source Temporal, resp.  rate 18, height 6' (1.829 m), weight 164 lb 6.4 oz (74.6 kg), SpO2 99 %.  Last bowel movement: See Nursing Note    Constitutional: Alert, oriented x4  Eyes: pupils equal, anicteric  ENMT: no nasal discharge, dry mucous membranes, several missing teeth with a hole in the gum. Cardiovascular: regular rhythm, distal pulses intact  Respiratory: breathing not labored, symmetric  Gastrointestinal: soft, non-tender, +bowel sounds  Musculoskeletal: No tenderness on palpation but patient had limited mobility of his right arm due to pain. Skin: warm, dry  Neurologic: following commands, moving all extremities  Psychiatric: full affect, appropriate conversation and behavior       HISTORY:     Past Medical History:   Diagnosis Date    Adverse effect of anesthesia     low HR and very slow to wakeup    Cancer ([facility] [address] 2016    neuroendocrine, retroperitoneal LN involvement    Diarrhea     Frequent urination     Hypertension     Poor appetite     Unspecified adverse effect of anesthesia     \"hard to put to sleep and slow to wake up-heart rate dropped very low\"      Past Surgical History:   Procedure Laterality Date    COLONOSCOPY N/A 2016    COLONOSCOPY performed by Amadeo Garcia MD at [address] [place] [address]  2016         HX ORTHOPAEDIC      knee scope left knee    HX OTHER SURGICAL  2013    excision of scalp cyst     UPPER GI ENDOSCOPY,BIOPSY  2016           Family History   Problem Relation Age of Onset    Cancer Mother     Stroke Sister       History reviewed, no pertinent family history. Social History     Tobacco Use    Smoking status: Former Smoker     Packs/day: 1.00     Years: 8.00     Pack years: 8.00     Quit date: 1972     Years since quittin.1    Smokeless tobacco: Never Used   Substance Use Topics    Alcohol use: No     No Known Allergies   Current Outpatient Medications   Medication Sig    meclizine (ANTIVERT) 25 mg tablet Take 25 mg by mouth three (3) times daily as needed for Dizziness.     lisinopriL (PRINIVIL, ZESTRIL) 20 mg tablet Take 20 mg by mouth daily.  gabapentin (NEURONTIN) 100 mg capsule Take 1 Capsule by mouth three (3) times daily. Max Daily Amount: 300 mg.    metoprolol tartrate (LOPRESSOR) 25 mg tablet TAKE 1 TABLET TWICE DAILY (Patient not taking: Reported on 8/16/2021)    amoxicillin (AMOXIL) 500 mg capsule     ondansetron (ZOFRAN ODT) 4 mg disintegrating tablet Take 1 Tablet by mouth every eight (8) hours as needed for Nausea or Vomiting.  gabapentin (NEURONTIN) 100 mg capsule Take 1 Capsule by mouth nightly. Max Daily Amount: 100 mg.  everolimus (Afinitor) 10 mg tab Take 1 Tab by mouth daily.  telotristat etiprate (Xermelo) 250 mg tab Take 250 mg by mouth three (3) times daily for 90 days. Indications: carcinoid syndrome diarrhea (Patient not taking: Reported on 6/16/2021)     No current facility-administered medications for this visit. LAB and other DATA REVIEWED:     Lab Results   Component Value Date/Time    WBC 10.3 08/11/2021 03:02 PM    HGB 10.3 (L) 08/11/2021 03:02 PM    PLATELET 873 63/00/8680 03:02 PM     Lab Results   Component Value Date/Time    Sodium 143 08/11/2021 03:02 PM    Potassium 3.5 08/11/2021 03:02 PM    Chloride 111 (H) 08/11/2021 03:02 PM    CO2 27 08/11/2021 03:02 PM    BUN 25 (H) 08/11/2021 03:02 PM    Creatinine 1.60 (H) 08/11/2021 03:02 PM    Calcium 8.4 (L) 08/11/2021 03:02 PM    Magnesium 2.2 07/22/2021 09:11 AM    Phosphorus 2.9 07/22/2021 09:11 AM      Lab Results   Component Value Date/Time    Alk. phosphatase 88 08/11/2021 03:02 PM    Protein, total 6.8 08/11/2021 03:02 PM    Albumin 3.1 (L) 08/11/2021 03:02 PM    Globulin 3.7 08/11/2021 03:02 PM     Lab Results   Component Value Date/Time    INR 1.0 04/29/2020 11:15 PM    Prothrombin time 10.6 04/29/2020 11:15 PM    aPTT 23.9 04/29/2020 11:15 PM      No results found for: IRON, FE, TIBC, IBCT, PSAT, FERR     Detail chart reviewed. Other specialists and referral provider notes reviewed.     MRI C- spine, 4/21  IMPRESSION  Scattered osseous metastatic disease is demonstrated at the dens, at C5, at C7  and at T1. Small foci of abnormal marrow signal intensity with associated  fracture or dislocation.     Multilevel degenerative change of the cervical spine was greater on the right.     Moderate to severe right foraminal stenosis at C5-6 and severe right foraminal  stenosis at C6-7.     Additional, less severe degenerative findings are as described in detail above.      CONTROLLED SUBSTANCES SAFETY ASSESSMENT (IF ON CONTROLLED SUBSTANCES):     Reviewed opioid safety handout:  [x] Yes   [] No  24 hour opioid dose >150mg morphine equivalent/day:  [] Yes   [x] No  Benzodiazepines:  [] Yes   [x] No  Sleep apnea:  [] Yes   [x] No  Urine Toxicology Testing within last 6 months:  [] Yes   [x] No  History of or new aberrant medication taking behaviors:  [] Yes   [x] No  Has Narcan been prescribed [x] Yes   [] No          Total time: 60 minutes  > 50% counseling / coordination?:  Yes, as summarized within this note

## 2021-08-16 NOTE — PROGRESS NOTES
Palliative Medicine Office Visit  Palliative Medicine Nurse Check In  [phone] ([phone])    Patient Name: Evaristo Duke  YOB: 1945      Date of Office Visit: 8/16/2021    Patient states: \"  \"    From Check In Sheet (scanned in Media):  Has a medical provider talked with you about cardiopulmonary resuscitation (CPR)? [x] Yes   [] No   [] Unable to obtain    Nurse reminder to complete or update ACP FlowSheet:    Is ACP on the Problem List?    [x] Yes    [] No  IF ACP Document is ON FILE; Nurse to place ACP on Problem List     Is there an ACP Note in Chart Review/Note? [x] Yes    [] No   If NO: ALERT PROVIDER       Primary Decision MakerEmBanner [phone]  Advance Care Planning 8/16/2021   Patient's Healthcare Decision Maker is: Named in scanned ACP document   Confirm Advance Directive Yes, on file   Patient Would Like to Complete Advance Directive -         Is there anything that we should know about you as a person in order to provide you the best care possible? Have you been to the ER, urgent care clinic since your last visit? [x] Yes   [] No   [] Unable to obtain    Have you been hospitalized since your last visit? [] Yes   [x] No   [] Unable to obtain    Have you seen or consulted any other health care providers outside of the [address] [place] [address] since your last visit?    [] Yes   [x] No   [] Unable to obtain    Functional status (describe):         Last BM:8/16/2021     accessed (date): 8/16/2021  Bottle review (for opioid pain medication):  Medication 1:   Date filled:   Directions:   # filled:   # left:   # pills taking per day:  Last dose taken:    Medication 2:   Date filled:   Directions:   # filled:   # left:   # pills taking per day:  Last dose taken:    Medication 3:   Date filled:   Directions:   # filled:   # left:   # pills taking per day:  Last dose taken:    Medication 4:   Date filled:   Directions:   # filled:   # left:   # pills taking per day:  Last dose taken:

## 2021-08-16 NOTE — PATIENT INSTRUCTIONS
Dear Jennifer Guido,     It was a pleasure seeing you today in [facility]     This is the plan we talked about:     1. Intractable right arm radiculopathy  -You think your pain is about the same overall. You are starting to get some numbness in your fingers.  -Your pain bothers you the most at night.  -The gabapentin has helped a little. You are taking gabapentin 100 mg three times daily.  -We can try increasing your nighttime dose of gabapentin to 200 mg at night for one week. If you do well with that, then increase your morning gabapentin to 200 mg for one week. If you do well with that, then increase your afternoon dose to 200 mg for one week. Please call me if this plan causes you any negative symptoms or effects. Please call me if you decide that you only want to increase the gabapentin at night and not during the other times of day. -You occasionally take an oxycodone 5 mg at night. You are hesitant to take opioids because the idea is scary to you. It does make you sleepy when you take it.  -Let us know when you have 5-10 pills of oxycodone and gabapentin left so we can refill those for you.     2.  Repeated oral/tooth infection  -You have been struggling with repeated tooth infection since September 2020. You had several teeth removed and you think you have another infection. You are back on Amoxicillin antibiotic.     3.  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    -You are losing a lot of water and electrolytes this way.  -Dr. Sujey Whittaker started you on Xermelo and this is helping.  -Your amoxicillin has been increasing your diarrhea.  -Hopefully your diarrhea will improve when you are done with the antibiotic.     4.  Water and electrolytes  -You went to the Emergency Department recently for dizziness.  You stopped your metoprolol after that visit because your heart rate was low.  -I recommend following up with your Primary Care Physician about your Blood Pressure.  -Keeping yourself hydrated with at least 100 ounces of water a day, lemonade, electrolyte drinks such as Gatorade/Powerade, electrolyte powder mixed in water, etc. is very important every day. -Please have at least 2 bananas per day. -Eat green leafy vegetables and fresh fruits and vegetables every day at least 2-3 servings per day which will help replace electrolytes that you are losing with constant diarrhea. 5. Fatigue  -You have chronic fatigue.  -You prefer to not take too many medications, so you would like to hold off on medications for now. -Please let me know if you change your mind. 6. Appetite:  -You think your appetite has been pretty good. 7. Mood:  -Sometimes you can feel lama.  -You do not take any medication for this.  -Increasing your gabapentin may help some with your anxiety.  -You would like to increase your walking on the treadmill if you can--you think this will help your mood. -You will try to think about some things you can do to keep yourself busy/active.  -Please let me know if you are interested in discussing medication for your mood. -Please let me know if you would like to speak with our .

## 2021-08-18 NOTE — PROGRESS NOTES
Jaleesa Soares is a 77 y.o. 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 Treatment today:  Cycle 33 Day 1 Xgeva + Somatuline Depot    1. Have you been to the ER, urgent care clinic since your last visit? Hospitalized since your last visit? 8/11/21 for dizziness    2. Have you seen or consulted any other health care providers outside of the [address] [place] [address] since your last visit? Include any pap smears or colon screening. Pt states he does not feel too good in the mornings. Usually takes him a little while to feel ok. He has passed out twice and ended up going to ER on 8/11/21.

## 2021-08-19 NOTE — LETTER
8/19/2021    Patient: Lela Pearson   YOB: 1945   Date of Visit: 8/19/2021     Janie Motta, [address]  [address] [phone]  Via Fax: [phone]    Dear Janie Motta MD,      Thank you for referring Mr. Marya Cuadra to [address] [place] [address] for evaluation. My notes for this consultation are attached. If you have questions, please do not hesitate to call me. I look forward to following your patient along with you.       Sincerely,    Lotus Adkins NP

## 2021-08-19 NOTE — PROGRESS NOTES
[address] [facility] [address], [address] [facility], [address] [place] [address]  [phone]       Oncology Follow up Note      Patient: Wolf Moya MRN: [id number]  SSN: [id number]    YOB: 1945  Age: 77 y.o. Sex: male        Diagnosis:     1. 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 Afinitor 10 mg + Somatuline Depot 120 mg SC - started Afinitor 3/30/2019 -  2. Somatuline Depot 120 mg SC - 05/2016 - 11/2018; Restarted 3/4/2019 -    (treatment held per patient request since11/16/2018)   3. Xgeva 120mg SC     Subjective:      Wolf Moya is a 77 y.o. male with a diagnosis of metastatic neuroendocrine cancer. He started experiencing pain in both groins and the abdomen. A CT of the abdomen showed retroperitoneal adenopathy. CT guided biopsy of the retroperitoneal LN reveals a dx of well differentiated neuroendocrine carcinoma. Bone scan shows disease in the bone. Mr. Scooter Pedro has been receiving Somatuline and taking everolimus. 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 Diarrhea is better now. He is tolerating treatment well. He feels unwell in the AM. Few weeks ago he was in the ED with syncopal episode. It was felt to be related to hypotension and bradycardia from anti-HTN meds. Lisinopril was taken off and Metoprolol dose was reduced. He is having problems with healing of infection in a tooth in the upper jaw.          Review of Systems:    Constitutional: fatigue  Eyes: negative  Ears, Nose, Mouth, Throat, and Face: negative  Respiratory: negative  Cardiovascular: negative  Gastrointestinal: less diarrhea   Genitourinary: negative  Integument/Breast: negative  Hematologic/Lymphatic: negative  Musculoskeletal: negative  Neurological: negative        Past Medical History:   Diagnosis Date    Adverse effect of anesthesia     low HR and very slow to wakeup    Cancer ([facility] [address] 2016    neuroendocrine, retroperitoneal LN involvement    Diarrhea     Frequent urination     Hypertension     Poor appetite     Unspecified adverse effect of anesthesia     \"hard to put to sleep and slow to wake up-heart rate dropped very low\"     Past Surgical History:   Procedure Laterality Date    COLONOSCOPY N/A 2016    COLONOSCOPY performed by Gabby Santiago MD at [address] [place] [address]  2016         HX ORTHOPAEDIC      knee scope left knee    HX OTHER SURGICAL  2013    excision of scalp cyst     UPPER GI ENDOSCOPY,BIOPSY  2016           Family History   Problem Relation Age of Onset    Cancer Mother     Stroke Sister      Social History     Tobacco Use    Smoking status: Former Smoker     Packs/day: 1.00     Years: 8.00     Pack years: 8.00     Quit date: 1972     Years since quittin.3    Smokeless tobacco: Never Used   Substance Use Topics    Alcohol use: No      Prior to Admission medications    Medication Sig Start Date End Date Taking? Authorizing Provider   meclizine (ANTIVERT) 25 mg tablet Take 25 mg by mouth three (3) times daily as needed for Dizziness. Yes Provider, Historical   lisinopriL (PRINIVIL, ZESTRIL) 20 mg tablet Take 20 mg by mouth daily. Yes Provider, Historical   gabapentin (NEURONTIN) 100 mg capsule Take 1 Capsule by mouth three (3) times daily. Max Daily Amount: 300 mg. 21  Yes Tom Polk MD   metoprolol tartrate (LOPRESSOR) 25 mg tablet TAKE 1 TABLET TWICE DAILY  Patient not taking: Reported on 2021   Suellen Lo MD   ondansetron (ZOFRAN ODT) 4 mg disintegrating tablet Take 1 Tablet by mouth every eight (8) hours as needed for Nausea or Vomiting. 21  Yes Los Stevenson MD   gabapentin (NEURONTIN) 100 mg capsule Take 1 Capsule by mouth nightly.  Max Daily Amount: 100 mg. 21  Yes Los Stevenson MD   everolimus (Afinitor) 10 mg tab Take 1 Tab by mouth daily. 8/17/20  Yes Osmin Sanches MD   amoxicillin (AMOXIL) 500 mg capsule  6/28/21   Provider, Historical   telotristat etiprate (Xermelo) 250 mg tab Take 250 mg by mouth three (3) times daily for 90 days. Indications: carcinoid syndrome diarrhea  Patient not taking: Reported on 6/16/2021 6/9/21 9/7/21  Osmin Sanches MD          No Known Allergies        Objective:     Visit Vitals  /86   Pulse 85   Temp 97.8 °F (36.6 °C)   Resp 18   Ht 6' (1.829 m)   Wt 163 lb (73.9 kg)   SpO2 100%   BMI 22.11 kg/m²     Pain Scale: 0 - No pain/10    Physical Exam:     GENERAL: alert, cooperative, thin  EYE: negative, no pallor or icterus  LYMPHATIC: Cervical, supraclavicular, and axillary nodes normal.   THROAT & NECK: normal and no erythema or exudates noted. LUNG: clear to auscultation bilaterally  HEART: regular rate and rhythm, no murmur  ABDOMEN: soft, non-tender on palpation  EXTREMITIES: no cyanosis or edema  SKIN: Normal. No rash or ecchymosis  NEUROLOGIC: numbness in RUE starting from the scapula to the forearm      Physical exam was pulled in from a previous visit. No changes were made d/t physical exam remains the same. IMAGING:     CT Results (most recent):  Results from Hospital Encounter encounter on 02/17/21    CT ABD PELV W CONT    Narrative  EXAM: CT ABD PELV W CONT    INDICATION: restaging of disease endocrine carcinoma    COMPARISON: November 10, 2020    CONTRAST: 100 mL of Isovue-370. TECHNIQUE:  Following the uneventful intravenous administration of contrast, thin axial  images were obtained through the abdomen and pelvis. Coronal and sagittal  reconstructions were generated. Oral contrast was administered. CT dose  reduction was achieved through use of a standardized protocol tailored for this  examination and automatic exposure control for dose modulation. FINDINGS:  LOWER THORAX: A small interstitial infiltrate at the a right lung base medially  appear more prominent.  A diffuse basilar interstitial pattern is also slightly  more prominent. Please correlate clinically. This could be related to some  inflammatory changes. There is chronic lung disease. LIVER: No change  BILIARY TREE: Gallstones within a nondistended gallbladder. . CBD is not dilated. SPLEEN: within normal limits. PANCREAS: No mass or ductal dilatation. ADRENALS: Unremarkable. KIDNEYS: Left renal calcification lower pole unchanged. Prominence of both  ureters. Unchanged. STOMACH: Unremarkable. Mesenteric mass is unchanged. .  Some wall thickening in the cecum and possibly left upper quadrant jejunum  demonstrated. Unknown clinical significance. APPENDIX: Appears normal  PERITONEUM: No ascites or pneumoperitoneum. RETROPERITONEUM: No aortic aneurysm. Retroperitoneal adenopathy unchanged. URINARY BLADDER: No change bladder wall thickening, prostate hypertrophy with  implants. Prominence of the distal left ureter. BONES: Bone metastases unchanged. ABDOMINAL WALL: No mass or hernia. ADDITIONAL COMMENTS: N/A    Impression  1. Small basilar lung infiltrates should be followed up. 2. Stable mesenteric mass and retroperitoneal adenopathy. 3. Stable bone metastases. 4. Some wall thickening in the cecum stable, possible new wall thickening left  upper quadrant jejunum. I reviewed the imaging personally. Stable disease in the retroperitoneal nodes.        Lab Results   Component Value Date/Time    WBC 5.1 08/19/2021 09:10 AM    Hemoglobin (POC) 14.6 12/21/2017 09:10 AM    HGB 11.3 (L) 08/19/2021 09:10 AM    Hematocrit (POC) 30 (L) 04/29/2021 09:03 AM    HCT 35.8 (L) 08/19/2021 09:10 AM    PLATELET 832 42/88/4125 09:10 AM    MCV 76.0 (L) 08/19/2021 09:10 AM       Lab Results   Component Value Date/Time    Sodium 143 08/19/2021 09:10 AM    Potassium 3.1 (L) 08/19/2021 09:10 AM    Chloride 112 (H) 08/19/2021 09:10 AM    CO2 28 08/19/2021 09:10 AM    Anion gap 3 (L) 08/19/2021 09:10 AM    Glucose 123 (H) 08/19/2021 09:10 AM    BUN 18 08/19/2021 09:10 AM    Creatinine 1.59 (H) 08/19/2021 09:10 AM    BUN/Creatinine ratio 11 (L) 08/19/2021 09:10 AM    GFR est AA 52 (L) 08/19/2021 09:10 AM    GFR est non-AA 43 (L) 08/19/2021 09:10 AM    Calcium 8.8 08/19/2021 09:10 AM    Bilirubin, total 0.6 08/19/2021 09:10 AM    Alk. phosphatase 88 08/19/2021 09:10 AM    Protein, total 7.1 08/19/2021 09:10 AM    Albumin 3.3 (L) 08/19/2021 09:10 AM    Globulin 3.8 08/19/2021 09:10 AM    A-G Ratio 0.9 (L) 08/19/2021 09:10 AM    ALT (SGPT) 21 08/19/2021 09:10 AM     PET Results (most recent):  Results from Hospital Encounter encounter on 08/04/21    PET/CT NE TUMOR IMAGE SKULL THIGH DOTATATE (SUBI)    Narrative  PET/CT SCAN    PROCEDURE: Following IV injection of 5.54 mCi GA-68 Dotatate and a standard  uptake delay, PET imaging is performed from the skull vertex to mid thigh and  axial, sagittal and coronal images were acquired. Unenhanced CT is obtained for  anatomic localization, and attenuation correction of the PET scan. CORRELATIVE IMAGING STUDIES: Abdomen pelvis CT 2/17/2021. PRIOR PET:  8/6/2020    HISTORY: The study is requested for restaging neuroendocrine carcinoma  metastatic to bone. FINDINGS:    HEAD/NECK: No apparent foci of abnormal hypermetabolism. Cerebral evaluation is  limited by normal intense activity. CHEST: The left supraclavicular, superior mediastinal, and paraesophageal lymph  nodes remain hypermetabolic but markedly improved compared to the prior exam.    ABDOMEN/PELVIS: Right retrocrural, peripancreatic, and retroperitoneal lymph  node activity has markedly improved. SKELETON: Foci of abnormal uptake throughout the skeleton have improved compared  to the prior examination. Impression  Overall improvement in the lymph node activity and osseous  metastatic foci activity compared to the prior examination. Assessment:     1.  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 Had excellent disease control. He experienced a number of side effects including hot flashes, weight loss etc  He decided to take a break from therapy. CT showed disease progression in retroperitoneum and abdomen. Restarted Somatuline. Symptoms improved  He has been on and off Afinitor. Last took in Aug 2020    Tolerating treatment   + diarrhea - better  + fatigue - stable  + bone pains are markedly improved    Weight is stable  Appetite is good    A detailed system by system evaluation of side effect was performed to assess chemotherapy related toxicity. Blood counts are acceptable. Results reviewed with the patient. PET CT - improved response      2. Bone metastasis    > Denosumab  > MRI Thoracic and cervical spine ( 4/18/2021) - no neural compromise. Widespread skeletal metastasis.   > Oxycodone      3. Urinary incontinence    BPH  Managed by Dr. Toby Irvin      4. 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 Orthostasis    Observation      6. HTN, uncontrolled    May need to add Amlodipine to Lisinopril. 7. Anemia from systemic therapy    Observation      8. Osteonecrosis of the jaw    D/C Denosumab      Plan:       > Discontinue Denosumab  > Continue Somatuline  > Xermelo  > oxycodone 5 mg  > Follow-up in 3 months      Signed by: Gildardo Torres MD                     August 19, 2021      CC. Mary Draper MD  CC.  Nicola Hernandez MD

## 2021-08-19 NOTE — PROGRESS NOTES
[facility] Visit Note    0900 Pt arrived at [facility] ambulatory and in no distress for Lanreotide + Xgeva. Assessment completed, pt had ED visit on 8/11/21 due to dizziness/fainting. BP meds were adjusted. Pt reports that he is still having issues with his teeth, therefore the Dejuan Hamman is being held today. Patient denied having any symptoms of COVID-19, i.e. SOB, coughing, fever, or generally not feeling well. Also denies having been exposed to COVID-19 recently or having had any recent contact with family/friend that has a pending COVID test.        Pt to MD office for follow-up appt. OK to treat entered for recent ED visit. Visit Vitals  /86 (BP 1 Location: Left upper arm, BP Patient Position: Sitting)   Pulse 85   Temp 97.8 °F (36.6 °C)   Resp 18   Ht 6' (1.829 m)   Wt 74 kg (163 lb 1.6 oz)   SpO2 100%   BMI 22.12 kg/m²     Labs drawn- CBC with diff, CMP, Magnesium, and Phosphorus    Recent Results (from the past 12 hour(s))   CBC WITH AUTOMATED DIFF    Collection Time: 08/19/21  9:10 AM   Result Value Ref Range    WBC 5.1 4.1 - 11.1 K/uL    RBC 4.71 4.10 - 5.70 M/uL    HGB 11.3 (L) 12.1 - 17.0 g/dL    HCT 35.8 (L) 36.6 - 50.3 %    MCV 76.0 (L) 80.0 - 99.0 FL    MCH 24.0 (L) 26.0 - 34.0 PG    MCHC 31.6 30.0 - 36.5 g/dL    RDW 16.3 (H) 11.5 - 14.5 %    PLATELET 666 333 - 712 K/uL    MPV 9.5 8.9 - 12.9 FL    NRBC 0.0 0  WBC    ABSOLUTE NRBC 0.00 0.00 - 0.01 K/uL    NEUTROPHILS 69 32 - 75 %    LYMPHOCYTES 16 12 - 49 %    MONOCYTES 10 5 - 13 %    EOSINOPHILS 4 0 - 7 %    BASOPHILS 1 0 - 1 %    IMMATURE GRANULOCYTES 0 0.0 - 0.5 %    ABS. NEUTROPHILS 3.5 1.8 - 8.0 K/UL    ABS. LYMPHOCYTES 0.8 0.8 - 3.5 K/UL    ABS. MONOCYTES 0.5 0.0 - 1.0 K/UL    ABS. EOSINOPHILS 0.2 0.0 - 0.4 K/UL    ABS. BASOPHILS 0.0 0.0 - 0.1 K/UL    ABS. IMM.  GRANS. 0.0 0.00 - 0.04 K/UL    DF AUTOMATED     MAGNESIUM    Collection Time: 08/19/21  9:10 AM   Result Value Ref Range    Magnesium 2.1 1.6 - 2.4 mg/dL   PHOSPHORUS    Collection Time: 08/19/21  9:10 AM   Result Value Ref Range    Phosphorus 3.0 2.6 - 4.7 MG/DL   METABOLIC PANEL, COMPREHENSIVE    Collection Time: 08/19/21  9:10 AM   Result Value Ref Range    Sodium 143 136 - 145 mmol/L    Potassium 3.1 (L) 3.5 - 5.1 mmol/L    Chloride 112 (H) 97 - 108 mmol/L    CO2 28 21 - 32 mmol/L    Anion gap 3 (L) 5 - 15 mmol/L    Glucose 123 (H) 65 - 100 mg/dL    BUN 18 6 - 20 MG/DL    Creatinine 1.59 (H) 0.70 - 1.30 MG/DL    BUN/Creatinine ratio 11 (L) 12 - 20      GFR est AA 52 (L) >60 ml/min/1.73m2    GFR est non-AA 43 (L) >60 ml/min/1.73m2    Calcium 8.8 8.5 - 10.1 MG/DL    Bilirubin, total 0.6 0.2 - 1.0 MG/DL    ALT (SGPT) 21 12 - 78 U/L    AST (SGOT) 26 15 - 37 U/L    Alk. phosphatase 88 45 - 117 U/L    Protein, total 7.1 6.4 - 8.2 g/dL    Albumin 3.3 (L) 3.5 - 5.0 g/dL    Globulin 3.8 2.0 - 4.0 g/dL    A-G Ratio 0.9 (L) 1.1 - 2.2         Medications received:  Lanreotide 120 mg SQ in R buttock    1035 Tolerated treatment well, no adverse reaction noted. D/Cd from [facility] ambulatory and in no distress accompanied by spouse. Next appt 9/16/21 @ 1000.

## 2021-09-09 NOTE — TELEPHONE ENCOUNTER
Triage for Controlled Substance Refill Request    Pain Diagnosis:  Intractable right arm radiculopathy    Last Outpatient Visit: 8/16/21    Next Outpatient Visit: 10/5/21    Reason for refill needed outside of office visit?  Appointment not scheduled prior to need for scheduled refill    -Pain escalation requiring increased medication  -Appointment scheduled but missed or moved prior to need for scheduled refill    Pharmacy: [facility]    Medication: Gabapentin 100 mg   Dose and directions:2 tabs TID  Number dispensed: 90  Date filled ( or Pharmacy):8/21/21  # left: 10    Medication: Oxycodone 5 mg   Dose and directions: 1 tab daily prn  Number Dispensed  Date filled ( or Pharmacy): 6/4/121     reviewed: Yes    Date of Urine Drug Screen:  n/a    Opioid Safety Handout given:  yes    Appropriate for refill: Yes    Action: Please sign

## 2021-09-09 NOTE — TELEPHONE ENCOUNTER
The patient is calling to request a refill for Oxycodone and Gabapentin. He is down to 10 pills for each. To be called in to [address]. If they do not have it, please go through [facility]. He also needs to reschedule his 9/28 appt as he is having family visit from out of town.   # [phone]

## 2021-09-16 NOTE — PROGRESS NOTES
Outpatient Infusion Center Progress Note    Pt admit to [facility] for Lanreotide in stable condition. Assessment completed. Patient denied having any symptoms of COVID-19, i.e. SOB, coughing, fever, or generally not feeling well. Also denies having been exposed to COVID-19 recently or having had any recent contact with family/friend that has a pending COVID test.     Labs drawn peripherally R AC. See [facility] for pending labs. Patient Vitals for the past 12 hrs:   Temp Pulse Resp BP SpO2   09/16/21 1002 98 °F (36.7 °C) 82 18 (!) 159/98 100 %        Recent Results (from the past 12 hour(s))   CBC WITH AUTOMATED DIFF    Collection Time: 09/16/21 10:15 AM   Result Value Ref Range    WBC 5.6 4.1 - 11.1 K/uL    RBC 4.70 4. 10 - 5.70 M/uL    HGB 11.2 (L) 12.1 - 17.0 g/dL    HCT 36.2 (L) 36.6 - 50.3 %    MCV 77.0 (L) 80.0 - 99.0 FL    MCH 23.8 (L) 26.0 - 34.0 PG    MCHC 30.9 30.0 - 36.5 g/dL    RDW 16.1 (H) 11.5 - 14.5 %    PLATELET 278 737 - 331 K/uL    MPV 9.6 8.9 - 12.9 FL    NRBC 0.0 0  WBC    ABSOLUTE NRBC 0.00 0.00 - 0.01 K/uL    NEUTROPHILS PENDING %    LYMPHOCYTES PENDING %    MONOCYTES PENDING %    EOSINOPHILS PENDING %    BASOPHILS PENDING %    IMMATURE GRANULOCYTES PENDING %    ABS. NEUTROPHILS PENDING K/UL    ABS. LYMPHOCYTES PENDING K/UL    ABS. MONOCYTES PENDING K/UL    ABS. EOSINOPHILS PENDING K/UL    ABS. BASOPHILS PENDING K/UL    ABS. IMM. GRANS. PENDING K/UL    DF PENDING         Medications:  Medications Administered     lanreotide (SOMATULINE DEPOT) injection syringe 120 mg     Admin Date  09/16/2021 Action  Given Dose  120 mg Route  SubCUTAneous Administered By  Alejandro Miner               Pt states he continues to have issues w/ teeth and cannot take Xgeva for now. Pt tolerated treatment well. D/c home in no distress. Pt aware of next [facility] appointment scheduled for: 10/14/2021 at 0900.      Future Appointments   Date Time Provider Mark De La Paz   10/5/2021 10:30 AM Capri Ravi MD [facility] AM[facility]   10/14/2021  9:00 AM BLOUNT FASTRACK 1 [facility]   11/11/2021  9:00 AM BLOUNT FASTRACK 1 [facility]   11/11/2021  9:15 AM Kamran Schafer MD [facility] AMB   12/9/2021  9:00 AM BLOUNT FASTRACK 1 [facility]

## 2021-10-12 NOTE — PROGRESS NOTES
Palliative Medicine Outpatient Services  [place]: [phone] ([phone]    Patient Name: Ross Jimenez  YOB: 1945    Date of Current Visit: 10/12/21  Location of Current Visit:    [] [facility] Office  [] [facility] Office  [x] [address] [place] [address] Office  [] Home  []Synchronous real-time A/V virtual visit    Date of Initial Visit: 6/16/2021  Referral from: Dr. Farrel Saint  Primary Care Physician: Ruth Whitaker MD      SUMMARY:   Ross Jimenez is a 77y.o. year old with a  history of neuroendocrine carcinoma initial diagnosis in 2016, followed by remission status post treatment, suffered progression of disease in 2018 and has been on treatment since, his most recent progression of disease is to the bones who was referred to Palliative Medicine by Dr. Farrel Saint for management of pain and psychosocial support. .  The patients social history includes lives at home with his wife Joanna Saucedo. Has very good friends and family support. Current treatment-Denosumab       PALLIATIVE DIAGNOSES:       ICD-10-CM ICD-9-CM    1. Cervical radiculopathy  M54.12 723.4    2. 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 Functional diarrhea  K59.1 564.5    4. Neoplastic malignant related fatigue  R53.0 780.79    5. Dizziness  R42 780.4           PLAN:   Patient Instructions   Dear Ross Jimenez,     It was a pleasure seeing you today in [address] [place] [address]     This is the plan we talked about:     1.  Intractable right arm radiculopathy  -You think your pain is about the same overall. Normally it is an ache. Some days it hurts, and some days it doesn't.  -Your pain bothers you the most at night.  -Now you are taking gabapentin 1 in the morning, 1 at lunch, 1 at supper, 1 at bedtime. You found that when you took 2 at bedtime that it made your lips feel swollen. You are not having any side effects with this regimen.  -You occasionally take an oxycodone 5 mg at night.  You are hesitant to take opioids because the idea is scary to you. It does make you sleepy when you take it. You only take it if you have to. You feel you don't reach this need very often. You have never been a medication person.   -Let us know when you have 5-10 pills of oxycodone and gabapentin left so we can refill those for you.     2.  Repeated oral/tooth infection  -You are going to the oral surgeon tomorrow.  -You don't think your previous courses of antibiotics made a big difference. -You are still using an anti-bacterial mouthwash.     3.  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    -Dr. Aranza Rodriguez started you on Delmy Harden and this is helping.  -Now you are having about 3 BMs daily on average.  -Antibiotics makes your diarrhea worse.     4.  Water and electrolytes  -You went to the Emergency Department prior to your last visit with us in August. After that ED trip you stopped your metoprolol after that visit because your heart rate was low.  -I recommended following up with your Primary Care Physician about your Blood Pressure--there were no other changes changes to your medication after your most recent visit.  -Keeping yourself hydrated with at least 100 ounces of water a day, lemonade, electrolyte drinks such as Gatorade/Powerade, electrolyte powder mixed in water, etc. is very important every day.     5. Fatigue  -You have chronic fatigue. The intensity waxes and wanes. Working for a day will make you really tired. -You do sleep well.  -You prefer to not take too many medications, so you would like to hold off on medications for now. -Please let me know if you change your mind.     6. Appetite:  -You think your appetite has been really good.     7. Mood:  -Sometimes you can feel lama, but you have good days more than bad days. Sometimes you feel like you have less patience than previously. -You do not take any medication for this.  -You have good support at home with family, friends, [employer].   -Please let me know if you are interested in discussing medication for your mood. -Please let me know if you would like to speak with our . 8. Dizziness:  -You were prescribed meclizine by Dr. Thanh Merritt while in the ED previously. -You find that the meclizine helps very quickly you when you get your random dizzy episodes.        Follow up in 3 months after the holidays. GOALS OF CARE / TREATMENT PREFERENCES:   [====Goals of Care====]  GOALS OF CARE:  Patient / health care proxy stated goals: See Patient Instructions / Summary    TREATMENT PREFERENCES:   Code Status:  [x] Attempt Resuscitation       [] Do Not Attempt Resuscitation    Advance Care Planning:  [x] The [employer] Interdisciplinary Team has updated the ACP Navigator with Decision Maker and Patient Capacity      Primary Decision MakeDocciaran Leonard [phone]  Advance Care Planning 8/19/2021   Patient's Healthcare Decision Maker is: -   Confirm Advance Directive Yes, on file   Patient Would Like to Complete Advance Directive -       Other:  (If patient appropriate for POST, consider using PALLPOST smart phrase here)    The palliative care team has discussed with patient / health care proxy about goals of care / treatment preferences for patient.  [====Goals of Care====]     PRESCRIPTIONS GIVEN:     Medications Ordered Today   Medications    meclizine (ANTIVERT) 25 mg tablet     Sig: Take 1 Tablet by mouth three (3) times daily as needed for Dizziness.      Dispense:  90 Tablet     Refill:  0           FOLLOW UP:     Future Appointments   Date Time Provider Mark Ana Cristina   10/14/2021  9:00 AM BLOUNT FASTRACK 1 [facility] REG   11/11/2021  9:00 AM BLOUNT FASTRACK 1 [facility] REG   11/11/2021  9:15 AM Gilberto Nguyen MD ONC BS AMB   12/9/2021  9:00 AM BLOUNT FASTRACK 1 [facility] REG   1/3/2022 10:30 AM Jack Myers MD [facility] BS AMB           PHYSICIANS INVOLVED IN CARE:   Patient Care Team:  Otoniel Kilpatrick MD as PCP - General (Family Medicine)  Rose Olivo MD as PCP - [facility] Empaneled Provider  Deanne Richter MD as Surgeon (General Surgery)       HISTORY:   Reviewed patient-completed ESAS and advance care planning form. Reviewed patient record in prescription monitoring program.    CHIEF COMPLAINT:   Chief Complaint   Patient presents with    Other     Neuropathy       HPI/SUBJECTIVE:    The patient is: [x] Verbal / [] Nonverbal     10/12: Patient is here today with his wife. He did not like the way taking 200 mg of gabapentin at once made him feel, so now he is taking gabapentin 100 mg four times daily. See plan for further details. __    8/16: You think your pain is about the same overall. You are starting to get some numbness in your fingers. See plan for further details. He recently had a PET scan and was told the results showed improvement in disease. ---    Patient doing a bit better with regular oxycodone. Working with physical therapy and this is helping as well. See plan for details    ---    Patient seen along with his wife today. Very pleasant gentleman. Right arm pain started about 3 months ago and it has been progressively worse. Pain starts in the right mid shoulder and radiates down his right arm. He started having tingling and numbness in the right arm over the last 6 weeks. He has been doing physical therapy which somewhat helps. He was started on oxycodone 5 mg but it made him very sleepy and he was very worried about taking opioids and therefore has not been taking it. Has been struggling with severe diarrhea for many many years. He has a bowel movement after every meal as well. He has had 2 spells of dizziness, one resulting in ER visit several months ago and 1 where the rescue squad was called but he recovered by the time they reached his home. Both times, he was outside doing something and called out to his wife for help due to feeling very poorly.   He did not lose consciousness but did have word finding difficulty but recovered within a few minutes. Wife did not check the blood pressure when he was down. Last episode was several weeks ago. Admits to not drinking enough water, likes Dr. Stephie Steen. Has been struggling with repeated oral infections as well. Will call his oromaxillary facial surgeon today. Clinical Pain Assessment (nonverbal scale for nonverbal patients):   [++++ Clinical Pain Assessment++++]  [++++Pain Severity++++]: Pain: 4  [++++Pain Character++++]:  [++++Pain Duration++++]:  [++++Pain Effect++++]:  [++++Pain Factors++++]:  [++++Pain Frequency++++]:  [++++Pain Location++++]:  [++++ Clinical Pain Assessment++++]       FUNCTIONAL ASSESSMENT:     Palliative Performance Scale (PPS):  PPS: 70       PSYCHOSOCIAL/SPIRITUAL SCREENING:     Any spiritual / Mandaeism concerns:  [] Yes /  [x] No    Caregiver Burnout:  [] Yes /  [x] No /  [] No Caregiver Present      Anticipatory grief assessment:   [x] Normal  / [] Maladaptive       ESAS Anxiety: Anxiety: 4    ESAS Depression: Depression: 2       REVIEW OF SYSTEMS:     The following systems were [x] reviewed / [] unable to be reviewed  Systems: constitutional, ears/nose/mouth/throat, respiratory, gastrointestinal, genitourinary, musculoskeletal, integumentary, neurologic, psychiatric, endocrine. Positive findings noted below. Modified ESAS Completed by: provider   Fatigue: 5 Drowsiness: 5   Depression: 2 Pain: 4   Anxiety: 4 Nausea: 0   Anorexia: 0 Dyspnea: 0   Best Well-Bein Constipation: No   Other Problem (Comment): 0          PHYSICAL EXAM:     Wt Readings from Last 3 Encounters:   10/12/21 162 lb (73.5 kg)   21 161 lb 11.2 oz (73.3 kg)   21 163 lb 1.6 oz (74 kg)     Blood pressure (!) 173/99, pulse 67, temperature 97.2 °F (36.2 °C), temperature source Temporal, resp. rate 18, height 6' (1.829 m), weight 162 lb (73.5 kg), SpO2 98 %.   Last bowel movement: See Nursing Note    Constitutional: Alert, oriented x4, sitting up in chair conversational in NAD  Eyes: pupils equal, anicteric  ENMT: no nasal discharge, dry mucous membranes, several missing teeth  Cardiovascular: regular rhythm, distal pulses intact  Respiratory: breathing not labored, symmetric  Gastrointestinal: soft, non-tender, +bowel sounds  Musculoskeletal: No tenderness on palpation but patient had limited mobility of his right arm due to pain. Skin: warm, dry  Neurologic: following commands, moving all extremities  Psychiatric: full affect, appropriate conversation and behavior       HISTORY:     Past Medical History:   Diagnosis Date    Adverse effect of anesthesia     low HR and very slow to wakeup    Cancer ([address] 2016    neuroendocrine, retroperitoneal LN involvement    Diarrhea     Frequent urination     Hypertension     Poor appetite     Unspecified adverse effect of anesthesia     \"hard to put to sleep and slow to wake up-heart rate dropped very low\"      Past Surgical History:   Procedure Laterality Date    COLONOSCOPY N/A 2016    COLONOSCOPY performed by Delmy Lundberg MD at [address]  2016         HX ORTHOPAEDIC      knee scope left knee    HX OTHER SURGICAL  2013    excision of scalp cyst     UPPER GI ENDOSCOPY,BIOPSY  2016           Family History   Problem Relation Age of Onset    Cancer Mother     Stroke Sister       History reviewed, no pertinent family history. Social History     Tobacco Use    Smoking status: Former Smoker     Packs/day: 1.00     Years: 8.00     Pack years: 8.00     Quit date: 1972     Years since quittin.4    Smokeless tobacco: Never Used   Substance Use Topics    Alcohol use: No     No Known Allergies   Current Outpatient Medications   Medication Sig    meclizine (ANTIVERT) 25 mg tablet Take 1 Tablet by mouth three (3) times daily as needed for Dizziness.     gabapentin (NEURONTIN) 100 mg capsule Take 2 Capsules by mouth three (3) times daily. Max Daily Amount: 600 mg.    gabapentin (NEURONTIN) 100 mg capsule TAKE 1 CAPSULE BY MOUTH THREE TIMES DAILY    lisinopriL (PRINIVIL, ZESTRIL) 20 mg tablet Take 20 mg by mouth daily.  metoprolol tartrate (LOPRESSOR) 25 mg tablet TAKE 1 TABLET TWICE DAILY (Patient not taking: Reported on 8/16/2021)    everolimus (Afinitor) 10 mg tab Take 1 Tab by mouth daily.  amoxicillin (AMOXIL) 500 mg capsule  (Patient not taking: Reported on 8/19/2021)    ondansetron (ZOFRAN ODT) 4 mg disintegrating tablet Take 1 Tablet by mouth every eight (8) hours as needed for Nausea or Vomiting. (Patient not taking: Reported on 10/12/2021)    gabapentin (NEURONTIN) 100 mg capsule Take 1 Capsule by mouth nightly. Max Daily Amount: 100 mg. No current facility-administered medications for this visit. LAB and other DATA REVIEWED:     Lab Results   Component Value Date/Time    WBC 5.6 09/16/2021 10:15 AM    HGB 11.2 (L) 09/16/2021 10:15 AM    PLATELET 412 83/28/8686 10:15 AM     Lab Results   Component Value Date/Time    Sodium 144 09/16/2021 10:15 AM    Potassium 3.8 09/16/2021 10:15 AM    Chloride 114 (H) 09/16/2021 10:15 AM    CO2 27 09/16/2021 10:15 AM    BUN 24 (H) 09/16/2021 10:15 AM    Creatinine 1.37 (H) 09/16/2021 10:15 AM    Calcium 8.7 09/16/2021 10:15 AM    Magnesium 2.2 09/16/2021 10:15 AM    Phosphorus 3.1 09/16/2021 10:15 AM      Lab Results   Component Value Date/Time    Alk. phosphatase 127 (H) 09/16/2021 10:15 AM    Protein, total 7.1 09/16/2021 10:15 AM    Albumin 3.0 (L) 09/16/2021 10:15 AM    Globulin 4.1 (H) 09/16/2021 10:15 AM     Lab Results   Component Value Date/Time    INR 1.0 04/29/2020 11:15 PM    Prothrombin time 10.6 04/29/2020 11:15 PM    aPTT 23.9 04/29/2020 11:15 PM      No results found for: IRON, FE, TIBC, IBCT, PSAT, FERR     Detail chart reviewed. Other specialists and referral provider notes reviewed.     PET scan 8/4/21:  FINDINGS:  HEAD/NECK: No apparent foci of abnormal hypermetabolism. Cerebral evaluation is  limited by normal intense activity. CHEST: The left supraclavicular, superior mediastinal, and paraesophageal lymph  nodes remain hypermetabolic but markedly improved compared to the prior exam.  ABDOMEN/PELVIS: Right retrocrural, peripancreatic, and retroperitoneal lymph  node activity has markedly improved. SKELETON: Foci of abnormal uptake throughout the skeleton have improved compared  to the prior examination. IMPRESSION  Overall improvement in the lymph node activity and osseous  metastatic foci activity compared to the prior examination. MRI C- spine, 4/21  IMPRESSION  Scattered osseous metastatic disease is demonstrated at the dens, at C5, at C7  and at T1. Small foci of abnormal marrow signal intensity with associated  fracture or dislocation. Multilevel degenerative change of the cervical spine was greater on the right. Moderate to severe right foraminal stenosis at C5-6 and severe right foraminal  stenosis at C6-7. Additional, less severe degenerative findings are as described in detail above.      CONTROLLED SUBSTANCES SAFETY ASSESSMENT (IF ON CONTROLLED SUBSTANCES):     Reviewed opioid safety handout:  [x] Yes   [] No  24 hour opioid dose >150mg morphine equivalent/day:  [] Yes   [x] No  Benzodiazepines:  [] Yes   [x] No  Sleep apnea:  [] Yes   [x] No  Urine Toxicology Testing within last 6 months:  [] Yes   [x] No  History of or new aberrant medication taking behaviors:  [] Yes   [x] No  Has Narcan been prescribed [x] Yes   [] No          Total time:   > 50% counseling / coordination?:

## 2021-10-12 NOTE — PATIENT INSTRUCTIONS
Dear Vandana Coleman,     It was a pleasure seeing you today in [facility] clinic     This is the plan we talked about:     1.  Intractable right arm radiculopathy  -You think your pain is about the same overall. Normally it is an ache. Some days it hurts, and some days it doesn't.  -Your pain bothers you the most at night.  -Now you are taking gabapentin 1 in the morning, 1 at lunch, 1 at supper, 1 at bedtime. You found that when you took 2 at bedtime that it made your feel swollen. You are not having any side effects with this regimen of 100 mg four times daily.  -You occasionally take an oxycodone 5 mg at night. We will refill this for you. You are hesitant to take opioids regularly because the idea is scary to you. It does make you sleepy when you take it. You only take it if you have to. You feel you don't reach this need very often. You have never been a medication person.      2.  Repeated oral/tooth infection  -You are going to the oral surgeon tomorrow.  -You don't think your previous courses of antibiotics made a big difference. -You are still using an anti-bacterial mouthwash.     3.  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    -Dr. Brayton Leventhal started you on Jennifer Furl and this is helping.  -Now you are having about 3 BMs daily on average.  -Antibiotics makes your diarrhea worse.     4.  Water and electrolytes  -You went to the Emergency Department prior to your last visit with us in August. After that ED trip you stopped your metoprolol after that visit because your heart rate was low.  -I recommended following up with your Primary Care Physician about your Blood Pressure--there were no other changes changes to your medication after your most recent visit.  -Keeping yourself hydrated with at least 100 ounces of water a day, lemonade, electrolyte drinks such as Gatorade/Powerade, electrolyte powder mixed in water, etc. is very important every day.     5.  Fatigue  -You have chronic fatigue. The intensity waxes and wanes. Working for a day will make you really tired. -You do sleep well.  -You prefer to not take too many medications, so you would like to hold off on medications for now. -Please let me know if you change your mind.     6. Appetite:  -You think your appetite has been really good.     7. Mood:  -Sometimes you can feel lama, but you have good days more than bad days. Sometimes you feel like you have less patience than previously. -You do not take any medication for this.  -You have good support at home with family, friends, Y[employer]. -Please let me know if you are interested in discussing medication for your mood. -Please let me know if you would like to speak with our . 8. Dizziness:  -You were prescribed meclizine by Dr. Mireya Mckinney while in the ED previously. I will refill this for you.  -You find that the meclizine helps very quickly you when you get your random dizzy episodes.        Follow up in 3 months after the holidays.

## 2021-10-27 NOTE — TELEPHONE ENCOUNTER
Returned call to patient/wife and she reported that patient was covering his fish pond today and slipped and fell and he took his Gabapentin @ 12 pm and Oxycodone @ 2 pm and just wanted to ensure that was ok. Inquired if patient hit his head or LOC - she denied and stated that he slipped and just hit his back. Advised it should be no issues with him taking the Gabapentin and Oxycodone, but he may benefit more from Ibuprofen ever 6-8  hours and apply heat 15-20 minutes at a time. Mrs. Orozco verbalized understanding

## 2021-10-27 NOTE — TELEPHONE ENCOUNTER
The patient is calling to confirm if he can take Oxycodone and Gabapentin at the same time. Please give him a call back [phone].

## 2021-11-11 NOTE — PROGRESS NOTES
[facility] Visit Note    0900 Pt arrived at [facility] ambulatory and in no distress for Lanreotide/Xgeva. Assessment completed. Pt had a fall 3 weeks ago. Soreness and bruising is healing. Holding Xgeva due to upcoming dental work on 11/24/21. Patient denied having any symptoms of COVID-19, i.e. SOB, coughing, fever, or generally not feeling well. Also denies having been exposed to COVID-19 recently or having had any recent contact with family/friend that has a pending COVID test.     Visit Vitals  BP (!) 146/89 (BP 1 Location: Left upper arm, BP Patient Position: Sitting)   Pulse 88   Temp 98 °F (36.7 °C)   Resp 18   Wt 72.5 kg (159 lb 12.8 oz)   SpO2 98%   BMI 21.67 kg/m²     Recent Results (from the past 12 hour(s))   CBC WITH AUTOMATED DIFF    Collection Time: 11/11/21  9:06 AM   Result Value Ref Range    WBC 5.8 4.1 - 11.1 K/uL    RBC 4.63 4.10 - 5.70 M/uL    HGB 11.3 (L) 12.1 - 17.0 g/dL    HCT 35.9 (L) 36.6 - 50.3 %    MCV 77.5 (L) 80.0 - 99.0 FL    MCH 24.4 (L) 26.0 - 34.0 PG    MCHC 31.5 30.0 - 36.5 g/dL    RDW 15.0 (H) 11.5 - 14.5 %    PLATELET 827 623 - 214 K/uL    MPV 9.6 8.9 - 12.9 FL    NRBC 0.0 0  WBC    ABSOLUTE NRBC 0.00 0.00 - 0.01 K/uL    NEUTROPHILS 73 32 - 75 %    LYMPHOCYTES 14 12 - 49 %    MONOCYTES 7 5 - 13 %    EOSINOPHILS 5 0 - 7 %    BASOPHILS 1 0 - 1 %    IMMATURE GRANULOCYTES 0 0.0 - 0.5 %    ABS. NEUTROPHILS 4.2 1.8 - 8.0 K/UL    ABS. LYMPHOCYTES 0.8 0.8 - 3.5 K/UL    ABS. MONOCYTES 0.4 0.0 - 1.0 K/UL    ABS. EOSINOPHILS 0.3 0.0 - 0.4 K/UL    ABS. BASOPHILS 0.1 0.0 - 0.1 K/UL    ABS. IMM.  GRANS. 0.0 0.00 - 0.04 K/UL    DF AUTOMATED     METABOLIC PANEL, COMPREHENSIVE    Collection Time: 11/11/21  9:06 AM   Result Value Ref Range    Sodium 141 136 - 145 mmol/L    Potassium 3.1 (L) 3.5 - 5.1 mmol/L    Chloride 108 97 - 108 mmol/L    CO2 27 21 - 32 mmol/L    Anion gap 6 5 - 15 mmol/L    Glucose 112 (H) 65 - 100 mg/dL    BUN 36 (H) 6 - 20 MG/DL    Creatinine 1.65 (H) 0.70 - 1.30 MG/DL    BUN/Creatinine ratio 22 (H) 12 - 20      GFR est AA 50 (L) >60 ml/min/1.73m2    GFR est non-AA 41 (L) >60 ml/min/1.73m2    Calcium 9.0 8.5 - 10.1 MG/DL    Bilirubin, total 0.4 0.2 - 1.0 MG/DL    ALT (SGPT) 32 12 - 78 U/L    AST (SGOT) 25 15 - 37 U/L    Alk. phosphatase 124 (H) 45 - 117 U/L    Protein, total 7.2 6.4 - 8.2 g/dL    Albumin 3.2 (L) 3.5 - 5.0 g/dL    Globulin 4.0 2.0 - 4.0 g/dL    A-G Ratio 0.8 (L) 1.1 - 2.2     PHOSPHORUS    Collection Time: 11/11/21  9:06 AM   Result Value Ref Range    Phosphorus 3.1 2.6 - 4.7 MG/DL   MAGNESIUM    Collection Time: 11/11/21  9:06 AM   Result Value Ref Range    Magnesium 2.1 1.6 - 2.4 mg/dL   PROTEIN URINE, RANDOM    Collection Time: 11/11/21  9:06 AM   Result Value Ref Range    Protein, urine random 24 (H) 0.0 - 11.9 mg/dL       Medications received:  Lanreotide 120 mg SQ in left GM    0920 Tolerated treatment well, no adverse reaction noted. D/Cd from [address] and in no distress accompanied by spouse. Next appt 12/9/21 @ 0900.

## 2021-11-29 NOTE — TELEPHONE ENCOUNTER
Patient called left  stating he is at [facility] out patient. He only has 1 week of Afinitor remaining. He is requesting a refill.

## 2021-11-30 NOTE — TELEPHONE ENCOUNTER
Requested Prescriptions     Pending Prescriptions Disp Refills    everolimus (Afinitor) 10 mg tab 30 Tablet 6     Sig: Take 1 Tablet by mouth daily.      Approved per Laquita Chandler from [facility]

## 2021-12-02 NOTE — PROGRESS NOTES
Vaccine Information Statement(s) or the Emergency Use Authorization was given today. This has been reviewed, questions answered, and verbal consent given by Patient for injection(s) and administration of COVID-19 Immunization Pfizer COVID-19.      Patient tolerated without incident. See immunization grid for documentation.         CT shows continuing good response.

## 2021-12-08 NOTE — PROGRESS NOTES
[address] [facility] [address], [address] [facility], [address] [place] [address]  [phone]       Oncology Follow up Note      Patient: Maryam Romano MRN: [id number]  SSN: [id number]    YOB: 1945  Age: 77 y.o. Sex: male        Diagnosis:     1. 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 Afinitor 10 mg + Somatuline Depot 120 mg SC - started Afinitor 3/30/2019 -  2. Somatuline Depot 120 mg SC - 05/2016 - 11/2018; Restarted 3/4/2019 -    (treatment held per patient request since11/16/2018)   3. Xgeva 120mg SC     Subjective:      Maryam Romano is a 77 y.o. male with a diagnosis of metastatic neuroendocrine cancer. He started experiencing pain in both groins and the abdomen. A CT of the abdomen showed retroperitoneal adenopathy. CT guided biopsy of the retroperitoneal LN reveals a dx of well differentiated neuroendocrine carcinoma. Bone scan shows disease in the bone. Mr. Bradley Montoya has been receiving Somatuline and taking everolimus. 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 Diarrhea is better now. He is tolerating treatment well. He had a syncopal episode thought to be related to hypotension and bradycardia from anti-HTN meds. Lisinopril was taken off and Metoprolol dose was reduced. 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 He continues to have problem with healing of the jaw. He fell at home while cleaning the pool and broke his ribs. The pain in the rib is improving.        Review of Systems:    Constitutional: fatigue  Eyes: negative  Ears, Nose, Mouth, Throat, and Face: negative  Respiratory: negative  Cardiovascular: negative  Gastrointestinal: less diarrhea   Genitourinary: negative  Integument/Breast: negative  Hematologic/Lymphatic: negative  Musculoskeletal: negative  Neurological: negative    Review of systems was reviewed and updated as needed on 21. Past Medical History:   Diagnosis Date    Adverse effect of anesthesia     low HR and very slow to wakeup    Cancer ([address] 2016    neuroendocrine, retroperitoneal LN involvement    Diarrhea     Frequent urination     Hypertension     Poor appetite     Unspecified adverse effect of anesthesia     \"hard to put to sleep and slow to wake up-heart rate dropped very low\"     Past Surgical History:   Procedure Laterality Date    COLONOSCOPY N/A 2016    COLONOSCOPY performed by Randy Blackwood MD at [address] [place] [address]  2016         HX ORTHOPAEDIC      knee scope left knee    HX OTHER SURGICAL  2013    excision of scalp cyst     UPPER GI ENDOSCOPY,BIOPSY  2016           Family History   Problem Relation Age of Onset    Cancer Mother     Stroke Sister      Social History     Tobacco Use    Smoking status: Former Smoker     Packs/day: 1.00     Years: 8.00     Pack years: 8.00     Quit date: 1972     Years since quittin.6    Smokeless tobacco: Never Used   Substance Use Topics    Alcohol use: No      Prior to Admission medications    Medication Sig Start Date End Date Taking? Authorizing Provider   everolimus (Afinitor) 10 mg tab Take 1 Tablet by mouth daily. 21  Yes Lillian Gilliam MD   meclizine (ANTIVERT) 25 mg tablet Take 1 Tablet by mouth three (3) times daily as needed for Dizziness. 10/12/21  Yes Katie Davison MD   gabapentin (NEURONTIN) 100 mg capsule Take 2 Capsules by mouth three (3) times daily. Max Daily Amount: 600 mg. 21  Yes Katie Davison MD   gabapentin (NEURONTIN) 100 mg capsule TAKE 1 CAPSULE BY MOUTH THREE TIMES DAILY 21  Yes Katie Davison MD   lisinopriL (PRINIVIL, ZESTRIL) 20 mg tablet Take 20 mg by mouth daily. Yes Provider, Historical   gabapentin (NEURONTIN) 100 mg capsule Take 1 Capsule by mouth nightly.  Max Daily Amount: 100 mg. 21  Yes Cheri Cox MD Am[place]          No Known Allergies        Objective:     Visit Vitals  BP (!) 155/87   Pulse 72   Temp 98.4 °F (36.9 °C)   Resp 18   Ht 6' (1.829 m)   Wt 160 lb (72.6 kg)   SpO2 99%   BMI 21.70 kg/m²     Pain Scale: /10    Physical Exam:     GENERAL: alert, cooperative, thin  EYE: negative, no pallor or icterus  LYMPHATIC: Cervical, supraclavicular, and axillary nodes normal.   MOUTH: swelling in the left maxilla  THROAT & NECK: normal and no erythema or exudates noted. LUNG: clear to auscultation bilaterally  HEART: regular rate and rhythm, no murmur  ABDOMEN: soft, non-tender on palpation  EXTREMITIES: no cyanosis or edema  SKIN: Normal. No rash or ecchymosis  NEUROLOGIC: numbness in RUE starting from the scapula to the forearm      The above physical exam was reviewed and updated as needed on 12/09/21. IMAGING:     CT Results (most recent):  Results from Hospital Encounter encounter on 02/17/21    CT ABD PELV W CONT    Narrative  EXAM: CT ABD PELV W CONT    INDICATION: restaging of disease endocrine carcinoma    COMPARISON: November 10, 2020    CONTRAST: 100 mL of Isovue-370. TECHNIQUE:  Following the uneventful intravenous administration of contrast, thin axial  images were obtained through the abdomen and pelvis. Coronal and sagittal  reconstructions were generated. Oral contrast was administered. CT dose  reduction was achieved through use of a standardized protocol tailored for this  examination and automatic exposure control for dose modulation. FINDINGS:  LOWER THORAX: A small interstitial infiltrate at the a right lung base medially  appear more prominent. A diffuse basilar interstitial pattern is also slightly  more prominent. Please correlate clinically. This could be related to some  inflammatory changes. There is chronic lung disease. LIVER: No change  BILIARY TREE: Gallstones within a nondistended gallbladder. . CBD is not dilated. SPLEEN: within normal limits.   PANCREAS: No mass or ductal dilatation. ADRENALS: Unremarkable. KIDNEYS: Left renal calcification lower pole unchanged. Prominence of both  ureters. Unchanged. STOMACH: Unremarkable. Mesenteric mass is unchanged. .  Some wall thickening in the cecum and possibly left upper quadrant jejunum  demonstrated. Unknown clinical significance. APPENDIX: Appears normal  PERITONEUM: No ascites or pneumoperitoneum. RETROPERITONEUM: No aortic aneurysm. Retroperitoneal adenopathy unchanged. URINARY BLADDER: No change bladder wall thickening, prostate hypertrophy with  implants. Prominence of the distal left ureter. BONES: Bone metastases unchanged. ABDOMINAL WALL: No mass or hernia. ADDITIONAL COMMENTS: N/A    Impression  1. Small basilar lung infiltrates should be followed up. 2. Stable mesenteric mass and retroperitoneal adenopathy. 3. Stable bone metastases. 4. Some wall thickening in the cecum stable, possible new wall thickening left  upper quadrant jejunum. Lab Results   Component Value Date/Time    WBC 6.2 12/09/2021 09:27 AM    Hemoglobin (POC) 14.6 12/21/2017 09:10 AM    HGB 9.7 (L) 12/09/2021 09:27 AM    Hematocrit (POC) 30 (L) 04/29/2021 09:03 AM    HCT 31.2 (L) 12/09/2021 09:27 AM    PLATELET 048 00/90/9316 09:27 AM    MCV 79.0 (L) 12/09/2021 09:27 AM       Lab Results   Component Value Date/Time    Sodium 141 11/11/2021 09:06 AM    Potassium 3.1 (L) 11/11/2021 09:06 AM    Chloride 108 11/11/2021 09:06 AM    CO2 27 11/11/2021 09:06 AM    Anion gap 6 11/11/2021 09:06 AM    Glucose 112 (H) 11/11/2021 09:06 AM    BUN 36 (H) 11/11/2021 09:06 AM    Creatinine 1.65 (H) 11/11/2021 09:06 AM    BUN/Creatinine ratio 22 (H) 11/11/2021 09:06 AM    GFR est AA 50 (L) 11/11/2021 09:06 AM    GFR est non-AA 41 (L) 11/11/2021 09:06 AM    Calcium 9.0 11/11/2021 09:06 AM    Bilirubin, total 0.4 11/11/2021 09:06 AM    Alk.  phosphatase 124 (H) 11/11/2021 09:06 AM    Protein, total 7.2 11/11/2021 09:06 AM    Albumin 3.2 (L) 11/11/2021 09:06 AM Globulin 4.0 11/11/2021 09:06 AM    A-G Ratio 0.8 (L) 11/11/2021 09:06 AM    ALT (SGPT) 32 11/11/2021 09:06 AM     PET Results (most recent):  Results from Hospital Encounter encounter on 08/04/21    PET/CT NE TUMOR IMAGE SKULL THIGH DOTATATE (SUBI)    Narrative  PET/CT SCAN    PROCEDURE: Following IV injection of 5.54 mCi GA-68 Dotatate and a standard  uptake delay, PET imaging is performed from the skull vertex to mid thigh and  axial, sagittal and coronal images were acquired. Unenhanced CT is obtained for  anatomic localization, and attenuation correction of the PET scan. CORRELATIVE IMAGING STUDIES: Abdomen pelvis CT 2/17/2021. PRIOR PET:  8/6/2020    HISTORY: The study is requested for restaging neuroendocrine carcinoma  metastatic to bone. FINDINGS:    HEAD/NECK: No apparent foci of abnormal hypermetabolism. Cerebral evaluation is  limited by normal intense activity. CHEST: The left supraclavicular, superior mediastinal, and paraesophageal lymph  nodes remain hypermetabolic but markedly improved compared to the prior exam.    ABDOMEN/PELVIS: Right retrocrural, peripancreatic, and retroperitoneal lymph  node activity has markedly improved. SKELETON: Foci of abnormal uptake throughout the skeleton have improved compared  to the prior examination. Impression  Overall improvement in the lymph node activity and osseous  metastatic foci activity compared to the prior examination. Assessment:     1. 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 Had excellent disease control. He experienced a number of side effects including hot flashes, weight loss etc  He decided to take a break from therapy. CT showed disease progression in retroperitoneum and abdomen. Restarted Somatuline.   Symptoms improved  He has been on and off Afinitor. Last took in Aug 2020    Tolerating treatment   + diarrhea - better  + fatigue - stable  + bone pains are markedly improved    Weight is stable  Appetite is good    A detailed system by system evaluation of side effect was performed to assess chemotherapy related toxicity. Blood counts are acceptable. Results reviewed with the patient. PET CT (8/4/21) - improved response      2. Bone metastasis    > Denosumab  > MRI Thoracic and cervical spine ( 4/18/2021) - no neural compromise. Widespread skeletal metastasis.   > Oxycodone    3. Urinary incontinence    BPH  Managed by Dr. Paulie Sherman      4. 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 Orthostasis    Observation      6. HTN, uncontrolled    May need to add Amlodipine to Lisinopril. 7. Anemia from systemic therapy    Observation      8. Osteonecrosis of the jaw    D/C Denosumab      Plan:     > Continue Somatuline  > Xermelo  > Follow-up in 3 months  > Gallium dotatate scan       Signed by: Ricky Neumann MD                     December 9, 2021      CC. Zelda Gomez MD  CC.  Rosa Hansen MD

## 2021-12-08 NOTE — PROGRESS NOTES
Judie Bowman is a 77 y.o. male here for 3 month follow up for met neuroendocrine carcinoma. Cycle 37 Day 1 Xgeva + Somatuline Depot  Pt states he is still having facial swelling and has been taking antibiotics for awhile now. He had a tooth infection but the antibiotics do not seem to be helping. Pt states this has been going on for about 15 months now. 1. Have you been to the ER, urgent care clinic since your last visit? Hospitalized since your last visit? no    2. Have you seen or consulted any other health care providers outside of the [address] [place] [address] since your last visit? Include any pap smears or colon screening. Dr Claus Cooper for yearly check up.

## 2021-12-09 NOTE — LETTER
12/9/2021    Patient: Mable Coleman   YOB: 1945   Date of Visit: 12/9/2021     Fausto Toledo, [address] [facility] [address]  [address] [address]  Via Fax: [phone]    Dear Fausto Toledo MD,      Thank you for referring Mr. Elliott Colby to [address] [place] [address] for evaluation. My notes for this consultation are attached. If you have questions, please do not hesitate to call me. I look forward to following your patient along with you.       Sincerely,    Bob Taylor MD

## 2021-12-09 NOTE — PROGRESS NOTES
[facility] Visit Note    7788 Pt arrived at [facility] ambulatory and in no distress for Lanreotide + Xgeva. Assessment completed. Pt has tooth infection, therefore we will still hold Xgeva today. Pt requesting we draw PSA level today. Received verbal order from MD office to draw PSA. Patient denied having any symptoms of COVID-19, i.e. SOB, coughing, fever, or generally not feeling well. Also denies having been exposed to COVID-19 recently or having had any recent contact with family/friend that has a pending COVID test.     Visit Vitals  BP (!) 155/87 (BP 1 Location: Left upper arm, BP Patient Position: Sitting)   Pulse 72   Temp 98.4 °F (36.9 °C)   Resp 18   Ht 6' (1.829 m)   Wt 72.8 kg (160 lb 8 oz)   SpO2 99%   BMI 21.77 kg/m²     Recent Results (from the past 12 hour(s))   CBC WITH AUTOMATED DIFF    Collection Time: 12/09/21  9:27 AM   Result Value Ref Range    WBC 6.2 4.1 - 11.1 K/uL    RBC 3.95 (L) 4.10 - 5.70 M/uL    HGB 9.7 (L) 12.1 - 17.0 g/dL    HCT 31.2 (L) 36.6 - 50.3 %    MCV 79.0 (L) 80.0 - 99.0 FL    MCH 24.6 (L) 26.0 - 34.0 PG    MCHC 31.1 30.0 - 36.5 g/dL    RDW 13.8 11.5 - 14.5 %    PLATELET 991 674 - 607 K/uL    MPV 9.5 8.9 - 12.9 FL    NRBC 0.0 0  WBC    ABSOLUTE NRBC 0.00 0.00 - 0.01 K/uL    NEUTROPHILS 79 (H) 32 - 75 %    LYMPHOCYTES 9 (L) 12 - 49 %    MONOCYTES 8 5 - 13 %    EOSINOPHILS 3 0 - 7 %    BASOPHILS 1 0 - 1 %    IMMATURE GRANULOCYTES 0 0.0 - 0.5 %    ABS. NEUTROPHILS 4.8 1.8 - 8.0 K/UL    ABS. LYMPHOCYTES 0.6 (L) 0.8 - 3.5 K/UL    ABS. MONOCYTES 0.5 0.0 - 1.0 K/UL    ABS. EOSINOPHILS 0.2 0.0 - 0.4 K/UL    ABS. BASOPHILS 0.1 0.0 - 0.1 K/UL    ABS. IMM.  GRANS. 0.0 0.00 - 0.04 K/UL    DF SMEAR SCANNED      RBC COMMENTS MICROCYTOSIS  1+       METABOLIC PANEL, COMPREHENSIVE    Collection Time: 12/09/21  9:27 AM   Result Value Ref Range    Sodium 141 136 - 145 mmol/L    Potassium 4.0 3.5 - 5.1 mmol/L    Chloride 108 97 - 108 mmol/L    CO2 28 21 - 32 mmol/L    Anion gap 5 5 - 15 mmol/L    Glucose 114 (H) 65 - 100 mg/dL    BUN 33 (H) 6 - 20 MG/DL    Creatinine 1.56 (H) 0.70 - 1.30 MG/DL    BUN/Creatinine ratio 21 (H) 12 - 20      GFR est AA 53 (L) >60 ml/min/1.73m2    GFR est non-AA 44 (L) >60 ml/min/1.73m2    Calcium 8.7 8.5 - 10.1 MG/DL    Bilirubin, total 1.4 (H) 0.2 - 1.0 MG/DL    ALT (SGPT) 35 12 - 78 U/L    AST (SGOT) 40 (H) 15 - 37 U/L    Alk. phosphatase 118 (H) 45 - 117 U/L    Protein, total 6.8 6.4 - 8.2 g/dL    Albumin 2.8 (L) 3.5 - 5.0 g/dL    Globulin 4.0 2.0 - 4.0 g/dL    A-G Ratio 0.7 (L) 1.1 - 2.2     MAGNESIUM    Collection Time: 12/09/21  9:27 AM   Result Value Ref Range    Magnesium 2.3 1.6 - 2.4 mg/dL   PHOSPHORUS    Collection Time: 12/09/21  9:27 AM   Result Value Ref Range    Phosphorus 3.1 2.6 - 4.7 MG/DL       Medications received:  Lanreotide 120 mg SQ in right buttock    0935 Tolerated treatment well, no adverse reaction noted. D/Cd from [facility] ambulatory and in no distress accompanied by self. Next appt 1/6/22 @ 1000.

## 2021-12-09 NOTE — TELEPHONE ENCOUNTER
Patient call regarding Oral Surgeon. Patient states, he has a new Oral Surgeon. Patient would like to know who the new one is.   Please follow up with patient

## 2021-12-16 PROBLEM — A41.9 SEVERE SEPSIS (HCC): Status: ACTIVE | Noted: 2021-01-01

## 2021-12-16 PROBLEM — C7A.8 NEUROENDOCRINE CANCER (HCC): Status: ACTIVE | Noted: 2021-01-01

## 2021-12-16 PROBLEM — N39.0 UTI (URINARY TRACT INFECTION): Status: ACTIVE | Noted: 2021-01-01

## 2021-12-16 PROBLEM — R65.20 SEVERE SEPSIS (HCC): Status: ACTIVE | Noted: 2021-01-01

## 2021-12-16 PROBLEM — N17.9 AKI (ACUTE KIDNEY INJURY) (HCC): Status: ACTIVE | Noted: 2021-01-01

## 2021-12-16 PROBLEM — N18.30 CKD (CHRONIC KIDNEY DISEASE) STAGE 3, GFR 30-59 ML/MIN (HCC): Status: ACTIVE | Noted: 2021-01-01

## 2021-12-16 NOTE — ED NOTES
Assumed care of pt from [facility]. Pt is a/ox4, rr even and unlabored, no acute distress. Pt and wife updated on plan of care: awaiting inpatient admission.

## 2021-12-16 NOTE — PROGRESS NOTES
Chart reviewed and images reviewed-  No evidence of obstructing calculus  Place ramirez and irrigate  Follow cultures, abx as directed  NP to see      Clinic note [place] [facility]:    Vargas Campos is a 77year old male who presents today for \"Elevated PSA\". He returns for follow-up. Mr. Neli Escobar returns today for a follow up. He is s/p urolift on 09/30/2019. Cystoscopy negative at that time He was diagnosed with metastatic neuroendocrine carcinoma in 2016. Currently being followed by Dr. Juan Ayers. He has stopped Flomax. He has nights when he leaks without knowing. He is experiencing some urgency and frequency of urination. No fever chills or hematuria reported. He has cut back on his Dr. Morenita Maravilla intake. He has noted an improvement with his stream. He denies any family history of prostate cancer. PSA on 11/22/21 was 7.7. Denies hematuria and dysuria. He was at the hospital this past Thursday due to dehydration from diarrhea. Urinalysis today is clear. PAST MEDICAL HISTORY:    Allergies: No known allergies. Medications: Flomax 0.4 mg capsule (tamsulosin) 1 by mouth twice a day   lisinopril 20 mg tablet (lisinopril) once a day   * CANCER MEDICATION every morning     Problems: Other specified counseling (ICD-V65.49) (NXW88-V57.89)  Urinary Frequency (ICD-788.41) (NFQ66-Z78.0)  Elevated PSA (ICD-790.93) (KMN18-Q12.20)  BPH with urinary obstruction (ICD-600.01) (PFL59-E56.1)  Incomplete bladder emptying (NXQ-287.97) (IFJ26-H77.14)  Incontinence (ICD-788.30) (MTE67-R97)  BPH w/o urinary obstruction (ICD-600.00) (PRZ59-J09.0)    Illnesses: High Blood Pressure, Bowel Problems, and Cancer. DENIES: Heart Disease, Pacemaker/Defibrillator, Lung Disease, Diabetes, Stroke/Seizure, Kidney Problems, Bleeding Problems, HIV, or Hepatitis. Surgeries: Other / Not Listed. Family History: DENIES: Prostate cancer, Kidney cancer, Kidney disease, Kidney stones. Social History: Retired. . Smoking status: former smoker. Does not drink alcohol. System Review: Admits to: None. DENIES: Leg Swelling, Constipation, Dry Skin, Difficulty Walking, Psychiatric Problems, Impaired Sex Drive, Easy Bleeding, Rash. EXAMINATION: Vitals: Pulse: 77 BP: 176/106 Weight: 160 lbs Height: 6' 0\" BMI: 21.78 kg/m^2  Appearance: well-developed NAD Respiratory Effort: breathing easily Prostate: 2+ symmetric, non-tender, no nodules Skin Inspection: warm and dry Orientation: oriented to person; time and place Mood/Affect: normal       URINALYSIS from Voided specimen  Urine Dip: pH: 5.0, Bld: Neg, LE: Neg, Nit: Neg, Prot: +, Ket: Neg, Gluc: Neg  Urine Micro not done    PSA HISTORY  7.7 ng/ml on 11/22/2021  5.3 ng/ml on 08/28/2020  6.4 ng/ml on 04/21/2020  5.28 ng/ml on 09/04/2019  3.3 ng/ml on 05/26/2016    IMPRESSION:    1. BPH WITH URINARY OBSTRUCTION (VMA95-Y46.1) - Improved    2. ELEVATED PSA (MVQ55-O77.20): We will recheck his PSA in 6 weeks. 3. URINARY FREQUENCY (VWD69-V66.0): Continue limitation of caffeine intake. Time voids and double voids. PLAN: All questions and concerns were addressed prior to the patient leaving the clinic. The patient understands and agrees with the plan. The patient was given a verbal/written log of Blood Pressure and recommendations. Severe Hypertension: Refer patient for urgent (next couple days) BP followup.

## 2021-12-16 NOTE — H&P
Hospitalist Admission Note    NAME: Ross Jimenez   :     MRN:  [id number]     Date/Time:  2021 7:32 AM    Patient PCP: Ruth Whitaker MD  Oncologist: Dr. Farrel Saint  Urologist: Dr. Bry Fuentes care: Dr. Lorena West    Please note that this dictation was completed with [employer], the computer voice recognition software. Quite often unanticipated grammatical, syntax, homophones, and other interpretive errors are inadvertently transcribed by the computer software. Please disregard these errors. Please excuse any errors that have escaped final proofreading  ______________________________________________________________________   Assessment & Plan:  Severe sepsis, POA as evidenced by fever 102.8, sinus tachycardia heart rate in the 120s, lactic over 5, leukopenia WBC 2.6 due to  Acute hemorrhagic cystitis, POA with  --Given 30 mL/kg bolus. Repeat lactic acid pending. Continue IV fluids with normal saline 150 mL per hour  --Follow-up urine and blood cultures. Continue cefepime and Levaquin  --Monitor H&H and transfuse for hemoglobin less than 7  --Noncontrast CT abdomen to evaluate prostate,bladder and ureteral tract for mass or stone. Consult urology  --monitor in stepdown. Acute urinary retention requiring Ramirez, 800 mL on bladder scan  --Ramirez catheter placed in the ER.  --urology consult  --CT abdomen ordered    LESLIE on CKD 3, baseline Cr 1.3-1.6, currently 1.98  Non-anion gap metabolic acidosis  --Hold lisinopril. IV fluid  --sodium bicarb x 1 amp now  --ramirez placed for urinary retention  --CT abdomen as above    Unresponsive episode in ER, likely due to hypotension, blood pressure 81/61  --Repeat troponin. --Monitor for orthostasis once hypotension is resolved.   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 Denzel Adam held 12/9 due to tooth infection  --got lanreotide infusion 12/9    Chronic essential hypertension  --hold lisinopril due to hypotension    Osteonecrosis of the maxilla due todenusumab  Recurrent upper teeth infection in September 2020, likely related to osteonecrosis  --if Cr improves, can get CT maxillofacial with contrast to evaluate if any active infection currently. No focal swelling or purulent discharge on upper teeth noted on exam    Chronic right shoulder pain  --continue neurontin 100mg tid. Has oxycodone prn prescribed but he is afraid to take    Intermittent dizziness, on as needed Antivert    There is no height or weight on file to calculate BMI. Code: discussed, full code but no prolonged mechanical ventilation  DVT prophylaxis:  SCD. Avoid heparin due to degree of gross hematuria  Surrogate decision maker:  Wife Brenda Carballo          Subjective:   CHIEF COMPLAINT:  Fever, chills, dysuria, gross hematuria,     HISTORY OF PRESENT ILLNESS:     Tray Marie is a 77 y.o. male presents with 2 days of fever up to 102, dysuria, gross hematuria, urinary frequency. Last night severe chills and unable to urinate. Came to ER by EMS. In ER, had an unresponsive episode while BP 81/61. Work up in [place] [id number] notable for bladder scan 800ml, ramirez placed with immediate output of large amount of dark red/opaque cola colored urine. UA c/w with uti. Lactic >5, WBC 2.6. Saw Dr. Humberto Lambert 2 days ago (but before came down with above symptoms) to get approval for colonoscopy due to hx urolift in 2019. Denies chest pain, cough, SOB, abdominal pain, n/v.  Has chronic diarrhea due to carcinoid syndrome. Actually gets a little constipated after lanreotide infusion 12/9. Diarrhea started after abx today. Denies any bloody or black stool. We were asked to admit for work up and evaluation of the above problems.      Past Medical History:   Diagnosis Date    Adverse effect of anesthesia     low HR and very slow to wakeup    Cancer ([facility] [address] 2016    neuroendocrine, retroperitoneal LN involvement    Diarrhea     Frequent urination     Hypertension     Poor appetite     Unspecified adverse effect of anesthesia     \"hard to put to sleep and slow to wake up-heart rate dropped very low\"      Past Surgical History:   Procedure Laterality Date    COLONOSCOPY N/A 2016    COLONOSCOPY performed by Saba Robins MD at [address] [place] [address]  2016         HX ORTHOPAEDIC      knee scope left knee    HX OTHER SURGICAL  2013    excision of scalp cyst     UPPER GI ENDOSCOPY,BIOPSY  2016          Social History     Tobacco Use    Smoking status: Former Smoker     Packs/day: 1.00     Years: 8.00     Pack years: 8.00     Quit date: 1972     Years since quittin.6    Smokeless tobacco: Never Used   Substance Use Topics    Alcohol use: No        Family History   Problem Relation Age of Onset    Cancer Mother     Stroke Sister       No Known Allergies     Prior to Admission medications    Medication Sig Start Date End Date Taking? Authorizing Provider   everolimus (Afinitor) 10 mg tab Take 1 Tablet by mouth daily. 21  Yes Tano Bernal MD   lisinopriL (PRINIVIL, ZESTRIL) 20 mg tablet Take 20 mg by mouth two (2) times a day. Yes Provider, Historical   gabapentin (NEURONTIN) 100 mg capsule Take 1 Capsule by mouth nightly. Max Daily Amount: 100 mg. Patient taking differently: Take 100 mg by mouth three (3) times daily. 21  Yes Karan Blackman MD   meclizine (ANTIVERT) 25 mg tablet Take 1 Tablet by mouth three (3) times daily as needed for Dizziness. Patient not taking: Reported on 2021 10/12/21   Jaye Chin MD   gabapentin (NEURONTIN) 100 mg capsule Take 2 Capsules by mouth three (3) times daily. Max Daily Amount: 600 mg.   Patient not taking: Reported on 2021   Jaye Chin MD   gabapentin (NEURONTIN) 100 mg capsule TAKE 1 CAPSULE BY MOUTH THREE TIMES DAILY  Patient not taking: Reported on 2021   Clemente Galicia MD     REVIEW OF SYSTEMS:  POSITIVE= Bold. Negative = normal text  General:  fever, chills, sweats, generalized weakness, weight loss/gain, loss of appetite  Eyes:  blurred vision, eye pain, loss of vision, diplopia  Ear Nose and Throat:  rhinorrhea, pharyngitis  Respiratory:   cough, sputum production, SOB, wheezing, VERDE, pleuritic pain  Cardiology:  chest pain, palpitations, orthopnea, PND, edema, syncope   Gastrointestinal:  abdominal pain, N/V, dysphagia, diarrhea, constipation, bleeding  Genitourinary:  frequency, urgency, dysuria, hematuria, incontinence  Muskuloskeletal :  Arthralgia right shoulder, myalgia  Hematology:  easy bruising, bleeding, lymphadenopathy  Dermatological:  rash, ulceration, pruritis  Endocrine:  hot flashes or polydipsia  Neurological:  headache, dizziness, confusion this AM, focal weakness, paresthesia, memory loss, gait disturbance  Psychological: anxiety, depression, agitation      Objective:   VITALS:    Visit Vitals  /66   Pulse (!) 105   Temp (!) 102.8 °F (39.3 °C)   Resp 22   SpO2 96%     Temp (24hrs), Av.8 °F (39.3 °C), Min:102.8 °F (39.3 °C), Max:102.8 °F (39.3 °C)    There is no height or weight on file to calculate BMI. PHYSICAL EXAM:    General:    Alert, cooperative, no distress, appears stated age. HEENT: Atraumatic, anicteric sclerae, pink conjunctivae     No oral ulcers, mucosa moist, missing left upper teeth, no gum swelling or pain or discharge, throat clear. Hearing intact. Neck:  Supple, symmetrical,  thyroid: non tender  Lungs:   Clear to auscultation bilaterally. No Wheezing or Rhonchi. No rales. Chest wall:  No tenderness  No Accessory muscle use. Heart:   Regular  rhythm,  Mild tachycardia. No  murmur   No gallop. No edema. Abdomen:   Soft, non-tender. Not distended.   Bowel sounds normal. No masses. Gee bag with coke colored urine  Extremities: No cyanosis. No clubbing  Skin:     Not pale Not Jaundiced  No rashes   Psych:  Good insight. Not depressed. Not anxious or agitated. Neurologic: EOMs intact. No facial asymmetry. No aphasia or slurred speech. Symmetrical strength 5/5 throughout, Alert and oriented X 3. Peripheral pulse: Left, Radial, 2+  Capillary refill:  normal    IMAGING RESULTS:   []       I have personally reviewed the actual   []     CXR  []     CT scan  CXR:  CT :  EKG: reviewed, sinus tach 112, LAE, age indeterminate anterior infarct, normal intervals ________________________________________________________________________  Care Plan discussed with:    Comments   Patient y    Family  y Wife bedside   RN     Care Manager                    Consultant:      ________________________________________________________________________  Prophylaxis:  GI none   DVT SCD   ________________________________________________________________________  Recommended Disposition:   Home with Family y   HH/PT/OT/RN    SNF/LTC    [place]    ________________________________________________________________________  Code Status:  Full Code y   DNR/DNI    ________________________________________________________________________  TOTAL TIME:  55 minutes      Comments    y Reviewed previous records   >50% of visit spent in counseling and coordination of care y Discussion with patient and/or family and questions answered         ______________________________________________________________________  Sanford Garnica MD      Procedures: see electronic medical records for all procedures/Xrays and details which were not copied into this note but were reviewed prior to creation of Plan. LAB DATA REVIEWED:    Recent Results (from the past 24 hour(s))   GLUCOSE, POC    Collection Time: 12/16/21  4:05 AM   Result Value Ref Range    Glucose (POC) 191 (H) 65 - 117 mg/dL    Performed by Ethan Hernandez. EDT    EKG, 12 LEAD, INITIAL    Collection Time: 12/16/21  4:05 AM   Result Value Ref Range    Ventricular Rate 112 BPM    Atrial Rate 112 BPM    P-R Interval 148 ms    QRS Duration 88 ms    Q-T Interval 320 ms    QTC Calculation (Bezet) 436 ms    Calculated P Axis 59 degrees    Calculated R Axis 93 degrees    Calculated T Axis 66 degrees    Diagnosis       Sinus tachycardia  Possible Left atrial enlargement  Rightward axis  Anterior infarct (cited on or before 16-DEC-2021)  When compared with ECG of 11-AUG-2021 14:31,  Vent. rate has increased BY  58 BPM  Questionable change in initial forces of Anterior leads     URINALYSIS W/ REFLEX CULTURE    Collection Time: 12/16/21  4:16 AM    Specimen: Miscellaneous sample; Urine    Urine specimen   Result Value Ref Range    Color RED      Appearance TURBID (A) CLEAR      Specific gravity 1.020 1.003 - 1.030      pH (UA) 6.5 5.0 - 8.0      Protein >300 (A) NEG mg/dL    Glucose Negative NEG mg/dL    Ketone TRACE (A) NEG mg/dL    Blood LARGE (A) NEG      Urobilinogen 1.0 0.2 - 1.0 EU/dL    Nitrites Positive (A) NEG      Leukocyte Esterase MODERATE (A) NEG      WBC >100 (H) 0 - 4 /hpf    RBC >100 (H) 0 - 5 /hpf    Epithelial cells FEW FEW /lpf    Bacteria 3+ (A) NEG /hpf    UA:UC IF INDICATED URINE CULTURE ORDERED (A) CNI     METABOLIC PANEL, COMPREHENSIVE    Collection Time: 12/16/21  4:16 AM   Result Value Ref Range    Sodium 138 136 - 145 mmol/L    Potassium 4.1 3.5 - 5.1 mmol/L    Chloride 107 97 - 108 mmol/L    CO2 19 (L) 21 - 32 mmol/L    Anion gap 12 5 - 15 mmol/L    Glucose 176 (H) 65 - 100 mg/dL    BUN 31 (H) 6 - 20 MG/DL    Creatinine 1.98 (H) 0.70 - 1.30 MG/DL    BUN/Creatinine ratio 16 12 - 20      GFR est AA 40 (L) >60 ml/min/1.73m2    GFR est non-AA 33 (L) >60 ml/min/1.73m2    Calcium 8.6 8.5 - 10.1 MG/DL    Bilirubin, total 0.7 0.2 - 1.0 MG/DL    ALT (SGPT) 62 12 - 78 U/L    AST (SGOT) 80 (H) 15 - 37 U/L    Alk.  phosphatase 164 (H) 45 - 117 U/L    Protein, total 6.6 6.4 - 8.2 g/dL    Albumin 2.7 (L) 3.5 - 5.0 g/dL    Globulin 3.9 2.0 - 4.0 g/dL    A-G Ratio 0.7 (L) 1.1 - 2.2     CBC WITH AUTOMATED DIFF    Collection Time: 12/16/21  4:16 AM   Result Value Ref Range    WBC 2.6 (L) 4.1 - 11.1 K/uL    RBC 4.69 4.10 - 5.70 M/uL    HGB 11.3 (L) 12.1 - 17.0 g/dL    HCT 36.0 (L) 36.6 - 50.3 %    MCV 76.8 (L) 80.0 - 99.0 FL    MCH 24.1 (L) 26.0 - 34.0 PG    MCHC 31.4 30.0 - 36.5 g/dL    RDW 13.8 11.5 - 14.5 %    PLATELET 464 061 - 171 K/uL    MPV 9.8 8.9 - 12.9 FL    NRBC 0.0 0  WBC    ABSOLUTE NRBC 0.00 0.00 - 0.01 K/uL    NEUTROPHILS 84 (H) 32 - 75 %    LYMPHOCYTES 14 12 - 49 %    MONOCYTES 1 (L) 5 - 13 %    EOSINOPHILS 1 0 - 7 %    BASOPHILS 0 0 - 1 %    IMMATURE GRANULOCYTES 0 0.0 - 0.5 %    ABS. NEUTROPHILS 2.2 1.8 - 8.0 K/UL    ABS. LYMPHOCYTES 0.4 (L) 0.8 - 3.5 K/UL    ABS. MONOCYTES 0.0 0.0 - 1.0 K/UL    ABS. EOSINOPHILS 0.0 0.0 - 0.4 K/UL    ABS. BASOPHILS 0.0 0.0 - 0.1 K/UL    ABS. IMM.  GRANS. 0.0 0.00 - 0.04 K/UL    DF SMEAR SCANNED      RBC COMMENTS ANISOCYTOSIS  1+        RBC COMMENTS ACANTHOCYTES     TROPONIN-HIGH SENSITIVITY    Collection Time: 12/16/21  4:16 AM   Result Value Ref Range    Troponin-High Sensitivity 15 0 - 76 ng/L   COVID-19 RAPID TEST    Collection Time: 12/16/21  4:16 AM   Result Value Ref Range    Specimen source Nasopharyngeal      COVID-19 rapid test Not detected NOTD     INFLUENZA A+B VIRAL AGS    Collection Time: 12/16/21  4:16 AM   Result Value Ref Range    Influenza A Antigen Negative NEG      Influenza B Antigen Negative NEG     BILIRUBIN, CONFIRM    Collection Time: 12/16/21  4:16 AM   Result Value Ref Range    Bilirubin UA, confirm Negative NEG     ETHYL ALCOHOL    Collection Time: 12/16/21  5:13 AM   Result Value Ref Range    ALCOHOL(ETHYL),SERUM <10 <10 MG/DL   BLOOD GAS,CHEM8,LACTIC ACID POC    Collection Time: 12/16/21  5:16 AM   Result Value Ref Range    Calcium, ionized (POC) 1.05 (L) 1.12 - 1.32 mmol/L    BICARBONATE 19 mmol/L Base deficit (POC) 5.1 mmol/L    Sample source VENOUS BLOOD      CO2, POC 19 19 - 24 MMOL/L    Sodium,  136 - 145 MMOL/L    Potassium, POC 5.1 3.5 - 5.5 MMOL/L    Chloride,  (H) 100 - 108 MMOL/L    Glucose,  (H) 74 - 106 MG/DL    Creatinine, POC 2.0 (H) 0.6 - 1.3 MG/DL    Lactic Acid (POC) 5.27 (HH) 0.40 - 2.00 mmol/L    Critical value read back O`DEON     pH, venous (POC) 7.43 (H) 7.32 - 7.42      pCO2, venous (POC) 28.3 (L) 41 - 51 MMHG    pO2, venous (POC) 32 25 - 40 mmHg

## 2021-12-16 NOTE — PROGRESS NOTES
Transition of Care Plan:    RUR:15 %, LOW  Disposition: Home with Spouse   Follow up appointments: PCP, Specialists as indicated   DME needed: None   Transportation at Discharge: Wife to transport at discharge   Karen Martínez or means to access home:   Wife has access     IM Medicare Letter: 2nd IM letter before discharge  Is patient a BCPI-A Bundle:  Bundle letter will be distributed by unit CM if pt meets bundle criteria. If yes, was Bundle Letter given?:    Is patient a [place] and connected with the [place]? N/A  If yes, was [employer] transfer form completed and [facility] notified? Caregiver Contact: Wife Mandi Krishnamurthy [phone]  Discharge Caregiver contacted prior to discharge? Unit CM to follow up before discharge    Reason for Admission:  Severe Sepsis                     RUR Score:       16 %,               Plan for utilizing home health:      None    PCP: First and Last name:  Evin Godfrey MD     Name of Practice:  [facility]   Are you a current patient: Yes/No: Yes   Approximate date of last visit: November 2021   Can you participate in a virtual visit with your PCP: Yes                    Current Advanced Directive/Advance Care Plan: Full Code      Advance Care Planning     General Advance Care Planning (ACP) Conversation    Date of Conversation: 12/16/2021  Conducted with: Patient with Decision Making Capacity    Healthcare Decision Maker:     Primary Decision Maker: Sarahi Rachel - Spouse - [phone]  Click here to [address] including selection of the [address] Relationship (ie \"Primary\")      Today we documented Decision Maker(s) consistent with Legal Next of Kin hierarchy. Content/Action Overview:    Has ACP document(s) on file - reflects the patient's care preferences  Reviewed DNR/DNI and patient elects Full Code (Attempt Resuscitation)     Length of Voluntary ACP Conversation in minutes:  <16 minutes (Non-Billable)    Alysha SATINDER Pereyra                   Transition of Care Plan:                    CM met with patient and his wife at the bedside to discuss discharge planning. Patient name, , and demographics all verified in chart. Patient lives with his wife in a one level home. Patient reports being independent of his ADLS/IDLS including driving. Patient has metastatic neuroendocrine carcinoma and follows with Dr. Luli Farr. Patient takes oral chemotherapy medication. Patient has no SNF, HH, or IPR history. Patient has an outpatient rehab history. Patient preferred pharmacy is [facility]. Patient is active with his Urologist Dr. Gurdeep Hutson, Gastroenterologist/; Dr. Cesario Ahumada and Ian Mendoza. Care Management Interventions  PCP Verified by CM: Yes  Mode of Transport at Discharge: Other (see comment) (Patient wife to transport)  Transition of Care Consult (CM Consult): Discharge Planning  Discharge Durable Medical Equipment: No  Physical Therapy Consult: No  Occupational Therapy Consult: No  Speech Therapy Consult: No  Support Systems: Spouse/Significant Other  Confirm Follow Up Transport: Self  Discharge Location  Discharge Placement: Home    Unit CM to continue to follow for discharge needs and planning.     CHRISTINE PaytonN, RN, BSW   RN Care Manager

## 2021-12-16 NOTE — ED NOTES
MD Shawanda Bates made aware of BP 77/51. Verbal order to admisniter 1L NS bolus and Reassess. Contact MD for: SBP <90 OR MAP OF <65 after bolus.

## 2021-12-16 NOTE — ED NOTES
Pt presents to the ED via EMS c/o fever and chills. Pt states his wife had called EMS because he had a temp of 102 oral and pt is taking chemo pills. Upon arrival to ED pt warm to touch, pale, diaphoretic, lethargic, orientated x 4. Pt reports blood in urine and burning with urination.  Pt denies n/v.

## 2021-12-16 NOTE — ED NOTES
Brought to triage by EMS. Able to stand with assistance and placed in a WC. When EMS brought him into triage the patient stated he was very dizzy, leaning to the left side and almost fell out of the WC. Very pale. Charge nurse called, patient immediately placed on a stretcher and placed in room 2 until main side room open. BP was running when this happened. BP 81/61.

## 2021-12-16 NOTE — CONSULTS
Urology Consult    Patient: Lito Gandhi MRN: [id number]  SSN: [id number]    YOB: 1945  Age: 77 y.o. Sex: male          Date of Consultation:  December 16, 2021  Requesting Physician: Wilmer Beebe MD  Reason for Consultation: gross hematuria with retention            Assessment/Plan:  Hemorrhagic cystitis with clot retention  uro sepsis- pending cxs  Metastatic neuroendocrine carcinoma  BPH with MONTERO    -now s/p ramirez and irrigation, urine clear red. hgb dropped to 7 from 11 this morning  -follow q6 hgb and cultures, transfuse per protocol   -nursing to upsize ramirez to 3 way 22-24 [place] ramirez and start continuous bladder irrigation and prn manual irrigation for clots visualized in ramirez. Titrate cbi to clear pink/yellow UA. Keep ramirez until hematuria resolves  -npo after MN in case needs clot evacuation     Supervising MD, Dr. Paulie Sherman       History of Present Illness:  Patient is a 77 y.o. male admitted 12/16/2021 to the hospital for Severe sepsis ([facility] [A41.9, R65.20]  LESLIE (acute kidney injury) ([facility] [N17.9]  UTI (urinary tract infection) [N39.0]  CKD (chronic kidney disease) stage 3, GFR 30-59 ml/min ([facility]) [N18.30]  Neuroendocrine cancer ([facility] [C7A.8]. He presents to the ED with gross hematuria for 3 days and subsequent difficulty voiding. Also presents febrile with chills. Hypotensive in the ED. A ramirez was placed in the ED for retention and dark red blood expelled. UA suspicious for infection. He was recently seen in the urology office a few days ago with Dr. Paulie Sherman. Reference urology office note copied by dr. Paulie Sherman. No hematuria or urinary complaints at that time. No fevers either. Denies hx of gross hematuria in the past.   20 [place] ramirez irrigated with several moderate size clots expelled, urine cleared to cherry red, draining well. Chart reviewed. temp 102 on admission, now afebrile.  Still with soft blood pressure sbp 91  hgb drop to 7.8 from 11.3, blood cx pending. Wbc 2.6. on cefepime and levaquin  Creat 1.98  Lactic 5 on admission, repeat 1.43  CT abd images today reviewed by dr. Gabriel Carpio      Past Medical History:  No Known Allergies   Prior to Admission medications    Medication Sig Start Date End Date Taking? Authorizing Provider   everolimus (Afinitor) 10 mg tab Take 1 Tablet by mouth daily. 12/1/21  Yes Booker Wong MD   lisinopriL (PRINIVIL, ZESTRIL) 20 mg tablet Take 20 mg by mouth two (2) times a day. Yes Provider, Historical   gabapentin (NEURONTIN) 100 mg capsule Take 1 Capsule by mouth nightly. Max Daily Amount: 100 mg. Patient taking differently: Take 100 mg by mouth three (3) times daily. 6/16/21  Yes Cynthia Aragon MD   meclizine (ANTIVERT) 25 mg tablet Take 1 Tablet by mouth three (3) times daily as needed for Dizziness. Patient not taking: Reported on 12/16/2021 10/12/21   Carmen Reyes MD   gabapentin (NEURONTIN) 100 mg capsule Take 2 Capsules by mouth three (3) times daily. Max Daily Amount: 600 mg. Patient not taking: Reported on 12/16/2021 9/9/21   Carmen Reyes MD   gabapentin (NEURONTIN) 100 mg capsule TAKE 1 CAPSULE BY MOUTH THREE TIMES DAILY  Patient not taking: Reported on 12/16/2021 8/24/21   Carmen Reyes MD      PMHx:  has a past medical history of Adverse effect of anesthesia, BPH (benign prostatic hyperplasia), Cancer ([address] (05/2016), Carcinoid syndrome ([address] CKD (chronic kidney disease) stage 3, GFR 30-59 ml/min ([address] Diarrhea, Frequent urination, Hypertension, Osteonecrosis ([address] Poor appetite, and Unspecified adverse effect of anesthesia. PSurgHx:  has a past surgical history that includes hx other surgical (09-); hx orthopaedic; upper gi endoscopy,biopsy (5/13/2016); colonoscopy,remv lesn,snare (5/13/2016); and colonoscopy (N/A, 5/13/2016). PSocHx:  reports that he quit smoking about 49 years ago. He has a 8.00 pack-year smoking history.  He has never used smokeless tobacco. He reports that he does not drink alcohol and does not use drugs. ROS:  Admission ROS by Shannon Patel MD from 12/16/2021 were reviewed with the patient and changes (other than per HPI) include: none.     Physical Exam    General Appearance: NAD, awake  HENT: atraumatic, normal ears  Cardiovascular: not tachycardic, no LE edema  Respiratory: no distress, room air  Abdomen: soft, no suprapubic fullness or tenderness  : no CVA tenderness, ramirez cherry red, some clots expelled  Extremities: moves all  Musculoskeletal: normal alignment of neck and head  Neuro: Appropriate, no focal neurological deficits  Mood/Affect: appropriate, A&O x 3      Lab Results   Component Value Date/Time    WBC 2.6 (L) 12/16/2021 04:16 AM    HCT 36.0 (L) 12/16/2021 04:16 AM    PLATELET 037 58/73/6778 04:16 AM    Sodium 138 12/16/2021 04:16 AM    Potassium 4.1 12/16/2021 04:16 AM    Chloride 107 12/16/2021 04:16 AM    CO2 19 (L) 12/16/2021 04:16 AM    BUN 31 (H) 12/16/2021 04:16 AM    Creatinine 1.98 (H) 12/16/2021 04:16 AM    Glucose 176 (H) 12/16/2021 04:16 AM    Calcium 8.6 12/16/2021 04:16 AM    Magnesium 2.3 12/09/2021 09:27 AM    INR 1.0 04/29/2020 11:15 PM    Prostate Specific Ag 8.9 (H) 12/09/2021 09:27 AM       UA:   Lab Results   Component Value Date/Time    Color RED 12/16/2021 04:16 AM    Appearance TURBID (A) 12/16/2021 04:16 AM    Specific gravity 1.020 12/16/2021 04:16 AM    Specific gravity 1.015 09/17/2020 10:06 AM    pH (UA) 6.5 12/16/2021 04:16 AM    Protein >300 (A) 12/16/2021 04:16 AM    Glucose Negative 12/16/2021 04:16 AM    Ketone TRACE (A) 12/16/2021 04:16 AM    Bilirubin Negative 09/17/2020 10:06 AM    Urobilinogen 1.0 12/16/2021 04:16 AM    Nitrites Positive (A) 12/16/2021 04:16 AM    Leukocyte Esterase MODERATE (A) 12/16/2021 04:16 AM    Epithelial cells FEW 12/16/2021 04:16 AM    Bacteria 3+ (A) 12/16/2021 04:16 AM    WBC >100 (H) 12/16/2021 04:16 AM    RBC >100 (H) 12/16/2021 04:16 AM           Signed By: Lauren Nixon NP  - December 16, 2021

## 2021-12-17 NOTE — PROGRESS NOTES
Hospitalist Progress Note    NAME: [facility]   :  1945   MRN:  [id number]     Interim Hospital Summary: 77 y.o. male whom presented on 2021 with      Assessment / Plan:    Severe sepsis, POA as evidenced by fever 102.8, sinus tachycardia heart rate in the 120s, lactic over 5, leukopenia WBC 2.6 due to  Acute hemorrhagic cystitis, POA with  Gram-negative yaneth bacteremia  -Procalcitonin 210  -Blood cultures  GNR. Repeat cultures over the weekend  -Continue empiric cefepime and Levaquin for now  -Continue IV fluid volume expansion, blood pressure stable     Acute urinary retention requiring Ramirez, 800 mL on bladder scan  -Ramirez catheter placed in the ER.  -CT abdomen   1. Abnormal bladder with wall thickening, mild distention, blood within it. 2. Fat stranding in the peritoneum suggesting inflammation. 3. Prominence of collecting system without obstructing stone demonstrated by  this technique.     LESLIE on CKD 3, baseline Cr 1.3-1.6, currently 1.98  Non-anion gap metabolic acidosis  -Hold lisinopril.    -sodium bicarb x 1 amp now  -ramirez placed for urinary retention  -CT abdomen as above  -IV fluids. 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   Samule Glassing held  due to tooth infection  -got lanreotide infusion   -Continue telotristat etiprate (patient supplied)     Chronic essential hypertension  -held lisinopril due to hypotension and LESLIE     Osteonecrosis of the maxilla due todenusumab  Recurrent upper teeth infection in 2020, likely related to osteonecrosis  -if Cr improves, can get CT maxillofacial with contrast to evaluate if any active infection currently. No focal swelling or purulent discharge on upper teeth noted on exam     Chronic right shoulder pain  -continue neurontin 100mg tid. Has oxycodone prn prescribed but he is afraid to take     Intermittent dizziness, on as needed Antivert    18.5 - 24.9 Normal weight / Body mass index is 21.68 kg/m². Code status: Full  Prophylaxis: SCD's  Recommended Disposition: Home w/Family       Subjective:     Chief Complaint / Reason for Physician Visit  Follow up of bacteremia, sepsis, UTI  Chart reviewed in detail. Discussed with RN events overnight. Review of Systems:  Symptom Y/N Comments  Symptom Y/N Comments   Fever/Chills    Chest Pain     Poor Appetite    Edema     Cough    Abdominal Pain     Sputum    Joint Pain     SOB/VERDE    Pruritis/Rash     Nausea/vomit    Tolerating PT/OT     Diarrhea    Tolerating Diet     Constipation    Other       Could NOT obtain due to:      PO intake: No data found. Objective:     VITALS:   Last 24hrs VS reviewed since prior progress note. Most recent are:  Patient Vitals for the past 24 hrs:   Temp Pulse Resp BP SpO2   12/17/21 1423 97.9 °F (36.6 °C) 71 18 (!) 145/84 98 %   12/17/21 1037 97.7 °F (36.5 °C) 73 18 128/66 98 %   12/17/21 0718 97.7 °F (36.5 °C) 82 18 113/65 99 %   12/17/21 0411  72 15 100/63 98 %   12/17/21 0400  73      12/17/21 0000  75      12/16/21 2340  69 20 93/60 98 %   12/16/21 2000  74 17 (!) 102/58 100 %   12/16/21 1930  77 16 95/63        Intake/Output Summary (Last 24 hours) at 12/17/2021 1627  Last data filed at 12/17/2021 1423  Gross per 24 hour   Intake 7570 ml   Output 10809 ml   Net -29298 ml        I had a face to face encounter, and independently examined this patient on 12/17/2021, as outlined below:  PHYSICAL EXAM:  General: WD, WN. Alert, cooperative, no acute distress    EENT:  EOMI. Anicteric sclerae. MMM  Resp:  CTA bilaterally, no wheezing or rales.   No accessory muscle use  CV:  Regular  rhythm,  No edema  GI:  Soft, Non distended, Non tender. +Bowel sounds  Neurologic:  Alert and oriented X 3, normal speech,   Psych:   Good insight. Not anxious nor agitated  Skin:  No rashes. No jaundice    Reviewed most current lab test results and cultures  YES  Reviewed most current radiology test results   YES  Review and summation of old records today    NO  Reviewed patient's current orders and MAR    YES  PMH/SH reviewed - no change compared to H&P  ________________________________________________________________________  Care Plan discussed with:    Comments   Patient x    Family      RN     Care Manager     Consultant                        Multidiciplinary team rounds were held today with , nursing, pharmacist and clinical coordinator. Patient's plan of care was discussed; medications were reviewed and discharge planning was addressed. ________________________________________________________________________  Total NON critical care TIME:   25  Minutes    Total CRITICAL CARE TIME Spent:   Minutes non procedure based      Comments   >50% of visit spent in counseling and coordination of care x     This includes time during multidisciplinary rounds if indicated above   ________________________________________________________________________  Ashley Mooney MD     Procedures: see electronic medical records for all procedures/Xrays and details which were not copied into this note but were reviewed prior to creation of Plan. LABS:  I reviewed today's most current labs and imaging studies. Pertinent labs include:  Recent Labs     12/17/21  0600 12/16/21  1554 12/16/21  1229 12/16/21  0416 12/16/21  0416   WBC 16.3*  --   --   --  2.6*   HGB 7.4* 7.5* 7.8*   < > 11.3*   HCT 23.5* 24.2* 24.9*   < > 36.0*     --   --   --  257    < > = values in this interval not displayed.      Recent Labs     12/17/21  0600 12/16/21  0416    138   K 4.2 4.1   * 107   CO2 20* 19*   GLU 85 176*   BUN 43* 31*   CREA 2.62* 1.98*   CA 7.1* 8.6   ALB 2.0* 2.7*   TBILI 0.6 0.7   ALT 32 62

## 2021-12-17 NOTE — PROGRESS NOTES
0700- Report given to Toby Stacy RN by off going nurse. 1235- Oncology consult called. 1850- Pt feeling dizzy and flushed. VSS. Page to Dr. Chan Number. No call back. 1900- Report given to oncoming nurse by Toby Stacy RN.

## 2021-12-17 NOTE — PROGRESS NOTES
Progress Note    Patient: Betsy Knott MRN: [id number]  SSN: [id number]    YOB: 1945  Age: 77 y.o. Sex: male        ADMITTED:  2021 to Dimitry Smith MD  for Severe sepsis ([address] [A41.9, R65.20]  LESLIE (acute kidney injury) ([address] [N17.9]  UTI (urinary tract infection) [N39.0]  CKD (chronic kidney disease) stage 3, GFR 30-59 ml/min ([address] [N18.30]  Neuroendocrine cancer ([address] [C7A.8]         Betsy Knott was admitted for Severe sepsis ([address] [A41.9, R65.20]  LESLIE (acute kidney injury) ([address] [N17.9]  UTI (urinary tract infection) [N39.0]  CKD (chronic kidney disease) stage 3, GFR 30-59 ml/min ([address] [N18.30]  Neuroendocrine cancer ([address] [C7A.8]. Gross hematuria improved on CBI, now clear pink with moderate/slow gtt rate  hgb stable at 7.4  Wbc went from 2.6 to 16.3  Creat up to 2.62  Blood cx GNR  Urine cx GNR  On Levaquin and cefepime    CT abd   IMPRESSION     1. Abnormal bladder with wall thickening, mild distention, blood within it. 2. Fat stranding in the peritoneum suggesting inflammation. 3. Prominence of collecting system without obstructing stone demonstrated by  this technique. Vitals:  Temp (24hrs), Av.5 °F (36.9 °C), Min:97.7 °F (36.5 °C), Max:99 °F (37.2 °C)     Blood pressure 113/65, pulse 82, temperature 97.7 °F (36.5 °C), resp. rate 18, weight 72.5 kg (159 lb 13.3 oz), SpO2 99 %. I&O's:  12/15 1901 -  0700  In: 4969 [I.V.:2695]  Out: 42903 [Urine:27682]    0701 - 1900  In: 3000   Out:  [Urine:]     Exam:   NAD. abdomen soft, NT     Labs:   Recent Labs     21  0600 21  1554 21  1229 21  0416 21  0416   WBC 16.3*  --   --   --  2.6*   HGB 7.4* 7.5* 7.8*   < > 11.3*   HCT 23.5* 24.2* 24.9*   < > 36.0*     --   --   --  257    < > = values in this interval not displayed.      Recent Labs     21  0600 21  0416    138   K 4.2 4.1   * 107   CO2 20* 19*   GLU 85 176*   BUN 43* 31*   CREA 2.62* 1.98*   CA 7.1* 8.6        Cultures:      Imaging:       Assessment:     - Active Problems:    UTI (urinary tract infection) (12/16/2021)      CKD (chronic kidney disease) stage 3, GFR 30-59 ml/min ([facility]) (12/16/2021)      Neuroendocrine cancer ([facility] (12/16/2021)      Severe sepsis ([facility] [facility] (12/16/2021)      LESLIE (acute kidney injury) ([facility] [address] [facility] (12/16/2021)        Plan:     - okay to eat today   -abx per primary team, target abx once able  -hold AC   -trend H&H, transfuse as needed  -continue CBI, titrate down to slow gtt if staying clear pink urine.  Possibly dc cbi tomorrow if improved  -urology will follow in AM  -CARMEN tomorrow if creat persistently elevated, suspect leslie in setting of sepsis    Supervising MD, Dr. Rankin Bloch By: Phani Flores NP - December 17, 2021

## 2021-12-17 NOTE — ED PROVIDER NOTES
EMERGENCY DEPARTMENT HISTORY AND PHYSICAL EXAM      Date: 12/16/2021  Patient Name: Micah Germain    History of Presenting Illness     No chief complaint on file. History Provided By: Patient    HPI: Micah Germain, 77 y.o. 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 Belkis Hatfield presents to the ED with fever, generalized weakness, and blood in his urine for the past 3 days. Patient arrived via EMS and on transfer to wheelchair, had unresponsive episode/possible syncopal event. Initial blood pressure was 81/61 before this event. Patient quickly regained consciousness when placed on stretcher. Blood pressure improved to 167T systolic. Blood sugar 191 at the time of this event. Patient unable to give much history on arrival due to severity of condition. Wife arrived and reports that patient has had high fever at home and rigors prompting her to call EMS. Reports that patient has had hematuria and frequency of urination beginning on the evening of December 14, but has not had much urine output at all today. He was seen by urology 2 days ago but did not have the symptoms at that time. Wife gave ibuprofen 600 mg prior to coming to ED. Patient is followed by Dr. Darrell Arthur for his neuroendocrine carcinoma. 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 He has been on multiple rounds of antibiotics and the tooth has been removed, but there is still chronic infection present. PCP: Isabel Domínguez MD    No current facility-administered medications on file prior to encounter. Current Outpatient Medications on File Prior to Encounter   Medication Sig Dispense Refill    everolimus (Afinitor) 10 mg tab Take 1 Tablet by mouth daily. 30 Tablet 6    lisinopriL (PRINIVIL, ZESTRIL) 20 mg tablet Take 20 mg by mouth two (2) times a day.       gabapentin (NEURONTIN) 100 mg capsule Take 1 Capsule by mouth nightly. Max Daily Amount: 100 mg. (Patient taking differently: Take 100 mg by mouth three (3) times daily. ) 15 Capsule 0    meclizine (ANTIVERT) 25 mg tablet Take 1 Tablet by mouth three (3) times daily as needed for Dizziness. (Patient not taking: Reported on 12/16/2021) 90 Tablet 0    gabapentin (NEURONTIN) 100 mg capsule Take 2 Capsules by mouth three (3) times daily. Max Daily Amount: 600 mg.  (Patient not taking: Reported on 12/16/2021) 180 Capsule 0    gabapentin (NEURONTIN) 100 mg capsule TAKE 1 CAPSULE BY MOUTH THREE TIMES DAILY (Patient not taking: Reported on 12/16/2021) 90 Capsule 0       Past History     Past Medical History:  Past Medical History:   Diagnosis Date    Adverse effect of anesthesia     low HR and very slow to wakeup    BPH (benign prostatic hyperplasia)     Cancer ([facility] [address] 05/2016    neuroendocrine, retroperitoneal LN involvement, bone mets    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    CKD (chronic kidney disease) stage 3, GFR 30-59 ml/min ([facility])     baseline Cr 1.3 to 1.6    Diarrhea     Frequent urination     Hypertension     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    Poor appetite     Unspecified adverse effect of anesthesia     \"hard to put to sleep and slow to wake up-heart rate dropped very low\"       Past Surgical History:  Past Surgical History:   Procedure Laterality Date    COLONOSCOPY N/A 5/13/2016    COLONOSCOPY performed by Alma Javier MD at [address]  5/13/2016         HX ORTHOPAEDIC      knee scope left knee    HX OTHER SURGICAL  09-    excision of scalp cyst     UPPER GI ENDOSCOPY,BIOPSY  5/13/2016            Family History:  Family History   Problem Relation Age of Onset    Cancer Mother     Stroke Sister        Social History:  Social History     Tobacco Use    Smoking status: Former Smoker     Packs/day: 1.00     Years: 8.00     Pack years: 8.00     Quit date: 1972     Years since quittin.6    Smokeless tobacco: Never Used   Substance Use Topics    Alcohol use: No    Drug use: No       Allergies:  No Known Allergies      Review of Systems   Review of Systems   Constitutional: Positive for appetite change, chills, fatigue and fever. HENT: Positive for dental problem. Negative for rhinorrhea and sore throat. Respiratory: Negative for cough, chest tightness, shortness of breath and wheezing. Cardiovascular: Negative for chest pain and palpitations. Gastrointestinal: Negative for abdominal pain, diarrhea, nausea and vomiting. Genitourinary: Positive for decreased urine volume, difficulty urinating, dysuria, frequency ( For first day of symptoms) and hematuria. Musculoskeletal: Negative for back pain and neck pain. Skin: Negative for rash and wound. Neurological: Positive for syncope and light-headedness. Psychiatric/Behavioral: Negative for confusion. The patient is not nervous/anxious. All other systems reviewed and are negative.         Physical Exam    General appearance -very thin, ill-appearing on arrival, and in no distress  Eyes - pupils equal and reactive, extraocular eye movements intact  ENT - mucous membranes moist, pharynx normal without lesions  Neck - supple, no significant adenopathy; non-tender to palpation  Chest - clear to auscultation, no wheezes, rales or rhonchi; non-tender to palpation  Heart -tachycardic, regular rhythm, S1 and S2 normal, no murmurs noted  Abdomen - soft, nontender, nondistended, no masses or organomegaly  Musculoskeletal - no joint tenderness, deformity or swelling; normal ROM  Extremities - peripheral pulses normal, no pedal edema  Skin - normal coloration and turgor, no rashes  Neurological - alert, oriented x3, normal speech, no focal findings or movement disorder noted    Diagnostic Study Results     Labs -     Recent Results (from the past 24 hour(s))   GLUCOSE, POC Collection Time: 12/16/21  4:05 AM   Result Value Ref Range    Glucose (POC) 191 (H) 65 - 117 mg/dL    Performed by Kelin Hawkins. EDT    EKG, 12 LEAD, INITIAL    Collection Time: 12/16/21  4:05 AM   Result Value Ref Range    Ventricular Rate 112 BPM    Atrial Rate 112 BPM    P-R Interval 148 ms    QRS Duration 88 ms    Q-T Interval 320 ms    QTC Calculation (Bezet) 436 ms    Calculated P Axis 59 degrees    Calculated R Axis 93 degrees    Calculated T Axis 66 degrees    Diagnosis       Sinus tachycardia  Possible Left atrial enlargement  Rightward axis  Poor R-wave Progression (consider lead placement or loss of anterior forces)  Confirmed by Dayana David ([id number]) on 12/16/2021 9:09:12 AM     URINALYSIS W/ REFLEX CULTURE    Collection Time: 12/16/21  4:16 AM    Specimen: Miscellaneous sample; Urine    Urine specimen   Result Value Ref Range    Color RED      Appearance TURBID (A) CLEAR      Specific gravity 1.020 1.003 - 1.030      pH (UA) 6.5 5.0 - 8.0      Protein >300 (A) NEG mg/dL    Glucose Negative NEG mg/dL    Ketone TRACE (A) NEG mg/dL    Blood LARGE (A) NEG      Urobilinogen 1.0 0.2 - 1.0 EU/dL    Nitrites Positive (A) NEG      Leukocyte Esterase MODERATE (A) NEG      WBC >100 (H) 0 - 4 /hpf    RBC >100 (H) 0 - 5 /hpf    Epithelial cells FEW FEW /lpf    Bacteria 3+ (A) NEG /hpf    UA:UC IF INDICATED URINE CULTURE ORDERED (A) CNI     METABOLIC PANEL, COMPREHENSIVE    Collection Time: 12/16/21  4:16 AM   Result Value Ref Range    Sodium 138 136 - 145 mmol/L    Potassium 4.1 3.5 - 5.1 mmol/L    Chloride 107 97 - 108 mmol/L    CO2 19 (L) 21 - 32 mmol/L    Anion gap 12 5 - 15 mmol/L    Glucose 176 (H) 65 - 100 mg/dL    BUN 31 (H) 6 - 20 MG/DL    Creatinine 1.98 (H) 0.70 - 1.30 MG/DL    BUN/Creatinine ratio 16 12 - 20      GFR est AA 40 (L) >60 ml/min/1.73m2    GFR est non-AA 33 (L) >60 ml/min/1.73m2    Calcium 8.6 8.5 - 10.1 MG/DL    Bilirubin, total 0.7 0.2 - 1.0 MG/DL    ALT (SGPT) 62 12 - 78 U/L    AST (SGOT) 80 (H) 15 - 37 U/L    Alk. phosphatase 164 (H) 45 - 117 U/L    Protein, total 6.6 6.4 - 8.2 g/dL    Albumin 2.7 (L) 3.5 - 5.0 g/dL    Globulin 3.9 2.0 - 4.0 g/dL    A-G Ratio 0.7 (L) 1.1 - 2.2     CBC WITH AUTOMATED DIFF    Collection Time: 12/16/21  4:16 AM   Result Value Ref Range    WBC 2.6 (L) 4.1 - 11.1 K/uL    RBC 4.69 4.10 - 5.70 M/uL    HGB 11.3 (L) 12.1 - 17.0 g/dL    HCT 36.0 (L) 36.6 - 50.3 %    MCV 76.8 (L) 80.0 - 99.0 FL    MCH 24.1 (L) 26.0 - 34.0 PG    MCHC 31.4 30.0 - 36.5 g/dL    RDW 13.8 11.5 - 14.5 %    PLATELET 754 962 - 860 K/uL    MPV 9.8 8.9 - 12.9 FL    NRBC 0.0 0  WBC    ABSOLUTE NRBC 0.00 0.00 - 0.01 K/uL    NEUTROPHILS 84 (H) 32 - 75 %    LYMPHOCYTES 14 12 - 49 %    MONOCYTES 1 (L) 5 - 13 %    EOSINOPHILS 1 0 - 7 %    BASOPHILS 0 0 - 1 %    IMMATURE GRANULOCYTES 0 0.0 - 0.5 %    ABS. NEUTROPHILS 2.2 1.8 - 8.0 K/UL    ABS. LYMPHOCYTES 0.4 (L) 0.8 - 3.5 K/UL    ABS. MONOCYTES 0.0 0.0 - 1.0 K/UL    ABS. EOSINOPHILS 0.0 0.0 - 0.4 K/UL    ABS. BASOPHILS 0.0 0.0 - 0.1 K/UL    ABS. IMM.  GRANS. 0.0 0.00 - 0.04 K/UL    DF SMEAR SCANNED      RBC COMMENTS ANISOCYTOSIS  1+        RBC COMMENTS ACANTHOCYTES     TROPONIN-HIGH SENSITIVITY    Collection Time: 12/16/21  4:16 AM   Result Value Ref Range    Troponin-High Sensitivity 15 0 - 76 ng/L   COVID-19 RAPID TEST    Collection Time: 12/16/21  4:16 AM   Result Value Ref Range    Specimen source Nasopharyngeal      COVID-19 rapid test Not detected NOTD     INFLUENZA A+B VIRAL AGS    Collection Time: 12/16/21  4:16 AM   Result Value Ref Range    Influenza A Antigen Negative NEG      Influenza B Antigen Negative NEG     BILIRUBIN, CONFIRM    Collection Time: 12/16/21  4:16 AM   Result Value Ref Range    Bilirubin UA, confirm Negative NEG     CULTURE, BLOOD    Collection Time: 12/16/21  4:45 AM    Specimen: Blood   Result Value Ref Range    Special Requests: NO SPECIAL REQUESTS      Culture result: (A)       GRAM NEGATIVE RODS GROWING IN 1 OF 2 BOTTLES DRAWN L AC SITE    Culture result: REMAINING BOTTLE(S) HAS/HAVE NO GROWTH SO FAR      Culture result:       (NOTE) PRELIMINARY REPORT OF GRAM NEGATIVE RODS GROWING IN 1 OF 2 BOTTLES DRAWN. CALLED TO AND READ BACK BY MS JAVIER RN ON 12/16/21 AT 2044. MS   ETHYL ALCOHOL    Collection Time: 12/16/21  5:13 AM   Result Value Ref Range    ALCOHOL(ETHYL),SERUM <10 <10 MG/DL   CULTURE, BLOOD    Collection Time: 12/16/21  5:15 AM    Specimen: Blood   Result Value Ref Range    Special Requests: NO SPECIAL REQUESTS      Culture result:        ONE OF TWO BOTTLES HAS BEEN FLAGGED POSITIVE BY INSTRUMENT. BOTTLE HAS BEEN SENT TO [facility] TO ASSESS FOR POSSIBLE GROWTH.     Culture result: REMAINING BOTTLE(S) HAS/HAVE NO GROWTH SO FAR     BLOOD GAS,CHEM8,LACTIC ACID POC    Collection Time: 12/16/21  5:16 AM   Result Value Ref Range    Calcium, ionized (POC) 1.05 (L) 1.12 - 1.32 mmol/L    BICARBONATE 19 mmol/L    Base deficit (POC) 5.1 mmol/L    Sample source VENOUS BLOOD      CO2, POC 19 19 - 24 MMOL/L    Sodium,  136 - 145 MMOL/L    Potassium, POC 5.1 3.5 - 5.5 MMOL/L    Chloride,  (H) 100 - 108 MMOL/L    Glucose,  (H) 74 - 106 MG/DL    Creatinine, POC 2.0 (H) 0.6 - 1.3 MG/DL    Lactic Acid (POC) 5.27 (HH) 0.40 - 2.00 mmol/L    Critical value read back O`DEON     pH, venous (POC) 7.43 (H) 7.32 - 7.42      pCO2, venous (POC) 28.3 (L) 41 - 51 MMHG    pO2, venous (POC) 32 25 - 40 mmHg   POC LACTIC ACID    Collection Time: 12/16/21  7:31 AM   Result Value Ref Range    Lactic Acid (POC) 1.43 0.40 - 2.00 mmol/L   PROCALCITONIN    Collection Time: 12/16/21  7:37 AM   Result Value Ref Range    Procalcitonin 52.61 ng/mL   TROPONIN-HIGH SENSITIVITY    Collection Time: 12/16/21 10:53 AM   Result Value Ref Range    Troponin-High Sensitivity 58 0 - 76 ng/L   HGB & HCT    Collection Time: 12/16/21 12:29 PM   Result Value Ref Range    HGB 7.8 (L) 12.1 - 17.0 g/dL    HCT 24.9 (L) 36.6 - 50.3 %   TYPE & SCREEN Collection Time: 12/16/21 12:29 PM   Result Value Ref Range    Crossmatch Expiration 12/19/2021,2359     ABO/Rh(D) B POSITIVE     Antibody screen NEG    HGB & HCT    Collection Time: 12/16/21  3:54 PM   Result Value Ref Range    HGB 7.5 (L) 12.1 - 17.0 g/dL    HCT 24.2 (L) 36.6 - 50.3 %       Radiologic Studies -   CT ABD PELV WO CONT   Final Result      1. Abnormal bladder with wall thickening, mild distention, blood within it. 2. Fat stranding in the peritoneum suggesting inflammation. 3. Prominence of collecting system without obstructing stone demonstrated by   this technique. XR CHEST PORT   Final Result   No acute findings. CT Results  (Last 48 hours)               12/16/21 0848  CT ABD PELV WO CONT Final result    Impression:      1. Abnormal bladder with wall thickening, mild distention, blood within it. 2. Fat stranding in the peritoneum suggesting inflammation. 3. Prominence of collecting system without obstructing stone demonstrated by   this technique. Narrative:  EXAM: CT ABD PELV WO CONT       INDICATION: gross hematuria, uti, flori on ckd, urinary retention, severe sepsis,   evalu for stone, hydronephrosis, mass, prostate size       COMPARISON: February 17, 2021       CONTRAST:  None. TECHNIQUE:    Thin axial images were obtained through the abdomen and pelvis. Coronal and   sagittal reformats were generated. Oral contrast was not administered. CT dose   reduction was achieved through use of a standardized protocol tailored for this   examination and automatic exposure control for dose modulation. The absence of intravenous contrast material reduces the sensitivity for   evaluation of the vasculature and solid organs. FINDINGS:    LOWER THORAX: New atelectasis or infiltrate at the right lung base LIVER: No   mass. BILIARY TREE: Gallbladder is within normal limits. CBD is not dilated. SPLEEN: within normal limits. PANCREAS: No focal abnormality. ADRENALS: Unremarkable. KIDNEYS/URETERS: 7 mm calculus is seen in the lower pole of the left kidney. There is slight prominence of collecting system and ureters without definite   obstructing calculus demonstrated. STOMACH: Unremarkable. SMALL BOWEL: No dilatation or wall thickening. COLON: Sigmoid diverticulosis . APPENDIX: Appears unremarkable   PERITONEUM: No ascites or pneumoperitoneum. Diffuse fat stranding suggest a   inflammatory process. RETROPERITONEUM: No aortic aneurysm. Retroperitoneal adenopathy once again   demonstrated mainly to the left of the aorta. Prostate implants. URINARY BLADDER: Gee catheter seen in the bladder which is diffuse bladder   wall thickening, contain dense material likely blood as well as gas. Mild   distention. BONES: No destructive bone lesion. ABDOMINAL WALL: No mass or hernia. ADDITIONAL COMMENTS: N/A               CXR Results  (Last 48 hours)               12/16/21 5089  XR CHEST PORT Final result    Impression:  No acute findings. Narrative:  EXAM: XR CHEST PORT       INDICATION: Eval for Infiltrate       COMPARISON: 2000       FINDINGS: A portable AP radiograph of the chest was obtained at 0 417 hours. The   patient is on a cardiac monitor. The lungs are clear. There is atherosclerosis   of the aorta. The bones and soft tissues are grossly within normal limits. The   cardiophrenic sulcus is not included on the exam.                   Medical Decision Making   I am the first provider for this patient. I reviewed the vital signs, available nursing notes, past medical history, past surgical history, family history and social history. Vital Signs-Reviewed the patient's vital signs.       EKG: Sinus tachycardia, 112 bpm, normal axis, normal AR, QRS, QTc intervals, poor R wave progression    Records Reviewed: Nursing Notes and Old Medical Records    Provider Notes (Medical Decision Making):   Differential diagnosis: Pneumonia, viral syndrome, Covid, flu, dehydration, electrolyte abnormality, UTI  We will obtain CBC, CMP, paired cultures, lactate, chest x-ray, flu swab, Covid swab. Will treat with IV fluid resuscitation and broad-spectrum antibiotics. ED Course:   Initial assessment performed. The patients presenting problems have been discussed, and they are in agreement with the care plan formulated and outlined with them. I have encouraged them to ask questions as they arise throughout their visit. Progress Notes:  ED Course as of 12/16/21 2228   Thu Dec 16, 2021   9311 Patient was being evaluated in triage. Noted to be hypotensive with blood pressures of 80 systolic and then suddenly became unresponsive with eyes rolling back and snoring respirations. No seizure activity detected. Placed on stretcher and became responsive again, although color appeared gray and patient was ill-appearing. Initial vital signs after this episode showed heart rate in the 120s and blood pressure of 412C systolic. [AO]   0807 [facility] ED SEPSIS NOTE:     5:38 AM The patient now meets criteria for: Severe Sepsis    Fluid resuscitation with: 30 mL/kg crystalloid bolus  Due to concern for rapidly advancing infection and deterioration of patient's condition, antibiotics are started STAT and cultures ordered. ----- [AO]   3608 I've performed a sepsis reassessment of the patient's clinical volume status and tissue perfusion at time 7:02 AM  [AO]   0702 Patient unable to urinate. Bladder scan showed greater than 800 cc in bladder. Catheter placed and over 800 cc of reddish-brown urine drained. No definite clots visible. [AO]   0732 Urine is positive for likely UTI with 3+ bacteria, greater than 100 white blood cells and greater than 100 red blood cells. He has already received broad-spectrum antibiotics. This is likely the source of his severe sepsis.   Case discussed with Rosemary Galvan (hospitalist) who will see and admit the patient. [AO]      ED Course User Index  [AO] Kim Kellogg MD     CRITICAL CARE NOTE :        IMPENDING DETERIORATION -Cardiovascular, CNS, Metabolic and Renal    ASSOCIATED RISK FACTORS - Hypotension, Shock, Bleeding, Dysrhythmia, Metabolic changes and Dehydration    MANAGEMENT- Bedside Assessment and Supervision of Care    INTERPRETATION -  Xrays, Blood Gases, ECG and Blood Pressure    INTERVENTIONS - hemodynamic mngmt and Metobolic interventions    CASE REVIEW - Hospitalist/Intensivist, Nursing and Family    TREATMENT RESPONSE -Stable    PERFORMED BY - Self        NOTES   :      I have spent 55 minutes of critical care time involved in lab review, consultations with specialist, family decision- making, bedside attention and documentation. During this entire length of time I was immediately available to the patient . Paulie Coto MD        Disposition:  Admit to hospitalist        Diagnosis     Clinical Impression:   1. Severe sepsis with lactic acidosis (HCC)    2. Urinary tract infection with hematuria, site unspecified    3. Urinary retention    4.  Gross hematuria

## 2021-12-17 NOTE — PROGRESS NOTES
1900: Bedside shift change report given to [facility] (oncoming nurse) by Normand Cowden (offgoing nurse). Report included the following information SBAR, Kardex, Intake/Output, MAR, Recent Results and Cardiac Rhythm NSR.     0700: End of Shift Note    Bedside shift change report given to / Kim 29 (oncoming nurse) by Javier Benavides RN (offgoing nurse). Report included the following information SBAR, Kardex, Intake/Output, MAR, Recent Results and Cardiac Rhythm NSR    Shift worked:  [id number]     Shift summary and any significant changes:     CBI, output range from clear light pink to dark pink with clots. Irrigated 2x. Concerns for physician to address:  Amount of CBI pt is needing. Zone phone for [facility] shift:          Activity:  Activity Level: Bed Rest  Number times ambulated in hallways past shift: 0  Number of times OOB to chair past shift: 0    Cardiac:   Cardiac Monitoring: Yes      Cardiac Rhythm: Sinus Rhythm    Access:   Current line(s): PIV     Genitourinary:   Urinary status: ramirez    Respiratory:   O2 Device: None (Room air)  Chronic home O2 use?: NO  Incentive spirometer at bedside: NO     GI:  Last Bowel Movement Date: 12/17/21  Current diet:  DIET NPO  Passing flatus: YES  Tolerating current diet: YES       Pain Management:   Patient states pain is manageable on current regimen: YES    Skin:  Pravin Score: 17  Interventions: limit briefs and internal/external urinary devices    Patient Safety:  Fall Score:  Total Score: 3  Interventions: bed/chair alarm, assistive device (walker, cane, etc), gripper socks and pt to call before getting OOB  High Fall Risk: Yes    Length of Stay:  Expected LOS: 3d 12h  Actual LOS: 1      Javier Benavides, RN

## 2021-12-17 NOTE — CONSULTS
[address] [facility] [address], [address] [facility], [address] [facility], [address] [place] [address]  [phone]        Brief Note      Chart reviewed  Consult noted. Patient of mine with metastatic carcinoid syndrome  Stable disease on Lanreotide + Afinitor. Admitted with urosepsis. Not much to add to his care at this point. Re-consult if needed. Otherwise I shall see him in f/u in the office.        Signed by: Светлана Garcia MD                     December 17, 2021

## 2021-12-17 NOTE — TELEPHONE ENCOUNTER
CONSULT    Patient: Jaleesa Aly    : 12/15/21    Referring Physician:     Reason: [place]    Room #: 2025     Nurse: [id number]

## 2021-12-18 NOTE — PROGRESS NOTES
Hospitalist Progress Note    NAME: Jean Roper   :  1945   MRN:  [id number]     Interim Hospital Summary: 77 y.o. male whom presented on 2021 with      Assessment / Plan:    Severe sepsis, POA as evidenced by fever 102.8, sinus tachycardia heart rate in the 120s, lactic over 5, leukopenia WBC 2.6 due to  Acute hemorrhagic cystitis, POA with  Gram-negative yaneth bacteremia  -Procalcitonin 210  -UCx Ecoli  -Blood cultures  GNR. Repeat cultures in AM  -Continue empiric cefepime and Levaquin pending final ID and S of blood cultures  -Continue IV fluid volume expansion, blood pressure stable     Acute urinary retention requiring Ramirez, 800 mL on bladder scan  -Ramirez catheter placed in the ER.  -CT abdomen   1. Abnormal bladder with wall thickening, mild distention, blood within it. 2. Fat stranding in the peritoneum suggesting inflammation. 3. Prominence of collecting system without obstructing stone demonstrated by  this technique.     LESLIE on CKD 3, baseline Cr 1.3-1.6, currently 1.98  Non-anion gap metabolic acidosis  -Hold lisinopril.    -sodium bicarb x 1 amp now  -ramirez placed for urinary retention  -CT abdomen as above  -IV fluids. 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   Rachelle Lapidus held  due to tooth infection  -got lanreotide infusion   -Continue telotristat etiprate (patient supplied)     Chronic essential hypertension  -held lisinopril due to hypotension and LESLIE  -hydralazine IV prn     Osteonecrosis of the maxilla due todenusumab  Recurrent upper teeth infection in 2020, likely related to osteonecrosis  -if Cr improves, can get CT maxillofacial with contrast to evaluate if any active infection currently. No focal swelling or purulent discharge on upper teeth noted on exam     Chronic right shoulder pain  -continue neurontin 100mg tid. Has oxycodone prn prescribed but he is afraid to take     Intermittent dizziness, on as needed Antivert    18.5 - 24.9 Normal weight / Body mass index is 21.68 kg/m². Code status: Full  Prophylaxis: SCD's  Recommended Disposition: Home w/Family       Subjective:     Chief Complaint / Reason for Physician Visit  Follow up of bacteremia, sepsis, UTI  Chart reviewed in detail. Discussed with RN events overnight. Review of Systems:  Symptom Y/N Comments  Symptom Y/N Comments   Fever/Chills    Chest Pain     Poor Appetite    Edema     Cough    Abdominal Pain     Sputum    Joint Pain     SOB/VERDE    Pruritis/Rash     Nausea/vomit    Tolerating PT/OT     Diarrhea    Tolerating Diet     Constipation    Other       Could NOT obtain due to:      PO intake: No data found. Objective:     VITALS:   Last 24hrs VS reviewed since prior progress note. Most recent are:  Patient Vitals for the past 24 hrs:   Temp Pulse Resp BP SpO2   12/18/21 0716 98.4 °F (36.9 °C) 69 19 (!) 155/91 96 %   12/18/21 0404 97.6 °F (36.4 °C) 68 11 (!) 155/79 97 %   12/18/21 0000 98 °F (36.7 °C) 73 18 139/89 98 %   12/17/21 2000 97.9 °F (36.6 °C) 81 19 (!) 152/95 98 %   12/17/21 1834  76 18 (!) 154/88    12/17/21 1700  83 17 (!) 144/89 99 %   12/17/21 1423 97.9 °F (36.6 °C) 71 18 (!) 145/84 98 %   12/17/21 1037 97.7 °F (36.5 °C) 73 18 128/66 98 %       Intake/Output Summary (Last 24 hours) at 12/18/2021 1026  Last data filed at 12/18/2021 0830  Gross per 24 hour   Intake 99875.33 ml   Output 92142 ml   Net 2343.33 ml        I had a face to face encounter, and independently examined this patient on 12/18/2021, as outlined below:  PHYSICAL EXAM:  General: WD, WN. Alert, cooperative, no acute distress    EENT:  EOMI. Anicteric sclerae. MMM  Resp:  CTA bilaterally, no wheezing or rales. No accessory muscle use  CV:  Regular  rhythm,  No edema  GI:  Soft, Non distended, Non tender. +Bowel sounds  Neurologic:  Alert and oriented X 3, normal speech,   Psych:   Good insight. Not anxious nor agitated  Skin:  No rashes. No jaundice    Reviewed most current lab test results and cultures  YES  Reviewed most current radiology test results   YES  Review and summation of old records today    NO  Reviewed patient's current orders and MAR    YES  PMH/SH reviewed - no change compared to H&P  ________________________________________________________________________  Care Plan discussed with:    Comments   Patient x    Family      RN     Care Manager     Consultant                        Multidiciplinary team rounds were held today with , nursing, pharmacist and clinical coordinator. Patient's plan of care was discussed; medications were reviewed and discharge planning was addressed. ________________________________________________________________________  Total NON critical care TIME:   25  Minutes    Total CRITICAL CARE TIME Spent:   Minutes non procedure based      Comments   >50% of visit spent in counseling and coordination of care x     This includes time during multidisciplinary rounds if indicated above   ________________________________________________________________________  Barkley Lanes, MD     Procedures: see electronic medical records for all procedures/Xrays and details which were not copied into this note but were reviewed prior to creation of Plan. LABS:  I reviewed today's most current labs and imaging studies. Pertinent labs include:  Recent Labs     12/18/21  0253 12/17/21  0600 12/16/21  1554 12/16/21  1229 12/16/21  0416   WBC 16.5* 16.3*  --   --  2.6*   HGB 8.1* 7.4* 7.5*   < > 11.3*   HCT 25.8* 23.5* 24.2*   < > 36.0*    151  --   --  257    < > = values in this interval not displayed.      Recent Labs 12/18/21  0253 12/17/21  0600 12/16/21  0416    142 138   K 3.7 4.2 4.1   * 114* 107   CO2 18* 20* 19*   GLU 96 85 176*   BUN 38* 43* 31*   CREA 2.04* 2.62* 1.98*   CA 7.1* 7.1* 8.6   MG 1.8  --   --    ALB  --  2.0* 2.7*   TBILI  --  0.6 0.7   ALT  --  32 62

## 2021-12-18 NOTE — PROGRESS NOTES
GH, CBI slow drop, urine pink, hand irrigated to clear immediately, no clots  Normal coags and plts. Hct stable 25.8  WBC 16.8, BCx 1/2 GNR, Urine GNR on Cefipime    Wean CBI as able today.   Cefipime for Urospesis pending CX identification  Will follow

## 2021-12-18 NOTE — PROGRESS NOTES
1900 Bedside and Verbal shift change report given to [address] (oncoming nurse) by Tres Alfaro RN (offgoing nurse). Report included the following information SBAR, Kardex, Intake/Output, MAR, Recent Results and Cardiac Rhythm NSR. End of Shift Note    Bedside shift change report given to Tres Alfaro RN (oncoming nurse) by Jaskaran Mosley RN (offgoing nurse). Report included the following information SBAR, Kardex, Intake/Output, MAR, Recent Results and Cardiac Rhythm NSR    Shift worked:  1900 - 0700     Shift summary and any significant changes:    Pt remains on CBI; draining clear pink urine, clot frequency/size seems to be decreasing. PRN Tylenol given 2x for back, bladder, and shoulder pain. Concerns for physician to address: Continues to c/o pain this AM following Tylenol administration. Alternate PRN pain coverage? Zone phone for oncoming shift:          Activity:  Activity Level: Bed Rest  Number times ambulated in hallways past shift: 0  Number of times OOB to chair past shift: 0    Cardiac:   Cardiac Monitoring: Yes      Cardiac Rhythm: Sinus Rhythm    Access:   Current line(s): PIV     Genitourinary:   Urinary status: ramirez    Respiratory:   O2 Device: None (Room air)  Chronic home O2 use?: NO  Incentive spirometer at bedside: NO     GI:  Last Bowel Movement Date: 12/18/21  Current diet:  ADULT DIET Regular  Passing flatus: YES  Tolerating current diet: YES       Pain Management:   Patient states pain is manageable on current regimen: NO    Skin:  Pravin Score: 17  Interventions: float heels, internal/external urinary devices and nutritional support     Patient Safety:  Fall Score:  Total Score: 3  Interventions: bed/chair alarm, assistive device (walker, cane, etc), gripper socks, pt to call before getting OOB and stay with me (per policy)  High Fall Risk: Yes    Length of Stay:  Expected LOS: 3d 12h  Actual LOS: 2      Jaskaran Mosley RN

## 2021-12-18 NOTE — PROGRESS NOTES
Received message from patient's nurse stating:    Pt admitted 12/16 for sepsis r/t UTI and hemorrhagic cystitis. Currently c/o 7/10 in mouth (has cracked tooth PTA). Tylenol given earlier with minimal relief. Can we try something else for pain? Discussion / orders:    Order entered for lidocaine oral gel to be applied at the gumline of broken tooth as needed           Please note that this note was dictated using Dragon computer voice recognition software. Quite often unanticipated grammatical, syntax, homophones, and other interpretive errors are inadvertently transcribed by the computer software. Please disregard these errors. Please excuse any errors that have escaped final proofreading.      Signed by:  Todd Blake DNP, ACNP-BC

## 2021-12-19 NOTE — PROGRESS NOTES
Received message from patient's nurse stating:    Patient K this am 3.4         Discussion / orders:    Potassium chloride 20 mEq p.o. x1         Please note that this note was dictated using Dragon computer voice recognition software. Quite often unanticipated grammatical, syntax, homophones, and other interpretive errors are inadvertently transcribed by the computer software. Please disregard these errors. Please excuse any errors that have escaped final proofreading.      Signed by:  Olimpia Carcamo DNP, ACNP-BC

## 2021-12-19 NOTE — PROGRESS NOTES
0700- Report given to Radha Brennan, RN by off going nurse. 0940- CPI capped per Dr Yael Wagoner. 65- Pt assisted to recliner with 1 assist. Wife at bedside. 1340- Pt assisted back to bed. 1410- Pt complaining of bladder burning. Gee draining red urine. Gee irrigated. Clots removed. Now draining yellow urine. 1830- Urine is pink tinged again. No clots noted. Gray Covarrubias, NP/on call for urology notified. She will notify urologist.     6289- Report given to oncoming nurse by Radha Brennan, SATINDER.

## 2021-12-19 NOTE — PROGRESS NOTES
Suspect hemorrhagic cystitis on immunosuppression  Hct 25.8->24 pretty stable  Creat down to 1.5  WBC down 12.6, BCx 1/2 sensitive Ecoli, same in urine on Cefipime  Urine clear, CBI capped. C/o nonlateralized back pain, nausea and emesis - renal Us today.  Also had a fall within the last month

## 2021-12-19 NOTE — PROGRESS NOTES
~1950: Verbal report/handoff received from SATINDER Guardado. Report received on information from the following report(s) SBAR, Kardex, MAR, Intake/Output, Recent Results, and Cardiac Rhythm NSR was reviewed with the receiving nurse. For additional details see Flowsheets and MAR. Highlights are noted below:     ~2030: Shift assessment completed. Patient resting in bed and no acute distress noted. See Flowsheets and MAR for details.      ~2315: Verbal report/handoff given to Sindy Stevens RN. Report given on information from the following report(s) SBAR, Kardex, MAR, Intake/Output, Recent Results, and Cardiac Rhythm NSR was reviewed with the receiving nurse.  For additional details see Flowsheets and MAR.

## 2021-12-19 NOTE — PROGRESS NOTES
2323: Bedside and Verbal shift change report given to Jagdeep Almaguer, RN (oncoming nurse) by Sherly Eric RN (offgoing nurse). Report included the following information SBAR, Kardex, Intake/Output, MAR, Accordion, Recent Results, Med Rec Status and Cardiac Rhythm NSR.     0552 12/19/2021: Patient's K 3.4 Purveyor, NP notified. Waiting on orders at this time. 1416: This RN did urinary catheter irrigation. N    9487: Bedside and Verbal shift change report given to Jamaica Clarke RN (oncoming nurse) by Jagdeep Almaguer RN (offgoing nurse). Report included the following information SBAR, Kardex, Intake/Output, MAR, Accordion, Recent Results, Med Rec Status and Cardiac Rhythm NSR.

## 2021-12-19 NOTE — PROGRESS NOTES
Hospitalist Progress Note    NAME: Ross Jimenez   :  1945   MRN:  [id number]     Interim Hospital Summary: 77 y.o. male whom presented on 2021 with      Assessment / Plan:    Severe sepsis, POA as evidenced by fever 102.8, sinus tachycardia heart rate in the 120s, lactic over 5, leukopenia WBC 2.6 due to  Acute hemorrhagic cystitis, POA with  Gram-negative yaneth bacteremia  -Procalcitonin 210  -UCx Ecoli  -Blood cultures  E coli. Repeat cultures  -pending  -Discontinue cefepime after today's dose. Continue Levaquin.     Acute urinary retention requiring Gee, 800 mL on bladder scan  -CT abdomen   1. Abnormal bladder with wall thickening, mild distention, blood within it. 2. Fat stranding in the peritoneum suggesting inflammation. 3. Prominence of collecting system without obstructing stone demonstrated by  this technique. -CBI capped today by urology. Urine has cleared.  -Complaining of back pain. Urology ordered renal ultrasound. LESLIE on CKD 3, baseline Cr 1.3-1.6, currently 1.98  Non-anion gap metabolic acidosis  -Hold lisinopril. 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   Will Vasquez held  due to tooth infection  -got lanreotide infusion   -Continue telotristat etiprate (patient supplied)     Chronic essential hypertension  -held lisinopril due to hypotension and LESLIE  -We will give a dose of Norvasc today and likely can restart lisinopril in the morning.  -hydralazine IV prn     Osteonecrosis of the maxilla due todenusumab  Recurrent upper teeth infection in 2020, likely related to osteonecrosis  -if Cr improves, can get CT maxillofacial with contrast to evaluate if any active infection currently. No focal swelling or purulent discharge on upper teeth noted on exam     Chronic right shoulder pain  -continue neurontin 100mg tid. Has oxycodone prn prescribed but he is afraid to take     Intermittent dizziness, on as needed Antivert    18.5 - 24.9 Normal weight / Body mass index is 21.68 kg/m². Code status: Full  Prophylaxis: SCD's  Recommended Disposition: Home w/Family       Subjective:     Chief Complaint / Reason for Physician Visit  Follow up of bacteremia, sepsis, UTI  Chart reviewed in detail. Discussed with RN events overnight. Review of Systems:  Symptom Y/N Comments  Symptom Y/N Comments   Fever/Chills    Chest Pain     Poor Appetite    Edema     Cough    Abdominal Pain     Sputum    Joint Pain     SOB/VERDE    Pruritis/Rash     Nausea/vomit    Tolerating PT/OT     Diarrhea    Tolerating Diet     Constipation    Other       Could NOT obtain due to:      PO intake: No data found. Objective:     VITALS:   Last 24hrs VS reviewed since prior progress note.  Most recent are:  Patient Vitals for the past 24 hrs:   Temp Pulse Resp BP SpO2   12/19/21 0732 98.8 °F (37.1 °C) 78 23 (!) 169/87 99 %   12/19/21 0453 98.5 °F (36.9 °C) 66 22 (!) 178/97 98 %   12/19/21 0113 99.3 °F (37.4 °C) 81 18 (!) 148/82 95 %   12/18/21 2300  68 13  95 %   12/18/21 2200  69 20  96 %   12/18/21 2142  75 19 (!) 164/91 97 %   12/18/21 2100  74 20  94 %   12/18/21 2030 98.9 °F (37.2 °C) 71 23     12/18/21 2000  70 17  92 %   12/18/21 1530 98.5 °F (36.9 °C) 73 21 (!) 158/92 98 %       Intake/Output Summary (Last 24 hours) at 12/19/2021 1059  Last data filed at 12/19/2021 8782  Gross per 24 hour   Intake 5831.67 ml   Output 3425 ml   Net 2406.67 ml        I had a face to face encounter, and independently examined this patient on 12/19/2021, as outlined below:  PHYSICAL EXAM:  General: WD, WN. Alert, cooperative, no acute distress    EENT:  EOMI. Anicteric sclerae. MMM  Resp:  CTA bilaterally, no wheezing or rales. No accessory muscle use  CV:  Regular  rhythm,  No edema  GI:  Soft, Non distended, Non tender. +Bowel sounds  Neurologic:  Alert and oriented X 3, normal speech,   Psych:   Good insight. Not anxious nor agitated  Skin:  No rashes. No jaundice    Reviewed most current lab test results and cultures  YES  Reviewed most current radiology test results   YES  Review and summation of old records today    NO  Reviewed patient's current orders and MAR    YES  PMH/SH reviewed - no change compared to H&P  ________________________________________________________________________  Care Plan discussed with:    Comments   Patient x    Family      RN     Care Manager     Consultant                        Multidiciplinary team rounds were held today with , nursing, pharmacist and clinical coordinator. Patient's plan of care was discussed; medications were reviewed and discharge planning was addressed. ________________________________________________________________________  Total NON critical care TIME:   25  Minutes    Total CRITICAL CARE TIME Spent:   Minutes non procedure based      Comments   >50% of visit spent in counseling and coordination of care x     This includes time during multidisciplinary rounds if indicated above   ________________________________________________________________________  Robbie Bowers MD     Procedures: see electronic medical records for all procedures/Xrays and details which were not copied into this note but were reviewed prior to creation of Plan. LABS:  I reviewed today's most current labs and imaging studies.   Pertinent labs include:  Recent Labs     12/19/21  0445 12/18/21  0253 12/17/21  0600   WBC 12.6* 16.5* 16.3*   HGB 7.6* 8.1* 7.4*   HCT 23.7* 25.8* 23.5*    177 151     Recent Labs     12/19/21  0445 12/18/21  0253 12/17/21  0600  143 142   K 3.4* 3.7 4.2   * 116* 114*   CO2 20* 18* 20*   * 96 85   BUN 21* 38* 43*   CREA 1.50* 2.04* 2.62*   CA 7.2* 7.1* 7.1*   MG 1.7 1.8  --    ALB  --   --  2.0*   TBILI  --   --  0.6   ALT  --   --  32

## 2021-12-20 NOTE — PROGRESS NOTES
1920: Bedside and Verbal shift change report given to Abe Mcintosh RN (oncoming nurse) by Sharmaine Fitch RN (offgoing nurse). Report included the following information SBAR, Kardex, Intake/Output, MAR, Accordion, Recent Results, Med Rec Status and Cardiac Rhythm NSR .     0200 12/20/2021: Patient was desatting 89% on room air while sleeping. Patient placed on 2L NC.     0537: Bladder irrigated. See chart for details. 0715: Bedside and Verbal shift change report given to Janay Washburn RN (oncoming nurse) by Abe Mcintosh RN (offgoing nurse). Report included the following information SBAR, Kardex, Intake/Output, MAR, Accordion, Recent Results, Med Rec Status and Cardiac Rhythm NSR/SB.

## 2021-12-20 NOTE — PROGRESS NOTES
0700- Report given to Josr Sherman RN by off going nurse. 1745- Discharge instructions, prescriptions, and follow up appointments reviewed with pt and wife. Opportunity for questions and clarification provided. Catheter care education provided and catheter connected to leg bag. PIV and telemetry removed. Pt taken to exit via wheelchair for discharge.

## 2021-12-20 NOTE — PROGRESS NOTES
Attempted to schedule hospital follow up PCP appointment. Awaiting call back from the office with appointment information. Pending patient discharge. Vitaliy Burk Care Management Assistant    12/20/21 15:44 - Hospital follow-up PCP transitional care appointment has been scheduled with Dr. Robbin Martell for Thursday, 12/23/21 at 10:15a.m. This was the earliest appointment date and time. Pending patient discharge.   Liz Dacosta Management Assistant

## 2021-12-20 NOTE — DISCHARGE INSTRUCTIONS
HOSPITALIST DISCHARGE INSTRUCTIONS    NAME: Micah Germain   :     MRN:  [id number]     Date/Time:  2021 2:14 PM    ADMIT DATE: 2021   DISCHARGE DATE: 2021     Attending Physician: Nathaniel Tejada MD    DISCHARGE DIAGNOSIS:  Severe sepsis  Hemorrhagic cystitis  Gram-negative bacteremia  Acute kidney injury on CKD 3  Non-anion gap metabolic acidosis  Syncope  Hypertension  Neuroendocrine cancer with mets to retroperitoneal lymph node and bones diagnosed   Carcinoid syndrome with diarrhea  Osteonecrosis of the maxilla due todenusumab  Recurrent upper teeth infection in 2020    MEDICATIONS:  See above    · It is important that you take the medication exactly as they are prescribed. · Keep your medication in the bottles provided by the pharmacist and keep a list of the medication names, dosages, and times to be taken in your wallet. · Do not take other medications without consulting your doctor. Pain Management: per above medications    What to do at Home    Recommended diet:  {diet:32709}    Recommended activity: {discharge activity:33674}    If you have questions regarding the hospital related prescriptions or hospital related issues please call [facility] at . You can always direct your questions to your primary care doctor if you are unable to reach your hospital physician; your PCP works as an extension of your hospital doctor just like your hospital doctor is an extension of your PCP for your time at [facility]. If you experience any of the following symptoms then please call your primary care physician or return to the emergency room if you cannot get hold of your doctor:  Fever, chills, nausea, vomiting, diarrhea, change in mentation, falling, bleeding, shortness of breath    Additional Instructions:      Bring these papers with you to your follow up appointments.  The papers will help your doctors be sure to continue the care plan from the hospital.              Information obtained by :  I understand that if any problems occur once I am at home I am to contact my physician. I understand and acknowledge receipt of the instructions indicated above.                                                                                                                                            Physician's or R.N.'s Signature                                                                  Date/Time                                                                                                                                              Patient or Representative Signature                                                          Da

## 2021-12-20 NOTE — PROGRESS NOTES
Transition of Care Plan:     RUR:15 %, LOW  Disposition: Home with Spouse   Follow up appointments: PCP, Specialists as indicated   DME needed: None   Transportation at Discharge: Wife to transport at discharge   Ladona Amston or means to access home:   Wife has access     IM Medicare Letter: Given 12/20/2021  Is patient a BCPI-A Bundle:  N/A     If yes, was Bundle Letter given?:    Is patient a [place] and connected with the [place]? N/A  If yes, was [employer] transfer form completed and [facility] notified? Caregiver Contact: Wife Claudia Lee [phone]  Discharge Caregiver contacted prior to discharge? Yes    2:50pm-No further CM needs identified. CM notified pt's nurse of d/c.    2:45pm- CM met with pt at bedside to discuss d/c plan. CM inquired with pt about needs. No new needs at this time. Pt stated that his wife will transport at d/c. Medicare pt has received, reviewed, and signed 2nd  letter informing them of their right to appeal the discharge. Signed copy has been placed on pt bedside chart. 2:30pm- CM called pt's PCP office to schedule follow-up appointment. CM left a voicemail to call pt for follow-up appointment. CM put [facility] on AVS.     Care Management Interventions  PCP Verified by CM: Yes  Mode of Transport at Discharge: Other (see comment) (Pt's wife will transport at d/c. )  Transition of Care Consult (CM Consult):  Other (Home with Follow-up Appointments)  Discharge Durable Medical Equipment: No  Physical Therapy Consult: No  Occupational Therapy Consult: No  Speech Therapy Consult: No  Support Systems: Spouse/Significant Other  Confirm Follow Up Transport: Self   Resource Information Provided?: No  Discharge Location  Discharge Placement: Home (Home with Follow-up Appointments)    Manan Stapleton [address]  [phone]

## 2021-12-20 NOTE — PROGRESS NOTES
Problem: Falls - Risk of  Goal: *Absence of Falls  Description: Document Shady Coe Fall Risk and appropriate interventions in the flowsheet. 12/20/2021 0345 by [employer]  Outcome: Progressing Towards Goal  Note: Fall Risk Interventions:  Mobility Interventions: Assess mobility with egress test,Bed/chair exit alarm,Communicate number of staff needed for ambulation/transfer,Patient to call before getting OOB         Medication Interventions: Assess postural VS orthostatic hypotension,Bed/chair exit alarm,Evaluate medications/consider consulting pharmacy,Patient to call before getting OOB,Teach patient to arise slowly    Elimination Interventions: Bed/chair exit alarm,Call light in reach,Patient to call for help with toileting needs,Stay With Me (per policy),Toileting schedule/hourly rounds           12/20/2021 0345 by [employer]  Outcome: Progressing Towards Goal  Note: Fall Risk Interventions:  Mobility Interventions: Assess mobility with egress test,Bed/chair exit alarm,Communicate number of staff needed for ambulation/transfer,Patient to call before getting OOB         Medication Interventions: Assess postural VS orthostatic hypotension,Bed/chair exit alarm,Evaluate medications/consider consulting pharmacy,Patient to call before getting OOB,Teach patient to arise slowly    Elimination Interventions: Bed/chair exit alarm,Call light in reach,Patient to call for help with toileting needs,Stay With Me (per policy),Toileting schedule/hourly rounds              Problem: Patient Education: Go to Patient Education Activity  Goal: Patient/Family Education  12/20/2021 0345 by [employer]  Outcome: Progressing Towards Goal  12/20/2021 0345 by [employer]  Outcome: Progressing Towards Goal     Problem: Pressure Injury - Risk of  Goal: *Prevention of pressure injury  Description: Document Pravin Scale and appropriate interventions in the flowsheet.   12/20/2021 0345 by [employer]  Outcome: Progressing Towards Goal  Note: Pressure Injury Interventions:       Moisture Interventions: Absorbent underpads,Apply protective barrier, creams and emollients,Internal/External urinary devices,Maintain skin hydration (lotion/cream),Minimize layers    Activity Interventions: Assess need for specialty bed,Increase time out of bed    Mobility Interventions: Assess need for specialty bed,Float heels,HOB 30 degrees or less,Pressure redistribution bed/mattress (bed type),PT/OT evaluation,Turn and reposition approx. every two hours(pillow and wedges)    Nutrition Interventions: Document food/fluid/supplement intake,Offer support with meals,snacks and hydration                  12/20/2021 0345 by Sharon Andrew  Outcome: Progressing Towards Goal  Note: Pressure Injury Interventions:       Moisture Interventions: Absorbent underpads,Apply protective barrier, creams and emollients,Internal/External urinary devices,Maintain skin hydration (lotion/cream),Minimize layers    Activity Interventions: Assess need for specialty bed,Increase time out of bed    Mobility Interventions: Assess need for specialty bed,Float heels,HOB 30 degrees or less,Pressure redistribution bed/mattress (bed type),PT/OT evaluation,Turn and reposition approx.  every two hours(pillow and wedges)    Nutrition Interventions: Document food/fluid/supplement intake,Offer support with meals,snacks and hydration                     Problem: Patient Education: Go to Patient Education Activity  Goal: Patient/Family Education  12/20/2021 0345 by Sharon Andrew  Outcome: Progressing Towards Goal  12/20/2021 0345 by Sharon Andrew  Outcome: Progressing Towards Goal

## 2021-12-20 NOTE — DISCHARGE SUMMARY
Hospitalist Discharge Summary     Patient ID:  Zainab Hogue  [id number]  42 y.o.  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 male presents with 2 days of fever up to 102, dysuria, gross hematuria, urinary frequency. Last night severe chills and unable to urinate. Came to ER by EMS. In ER, had an unresponsive episode while BP 81/61. Work up in [facility] 19 notable for bladder scan 800ml, ramirez placed with immediate output of large amount of dark red/opaque cola colored urine. UA c/w with uti. Lactic >5, WBC 2.6. Saw Dr. Homero Otoole 2 days ago (but before came down with above symptoms) to get approval for colonoscopy due to hx urolift in 2019.     Denies chest pain, cough, SOB, abdominal pain, n/v.  Has chronic diarrhea due to carcinoid syndrome. Actually gets a little constipated after lanreotide infusion 12/9. Diarrhea started after abx today. Denies any bloody or black stool.    ______________________________________________________________________  DISCHARGE SUMMARY/HOSPITAL COURSE:  for full details see H&P, daily progress notes, labs, consult notes.        Severe sepsis, POA as evidenced by fever 102.8, sinus tachycardia heart rate in the 120s, lactic over 5, leukopenia WBC 2.6 due to  Acute hemorrhagic cystitis, POA with  Gram-negative yaneth bacteremia  -Procalcitonin 210  -UCx Ecoli  -Blood cultures 12/16 E coli. Repeat cultures 12/19 -negative.  -Discharged on Levaquintotal 12 days. Patient discharged on Gee, cleared by urology, outpatient follow-up with urology.     Acute urinary retention requiring Gee, 800 mL on bladder scan  -CT abdomen   1. Abnormal bladder with wall thickening, mild distention, blood within it. 2. Fat stranding in the peritoneum suggesting inflammation. 3. Prominence of collecting system without obstructing stone demonstrated by  this technique.     -CBI capped today by urology. Urine has cleared.  -Complaining of back pain.   Urology ordered renal ultrasound.     LESLIE on CKD 3, baseline Cr 1.3-1.6, currently 1.98  Non-anion gap metabolic acidosis  -Hold lisinopril.    -CT abdomen as above  -Gee per urology  -resolved.     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  -hydralazine IV prn     Osteonecrosis of the maxilla due todenusumab  Recurrent upper teeth infection in September 2020, likely related to osteonecrosis  -if Cr improves, can get CT maxillofacial with contrast to evaluate if any active infection currently.  No focal swelling or purulent discharge on upper teeth noted on exam     Chronic right shoulder pain  -continue neurontin 100mg tid.  Has oxycodone prn prescribed but he is afraid to take     Intermittent dizziness, on as needed Antivert      _______________________________________________________________________  Patient seen and examined by me on discharge day. Pertinent Findings:  Gen:    Not in distress  Chest: Clear lungs  CVS:   Regular rhythm. No edema  Abd:  Soft, not distended, not tender  Neuro:  Alert,   _______________________________________________________________________  DISCHARGE MEDICATIONS:   Current Discharge Medication List      START taking these medications    Details   telotristat etiprate 250 mg tab Take 250 mg by mouth three (3) times daily (with meals) for 30 days. Qty: 90 Tablet, Refills: 0  Start date: 12/20/2021, End date: 1/19/2022      levoFLOXacin (Levaquin) 750 mg tablet Take 1 Tablet by mouth daily for 12 days. Qty: 12 Tablet, Refills: 0  Start date: 12/20/2021, End date: 1/1/2022         CONTINUE these medications which have CHANGED    Details   lisinopriL (PRINIVIL, ZESTRIL) 20 mg tablet Take 1 Tablet by mouth daily for 30 days. Qty: 30 Tablet, Refills: 0  Start date: 12/20/2021, End date: 1/19/2022         CONTINUE these medications which have NOT CHANGED    Details   everolimus (Afinitor) 10 mg tab Take 1 Tablet by mouth daily. Qty: 30 Tablet, Refills: 6    Associated Diagnoses: Neuroendocrine carcinoma metastatic to bone (HCC)      gabapentin (NEURONTIN) 100 mg capsule TAKE 1 CAPSULE BY MOUTH THREE TIMES DAILY  Qty: 90 Capsule, Refills: 0    Comments: pt says he is on one capsule 4x/daily  Associated Diagnoses: Cervical radiculopathy         STOP taking these medications       meclizine (ANTIVERT) 25 mg tablet Comments:   Reason for Stopping:                 Patient Follow Up Instructions:    Activity: Activity as tolerated  Diet: Cardiac Diet  Wound Care: None needed    Please get your CBC and BMP checked in 1 week after discharge from the hospital.    If you have questions regarding the hospital related prescriptions or hospital related issues please call Nilton Arroyo at [phone] can always direct your questions to your primary care doctor if you are unable to reach your hospital physician; your PCP works as an extension of your hospital doctor just like your hospital doctor is an extension of your PCP for your [facility] [address].     If you experience any of the following symptoms then please call your primary care physician or return to the emergency room if you cannot get hold of your doctor:  Fever, chills, nausea, vomiting, diarrhea, change in mentation, falling, bleeding, shortness of breath    Follow-up Information     Follow up With Specialties Details Why [address] [facility]  On 12/28/2021 follow up on this date at our [facility] [place] office with [place] [facility] [place] for voiding trial at 8:10 am  [address] [facility], [address] [facility], [address] [place] [address]  [phone]          ________________________________________________________________    Risk of deterioration: Moderate    Condition at Discharge:  Stable  __________________________________________________________________    Disposition  Home with family and home health services    ____________________________________________________________________    Code Status: Full Code  ___________________________________________________________________      Total time in minutes spent coordinating this discharge (includes going over instructions, follow-up, prescriptions, and preparing report for sign off to her PCP) :  >30 minutes    Signed:  Heber Thompson MD

## 2021-12-20 NOTE — PROGRESS NOTES
Progress Note    Patient: Winnie Samuels MRN: [id number]  SSN: [id number]    YOB: 1945  Age: 77 y.o. Sex: male        ADMITTED:  2021 to Gogo Bahena MD  for Severe sepsis ([address] [A41.9, R65.20]  LESLIE (acute kidney injury) ([address] [N17.9]  UTI (urinary tract infection) [N39.0]  CKD (chronic kidney disease) stage 3, GFR 30-59 ml/min ([address] [N18.30]  Neuroendocrine cancer ([address] [C7A.8]         Winnie Samuels was admitted for Severe sepsis ([address] [A41.9, R65.20]  LESLIE (acute kidney injury) ([address] [N17.9]  UTI (urinary tract infection) [N39.0]  CKD (chronic kidney disease) stage 3, GFR 30-59 ml/min ([address] [N18.30]  Neuroendocrine cancer ([address] [C7A.8]. Urology following with hemorrhagic cystitis on immunosuppression. CBI stopped over the weekend, urine now clear orange/light pink  hgb improved to 8. 2. hct 26.2  leslie improving with creat now 1.39  + ecoli blood cx  US 21 no hydro, resolved since CT abd 21 with some hydro and clot retention     Hx of urolift with BPH      Vitals:  Temp (24hrs), Av.1 °F (36.7 °C), Min:97.4 °F (36.3 °C), Max:99 °F (37.2 °C)     Blood pressure (!) 156/89, pulse 64, temperature 97.9 °F (36.6 °C), resp. rate 16, weight 72.5 kg (159 lb 13.3 oz), SpO2 95 %. I&O's:   1901 -  0700  In: 7470.4 [P.O.:400; I.V.:4030.4]  Out: 6975 [Urine:6975]    0701 - 1900  In: 300 [P.O.:150;  I.V.:150]  Out: -      Exam:   NAD. abdomen soft, NT     Labs:   Recent Labs     21  0544 21  0445 21  0253   WBC 9.1 12.6* 16.5*   HGB 8.2* 7.6* 8.1*   HCT 26.2* 23.7* 25.8*    179 177     Recent Labs     21  0544 215 21  0253    142 143   K 3.6 3.4* 3.7   * 113* 116*   CO2 24 20* 18*   * 132* 96   BUN 13 21* 38*   CREA 1.39* 1.50* 2.04*   CA 7.5* 7.2* 7.1*        Cultures:      Imaging:       Assessment:     - Active Problems:    UTI (urinary tract infection) (12/16/2021)      CKD (chronic kidney disease) stage 3, GFR 30-59 ml/min (HCC) (12/16/2021)      Neuroendocrine cancer ([facility] (12/16/2021)      Severe sepsis ([facility] (12/16/2021)      LESLIE (acute kidney injury) ([facility] (12/16/2021)    GNR bacteremia repeat cultures 12/19 pending    Clot retention- post void 800ml with clots, cbi now off    LESLIE on CKD 30 baseline 1.3-1.6. now at baseline           Plan:     - okay for discharge from  standpoint, home with ramirez and follow up arranged for OP gross hematuria workup.   -irrigate prn if clots visualized.  Urine clearing, suspect hematuria related to cystitis however OP work up needed  -abx per primary team, transition to po once able  -call if needed, will sign off    Supervising Dr. José Miguel SIERRA By: Ivan Moraes NP - December 20, 2021

## 2021-12-22 PROBLEM — R55 SYNCOPE: Status: ACTIVE | Noted: 2021-01-01

## 2021-12-22 NOTE — PROGRESS NOTES
Pharmacy Medication Reconciliation     The patient was interviewed regarding current PTA medication list, use and drug allergies;  patient/patient's wife present in room and obtained permission from patient to discuss drug regimen with visitor(s) present. The patient was questioned regarding use of any other inhalers, topical products, over the counter medications, herbal medications, vitamin products or ophthalmic/nasal/otic medication use. Allergy Update: Patient has no known allergies. Recommendations/Findings: The following amendments were made to the patient's active medication list on file at [address] [facility]:   1) Additions: none  2) Deletions: none  3) Changes: none  Pertinent Findings:   -patient's wife to bring in [address] [place] [address] and [facility]    Clarified PTA med list with rx query, patient list. PTA medication list was corrected to the following:     Prior to Admission Medications   Prescriptions Last Dose Informant Taking? everolimus (Afinitor) 10 mg tab 12/21/2021 at Unknown time Significant Other Yes   Sig: Take 1 Tablet by mouth daily. gabapentin (NEURONTIN) 100 mg capsule 12/21/2021 at Unknown time Significant Other Yes   Sig: TAKE 1 CAPSULE BY MOUTH THREE TIMES DAILY   Patient taking differently: Take 100 mg by mouth three (3) times daily. levoFLOXacin (Levaquin) 750 mg tablet 12/21/2021 at Unknown time Significant Other Yes   Sig: Take 1 Tablet by mouth daily for 12 days. lisinopriL (PRINIVIL, ZESTRIL) 20 mg tablet 12/21/2021 at Unknown time Significant Other Yes   Sig: Take 1 Tablet by mouth daily for 30 days. telotristat etiprate 250 mg tab 11/22/2021 at Unknown time Significant Other Yes   Sig: Take 250 mg by mouth three (3) times daily (with meals) for 30 days.       Facility-Administered Medications: None        Thank you,  JOSSIE Luis

## 2021-12-22 NOTE — PROGRESS NOTES
Information obtained from [address] stating that family is looking at hospice and wanting to make choice on Hospice provider. Family will provide information on agency they want to use. Referral will be sent to identified agency. Zan Boudreaux, [address] - ED [facility]  [facility] ACP Facilitator  Zone Phone: [phone]

## 2021-12-22 NOTE — ACP (ADVANCE CARE PLANNING)
Advance Care Planning Note      NAME: Lizzy Callahan   :  1945   MRN:  [id number]     Date/Time:  2021 2:30 PM    Active Diagnoses:  Hospital Problems  Date Reviewed: 10/12/2021          Codes Class Noted POA    Syncope ICD-10-CM: R55  ICD-9-CM: 780.2  2021 Unknown              These active diagnoses are of sufficient risk that focused discussion on advance care planning is indicated in order to allow the patient to thoughtfully consider personal goals of care, and if situations arise that prevent the ability to personally give input, to ensure appropriate representation of their personal desires for different levels and aggressiveness of care. Discussion:   Code status addressed and wants to be a DNR / DNI. Patient  would like to assign Wife, Hudson Lidia  as the surrogate decision maker. Persons present and participating in discussion: Nito Al MD, Wife, Hudson Lidia      Time Spent:   Total time spent face-to-face in education and discussion:   16  minutes.          Maribeth Temple MD   Hospitalist

## 2021-12-22 NOTE — PROGRESS NOTES
Transition of Care Plan:    RUR: N/A - observation status  Disposition: Home with spouse  Follow up appointments: PCP  DME needed: No DME use at baseline, therapy consults pending   Transportation at Discharge: Spouse  Keys or means to access home:  Spouse will have access     IM Medicare Letter: N/A - observation status  Is patient a BCPI-A Bundle:  N/A         If yes, was Bundle Letter given?:    Is patient a  and connected with the [place]? N/A  If yes, was [employer] transfer form completed and [facility] notified? Caregiver Contact: Pt's spouse/EmaniVinita RUANO Jane) [phone]  Discharge Caregiver contacted prior to discharge? CM met with pt and pt's spouse on today. Unit CM to follow. Oral and Written notification given to patient and/or caregiver informing patient of current Observation status receiving care in our facility. Copies placed on bedside chart. Reason for Readmission:  Syncope, lactic acidosis, recent E.coli bacteremia            RUR Score/Risk Level:  N/A - observation status       PCP: First and Last name:  Wallace Rubio MD    Name of Practice: [address]    Are you a current patient: Yes/No: Yes   Approximate date of last visit: Had appt on 12/23 which was cancelled d/t return to hospital   Can you participate in a virtual visit with your PCP: No    Is a Care Conference indicated: No care conference needs at this time       Did you attend your follow up appointment (s): If not, why not:   Pt returned to hospital prior to attending PCP f/u due to syncope       Resources/supports as identified by patient/family:  Supportive spouse, family        Top Challenges facing patient (as identified by patient/family and CM): Finances/Medication cost?   No concerns, pt uses [facility]     Transportation   Pt drives, spouse is also available for transportation needs if needed      Support system or lack thereof? Pt identifies good support   Living arrangements? Lives with spouse        Self-care/ADLs/Cognition? Pt is alert and oriented, independent w/ ADLs and IADLs at baseline. Current Advanced Directive/Advance Care Plan:    Advance Care Planning     General Advance Care Planning (ACP) Conversation      Date of Conversation: 12/22/2021  Conducted with: Patient with Decision Making Capacity    Healthcare Decision Maker:     Primary Decision Maker: Jacklyn Tiwari - Spouse - [phone]  Click here to complete [address] including selection of the Healthcare Decision Maker Relationship (ie \"Primary\")      Today we documented Decision Maker(s) consistent with ACP documents on file. Content/Action Overview: Has ACP document(s) on file - reflects the patient's care preferences    Length of Voluntary ACP Conversation in minutes:  <16 minutes (Non-Billable)    [address] [facility] for utilizing home health: Pending therapy recommendations               Transition of Care Plan:    Based on readmission, the patient's previous Plan of Care   has been evaluated and/or modified. The current Transition of Care Plan is:    Home with spouse, outpatient follow up appts    CM reviewed chart. CM completed assessment with pt and pt's spouse at bedside. CM introduced self/role, verified demographics, and discussed discharge planning. Pt is independent at baseline to include driving, no DME use. Pt's spouse will transport pt home. Pt has PT/OT consults pending. Pt and spouse also expressed interest in further information about hospice, provider placed hospice consult, this CM placed referral to [address] [place] [address] for information session. CM reviewed with pt and spouse that if they elect for hospice care in the future that pt has freedom of choice to choose whichever agency they prefer. Pt and spouse verbalized understanding of this. Pt and spouse voice no concerns with transition of care plans at this time.  Pt's spouse did question about the possibility of pt having a HH RN at d/c, reviewed that provider would need to recommend this and place orders. Unit care management will continue to follow for transition of care planning needs. Care Management Interventions  PCP Verified by CM: Yes  Palliative Care Criteria Met (RRAT>21 & CHF Dx)?: No  Mode of Transport at Discharge:  Other (see comment) (Spouse)  Transition of Care Consult (CM Consult): Discharge Planning  Discharge Durable Medical Equipment: No (No DME use at baseline)  Physical Therapy Consult: Yes  Occupational Therapy Consult: Yes  Speech Therapy Consult: No  Support Systems: Spouse/Significant Other,Child(leandro)  Confirm Follow Up Transport: Self (Self or spouse)  Discharge Location  Discharge Placement: Home (Home with spouse, PT/OT consults pending, hospice discussion pending)    Readmission Assessment  Number of days since last admission?: 1-7 days  Previous disposition: Home with Family  Who is being interviewed?: [address] (Pt and pt's spouse, Debra Marshall, at bedside)  What was the patient's/caregiver's perception as to why they think they needed to return back to the hospital?: Other (Comment) (Syncopal episode)  Did you visit your Primary Care Physician after you left the hospital, before you returned this time?: No  Why weren't you able to visit your PCP?: Other (Comment) (Had medical need to return to hospital: syncopal episode)  Did you see a specialist, such as Cardiac, Pulmonary, Orthopedic Physician, etc. after you left the hospital?: No  Who advised the patient to return to the hospital?: Caregiver,Self-referral  Does the patient report anything that got in the way of taking their medications?: No  In our efforts to provide the best possible care to you and others like you, can you think of anything that we could have done to help you after you left the hospital the first time, so that you might not have needed to return so soon?: Other (Comment) (None voiced)    Ana Cristina Polk [facility] [address]

## 2021-12-22 NOTE — PROGRESS NOTES
TRANSFER - IN REPORT:    Verbal report received from [facility] (name) on Hailee Garcia  being received from the ED (unit) for routine progression of care      Report consisted of patients Situation, Background, Assessment and   Recommendations(SBAR). Information from the following report(s) SBAR, Kardex, Intake/Output, MAR and Recent Results was reviewed with the receiving nurse. Opportunity for questions and clarification was provided. Assessment completed upon patients arrival to unit and care assumed.

## 2021-12-22 NOTE — ED NOTES
Patient is being transferred to [address], Room # 2119. Report given to Amirah Conrad RN on Shruthi Bell for routine progression of care. Report consisted of the following information SBAR, Kardex, ED Summary and MAR. Patient transferred to receiving unit by: transporter (RN or tech name). Outstanding consults needed: No     Next labs due: No     The following personal items will be sent with the patient during transfer to the floor: All valuables:    Cardiac monitoring ordered: No     The following CURRENT information was reported to the receiving RN:    Code status: DNR at time of transfer    Last set of vital signs:  Vital Signs  Level of Consciousness: Alert (0) (12/22/21 1430)  Temp: 97.9 °F (36.6 °C) (12/22/21 1430)  Temp Source: Oral (12/22/21 0939)  Pulse (Heart Rate): 66 (12/22/21 1430)  Resp Rate: 17 (12/22/21 1430)  BP: (!) 167/89 (12/22/21 1430)  MAP (Monitor): 111 (12/22/21 1430)  MAP (Calculated): 115 (12/22/21 1430)  BP 1 Location: Left upper arm (12/22/21 0939)  BP 1 Method: Automatic (12/22/21 0939)  MEWS Score: 1 (12/22/21 1430)         Oxygen Therapy  O2 Sat (%): 99 % (12/22/21 1430)  Pulse via Oximetry: 66 beats per minute (12/22/21 1430)      Last pain assessment:         Wounds: No     Urinary catheter: ramirez  Is there a ramirez order: No     LDAs:       Peripheral IV 12/22/21 Right Antecubital (Active)   Site Assessment Clean, dry, & intact 12/22/21 0927   Phlebitis Assessment 0 12/22/21 0927   Infiltration Assessment 0 12/22/21 0927   Dressing Status Clean, dry, & intact 12/22/21 0927   Dressing Type Tape;Transparent 12/22/21 9651         Opportunity for questions and clarification was provided.     Diana Vaca RN

## 2021-12-22 NOTE — HOSPICE
[facility] RN note:  Consult noted. Reviewing chart. Discussed pt with [facility] [employer]. Will address shortly. 4:00--In to meet with pt and wife at bedside. Pt in good spirits despite returning to the hospital shortly after returning home from previous admission. Provided overview of hospice philosophy and services for traditional medicare hospice benefit. Pt has [facility] which participated in the [facility] hospice model that provides additional services beyond traditional medicare. Family also has a friend that works for a different agency. Pt/wife to contact [facility] to research benefit and which hospice agencies are \"in network. \"  Plan is to go home with hospice support once agency is determined. [facility] [employer] informed of plan and will send referral once agency is determined. Information packet provided with general information about traditional hospice benefit. [id number] to follow while inpt for any additional information needs. Thank you for the opportunity to care for this pt and family. Please contact hospice at [phone] with any questions or concerns.

## 2021-12-22 NOTE — ED PROVIDER NOTES
EMERGENCY DEPARTMENT HISTORY AND PHYSICAL EXAM      Date: 12/22/2021  Patient Name: Lizzy Callahan    History of Presenting Illness     Chief Complaint   Patient presents with    Syncope     Pt arrived via [facility] for syncopal episode on commode this AM. BP for EMS 42H systolic. Pt d/c from [address] yesterday after fighting sepsis. Pt advised he was doing fine until trying to have a BM this morning and having syncopal episode. History Provided By: Patient    HPI: Lizzy Callahan, 77 y.o. male with PMHx as noted below presents the emergency department for evaluation of vomiting, lightheadedness and syncope. Patient was discharged yesterday after being treated for septic shock from urinary source. Patient was feeling somewhat better yesterday so was discharged. Patient states he woke up this morning and when he attempted to go to the bathroom felt very lightheaded having a syncopal episode. Patient was helped to the floor, no fall or injury sustained. Patient has been vomiting and lightheaded since. He rates his symptoms as constant and severe. Denying any pain at this time. Pt denies any other alleviating or exacerbating factors. Additionally, pt specifically denies any recent fever, chills, headache,abdominal pain, CP, SOB,  numbness, weakness, BLE swelling, heart palpitations, urinary sxs, diarrhea, constipation, melena, hematochezia, cough, or congestion. PCP: Amish Baker MD    Current Facility-Administered Medications   Medication Dose Route Frequency Provider Last Rate Last Admin    sodium chloride (NS) flush 5-10 mL  5-10 mL IntraVENous PRN Romayne Holiday, MD        levoFLOXacin [facility]) tablet 750 mg  750 mg Oral Q48H Trey Mejia MD   750 mg at 12/22/21 1327    [Held by provider] lisinopriL (PRINIVIL, ZESTRIL) tablet 20 mg  20 mg Oral DAILY Trey Mejia MD        . PHARMACY TO SUBSTITUTE PER PROTOCOL (Reordered from: telotristat etiprate 250 mg tab)    Per Protocol Lilo Aleida Vázquez MD        0.9% sodium chloride infusion  100 mL/hr IntraVENous CONTINUOUS Corazon Sullivan  mL/hr at 21 1421 100 mL/hr at 21 1421    [START ON 2021] enoxaparin (LOVENOX) injection 40 mg  40 mg SubCUTAneous 7am Corazon Sullivan MD        gabapentin (NEURONTIN) capsule 100 mg  100 mg Oral TID Corazon Sullivan MD   100 mg at 21 1538    oxyCODONE IR (ROXICODONE) tablet 5 mg  5 mg Oral Q4H PRN Corazon Sullivan MD           Past History     Past Medical History:  Past Medical History:   Diagnosis Date    Adverse effect of anesthesia     low HR and very slow to wakeup    BPH (benign prostatic hyperplasia)     Cancer ([facility] 2016    neuroendocrine, retroperitoneal LN involvement, bone mets    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    CKD (chronic kidney disease) stage 3, GFR 30-59 ml/min ([facility])     baseline Cr 1.3 to 1.6    Diarrhea     Frequent urination     Hypertension     Osteonecrosis ([facility] [address] [facility]     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    Poor appetite     Unspecified adverse effect of anesthesia     \"hard to put to sleep and slow to wake up-heart rate dropped very low\"       Past Surgical History:  Past Surgical History:   Procedure Laterality Date    COLONOSCOPY N/A 2016    COLONOSCOPY performed by Mack White MD at [address] [place] [address]  2016         HX ORTHOPAEDIC      knee scope left knee    HX OTHER SURGICAL  2013    excision of scalp cyst     UPPER GI ENDOSCOPY,BIOPSY  2016            Family History:  Family History   Problem Relation Age of Onset    Cancer Mother     Stroke Sister        Social History:  Social History     Tobacco Use    Smoking status: Former Smoker     Packs/day: 1.00     Years: 8.00     Pack years: 8.00     Quit date: 1972     Years since quittin.6    Smokeless tobacco: Never Used   Substance Use Topics    Alcohol use: No    Drug use: No       Allergies:  No Known Allergies      Review of Systems   Review of Systems  Constitutional: Negative for fever, chills, and fatigue. HENT: Negative for congestion, sore throat, rhinorrhea, sneezing and neck stiffness   Eyes: Negative for discharge and redness. Respiratory: Negative for  shortness of breath, wheezing   Cardiovascular: Negative for chest pain, palpitations   Gastrointestinal: Positive for nausea, vomiting. Negative abdominal pain, constipation, diarrhea and blood in stool. Genitourinary: Negative for dysuria, urgency, frequency, hematuria, flank pain, decreased urine volume, discharge,   Musculoskeletal: Negative for myalgias or joint pain . Skin: Negative for rash or lesions . Neurological: Positive lightheadedness. Negative numbness and headaches. Physical Exam   Physical Exam    GENERAL: alert and oriented, no acute distress  EYES: PEERL, No injection, discharge or icterus. ENT: Mucous membranes dry. NECK: Supple  LUNGS: Airway patent. Non-labored respirations. Breath sounds clear with good air entry bilaterally. HEART: Regular rate and rhythm. No peripheral edema  ABDOMEN: Non-distended and non-tender, without guarding or rebound.   SKIN:  warm, dry  EXTREMITIES: Without swelling, tenderness or deformity, symmetric with normal ROM  NEUROLOGICAL: Alert, oriented      Diagnostic Study Results     Labs -     Recent Results (from the past 12 hour(s))   METABOLIC PANEL, COMPREHENSIVE    Collection Time: 12/22/21  9:34 AM   Result Value Ref Range    Sodium 142 136 - 145 mmol/L    Potassium 3.2 (L) 3.5 - 5.1 mmol/L    Chloride 109 (H) 97 - 108 mmol/L    CO2 24 21 - 32 mmol/L    Anion gap 9 5 - 15 mmol/L    Glucose 176 (H) 65 - 100 mg/dL    BUN 16 6 - 20 MG/DL    Creatinine 1.54 (H) 0.70 - 1.30 MG/DL    BUN/Creatinine ratio 10 (L) 12 - 20      GFR est AA 54 (L) >60 ml/min/1.73m2    GFR est non-AA 44 (L) >60 ml/min/1.73m2    Calcium 8.4 (L) 8.5 - 10.1 MG/DL    Bilirubin, total 0.8 0.2 - 1.0 MG/DL ALT (SGPT) 26 12 - 78 U/L    AST (SGOT) 24 15 - 37 U/L    Alk. phosphatase 169 (H) 45 - 117 U/L    Protein, total 6.0 (L) 6.4 - 8.2 g/dL    Albumin 2.2 (L) 3.5 - 5.0 g/dL    Globulin 3.8 2.0 - 4.0 g/dL    A-G Ratio 0.6 (L) 1.1 - 2.2     CBC WITH AUTOMATED DIFF    Collection Time: 12/22/21  9:34 AM   Result Value Ref Range    WBC 9.7 4.1 - 11.1 K/uL    RBC 4.73 4.10 - 5.70 M/uL    HGB 11.2 (L) 12.1 - 17.0 g/dL    HCT 35.9 (L) 36.6 - 50.3 %    MCV 75.9 (L) 80.0 - 99.0 FL    MCH 23.7 (L) 26.0 - 34.0 PG    MCHC 31.2 30.0 - 36.5 g/dL    RDW 15.0 (H) 11.5 - 14.5 %    PLATELET 146 (H) 087 - 400 K/uL    MPV 9.6 8.9 - 12.9 FL    NRBC 0.0 0  WBC    ABSOLUTE NRBC 0.00 0.00 - 0.01 K/uL    NEUTROPHILS 59 32 - 75 %    BAND NEUTROPHILS 5 %    LYMPHOCYTES 21 12 - 49 %    MONOCYTES 8 5 - 13 %    EOSINOPHILS 3 0 - 7 %    BASOPHILS 1 0 - 1 %    METAMYELOCYTES 2 %    MYELOCYTES 1 %    IMMATURE GRANULOCYTES 0 0.0 - 0.5 %    ABS. NEUTROPHILS 6.2 1.8 - 8.0 K/UL    ABS. LYMPHOCYTES 2.0 0.8 - 3.5 K/UL    ABS. MONOCYTES 0.8 0.0 - 1.0 K/UL    ABS. EOSINOPHILS 0.3 0.0 - 0.4 K/UL    ABS. BASOPHILS 0.1 0.0 - 0.1 K/UL    ABS. IMM.  GRANS. 0.0 0.00 - 0.04 K/UL    DF MANUAL      PLATELET COMMENTS PLATELET AGGREGATES PRESENT      RBC COMMENTS ANISOCYTOSIS  PRESENT        RBC COMMENTS MICROCYTOSIS  1+        RBC COMMENTS POLYCHROMASIA  PRESENT        RBC COMMENTS BRIAN CELLS  PRESENT        WBC COMMENTS ATYPICAL LYMPHOCYTES PRESENT     TROPONIN-HIGH SENSITIVITY    Collection Time: 12/22/21  9:34 AM   Result Value Ref Range    Troponin-High Sensitivity 17 0 - 76 ng/L   MAGNESIUM    Collection Time: 12/22/21  9:34 AM   Result Value Ref Range    Magnesium 1.8 1.6 - 2.4 mg/dL   BLOOD GAS,LACTIC ACID, POC    Collection Time: 12/22/21  9:45 AM   Result Value Ref Range    pH (POC) 7.33 (L) 7.35 - 7.45      pCO2 (POC) 48.0 (H) 35.0 - 45.0 MMHG    pO2 (POC) 18 (LL) 80 - 100 MMHG    HCO3 (POC) 25.5 22 - 26 MMOL/L    sO2 (POC) 22.6 (L) 92 - 97 %    Base deficit (POC) 0.8 mmol/L    Specimen type (POC) VENOUS BLOOD      Performed by [employer] [employer]     Lactic Acid (POC) 3.41 (HH) 0.40 - 2.00 mmol/L    Critical value read back AMRTHA    PROCALCITONIN    Collection Time: 12/22/21 11:23 AM   Result Value Ref Range    Procalcitonin 4.63 ng/mL   URINALYSIS W/ REFLEX CULTURE    Collection Time: 12/22/21 11:29 AM    Specimen: Urine   Result Value Ref Range    Color LIGHT RED     Appearance TURBID (A) CLEAR      Specific gravity 1.012 1.003 - 1.030      pH (UA) 7.0 5.0 - 8.0      Protein 300 (A) NEG mg/dL    Glucose Negative NEG mg/dL    Ketone Negative NEG mg/dL    Bilirubin Negative NEG      Blood LARGE (A) NEG      Urobilinogen 0.2 0.2 - 1.0 EU/dL    Nitrites Negative NEG      Leukocyte Esterase TRACE (A) NEG      WBC 0-4 0 - 4 /hpf    RBC >100 (H) 0 - 5 /hpf    Epithelial cells FEW FEW /lpf    Bacteria Negative NEG /hpf    UA:UC IF INDICATED CULTURE NOT INDICATED BY UA RESULT CNI     D DIMER    Collection Time: 12/22/21 11:44 AM   Result Value Ref Range    D-dimer 8.14 (H) 0.00 - 0.65 mg/L FEU   LACTIC ACID    Collection Time: 12/22/21  2:28 PM   Result Value Ref Range    Lactic acid 1.6 0.4 - 2.0 MMOL/L       Radiologic Studies -   CTA CHEST W OR W WO CONT   Final Result   1. No pulmonary embolism. No acute vascular abnormality. 2. Small bilateral pleural effusions with bilateral dependent atelectasis. 3. Trace pericardial effusion, with coronary artery calcifications. 4. Ectatic ascending thoracic aorta, 4.4 cm.   5. Cholelithiasis without evidence of cholecystitis. XR CHEST PORT   Final Result   No acute process. CT Results  (Last 48 hours)               12/22/21 1402  CTA CHEST W OR W WO CONT Final result    Impression:  1. No pulmonary embolism. No acute vascular abnormality. 2. Small bilateral pleural effusions with bilateral dependent atelectasis. 3. Trace pericardial effusion, with coronary artery calcifications.    4. Ectatic ascending thoracic aorta, 4.4 cm.   5. Cholelithiasis without evidence of cholecystitis. Narrative:  EXAM:  CTA CHEST W OR W WO CONT       INDICATION: Syncope, evaluate for pulmonary embolism       COMPARISON: None. TECHNIQUE: Helical thin section chest CT following uneventful intravenous   administration of nonionic contrast 100 mL of isovue 370 according to   departmental PE protocol. Coronal and sagittal reformats were performed. 3D post   processing was performed. CT dose reduction was achieved through the use of a   standardized protocol tailored for this examination and automatic exposure   control for dose modulation. FINDINGS: This is a good quality study for the evaluation of pulmonary embolism   to the first subsegmental arterial level. There is no pulmonary embolism to this   level. THYROID: No nodule. MEDIASTINUM: No mass or lymphadenopathy. LIBBY: No mass or lymphadenopathy. THORACIC AORTA: Ectatic ascending thoracic aorta, 4.4 cm. HEART: Trace pericardial effusion. Coronary artery calcifications. ESOPHAGUS: No wall thickening or dilatation. TRACHEA/BRONCHI: Patent. PLEURA: Small bilateral pleural effusions. LUNGS: Mild bilateral dependent atelectasis. UPPER ABDOMEN: Cholelithiasis without evidence of cholecystitis. BONES: No aggressive bone lesion or fracture. CXR Results  (Last 48 hours)               12/22/21 1035  XR CHEST PORT Final result    Impression:  No acute process. Narrative: Indication: Syncope       Comparison: 12/16/2021       Portable exam of the chest obtained at 1035 demonstrates normal heart size. There is no acute process in the lung fields. The osseous structures are   unremarkable. Medical Decision Making     INelly MD am the first provider for this patient and am the attending of record for this patient encounter.     I reviewed the vital signs, available nursing notes, past medical history, past surgical history, family history and social history. Vital Signs-Reviewed the patient's vital signs. EKG interpretation: (Preliminary)  Rhythm: normal sinus rhythm; and regular . Rate (approx.): 94; Axis: normal; P wave: normal; QRS interval: normal ; ST/T wave: Nonspecific abnormality. ;  was interpreted by Marcello De La Paz MD,ED Provider. Records Reviewed: Nursing Notes and Old Medical Records    Provider Notes (Medical Decision Making): On presentation, the patient is well appearing but noted to be hypotensive on arrival.  Differential includes septic shock, PE, arrhythmia, vasovagal syncope, volume depletion and among others. On arrival patient noted to be extremely hypertensive that was fluid responsive. Initial lactate was elevated. Treated presumptively for possible sepsis and was given 30 cc/kg fluid bolus, antibiotics, culture sent however after labs returned this seems to be less likely as his white count has resolved, no fever and overall lab seem to be improved from his recent hospitalization. Is possible that he may have had a vagal response or have some degree of hypovolemia. Patient did feel significantly improved on reevaluation however given his lactic acidosis and significant hypotension will admit for observation. ED Course:   Initial assessment performed. The patients presenting problems have been discussed, and they are in agreement with the care plan formulated and outlined with them. I have encouraged them to ask questions as they arise throughout their visit. SEPSIS NOTE (possible):     9:51 The patient now meets criteria for possible however this does not seem to be the most likely etiology: Severe Sepsis    1. Fluid resuscitation with: 30 mL/kg crystalloid bolus  2. Due to concern for rapidly advancing infection and deterioration of patient's condition, antibiotics are started STAT and cultures ordered.     Medications   sodium chloride (NS) flush 5-10 mL (has no administration in time range)   levoFLOXacin (LEVAQUIN) tablet 750 mg (750 mg Oral Given 12/22/21 1327)   lisinopriL (PRINIVIL, ZESTRIL) tablet 20 mg ( Oral Automatically Held 1/6/22 0900)   . PHARMACY TO SUBSTITUTE PER PROTOCOL (Reordered from: telotristat etiprate 250 mg tab) (has no administration in time range)   0.9% sodium chloride infusion (100 mL/hr IntraVENous New Bag 12/22/21 1421)   enoxaparin (LOVENOX) injection 40 mg (has no administration in time range)   gabapentin (NEURONTIN) capsule 100 mg (100 mg Oral Given 12/22/21 1538)   oxyCODONE IR (ROXICODONE) tablet 5 mg (has no administration in time range)   lactated ringers bolus infusion 1,000 mL (0 mL IntraVENous IV Completed 12/22/21 1008)     Followed by   lactated ringers bolus infusion 1,000 mL (0 mL IntraVENous IV Completed 12/22/21 1209)   piperacillin-tazobactam (ZOSYN) 3.375 g in 0.9% sodium chloride (MBP/ADV) 100 mL MBP (0 g IntraVENous IV Completed 12/22/21 1100)   potassium bicarb-citric acid (EFFER-K) tablet 50 mEq (50 mEq Oral Given 12/22/21 1141)   iopamidoL (ISOVUE-370) 76 % injection 100 mL (100 mL IntraVENous Given 12/22/21 1401)   oxyCODONE IR (ROXICODONE) tablet 5 mg (5 mg Oral Given 12/22/21 1327)           PROGRESS:  The patient has been re-evaluated and sx have improved. Reviewed available results with patient and have counseled them on diagnosis and care plan. They have expressed understanding, and all their questions have been answered. They agree with plan for admission. CONSULT:  Cindi Stock MD spoke with the hospitalist.  Discussed HPI and PE, available diagnostic tests and clinical findings. He is in agreement with care plans as outlined and will evaluate for admission     Admit Note  Patient is being admitted to the hospitalist.  The results of their tests and reasons for their admission have been discussed with them and/or available family.   They convey agreement and understanding for the need to be admitted and for their admission diagnosis. Consultation has been made with the inpatient physician specialist for hospitalization. Critical Care Note      IMPENDING DETERIORATION -Cardiovascular, CNS and Renal  ASSOCIATED RISK FACTORS - Hypotension and Metabolic changes  MANAGEMENT- Bedside Assessment and Supervision of Care  INTERPRETATION -  CT Scan, ECG and Blood Pressure  INTERVENTIONS - hemodynamic mngmt-30 cc/kg fluid bolus  CASE REVIEW - Hospitalist/Intensivist  TREATMENT RESPONSE -Improved  PERFORMED BY - Self    NOTES   :  I have provided a total of 35 minutes of critical time not including time spent on separately documented procedures. The reason for providing this level of medical care for this critically ill patient was due to a critical illness that impaired one or more vital organ systems such that there was a high probability of imminent or life threatening deterioration in the patients condition. This care involved high complexity decision making to assess, manipulate, and support vital system functions,  lab review, consultations with specialist, family decision- making, bedside attention and documentation. During this entire length of time I was immediately available to the patient      Disposition:  admission    PLAN:  1. Admit     Diagnosis     Clinical Impression:   1. Hypotension, unspecified hypotension type    2. Syncope and collapse    3. Elevated lactic acid level        Please note that this dictation was completed with Dragon, computer voice recognition software. Quite often unanticipated grammatical, syntax, homophones, and other interpretive errors are inadvertently transcribed by the computer software. Please disregard these errors. Additionally, please excuse any errors that have escaped final proofreading.

## 2021-12-22 NOTE — H&P
Hospitalist Admission Note    NAME: Cm Freedman   :     MRN:  [id number]     Date/Time:  2021 1:06 PM    Patient PCP: López Joy MD  _____________________________________________________________________  Given the patient's current clinical presentation, I have a high level of concern for decompensation if discharged from the emergency department. Complex decision making was performed, which includes reviewing the patient's available past medical records, laboratory results, and x-ray films. My assessment of this patient's clinical condition and my plan of care is as follows. Assessment / Plan:  Syncope  Lactic acidosis  Recent E. Coli bacteremia  -c/w levaquin  -Repeat blood cultures (had been clear upon DC)  -IVF  -Check Orthostatics  -PT/OT  -D-Dimer elevated  -CTA chest ordered     LESLIE on CKD 3, baseline Cr 1.3-1.6, currently 1.54  Non-anion gap metabolic acidosis  -Hold lisinopril.    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  Has oxycodone prn        Code Status: DNR/DNI  Surrogate Decision Maker: Wife, Bridget Mclain    DVT Prophylaxis: Lovenox  GI Prophylaxis: not indicated    Baseline:  Indepednent        Subjective:   CHIEF COMPLAINT:  Dizziness, syncope    HISTORY OF PRESENT ILLNESS:     Uvaldo Nicole is a 77 y.o. male with past medical history significant for neuroendocrine tumor carcinoid, CKD, osteonecrosis, hypertension who presents with syncope. Patient's wife at bedside during the history and physical as well. Patient was recently admitted for sepsis with E. coli bacteremia secondary to urinary tract infection. He was discharged on 12/21 and had been feeling well. This morning, he got up to use the bathroom when he felt dizzy and lightheaded. He admitted to the toilet to sit down. While he was bearing down, patient got even more lightheaded when his wife noticed him slumping over. Patient was eased to the ground did not hit his head. He did have some clear vomitus and was passed out for about 30 seconds. Patient's wife called the ambulance. On arrival to the ED, patient was hypotensive. At the time my exam, patient currently feeling improved and has complaints of his chronic back pain. Patient and wife are interested in discussing what services would be available to them at home. They were also interested in potentially hospice care. They are also wondering about patient's chemo medication. We were asked to admit for work up and evaluation of the above problems.      Past Medical History:   Diagnosis Date    Adverse effect of anesthesia     low HR and very slow to wakeup    BPH (benign prostatic hyperplasia)     Cancer ([address] 05/2016    neuroendocrine, retroperitoneal LN involvement, bone mets    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    CKD (chronic kidney disease) stage 3, GFR 30-59 ml/min ([facility])     baseline Cr 1.3 to 1.6    Diarrhea     Frequent urination     Hypertension     Osteonecrosis ([address]     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    Poor appetite     Unspecified adverse effect of anesthesia     \"hard to put to sleep and slow to wake up-heart rate dropped very low\"        Past Surgical History:   Procedure Laterality Date    COLONOSCOPY N/A 5/13/2016    COLONOSCOPY performed by Speedy Herndon Coni Noe MD at [address] [place] [address]  2016         HX ORTHOPAEDIC      knee scope left knee    HX OTHER SURGICAL  2013    excision of scalp cyst     UPPER GI ENDOSCOPY,BIOPSY  2016            Social History     Tobacco Use    Smoking status: Former Smoker     Packs/day: 1.00     Years: 8.00     Pack years: 8.00     Quit date: 1972     Years since quittin.6    Smokeless tobacco: Never Used   Substance Use Topics    Alcohol use: No        Family History   Problem Relation Age of Onset    Cancer Mother     Stroke Sister      No Known Allergies     Prior to Admission medications    Medication Sig Start Date End Date Taking? Authorizing Provider   telotristat etiprate 250 mg tab Take 250 mg by mouth three (3) times daily (with meals) for 30 days. 21  Katerina Erazo MD   levoFLOXacin (Levaquin) 750 mg tablet Take 1 Tablet by mouth daily for 12 days. 21  Katerina Erazo MD   lisinopriL (PRINIVIL, ZESTRIL) 20 mg tablet Take 1 Tablet by mouth daily for 30 days. 21  Katerina Erazo MD   everolimus (Afinitor) 10 mg tab Take 1 Tablet by mouth daily. 21   Lesvia Little MD   gabapentin (NEURONTIN) 100 mg capsule TAKE 1 CAPSULE BY MOUTH THREE TIMES DAILY  Patient not taking: Reported on 2021   Demian Corona MD       REVIEW OF SYSTEMS:     I am not able to complete the review of systems because:    The patient is intubated and sedated    The patient has altered mental status due to his acute medical problems    The patient has baseline aphasia from prior stroke(s)    The patient has baseline dementia and is not reliable historian    The patient is in acute medical distress and unable to provide information           Total of 12 systems reviewed as follows:       POSITIVE= underlined text  Negative = text not underlined  General:  fever, chills, sweats, generalized weakness, weight loss/gain,      loss of appetite   Eyes:    blurred vision, eye pain, loss of vision, double vision  ENT:    rhinorrhea, pharyngitis   Respiratory:   cough, sputum production, SOB, VERDE, wheezing, pleuritic pain   Cardiology:   chest pain, palpitations, orthopnea, PND, edema, syncope   Gastrointestinal:  abdominal pain , N/V, diarrhea, dysphagia, constipation, bleeding   Genitourinary:  frequency, urgency, dysuria, hematuria, incontinence   Muskuloskeletal :  arthralgia, myalgia, back pain  Hematology:  easy bruising, nose or gum bleeding, lymphadenopathy   Dermatological: rash, ulceration, pruritis, color change / jaundice  Endocrine:   hot flashes or polydipsia   Neurological:  headache, dizziness, confusion, focal weakness, paresthesia,     Speech difficulties, memory loss, gait difficulty  Psychological: Feelings of anxiety, depression, agitation    Objective:   VITALS:    Visit Vitals  /87   Pulse 60   Temp 97.5 °F (36.4 °C)   Resp 17   Ht 6' (1.829 m)   Wt 72.6 kg (160 lb)   SpO2 94%   BMI 21.70 kg/m²       PHYSICAL EXAM:    General:    Alert, cooperative, no distress, appears stated age. HEENT: Atraumatic, anicteric sclerae, pink conjunctivae     No oral ulcers, mucosa moist, throat clear, dentition fair  Neck:  Supple, symmetrical,  thyroid: non tender  Lungs:   Clear to auscultation bilaterally. No Wheezing or Rhonchi. No rales. Chest wall:  No tenderness  No Accessory muscle use. Heart:   Regular  rhythm,  No  murmur   No edema  Abdomen:   Soft, non-tender. Not distended. Bowel sounds normal  Extremities: No cyanosis. No clubbing,      Skin turgor normal, Capillary refill normal, Radial dial pulse 2+  Skin:     Not pale. Not Jaundiced  No rashes   Psych:  Good insight. Not depressed. Not anxious or agitated. Neurologic: EOMs intact. No facial asymmetry. No aphasia or slurred speech. Symmetrical strength, Sensation grossly intact. Alert and oriented X 4. _______________________________________________________________________  Care Plan discussed with:    Comments   Patient     Family      RN     Care Manager                    Consultant:      _______________________________________________________________________  Expected  Disposition:   Home with Family    HH/PT/OT/RN    SNF/LTC    MONICA    ________________________________________________________________________  TOTAL TIME: 28   Minutes    Critical Care Provided     Minutes non procedure based      Comments     Reviewed previous records   >50% of visit spent in counseling and coordination of care  Discussion with patient and/or family and questions answered       Given the patient's current clinical presentation, I have a high level of concern for decompensation if discharged from the ED. Complex decision making was performed which includes reviewing the patient's available past medical records, laboratory results, and Xray films. I have also directly communicated my plan and discussed this case with the involved ED physician.     ____________________________________________________________________  Edel Clark MD    Procedures: see electronic medical records for all procedures/Xrays and details which were not copied into this note but were reviewed prior to creation of Plan.     LAB DATA REVIEWED:    Recent Results (from the past 24 hour(s))   METABOLIC PANEL, COMPREHENSIVE    Collection Time: 12/22/21  9:34 AM   Result Value Ref Range    Sodium 142 136 - 145 mmol/L    Potassium 3.2 (L) 3.5 - 5.1 mmol/L    Chloride 109 (H) 97 - 108 mmol/L    CO2 24 21 - 32 mmol/L    Anion gap 9 5 - 15 mmol/L    Glucose 176 (H) 65 - 100 mg/dL    BUN 16 6 - 20 MG/DL    Creatinine 1.54 (H) 0.70 - 1.30 MG/DL    BUN/Creatinine ratio 10 (L) 12 - 20      GFR est AA 54 (L) >60 ml/min/1.73m2    GFR est non-AA 44 (L) >60 ml/min/1.73m2    Calcium 8.4 (L) 8.5 - 10.1 MG/DL    Bilirubin, total 0.8 0.2 - 1.0 MG/DL    ALT (SGPT) 26 12 - 78 U/L    AST (SGOT) 24 15 - 37 U/L    Alk. phosphatase 169 (H) 45 - 117 U/L    Protein, total 6.0 (L) 6.4 - 8.2 g/dL    Albumin 2.2 (L) 3.5 - 5.0 g/dL    Globulin 3.8 2.0 - 4.0 g/dL    A-G Ratio 0.6 (L) 1.1 - 2.2     CBC WITH AUTOMATED DIFF    Collection Time: 12/22/21  9:34 AM   Result Value Ref Range    WBC 9.7 4.1 - 11.1 K/uL    RBC 4.73 4.10 - 5.70 M/uL    HGB 11.2 (L) 12.1 - 17.0 g/dL    HCT 35.9 (L) 36.6 - 50.3 %    MCV 75.9 (L) 80.0 - 99.0 FL    MCH 23.7 (L) 26.0 - 34.0 PG    MCHC 31.2 30.0 - 36.5 g/dL    RDW 15.0 (H) 11.5 - 14.5 %    PLATELET 224 (H) 800 - 400 K/uL    MPV 9.6 8.9 - 12.9 FL    NRBC 0.0 0  WBC    ABSOLUTE NRBC 0.00 0.00 - 0.01 K/uL    NEUTROPHILS 59 32 - 75 %    BAND NEUTROPHILS 5 %    LYMPHOCYTES 21 12 - 49 %    MONOCYTES 8 5 - 13 %    EOSINOPHILS 3 0 - 7 %    BASOPHILS 1 0 - 1 %    METAMYELOCYTES 2 %    MYELOCYTES 1 %    IMMATURE GRANULOCYTES 0 0.0 - 0.5 %    ABS. NEUTROPHILS 6.2 1.8 - 8.0 K/UL    ABS. LYMPHOCYTES 2.0 0.8 - 3.5 K/UL    ABS. MONOCYTES 0.8 0.0 - 1.0 K/UL    ABS. EOSINOPHILS 0.3 0.0 - 0.4 K/UL    ABS. BASOPHILS 0.1 0.0 - 0.1 K/UL    ABS. IMM.  GRANS. 0.0 0.00 - 0.04 K/UL    DF MANUAL      PLATELET COMMENTS PLATELET AGGREGATES PRESENT      RBC COMMENTS ANISOCYTOSIS  PRESENT        RBC COMMENTS MICROCYTOSIS  1+        RBC COMMENTS POLYCHROMASIA  PRESENT        RBC COMMENTS BRIAN CELLS  PRESENT        WBC COMMENTS ATYPICAL LYMPHOCYTES PRESENT     TROPONIN-HIGH SENSITIVITY    Collection Time: 12/22/21  9:34 AM   Result Value Ref Range    Troponin-High Sensitivity 17 0 - 76 ng/L   MAGNESIUM    Collection Time: 12/22/21  9:34 AM   Result Value Ref Range    Magnesium 1.8 1.6 - 2.4 mg/dL   BLOOD GAS,LACTIC ACID, POC    Collection Time: 12/22/21  9:45 AM   Result Value Ref Range    pH (POC) 7.33 (L) 7.35 - 7.45      pCO2 (POC) 48.0 (H) 35.0 - 45.0 MMHG    pO2 (POC) 18 (LL) 80 - 100 MMHG    HCO3 (POC) 25.5 22 - 26 MMOL/L    sO2 (POC) 22.6 (L) 92 - 97 %    Base deficit (POC) 0.8 mmol/L    Specimen type (POC) VENOUS BLOOD      Performed by [employer]     Lactic Acid (POC) 3.41 (HH) 0.40 - 2.00 mmol/L    Critical value read back MARTHA    URINALYSIS W/ REFLEX CULTURE    Collection Time: 12/22/21 11:29 AM    Specimen: Urine   Result Value Ref Range    Color LIGHT RED     Appearance TURBID (A) CLEAR      Specific gravity 1.012 1.003 - 1.030      pH (UA) 7.0 5.0 - 8.0      Protein 300 (A) NEG mg/dL    Glucose Negative NEG mg/dL    Ketone Negative NEG mg/dL    Bilirubin Negative NEG      Blood LARGE (A) NEG      Urobilinogen 0.2 0.2 - 1.0 EU/dL    Nitrites Negative NEG      Leukocyte Esterase TRACE (A) NEG      WBC 0-4 0 - 4 /hpf    RBC >100 (H) 0 - 5 /hpf    Epithelial cells FEW FEW /lpf    Bacteria Negative NEG /hpf    UA:UC IF INDICATED CULTURE NOT INDICATED BY UA RESULT CNI     D DIMER    Collection Time: 12/22/21 11:44 AM   Result Value Ref Range    D-dimer 8.14 (H) 0.00 - 0.65 mg/L FEU

## 2021-12-22 NOTE — PROGRESS NOTES
2:07 PM  CM acknowledges consult to hospice for hospice conversation. CM has placed referral via [address] to [facility] for info session, notified RN liaison. CM awaiting pt to return to floor from testing to complete readmission assessment and provide observation documents. 11:25 AM  CM acknowledges anticipated readmit, will meet with pt and spouse at bedside to complete assessment. Per request of pt's spouse, CM cancelled PCP f/u appt with Dr. Roberto Carlos Dyson for tomorrow 12/23. Will reschedule PCP F/u appt when d/c is known.     Ana Cristina Nielsen [facility] [address], [address]

## 2021-12-23 NOTE — PROGRESS NOTES
Transition of Care Plan:     RUR: N/A - observation status  Disposition: Home with At [facility]  Follow up appointments: PCP  DME needed: None  Transportation at Discharge: Spouse; ETA 2 PM on 12/24  Keys or means to access home:  Spouse will have access     IM Medicare Letter: N/A - observation status  Is patient a BCPI-A Bundle:  N/A                    If yes, was Bundle Letter given?:    Is patient a  and connected with the [place]? N/A  If yes, was [place] transfer form completed and [facility] notified? Caregiver Contact: Pt's spouse/Opal Lopez Neighbors) [phone]  Discharge Caregiver contacted prior to discharge? Update 3:19 PM  CM received update from [facility] liaison Rodney Caceres, [phone]) reporting they can admit pt tomorrow (12/24) at 5 PM. CM spoke with pt by bedside phone to relay updates. Reports his wife will transport him home at discharge. Reports no questions or concerns at this time. CM relayed updates to attending MD via [employer]. Still need hospice order from MD stating for hospice to admit pt. Update 12:27 PM  Hospice referral sent to [facility] accepted; will update on d/c. Initial note-  CM reviewed pt's chart. CM received update from [address] [place] [address] reporting pt & wife request referral be sent to [facility] as they are in network with pt's payor source. CM sent referral via [employer]; waiting hospice order from attending MD. Notified attending MD via [employer]. Will continue to follow & report updates.     LEWIS Dupree  Care Management

## 2021-12-23 NOTE — PROGRESS NOTES
Consult called to our group in error. I have reached out to the nursing staff ([phone]) and asked that this be forwarded to Dr. Zachary Galeana. Thanks for thinking of us!  Deepa Vega MD FACP

## 2021-12-23 NOTE — PROGRESS NOTES
Problem: Mobility Impaired (Adult and Pediatric)  Goal: *Acute Goals and Plan of Care (Insert Text)  Description: FUNCTIONAL STATUS PRIOR TO ADMISSION: independent with household mobility with safety concerns, no device. HOME SUPPORT PRIOR TO ADMISSION: Pt lives with wife in [place], she provides assistance as needed with daily tasks. Physical Therapy Goals  Initiated 12/23/2021  1. Patient will move from supine to sit and sit to supine , scoot up and down, and roll side to side in bed with modified independence within 7 day(s). 2.  Patient will transfer from bed to chair and chair to bed with supervision/set-up using the least restrictive device within 7 day(s). 3.  Patient will perform sit to stand with supervision/set-up within 7 day(s). 4.  Patient will ambulate with supervision/set-up for 100 feet with the least restrictive device within 7 day(s). 5.  Patient will ascend/descend 6 stairs without handrail(s) with minimal assistance/contact guard assist within 7 day(s). Outcome: Progressing Towards Goal     PHYSICAL THERAPY EVALUATION  Patient: Tray Marie (25 y.o. male)  Date: 12/23/2021  Primary Diagnosis: Syncope [R55]        Precautions: fall, orthostatic (check vitals)       ASSESSMENT  Based on the objective data described below, the patient presents with impairments in overall strength, balance, and gait from functional baseline. Pt received supine in bed, agreeable to PT session. Pt reports recent admission for UTI, discharged home 12/21/21 and had syncopal episode in home bathroom 12/22 requiring lowering to floor by wife. Pt denies head strike. Today, orthostatic vitals taken from supine > sit and sit > stand with minimal change and symptoms. Pt able to walk with HHA and Min A from bed > bathroom but became dizzy when standing with OT to perform self care. Immediately assisted back to sitting on bed with BP 83/54 and .  Pt unable to perform further interventions or gait due to symptoms. Pt would benefit from [facility] PT services upon DC home. Will continue to follow. Vitals:    12/23/21 1111 12/23/21 1116 12/23/21 1121 12/23/21 1200   BP: 130/82 (!) 83/54 (!) 142/81    BP 1 Location: Left upper arm Left upper arm Left upper arm    BP Patient Position: Standing Sitting Supine    Pulse: 88 (!) 112 76 76   Temp:       Resp:       Height:       Weight:       SpO2:        .    Current Level of Function Impacting Discharge (mobility/balance): Min A for upright mobility    Functional Outcome Measure: The patient scored 50/100 on the Barthel Index outcome measure which is indicative of partial dependence in functional mobility and ADLs. Other factors to consider for discharge: pt lives with wife in 1 story home, frequent re-admission     Patient will benefit from skilled therapy intervention to address the above noted impairments. PLAN :  Recommendations and Planned Interventions: bed mobility training, transfer training, gait training, therapeutic exercises, neuromuscular re-education, patient and family training/education, and therapeutic activities      Frequency/Duration: Patient will be followed by physical therapy:  4 times a week to address goals. Recommendation for discharge: (in order for the patient to meet his/her long term goals)  Physical therapy at least 2 days/week in the home AND ensure assist and/or supervision for safety with upright mobility and fall prevention. This discharge recommendation:  Has been made in collaboration with the attending provider and/or case management    IF patient discharges home will need the following DME: to be determined (TBD)         SUBJECTIVE:   Patient stated we can try to walk. 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    Poor appetite     Unspecified adverse effect of anesthesia     \"hard to put to sleep and slow to wake up-heart rate dropped very low\"     Past Surgical History:   Procedure Laterality Date    COLONOSCOPY N/A 5/13/2016    COLONOSCOPY performed by Suraj Barreto MD at [address] [place] [address]  5/13/2016         HX ORTHOPAEDIC      knee scope left knee    HX OTHER SURGICAL  09-    excision of scalp cyst     UPPER GI ENDOSCOPY,BIOPSY  5/13/2016            Personal factors and/or comorbidities impacting plan of care: orthostasis, complex medical history    Home Situation  Home Environment: Private residence  # Steps to Enter: 6  Rails to Enter: No  One/Two Story Residence: One story  Living Alone: No  Support Systems: Spouse/Significant Other  Patient Expects to be Discharged to[de-identified] [employer]  Current DME Used/Available at Home: None  Tub or Shower Type: Shower    EXAMINATION/PRESENTATION/DECISION MAKING:   Critical Behavior:  Neurologic State: Alert  Orientation Level: Oriented X4  Cognition: Appropriate decision making     Hearing: Auditory  Auditory Impairment: None  Skin:  Appears intact  Edema: none noted.   Range Of Motion:  AROM: Generally decreased, functional                       Strength:    Strength: Generally decreased, functional                    Tone & Sensation:   Tone: Normal              Sensation: Intact               Coordination:  Coordination: Within functional limits  Vision:      Functional Mobility:  Bed Mobility:  Rolling: Stand-by assistance  Supine to Sit: Stand-by assistance  Sit to Supine: Minimum assistance  Scooting: Minimum assistance  Transfers:  Sit to Stand: Minimum assistance  Stand to Sit: Minimum assistance  Stand Pivot Transfers: Minimum assistance                    Balance:   Sitting: Impaired; With support  Sitting - Static: Good (unsupported); Supported sitting  Sitting - Dynamic: Fair (occasional); Supported sitting  Standing: Impaired  Standing - Static: Constant support; Fair  Standing - Dynamic : Constant support; Fair  Ambulation/Gait Training:  Distance (ft): 30 Feet (ft)  Assistive Device: Other (comment) (hand held )  Ambulation - Level of Assistance: Minimal assistance                 Base of Support: Narrowed     Speed/Rita: Slow  Step Length: Left shortened;Right shortened         Functional Measure:  Barthel Index:    Bathin  Bladder: 10  Bowels: 5  Groomin  Dressin  Feeding: 10  Mobility: 0  Stairs: 0  Toilet Use: 5  Transfer (Bed to Chair and Back): 10  Total: 50/100       The Barthel ADL Index: Guidelines  1. The index should be used as a record of what a patient does, not as a record of what a patient could do. 2. The main aim is to establish degree of independence from any help, physical or verbal, however minor and for whatever reason. 3. The need for supervision renders the patient not independent. 4. A patient's performance should be established using the best available evidence. Asking the patient, friends/relatives and nurses are the usual sources, but direct observation and common sense are also important. However direct testing is not needed. 5. Usually the patient's performance over the preceding 24-48 hours is important, but occasionally longer periods will be relevant. 6. Middle categories imply that the patient supplies over 50 per cent of the effort. 7. Use of aids to be independent is allowed. Score Interpretation (from [address] [place] [address])    Independent   60-79 Minimally independent   40-59 Partially dependent   20-39 Very dependent   <20 Totally dependent     -Tian Ramirez., Barthel, D.W. (1965).  Functional evaluation: the Barthel Index. [address] [facility] ([address] (1997). The Barthel activities of daily living index: self-reporting versus actual performance in the old (> or = 75 years). Journal of [address] [place] [address] 45(7), [facility], RADHA, Gordon Hernandez., Pavel Meléndez. (1999). Measuring the change in disability after inpatient rehabilitation; comparison of the responsiveness of the Barthel Index and Functional Bruno Measure. Journal of Neurology, Neurosurgery, and Psychiatry, 66(4), 766-541. Ron Mcarthur, NBRO.A, ALIZA Arauz, & Mary Duke M.A. (2004) Assessment of post-stroke quality of life in cost-effectiveness studies: The usefulness of the Barthel Index and the EuroQoL-5D. Quality of Life Research, 15, 337-05         Physical Therapy Evaluation Charge Determination   History Examination Presentation Decision-Making   MEDIUM  Complexity : 1-2 comorbidities / personal factors will impact the outcome/ POC  LOW Complexity : 1-2 Standardized tests and measures addressing body structure, function, activity limitation and / or participation in recreation  MEDIUM Complexity : Evolving with changing characteristics  LOW Complexity : FOTO score of       Based on the above components, the patient evaluation is determined to be of the following complexity level: LOW     Pain Rating:  None reported. Activity Tolerance:   Fair, requires frequent rest breaks and signs and symptoms of orthostatic hypotension    After treatment patient left in no apparent distress:   Supine in bed, Call bell within reach, Caregiver / family present and Side rails x 3    COMMUNICATION/EDUCATION:   The patients plan of care was discussed with: Occupational therapist and Registered nurse. Fall prevention education was provided and the patient/caregiver indicated understanding.     Thank you for this referral.  Princess Rich, PT, DPT, NCS   Time Calculation: 30 mins

## 2021-12-23 NOTE — HOSPICE
[facility] RN note: In to meet with pt and wife at bedside. Wife Adela's friend works for [employer] which is in network for [employer] ([employer]), [address] [facility] sent referral to [facility] to serve pt/family. Pt will need refill of oxy, only has a few tablets remaining. C/O nausea and dizziness when sitting up. Oxy seems to give some relief of generalized pain (bone mets), question need for higher dose availability. Pt followed by Dr Peter Merchant from palliative as an outpatient. Thank you for the opportunity to care for this pt and family. Please contact hospice at [phone] with any questions or concerns.

## 2021-12-23 NOTE — PROGRESS NOTES
Problem: Patient Education: Go to Patient Education Activity  Goal: Patient/Family Education  Outcome: Progressing Towards Goal     Problem: Hypotension  Goal: *Blood pressure within specified parameters  Outcome: Progressing Towards Goal  Goal: *Fluid volume balance  Outcome: Progressing Towards Goal  Goal: *Labs within defined limits  Outcome: Progressing Towards Goal     Problem: Patient Education: Go to Patient Education Activity  Goal: Patient/Family Education  Outcome: Progressing Towards Goal

## 2021-12-23 NOTE — PROGRESS NOTES
Hospitalist Progress Note    NAME: Moses Adams   :  1945   MRN:  [id number]       Assessment / Plan:  Syncope likely due to orthostatic hypotension  Lactic acidosis  Recent E. Coli bacteremia  Orthostatic vitals are positive, advised the patient to wear contention's stockings, will also add Florinef  Continue with Levaquin for 10 more days  -Repeat blood cultures (had been clear upon DC)  -IVF  -CT of the chest was negative for PE  Lactic acid back to wnl   LESLIE on CKD 3, baseline Cr 1.3-1.6, currently 1.54  Non-anion gap metabolic acidosis  -Hold lisinopril.    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  Has oxycodone prn         Code Status: DNR/DNI  Surrogate Decision Maker: Wife, Dasha Gutierrez     DVT Prophylaxis: Lovenox  GI Prophylaxis: not indicated     Baseline: Indepednent      18.5 - 24.9 Normal weight / Body mass index is 21.7 kg/m². Estimated discharge date:   Barriers:    Code status: DNR       Subjective:     Chief Complaint / Reason for Physician Visit  Fu syncope . Feeling better . Discussed with RN events overnight.      Review of Systems:  Symptom Y/N Comments  Symptom Y/N Comments   Fever/Chills n   Chest Pain n    Poor Appetite    Edema     Cough    Abdominal Pain n    Sputum    Joint Pain     SOB/VERDE n   Pruritis/Rash     Nausea/vomit    Tolerating PT/OT     Diarrhea Tolerating Diet y    Constipation n   Other       Could NOT obtain due to:      Objective:     VITALS:   Last 24hrs VS reviewed since prior progress note. Most recent are:  Patient Vitals for the past 24 hrs:   Temp Pulse Resp BP SpO2   12/23/21 1450 98.4 °F (36.9 °C) 78 18 (!) 157/100 99 %   12/23/21 1200  76      12/23/21 1121  76  (!) 142/81    12/23/21 1116  (!) 112  (!) 83/54    12/23/21 1111  88  130/82    12/23/21 1107    138/77    12/23/21 1100    (!) 157/88    12/23/21 1053 98 °F (36.7 °C) 76 18 (!) 145/85 98 %   12/23/21 0749  75      12/23/21 0744 98.2 °F (36.8 °C) 96 18 (!) 157/91 99 %   12/23/21 0328 98.4 °F (36.9 °C) 73 16 138/84 96 %   12/23/21 0128 98.5 °F (36.9 °C) 65  (!) 170/82 97 %   12/22/21 2056 98.1 °F (36.7 °C) 83 17 (!) 155/87 98 %   12/22/21 1510 98.4 °F (36.9 °C) 80 18 (!) 152/90 99 %       Intake/Output Summary (Last 24 hours) at 12/23/2021 1459  Last data filed at 12/23/2021 7906  Gross per 24 hour   Intake 1311.66 ml   Output 1250 ml   Net 61.66 ml        I had a face to face encounter and independently examined this patient on 12/23/2021, as outlined below:  PHYSICAL EXAM:  General: WD, WN. Alert, cooperative, no acute distress    EENT:  EOMI. Anicteric sclerae. MMM  Resp:  CTA bilaterally, no wheezing or rales. No accessory muscle use  CV:  Regular  rhythm,  No edema  GI:  Soft, Non distended, Non tender. +Bowel sounds  Neurologic:  Alert and oriented X 3, normal speech,   Psych:   Good insight. Not anxious nor agitated  Skin:  No rashes.   No jaundice    Reviewed most current lab test results and cultures  YES  Reviewed most current radiology test results   YES  Review and summation of old records today    NO  Reviewed patient's current orders and MAR    YES  PMH/ reviewed - no change compared to H&P  ________________________________________________________________________  Care Plan discussed with:    Comments   Patient x    Family      RN x    Care Manager     Consultant                        Multidiciplinary team rounds were held today with , nursing, pharmacist and clinical coordinator. Patient's plan of care was discussed; medications were reviewed and discharge planning was addressed. ________________________________________________________________________  Total NON critical care TIME35 Minutes    Total CRITICAL CARE TIME Spent:   Minutes non procedure based      Comments   >50% of visit spent in counseling and coordination of care     ________________________________________________________________________  Gwendolyn Jimenez MD     Procedures: see electronic medical records for all procedures/Xrays and details which were not copied into this note but were reviewed prior to creation of Plan. LABS:  I reviewed today's most current labs and imaging studies.   Pertinent labs include:  Recent Labs     12/23/21  0343 12/22/21  0934   WBC 8.0 9.7   HGB 7.8* 11.2*   HCT 24.6* 35.9*    434*     Recent Labs     12/23/21  0343 12/22/21  0934    142   K 3.7 3.2*   * 109*   CO2 26 24   * 176*   BUN 17 16   CREA 1.49* 1.54*   CA 7.7* 8.4*   MG 1.8 1.8   PHOS 2.6  --    ALB  --  2.2*   TBILI  --  0.8   ALT  --  26       Signed: Gwendolyn Jimenez MD

## 2021-12-23 NOTE — PROGRESS NOTES
Problem: Self Care Deficits Care Plan (Adult)  Goal: *Acute Goals and Plan of Care (Insert Text)  Description: FUNCTIONAL STATUS PRIOR TO ADMISSION: Patient was independent and active without use of DME.     HOME SUPPORT: The patient lived with spouse but did not require assist.    Occupational Therapy Goals  Initiated 12/23/2021  1. Patient will perform grooming standing sinkside with Supervision/modified independent (& B/P >80/50)  within 7 day(s). 2.  Patient will perform bathing seated sinkside with supervision/set-up within 7 day(s). 3.  Patient will perform lower body dressing with supervision/set-up within 7 day(s). 4.  Patient will perform toilet transfers with modified independence within 7 day(s). 5.  Patient will perform all aspects of toileting with modified independence within 7 day(s). 6.  Patient will participate in upper extremity therapeutic exercise/activities with modified independence for 15 minutes within 7 day(s). 7.  Patient will utilize energy conservation techniques during functional activities with verbal cues within 7 day(s). Outcome: Progressing Towards Goal     OCCUPATIONAL THERAPY EVALUATION  Patient: Caterina Allan (47 y.o. male)  Date: 12/23/2021  Primary Diagnosis: Syncope [R55]        Precautions:   Fall,DNR,DNI (Orthostatics s/p 1-2 mins standing; ostomy bag)    ASSESSMENT  Based on the objective data described below, the patient presents with decreased LE ADL performance and drop in B/P w/ standing 1-2 minutes. Pt assisted w/ sock donning. Orthostatics taken w/ vitals initially stable in standing. Pt able to perform oral care in standing sinkside, but at end, pt reported not feeling well. Seated EOB w/ B/P taken. Pt assisted back to bed.  Educated pt/spouse on techniques to assist at home including B/P monitor and multiple chairs place in home for seated rest break with ankle pumps, possible need for THIGH his compression hose and instructed on strategies for ray. Vitals:    12/23/21 1111 12/23/21 1116 12/23/21 1121 12/23/21 1200   BP: 130/82 (!) 83/54 (!) 142/81    BP 1 Location: Left upper arm Left upper arm Left upper arm    BP Patient Position: Standing Sitting Supine    Pulse: 88 (!) 112 76 76   Temp:       Resp:       Height:       Weight:       SpO2:           Current Level of Function Impacting Discharge (ADLs/self-care): moderate assist and at seated level d/t drop in B/P    Functional Outcome Measure: The patient scored Total: 50/100 on the Barthel Index outcome measure which is indicative of being moderate impairment in basic self-care. Other factors to consider for discharge: Orthostatics     Patient will benefit from skilled therapy intervention to address the above noted impairments. PLAN :  Recommendations and Planned Interventions: self care training, functional mobility training, therapeutic exercise, balance training, therapeutic activities, cognitive retraining, endurance activities, neuromuscular re-education, patient education, home safety training, and family training/education    Frequency/Duration: Patient will be followed by occupational therapy 4 times a week to address goals. Recommendation for discharge: (in order for the patient to meet his/her long term goals)  To be determined: Pt planning on Hospice at home    This discharge recommendation:  A follow-up discussion with the attending provider and/or case management is planned    IF patient discharges home will need the following DME: bedside commode, walker: rolling, wheelchair, and ? Thigh-high compression hose       SUBJECTIVE:   Patient stated I'm starting to feel a little funny. 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    Poor appetite     Unspecified adverse effect of anesthesia     \"hard to put to sleep and slow to wake up-heart rate dropped very low\"     Past Surgical History:   Procedure Laterality Date    COLONOSCOPY N/A 5/13/2016    COLONOSCOPY performed by Macey Edward MD at [facility]  5/13/2016         HX ORTHOPAEDIC      knee scope left knee    HX OTHER SURGICAL  09-    excision of scalp cyst     UPPER GI ENDOSCOPY,BIOPSY  5/13/2016            Expanded or extensive additional review of patient history:     Home Situation  Home Environment: Private residence  # Steps to Enter: 6  Rails to Enter: No  One/Two Story Residence: One story  Living Alone: No  Support Systems: Spouse/Significant Other  Patient Expects to be Discharged to[de-identified] [employer]  Current DME Used/Available at Home: None  Tub or Shower Type: Shower    Hand dominance: Right    EXAMINATION OF PERFORMANCE DEFICITS:  Cognitive/Behavioral Status:  Neurologic State: Alert  Orientation Level: Oriented X4  Cognition: Follows commands  Perception: Appears intact  Perseveration: No perseveration noted  Safety/Judgement: Awareness of environment; Fall prevention; Insight into deficits; Decreased awareness of need for safety;Decreased awareness of need for assistance    Skin: appears grossly intact    Edema: No significant edema noted     Hearing: Auditory  Auditory Impairment: None    Vision/Perceptual:    Acuity: Able to read employee name badge without difficulty      Range of Motion:  AROM: Generally decreased, functional    Strength:  Strength: Generally decreased, functional    Coordination:  Coordination: Within functional limits  Fine Motor Skills-Upper: Left Intact; Right Intact Gross Motor Skills-Upper: Left Intact; Right Intact    Tone & Sensation:  Tone: Normal  Sensation: Intact    Balance:  Sitting: Impaired; With support  Sitting - Static: Good (unsupported); Supported sitting  Sitting - Dynamic: Fair (occasional); Supported sitting  Standing: Impaired  Standing - Static: Constant support; Fair  Standing - Dynamic : Constant support; Fair    Functional Mobility and Transfers for ADLs:  Bed Mobility:  Rolling: Stand-by assistance  Supine to Sit: Stand-by assistance  Sit to Supine: Minimum assistance  Scooting: Minimum assistance    Transfers:  Sit to Stand: Minimum assistance  Stand to Sit: Minimum assistance  Bed to Chair: Contact guard assistance  Bathroom Mobility: Contact guard assistance    ADL Assessment:  Feeding: Supervision    Oral Facial Hygiene/Grooming: Contact guard assistance    Bathing: Moderate assistance    Upper Body Dressing: Minimum assistance    Lower Body Dressing: Moderate assistance    Toileting: Moderate assistance    ADL Intervention and task modifications:  Feeding  Drink to Mouth: Independent    Grooming  Position Performed: Standing (began feeling funny w/ drop inB/P 80/50s)  Brushing Teeth: Contact guard assistance  Brushing/Combing Hair: Set-up    Lower Body Dressing Assistance  Socks: Maximum assistance; Total assistance (dependent)  Position Performed: Seated edge of bed    Cognitive Retraining  Problem Solving: Identifying the task; Identifying the problem;General alternative solution  Safety/Judgement: Awareness of environment; Fall prevention; Insight into deficits; Decreased awareness of need for safety;Decreased awareness of need for assistance    Functional Measure:    Barthel Index:  Bathin  Bladder: 5  Bowels: 5  Groomin  Dressin  Feeding: 10  Mobility: 5  Stairs: 0  Toilet Use: 5  Transfer (Bed to Chair and Back): 10  Total: 50/100      The Barthel ADL Index: Guidelines  1.  The index should be used as a record of what a patient does, not as a record of what a patient could do. 2. The main aim is to establish degree of independence from any help, physical or verbal, however minor and for whatever reason. 3. The need for supervision renders the patient not independent. 4. A patient's performance should be established using the best available evidence. Asking the patient, friends/relatives and nurses are the usual sources, but direct observation and common sense are also important. However direct testing is not needed. 5. Usually the patient's performance over the preceding 24-48 hours is important, but occasionally longer periods will be relevant. 6. Middle categories imply that the patient supplies over 50 per cent of the effort. 7. Use of aids to be independent is allowed. Score Interpretation (from [address] [facility] [address])    Independent   60-79 Minimally independent   40-59 Partially dependent   20-39 Very dependent   <20 Totally dependent     -Tian Ramirez., Barthel, EMILIEW. (1965). Functional evaluation: the Barthel Index. [facility] ([address] (1997). The Barthel activities of daily living index: self-reporting versus actual performance in the old (> or = 75 years). Journal of [address] [place] [address] 457), [facility], J.TOYA, Dotite Powell., Oleg Jay. (1999). Measuring the change in disability after inpatient rehabilitation; comparison of the responsiveness of the Barthel Index and Functional Stafford Measure. Journal of Neurology, Neurosurgery, and Psychiatry, 66(4), 465-844. Feng Gordon, N.RADHA.BINDU, ALIZA Arauz, & Sruthi Tran MParvizA. (2004) Assessment of post-stroke quality of life in cost-effectiveness studies: The usefulness of the Barthel Index and the EuroQoL-5D.  Quality of Life Research, 15, 153-95     Occupational Therapy Evaluation Charge Determination   History Examination Decision-Making   HIGH Complexity : Extensive review of history including physical, cognitive and psychosocial history  HIGH Complexity : 5 or more performance deficits relating to physical, cognitive , or psychosocial skils that result in activity limitations and / or participation restrictions HIGH Complexity : Patient presents with comorbidities that affect occupational performance. Signifigant modification of tasks or assistance (eg, physical or verbal) with assessment (s) is necessary to enable patient to complete evaluation       Based on the above components, the patient evaluation is determined to be of the following complexity level: HIGH   Pain Ratin/10    Activity Tolerance:   requires rest breaks and signs and symptoms of orthostatic hypotension    After treatment patient left in no apparent distress:    Supine in bed, Call bell within reach, and Caregiver / family present    COMMUNICATION/EDUCATION:   The patients plan of care was discussed with: Physical therapist and Registered nurse. Home safety education was provided and the patient/caregiver indicated understanding. and Patient/family have participated as able in goal setting and plan of care. This patients plan of care is appropriate for delegation to \[facility]\"".     Thank you for this referral.  Freda Ramos  Time Calculation: 30 mins

## 2021-12-23 NOTE — PROGRESS NOTES
Problem: Falls - Risk of  Goal: *Absence of Falls  Description: Document Catalina Kwan Fall Risk and appropriate interventions in the flowsheet.   Outcome: Progressing Towards Goal  Note: Fall Risk Interventions:  Mobility Interventions: Assess mobility with egress test,Bed/chair exit alarm,Communicate number of staff needed for ambulation/transfer,Patient to call before getting OOB         Medication Interventions: Assess postural VS orthostatic hypotension,Bed/chair exit alarm,Evaluate medications/consider consulting pharmacy,Patient to call before getting OOB,Teach patient to arise slowly    Elimination Interventions: Bed/chair exit alarm,Call light in reach,Patient to call for help with toileting needs,Stay With Me (per policy),Toileting schedule/hourly rounds    History of Falls Interventions: Bed/chair exit alarm,Door open when patient unattended,Evaluate medications/consider consulting pharmacy,Investigate reason for fall,Room close to nurse's station,Assess for delayed presentation/identification of injury for 48 hrs (comment for end date),Vital signs minimum Q4HRs X 24 hrs (comment for end date)         Problem: Patient Education: Go to Patient Education Activity  Goal: Patient/Family Education  Outcome: Progressing Towards Goal     Problem: Hypotension  Goal: *Blood pressure within specified parameters  Outcome: Progressing Towards Goal  Goal: *Fluid volume balance  Outcome: Progressing Towards Goal  Goal: *Labs within defined limits  Outcome: Progressing Towards Goal     Problem: Patient Education: Go to Patient Education Activity  Goal: Patient/Family Education  Outcome: Progressing Towards Goal

## 2021-12-23 NOTE — PROGRESS NOTES
1920: Bedside and Verbal shift change report given to Shonda Jensen RN (oncoming nurse) by Coni Lambert RN (offgoing nurse). Report included the following information SBAR, Kardex, Intake/Output, MAR, Accordion, Recent Results, Med Rec Status and Cardiac Rhythm NSR.     2212: Patient complaining of shoulder pain. Scheduled Neurontin given. Informed patient that if VS stable and pain worsens, then will give PRN oxycodone. 0112 12/23/2021: Patient complaining of 10/10 generalized pain. PRN oxycodone. See flowsheet for pain assessment. 8955: Bedside and Verbal shift change report given to Vivek Riojas RN (oncoming nurse) by Sohnda Jensen RN (offgoing nurse). Report included the following information SBAR, Kardex, Intake/Output, MAR, Accordion, Recent Results, Med Rec Status and Cardiac Rhythm NSR.

## 2021-12-24 PROBLEM — R55 SYNCOPE AND COLLAPSE: Status: ACTIVE | Noted: 2021-01-01

## 2021-12-24 NOTE — PROGRESS NOTES
Transition of Care Plan:     RUR: 18% \"mod risk\"  Disposition: Home with [facility]  Follow up appointments: Hospice care  DME needed: None  Transportation at [address] or means to access home:  Spouse will have access     IM Medicare Letter: Reviewed & signed  Is patient a BCPI-A Bundle:  N/A                    If yes, was Bundle Letter given?:    Is patient a  and connected with the [facility]? N/A  If yes, was [employer] transfer form completed and [facility] notified? Caregiver Contact: Pt's spouse/JohnEsperanza Maillard) [phone]  Discharge Caregiver contacted prior to discharge? Update 12:57 PM  CM received update from attending MD reporting pt is not stable for discharge home today & will delay discharge for the weekend d/t low BP. Initial note-  CM reviewed pt's chart. Pt anticipated to discharge home with hospice today with hospice nurse to admit at 5 PM. Will continue to follow.     LEWIS Reno  Care Management

## 2021-12-24 NOTE — PROGRESS NOTES
Problem: Patient Education: Go to Patient Education Activity  Goal: Patient/Family Education  Outcome: Progressing Towards Goal     Problem: Hypotension  Goal: *Blood pressure within specified parameters  Outcome: Progressing Towards Goal  Goal: *Fluid volume balance  Outcome: Progressing Towards Goal  Goal: *Labs within defined limits  Outcome: Progressing Towards Goal     Problem: Patient Education: Go to Patient Education Activity  Goal: Patient/Family Education  Outcome: Progressing Towards Goal     Problem: Patient Education: Go to Patient Education Activity  Goal: Patient/Family Education  Outcome: Progressing Towards Goal     Problem: Patient Education: Go to Patient Education Activity  Goal: Patient/Family Education  Outcome: Progressing Towards Goal

## 2021-12-24 NOTE — PROGRESS NOTES
Hospitalist Progress Note    NAME: Michael Bland   :  1945   MRN:  [id number]       Assessment / Plan:  Syncope likely due to orthostatic hypotension  Supine hypertension  Lactic acidosis  Recent E. Coli bacteremia  Orthostatic vitals are positive, advised the patient to wear contention's stockings, will also add Florinef  Continue with Levaquin for 10 more days  -Repeat blood cultures (had been clear upon DC)  -IVF  -CT of the chest was negative for PE  Lactic acid back to wnl   Systolic blood pressure in the 160s in supine position, restarted low-dose of lisinopril five) patient takes 20 mg of lisinopril, restarted on 5 mg  Daily orthostatic vital signs check    Acute on chronic anemia  hemoglobin dropped to 7.8 from 11.2, most likely dilutional  We will repeat CBC  We will check iron panel and ferritin    LESLIE on CKD 3, baseline Cr 1.3-1.6, currently 1.54  Non-anion gap metabolic acidosis  Resume lisinopril at lower dose  -c/w Gee  -Avoid nephrotoxins           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 Baljit Evans as outpatient  Hospice consulted, patient accepted to hospice.   But clinically patient blood pressure not stable, is hypertensive  When supine and symptomatic orthostatic hypotension     Chronic essential hypertension  Restarted on low-dose lisinopril because of the blood pressure trending up, reassess BP     Osteonecrosis of the maxilla due todenusumab  Recurrent upper teeth infection in 2020, likely related to osteonecrosis  -follow up with [facility]     Chronic right shoulder pain  -continue neurontin 100mg tid.  Has oxycodone prn         Code Status: DNR/DNI  Surrogate Decision Maker: Wife, Teresa Gibbs     DVT Prophylaxis: Lovenox  GI Prophylaxis: not indicated     Baseline: Indepednent      18.5 - 24.9 Normal weight / Body mass index is 21.7 kg/m². Estimated discharge date: December 24  Barriers:    Code status: DNR       Subjective:     Chief Complaint / Reason for Physician Visit  Fu syncope. Patient complain of nausea and not feeling well after eating. He was given pain medication wife has a lot of questions regarding hospice   discussed with RN events overnight. Review of Systems:  Symptom Y/N Comments  Symptom Y/N Comments   Fever/Chills n   Chest Pain n    Poor Appetite    Edema     Cough    Abdominal Pain y    Sputum    Joint Pain     SOB/VERDE n   Pruritis/Rash     Nausea/vomit y   Tolerating PT/OT     Diarrhea    Tolerating Diet y    Constipation n   Other       Could NOT obtain due to:      Objective:     VITALS:   Last 24hrs VS reviewed since prior progress note. Most recent are:  Patient Vitals for the past 24 hrs:   Temp Pulse Resp BP SpO2   12/24/21 0800  69      12/24/21 0239 98.9 °F (37.2 °C) 79 18 (!) 173/92 97 %   12/23/21 2332 98.4 °F (36.9 °C) 70 16 (!) 181/91 96 %   12/23/21 2118 98.9 °F (37.2 °C) 74 16 (!) 161/92 96 %   12/23/21 1841    (!) 157/100    12/23/21 1600  78      12/23/21 1450 98.4 °F (36.9 °C) 78 18 (!) 157/100 99 %   12/23/21 1200  76      12/23/21 1121  76  (!) 142/81    12/23/21 1116  (!) 112  (!) 83/54    12/23/21 1111  88  130/82    12/23/21 1107    138/77    12/23/21 1100    (!) 157/88    12/23/21 1053 98 °F (36.7 °C) 76 18 (!) 145/85 98 %       Intake/Output Summary (Last 24 hours) at 12/24/2021 0851  Last data filed at 12/24/2021 6785  Gross per 24 hour   Intake    Output 3975 ml   Net -3975 ml        I had a face to face encounter and independently examined this patient on 12/24/2021, as outlined below:  PHYSICAL EXAM:  General: WD, WN.  Alert, cooperative, no acute distress EENT:  EOMI. Anicteric sclerae. MMM  Resp:  CTA bilaterally, no wheezing or rales. No accessory muscle use  CV:  Regular  rhythm,  No edema  GI:  Soft, Non distended, Non tender. +Bowel sounds  Neurologic:  Alert and oriented X 3, normal speech,   Psych:   Good insight. Not anxious nor agitated  Skin:  No rashes. No jaundice    Reviewed most current lab test results and cultures  YES  Reviewed most current radiology test results   YES  Review and summation of old records today    NO  Reviewed patient's current orders and MAR    YES  PMH/SH reviewed - no change compared to H&P  ________________________________________________________________________  Care Plan discussed with:    Comments   Patient x    Family      RN x    Care Manager     Consultant                        Multidiciplinary team rounds were held today with , nursing, pharmacist and clinical coordinator. Patient's plan of care was discussed; medications were reviewed and discharge planning was addressed. ________________________________________________________________________  Total NON critical care TIME35 Minutes    Total CRITICAL CARE TIME Spent:   Minutes non procedure based      Comments   >50% of visit spent in counseling and coordination of care     ________________________________________________________________________  Gabriella Ny MD     Procedures: see electronic medical records for all procedures/Xrays and details which were not copied into this note but were reviewed prior to creation of Plan. LABS:  I reviewed today's most current labs and imaging studies.   Pertinent labs include:  Recent Labs     12/23/21  0343 12/22/21  0934   WBC 8.0 9.7   HGB 7.8* 11.2*   HCT 24.6* 35.9*    434*     Recent Labs     12/23/21  0343 12/22/21  0934    142   K 3.7 3.2*   * 109*   CO2 26 24   * 176*   BUN 17 16   CREA 1.49* 1.54*   CA 7.7* 8.4*   MG 1.8 1.8   PHOS 2.6  --    ALB  --  2.2*   TBILI  -- 0. 8   ALT  --  26       Signed: Meera Castro MD

## 2021-12-25 NOTE — DISCHARGE SUMMARY
Hospitalist Discharge Summary     Patient ID:  Ross Jimenez  [id number]  31 y.o.  1945  12/22/2021    PCP on record: Ruth Whitaker MD    Admit date: 12/22/2021  Discharge date and time: 12/26/2021    DISCHARGE DIAGNOSIS:  Syncope likely due to orthostatic hypotension  Supine hypertension  Lactic acidosis  Recent E. 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 male with past medical history significant for neuroendocrine tumor carcinoid, CKD, osteonecrosis, hypertension who presents with syncope. Patient's wife at bedside during the history and physical as well. Patient was recently admitted for sepsis with E. coli bacteremia secondary to urinary tract infection. He was discharged on 12/21 and had been feeling well. This morning, he got up to use the bathroom when he felt dizzy and lightheaded. He admitted to the toilet to sit down. While he was bearing down, patient got even more lightheaded when his wife noticed him slumping over. Patient was eased to the ground did not hit his head. He did have some clear vomitus and was passed out for about 30 seconds. Patient's wife called the ambulance. On arrival to the ED, patient was hypotensive. At the time my exam, patient currently feeling improved and has complaints of his chronic back pain.     Patient and wife are interested in discussing what services would be available to them at home.   They were also interested in potentially hospice care. They are also wondering about patient's chemo medication.     We were asked to admit for work up and evaluation of the above problems. ______________________________________________________________________  DISCHARGE SUMMARY/HOSPITAL COURSE:  for full details see H&P, daily progress notes, labs, consult notes. Syncope likely due to orthostatic hypotension  Supine hypertension  Lactic acidosis  Recent E. Coli bacteremia  Orthostatic vitals are positive, advised the patient to wear contention's stockings,   Continue with Levaquin   -Repeat blood cultures (had been clear upon DC)  Status post IV fluid  -CT of the chest was negative for PE  Lactic acid back to wnl   Systolic blood pressure in the 160s in supine position, restarted low-dose of lisinopril five) patient takes 20 mg of lisinopril, restarted on 5 mg  Daily orthostatic vital signs check  Added Florinef     Acute on chronic anemia  hemoglobin dropped to 7.8 from 11.2, most likely dilutional  Repeat CBC showed hemoglobin 8.1  Iron panel showed iron deficiency. Patient started on p.o. 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 Luli Farr as outpatient  Hospice consulted, patient accepted to hospice. Patient is clinically stable to go home with hospice.   Persistent orthostatic hypotension, educated patient and family about wearing compression stockings while ambulating    Chronic essential hypertension  Restarted on low-dose lisinopril because of the blood pressure trending up, reassess BP  Patient needs daily BP checks at home     Osteonecrosis of the maxilla due todenusumab  Recurrent upper teeth infection in September 2020, likely related to osteonecrosis  -follow up with [facility]     Chronic right shoulder pain  -continue neurontin 100mg tid.  Has oxycodone prn           _______________________________________________________________________  Patient seen and examined by me on discharge day. Pertinent Findings:  Gen:    Not in distress  Chest: Clear lungs  CVS:   Regular rhythm. No edema  Abd:  Soft, not distended, not tender  Neuro:  Alert, oriented x3  _______________________________________________________________________  DISCHARGE MEDICATIONS:   Current Discharge Medication List      START taking these medications    Details   fludrocortisone (FLORINEF) 0.1 mg tablet Take 1 Tablet by mouth daily for 30 days. Qty: 30 Tablet, Refills: 0  Start date: 12/26/2021, End date: 1/25/2022      meclizine (ANTIVERT) 12.5 mg tablet Take 1 Tablet by mouth every eight (8) hours as needed for Dizziness for up to 10 days. Qty: 30 Tablet, Refills: 0  Start date: 12/25/2021, End date: 1/4/2022      ferrous sulfate (IRON) 325 mg (65 mg iron) EC tablet Take 1 Tablet by mouth daily for 30 days. Qty: 30 Tablet, Refills: 0  Start date: 12/25/2021, End date: 1/24/2022         CONTINUE these medications which have CHANGED    Details   lisinopriL (PRINIVIL, ZESTRIL) 5 mg tablet Take 1 Tablet by mouth daily for 30 days. Qty: 30 Tablet, Refills: 0  Start date: 12/26/2021, End date: 1/25/2022      levoFLOXacin (Levaquin) 750 mg tablet Take 1 Tablet by mouth every fourty-eight (48) hours for 5 doses. Qty: 5 Tablet, Refills: 0  Start date: 12/25/2021, End date: 1/3/2022         CONTINUE these medications which have NOT CHANGED    Details   telotristat etiprate 250 mg tab Take 250 mg by mouth three (3) times daily (with meals) for 30 days.   Qty: 90 Tablet, Refills: 0      everolimus (Afinitor) 10 mg tab Take 1 Tablet by mouth daily. Qty: 30 Tablet, Refills: 6    Associated Diagnoses: Neuroendocrine carcinoma metastatic to bone (HCC)      gabapentin (NEURONTIN) 100 mg capsule Take 1 Capsule by mouth three (3) times daily. Max Daily Amount: 300 mg. Qty: 90 Capsule, Refills: 0  Start date: 12/23/2021    Associated Diagnoses: Cervical radiculopathy               Patient Follow Up Instructions: Activity: Activity as tolerated  Diet: Regular Diet  Wound Care: None needed      Follow-up Information     Follow up With Specialties Details Why Eriberto  This is your hospice provider. Please contact them directly with questions!  Ila Andrew 1452  [phone]    Fadia Mclaughlin MD Hematology and Oncology, Internal Medicine, Hematology, Oncology   [address] [address] [address]  [address] [phone]      [facility], [address] [place] [address]  [phone]          ________________________________________________________________    Risk of deterioration: High    Condition at Discharge:  Stable  __________________________________________________________________    Disposition  Home with hospice    ____________________________________________________________________    Code Status: DNR/DNI  ___________________________________________________________________      Total time in minutes spent coordinating this discharge (includes going over instructions, follow-up, prescriptions, and preparing report for sign off to her PCP) :  >30 minutes    Signed:  Johann Jovel MD

## 2021-12-25 NOTE — PROGRESS NOTES
End of Shift Note    Bedside shift change report given to Mady Lee RN (oncoming nurse) by Matt Ibrahim LPN (offgoing nurse). Report included the following information SBAR, Kardex, Intake/Output, MAR and Recent Results    Shift worked:  7p-7:30a     Shift summary and any significant changes:          Concerns for physician to address:       Zone phone for oncoming shift:   [phone]       Activity:  Activity Level: Up with Assistance  Number times ambulated in hallways past shift: 0  Number of times OOB to chair past shift: 0    Cardiac:   Cardiac Monitoring: Yes      Cardiac Rhythm: Sinus Rhythm    Access:   Current line(s): PIV     Genitourinary:   Urinary status: Gee    Respiratory:   O2 Device: None (Room air)  Chronic home O2 use?: NO  Incentive spirometer at bedside: YES     GI:  Last Bowel Movement Date: 12/22/21  Current diet:  ADULT DIET Regular  Passing flatus: YES  Tolerating current diet: YES       Pain Management:   Patient states pain is manageable on current regimen: YES    Skin:  Pravin Score: 20  Interventions: increase time out of bed and nutritional support     Patient Safety:  Fall Score:  Total Score: 4  Interventions: assistive device (walker, cane, etc), gripper socks, pt to call before getting OOB and stay with me (per policy)  High Fall Risk: Yes    Length of Stay:  Expected LOS: - - -  Actual LOS: 0      Matt Ibrahim LPN

## 2021-12-25 NOTE — PROGRESS NOTES
Problem: Falls - Risk of  Goal: *Absence of Falls  Description: Document Alivia Rao Fall Risk and appropriate interventions in the flowsheet.   Outcome: Progressing Towards Goal  Note: Fall Risk Interventions:  Mobility Interventions: Assess mobility with egress test,Bed/chair exit alarm,Communicate number of staff needed for ambulation/transfer,Patient to call before getting OOB         Medication Interventions: Assess postural VS orthostatic hypotension,Bed/chair exit alarm,Evaluate medications/consider consulting pharmacy,Patient to call before getting OOB,Teach patient to arise slowly    Elimination Interventions: Bed/chair exit alarm,Call light in reach,Patient to call for help with toileting needs,Stay With Me (per policy),Toileting schedule/hourly rounds    History of Falls Interventions: Bed/chair exit alarm,Door open when patient unattended,Evaluate medications/consider consulting pharmacy,Investigate reason for fall,Room close to nurse's station,Assess for delayed presentation/identification of injury for 48 hrs (comment for end date),Vital signs minimum Q4HRs X 24 hrs (comment for end date)         Problem: Patient Education: Go to Patient Education Activity  Goal: Patient/Family Education  Outcome: Progressing Towards Goal     Problem: Hypotension  Goal: *Fluid volume balance  Outcome: Progressing Towards Goal  Goal: *Labs within defined limits  Outcome: Progressing Towards Goal     Problem: Patient Education: Go to Patient Education Activity  Goal: Patient/Family Education  Outcome: Progressing Towards Goal

## 2021-12-25 NOTE — PROGRESS NOTES
[address] [facility] with Dr Scott Gallardo about ETA time of [facility] is so late 10:30 pm tonight, she asked to cancel the D/C today and setup [facility] for Tomorrow morning and pt will leave in the morning, informed  about that and she will set up [facility] for Tomorrow    1900 Bedside shift change report given to PARAMJIT Hernandez (oncoming nurse) by Bernie COFFMAN RN (offgoing nurse). Report included the following information SBAR, Kardex and MAR.

## 2021-12-25 NOTE — PROGRESS NOTES
Transition of Care Plan:     RUR: 18% \"mod risk\"  [address]  Follow up appointments: Hospice care  DME needed: None  Transportation at [address] @ 10 a.m. 12/26/21  Keys or means to access home:  Spouse will have access     IM Medicare Letter: Reviewed & signed  Is patient a BCPI-A Bundle:  N/A                    If yes, was Bundle Letter given?:    Is patient a [place] and connected with the [facility]? N/A  If yes, was [employer] transfer form completed and [facility] notified? Caregiver Contact: Pt's spouse/Lyric Lopez Hook p) [phone]  Discharge Caregiver contacted prior to discharge?    4 p.m. [facility] can transport pt tomorrow at 10 a.m.  CM called and informed Maggy Yoon of [facility]. CM informed pt and pt's spouse of time and they are in agreement. 3:47 p.m. CM [facility] CM asked pt and spouse if they wanted to stay with that time or reschedule. Pt and spouse elected to go in the morning. CM set referral back out to [facility] asking what time they can transport. CM informed Maggy Yoon of [facility] of the change. Her number is [phone].    2:13 p.m. CM informed by nursing pt needs [facility] transportation d/t syncope episode. CM will arrange transportation for 6 p.m. The hospice nurse will be out to pt's home at 7 to admit. CM acknowledged d/c order. CM called pt to discuss. Pt in agreement. Pt's spouse is here to transport pt home. CM called and informed Leslie of [facility]. No further CM needs identified. Care Management Interventions  PCP Verified by CM: Yes  Palliative Care Criteria Met (RRAT>21 & CHF Dx)?: No  Mode of Transport at Discharge:  Other (see comment) (Wife)  Hospital Transport Time of Discharge: 1400  Transition of Care Consult (CM Consult): Jj: No  Reason Outside [place]: Managed care specific requirement  Discharge Durable Medical Equipment: No  Physical Therapy Consult: Yes  Occupational Therapy Consult: Yes  Speech Therapy Consult: No  Support Systems: Spouse/Significant Other  Confirm Follow Up Transport: Family  The Plan for Transition of Care is Related to the Following Treatment Goals : Home with [facility]  The Patient and/or Patient Representative was Provided with a Choice of Provider and Agrees with the Discharge Plan?: Yes  Name of the Patient Representative Who was Provided with a Choice of Provider and Agrees with the Discharge Plan: Tran Abel  Freedom of Choice List was Provided with Basic Dialogue that Supports the Patient's Individualized Plan of Care/Goals, Treatment Preferences and Shares the Quality Data Associated with the Providers?: Yes  Discharge Location  Discharge Placement: Home with hospice      LEWIS Lincoln  Care Management, [address]

## 2021-12-25 NOTE — DISCHARGE INSTRUCTIONS
DISCHARGE DIAGNOSIS:  Syncope likely due to orthostatic hypotension  Supine hypertension  Lactic acidosis  Recent E. 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 · Keep your medication in the bottles provided by the pharmacist and keep a list of the medication names, dosages, and times to be taken in your wallet. · Do not take other medications without consulting your doctor. Pain Management: per above medications    What to do at [address]:  Cardiac Diet    Recommended activity: Activity as tolerated    If you have questions regarding the hospital related prescriptions or hospital related issues please call [address] [place] [address] 190 at . You can always direct your questions to your primary care doctor if you are unable to reach your hospital physician; your PCP works as an extension of your hospital doctor just like your hospital doctor is an extension of your PCP for your time at the hospital [facility], [facility]). If you experience any of the following symptoms then please call your primary care physician or return to the emergency room if you cannot get hold of your doctor:  Fever, chills, nausea, vomiting, diarrhea, change in mentation, falling, bleeding, shortness of breath  Patient Education        Orthostatic Hypotension: Care Instructions  Your Care Instructions     Orthostatic hypotension is a quick drop in blood pressure. It happens when you get up from sitting or lying down. You may feel faint, lightheaded, or dizzy.   When a person sits up or stands up, the body changes the way it pumps blood. This can slow the flow of blood to the brain for a very short time. And that can make you feel lightheaded. Many medicines can cause this problem, especially in older people. Lack of fluids (dehydration) or illnesses such as diabetes or heart disease also can cause it. Follow-up care is a key part of your treatment and safety. Be sure to make and go to all appointments, and call your doctor if you are having problems. It's also a good idea to know your test results and keep a list of the medicines you take. How can you care for yourself at home? · Be safe with medicines. Call your doctor if you think you are having a problem with your medicine. You will get more details on the specific medicines your doctor prescribes. · If you feel dizzy or lightheaded, sit down or lie down for a few minutes. Or you can sit down and put your head between your knees. This will help your blood pressure go back to normal and help your symptoms go away. · Follow your doctor's suggestions for ways to prevent symptoms like dizziness. These suggestions may include:  ? Get up slowly from bed or after sitting for a long time. If you are in bed, roll to your side and swing your legs over the edge of the bed and onto the floor. Push your body up to a sitting position. Wait for a while before you slowly stand up.  ? Add more salt to your diet, if your doctor recommends it. ? Drink plenty of fluids. Choose water and other clear liquids. If you have kidney, heart, or liver disease and have to limit fluids, talk with your doctor before you increase the amount of fluids you drink. ? Avoid or limit alcohol to 2 drinks a day for men and 1 drink a day for women. Alcohol may interfere with your medicine. In addition, alcohol can make your low blood pressure worse by causing your body to lose water. ? Wear compression stockings to help improve blood flow.   When should you call for help? Call 911 anytime you think you may need emergency care. For example, call if:    · You passed out (lost consciousness). Watch closely for changes in your health, and be sure to contact your doctor if:    · You do not get better as expected. Where can you learn more? Go to http://www.wilkins.com/  Enter V984 in the search box to learn more about \"Orthostatic Hypotension: Care Instructions. \"  Current as of: April 29, 2021               Content Version: 13.0  © 6043-4455 [employer]. Care instructions adapted under license by [employer] (which disclaims liability or warranty for this information). If you have questions about a medical condition or this instruction, always ask your healthcare professional. [address] any warranty or liability for your use of this information.

## 2021-12-26 NOTE — PROGRESS NOTES
Hospitalist Progress Note    NAME: Moses Adams   :  1945   MRN:  [id number]       Assessment / Plan:  Syncope likely due to orthostatic hypotension  Supine hypertension  Lactic acidosis  Recent E. Coli bacteremia  Orthostatic vitals are positive, advised the patient to wear contention's stockings,   Continue with Levaquin   -Repeat blood cultures (had been clear upon DC)  Status post IV fluid  -CT of the chest was negative for PE  Lactic acid back to wnl   Systolic blood pressure in the 160s in supine position, restarted low-dose of lisinopril five) patient takes 20 mg of lisinopril, restarted on 5 mg  Daily orthostatic vital signs check  Added Florinef     Acute on chronic anemia  hemoglobin dropped to 7.8 from 11.2, most likely dilutional  Repeat CBC showed hemoglobin 8.1  Iron panel showed iron deficiency. Patient started on p.o. iron     LESLIE on CKD 3, baseline Cr 1.3-1.6, currently 1.54  Non-anion gap metabolic acidosis  Resume lisinopril at lower dose  -Avoid nephrotoxins      Neuroendocrine cancer with mets to retroperitoneal lymph node and bones diagnosed   Carcinoid syndrome with diarrhea from neuroendocrine cancer  -hold oral chemo afinator and Verneice Batty held  due to tooth infection  -got lanreotide infusion   -Continue telotristat etiprate (patient supplied)  Heme-onc input noted, patient needs to discuss treatment option with Dr. Angelina Darby as outpatient  Hospice consulted, patient accepted to hospice.    Patient is clinically stable to go home with hospice.   Persistent orthostatic hypotension, educated patient and family about wearing compression stockings while ambulating     Chronic essential hypertension  Restarted on low-dose lisinopril because of the blood pressure trending up, reassess BP  Patient needs daily BP checks at home     Osteonecrosis of the maxilla due todenusumab  Recurrent upper teeth infection in 2020, likely related to osteonecrosis  -follow up with OMFS     Chronic right shoulder pain  -continue neurontin 100mg tid.  Has oxycodone prn       18.5 - 24.9 Normal weight / Body mass index is 21.7 kg/m². Estimated discharge date: December 24  Barriers: Discharge delayed by [facility] for the last 2 days    Code status: DNR  Prophylaxis: Lovenox  Recommended Disposition: Home w/Family     Subjective:     Chief Complaint / Reason for Physician Visit  Follow-up orthostatic hypotension. Patient has been stable since 12/24 for discharge home with hospice discharge delayed because of pickup time being scheduled late at night   Patient still feels dizzy when sitting up. discussed with RN events overnight. Review of Systems:  Symptom Y/N Comments  Symptom Y/N Comments   Fever/Chills    Chest Pain     Poor Appetite    Edema     Cough    Abdominal Pain     Sputum    Joint Pain     SOB/VERDE    Pruritis/Rash     Nausea/vomit    Tolerating PT/OT     Diarrhea    Tolerating Diet     Constipation    Other       Could NOT obtain due to:      Objective:     VITALS:   Last 24hrs VS reviewed since prior progress note. Most recent are:  Patient Vitals for the past 24 hrs:   Temp Pulse Resp BP SpO2   12/26/21 1126 97.7 °F (36.5 °C) 65 18 (!) 141/64 94 %   12/26/21 0822 98 °F (36.7 °C) 77 18 (!) 143/85 94 %   12/26/21 0305 98 °F (36.7 °C) 68 17 (!) 160/82 98 %   12/25/21 2344 98.2 °F (36.8 °C) 66 17 (!) 169/82 98 %   12/25/21 1927 98.4 °F (36.9 °C) 72 18 (!) 151/82 96 %   12/25/21 1513 98.6 °F (37 °C) 66 16 (!) 166/90 95 %     No intake or output data in the 24 hours ending 12/26/21 1408     I had a face to face encounter and independently examined this patient on 12/26/2021, as outlined below:  PHYSICAL EXAM:  General: WD, WN. Alert, cooperative, no acute distress    EENT:  EOMI. Anicteric sclerae. MMM  Resp:  CTA bilaterally, no wheezing or rales. No accessory muscle use  CV:  Regular  rhythm,  No edema  GI:  Soft, Non distended, Non tender.   +Bowel sounds  Neurologic:  Alert and oriented X 3, normal speech,   Psych:   Good insight. Not anxious nor agitated  Skin:  No rashes. No jaundice    Reviewed most current lab test results and cultures  YES  Reviewed most current radiology test results   YES  Review and summation of old records today    NO  Reviewed patient's current orders and MAR    YES  PMH/SH reviewed - no change compared to H&P  ________________________________________________________________________  Care Plan discussed with:    Comments   Patient x    Family  x  wife at bedside   RN x    Care Manager     Consultant                        Multidiciplinary team rounds were held today with , nursing, pharmacist and clinical coordinator. Patient's plan of care was discussed; medications were reviewed and discharge planning was addressed. ________________________________________________________________________  Total NON critical care TIME: 35 Minutes    Total CRITICAL CARE TIME Spent:   Minutes non procedure based      Comments   >50% of visit spent in counseling and coordination of care     ________________________________________________________________________  Viry Cruz MD     Procedures: see electronic medical records for all procedures/Xrays and details which were not copied into this note but were reviewed prior to creation of Plan. LABS:  I reviewed today's most current labs and imaging studies. Pertinent labs include:  Recent Labs     12/25/21  0942   WBC 9.6   HGB 8.1*   HCT 25.6*        No results for input(s): NA, K, CL, CO2, GLU, BUN, CREA, CA, MG, PHOS, ALB, TBIL, TBILI, ALT, INR, INREXT in the last 72 hours.     No lab exists for component: SGOT    Signed: Viry Cruz MD

## 2021-12-26 NOTE — PROGRESS NOTES
Hospitalist Progress Note    NAME: Ranjit Serrato   :  1945   MRN:  [id number]       Assessment / Plan:  Syncope likely due to orthostatic hypotension  Supine hypertension  Lactic acidosis  Recent E. Coli bacteremia  Orthostatic vitals are positive, advised the patient to wear contention's stockings,   Continue with Levaquin   -Repeat blood cultures (had been clear upon DC)  Status post IV fluid  -CT of the chest was negative for PE  Lactic acid back to wnl   Systolic blood pressure in the 160s in supine position, restarted low-dose of lisinopril five) patient takes 20 mg of lisinopril, restarted on 5 mg  Daily orthostatic vital signs check  Added Florinef     Acute on chronic anemia  hemoglobin dropped to 7.8 from 11.2, most likely dilutional  Repeat CBC showed hemoglobin 8.1  Iron panel showed iron deficiency. Patient started on p.o. 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 Giuseppe Thompson as outpatient  Hospice consulted, patient accepted to hospice.    Patient is clinically stable to go home with hospice.   Persistent orthostatic hypotension, educated patient and family about wearing compression stockings while ambulating     Chronic essential hypertension  Restarted on low-dose lisinopril because of the blood pressure trending up, reassess BP  Patient needs daily BP checks at home     Osteonecrosis of the maxilla due todenusumab  Recurrent upper teeth infection in 2020, likely related to osteonecrosis  -follow up with OMFS     Chronic right shoulder pain  -continue neurontin 100mg tid.  Has oxycodone prn       18.5 - 24.9 Normal weight / Body mass index is 21.7 kg/m². Estimated discharge date: December 25  Barriers: Discharge delayed by [facility] for the last 2 days    Code status: DNR  Prophylaxis: Lovenox  Recommended Disposition: Home w/Family     Subjective:     Chief Complaint / Reason for Physician Visit  Follow-up orthostatic hypotension. DC cancelled   Review of Systems:  Symptom Y/N Comments  Symptom Y/N Comments   Fever/Chills    Chest Pain     Poor Appetite    Edema     Cough    Abdominal Pain     Sputum    Joint Pain     SOB/VERDE    Pruritis/Rash     Nausea/vomit    Tolerating PT/OT     Diarrhea    Tolerating Diet     Constipation    Other       Could NOT obtain due to:      Objective:     VITALS:   Last 24hrs VS reviewed since prior progress note. Most recent are:  Patient Vitals for the past 24 hrs:   Temp Pulse Resp BP SpO2   12/26/21 1126 97.7 °F (36.5 °C) 65 18 (!) 141/64 94 %   12/26/21 0822 98 °F (36.7 °C) 77 18 (!) 143/85 94 %   12/26/21 0305 98 °F (36.7 °C) 68 17 (!) 160/82 98 %   12/25/21 2344 98.2 °F (36.8 °C) 66 17 (!) 169/82 98 %   12/25/21 1927 98.4 °F (36.9 °C) 72 18 (!) 151/82 96 %       Intake/Output Summary (Last 24 hours) at 12/26/2021 1515  Last data filed at 12/26/2021 1505  Gross per 24 hour   Intake    Output 500 ml   Net -500 ml        I had a face to face encounter and independently examined this patient on 12/26/2021, as outlined below:  PHYSICAL EXAM:  General: WD, WN. Alert, cooperative, no acute distress    EENT:  EOMI. Anicteric sclerae. MMM  Resp:  CTA bilaterally, no wheezing or rales. No accessory muscle use  CV:  Regular  rhythm,  No edema  GI:  Soft, Non distended, Non tender. +Bowel sounds  Neurologic:  Alert and oriented X 3, normal speech,   Psych:   Good insight. Not anxious nor agitated  Skin:  No rashes.   No jaundice    Reviewed most current lab test results and cultures  YES  Reviewed most current radiology test results   YES  Review and summation of old records today    NO  Reviewed patient's current orders and MAR    YES  PMH/SH reviewed - no change compared to H&P  ________________________________________________________________________  Care Plan discussed with:    Comments   Patient x    Family  x  wife at bedside   RN x    Care Manager     Consultant                        Multidiciplinary team rounds were held today with , nursing, pharmacist and clinical coordinator. Patient's plan of care was discussed; medications were reviewed and discharge planning was addressed. ________________________________________________________________________  Total NON critical care TIME: 35 Minutes    Total CRITICAL CARE TIME Spent:   Minutes non procedure based      Comments   >50% of visit spent in counseling and coordination of care     ________________________________________________________________________  Meera Castro MD     Procedures: see electronic medical records for all procedures/Xrays and details which were not copied into this note but were reviewed prior to creation of Plan. LABS:  I reviewed today's most current labs and imaging studies. Pertinent labs include:  Recent Labs     12/25/21  0942   WBC 9.6   HGB 8.1*   HCT 25.6*        No results for input(s): NA, K, CL, CO2, GLU, BUN, CREA, CA, MG, PHOS, ALB, TBIL, TBILI, ALT, INR, INREXT, INREXT in the last 72 hours.     No lab exists for component: SGOT    Signed: Meera Castro MD

## 2021-12-26 NOTE — PROGRESS NOTES
Transition of Care Plan:     RUR: 18% \"mod risk\"  [address] [place] [address] [employer]  8:17 AM: CM talked to RN with At [address]: Jesenia Osullivan and they are planning to be out there at 10:30 AM.  RN please call Jesenia Osullivan to confirm when Pt is leaving the hospital, her direct line is [phone].    11 AM: [facility] delayed the trip. Now saying that they will be here around 3 PM.  At [facility] is aware. 1:30 PM [facility] called and stated that trip is delayed further and new time was 8 PM  Hospice RN unable to go that late tonight. CM rescheduled [facility] transport for 12/27/21 at 9 AM.    -CM called Hospice RN and family to notify her of the change in DC time d/t [facility] delay. Follow up appointments: Hospice care  DME needed: None    Transportation at [address] @ 9 AM . 12/27/21  Keys or means to access home:  Spouse will have access       IM Medicare Letter: Reviewed & signed on 12/24  Medicare pt has received, reviewed, and signed 2nd IM letter informing them of their right to appeal the discharge. Signed copy has been placed on pt bedside chart. Is patient a BCPI-A Bundle:  N/A                    If yes, was Bundle Letter given?:    Is patient a [place] and connected with the [facility]? N/A  If yes, was [employer] transfer form completed and [facility] notified? Caregiver Contact: Pt's spouse/John, Dallas García [phone]  Discharge Caregiver contacted prior to discharge? Pt called wife and she was on the way to the hospital.. reviewed plan and all are in agreement. No further CM needs. Care Management Interventions  PCP Verified by CM: Yes  Palliative Care Criteria Met (RRAT>21 & CHF Dx)?: No  Mode of Transport at Discharge:  Other (see comment) (Wife)  Hospital Transport Time of Discharge: 1400  Transition of Care Consult (CM Consult): Jj: No  Reason Outside [place]: Managed care specific requirement  Discharge Durable Medical Equipment: No  Physical Therapy Consult: Yes  Occupational Therapy Consult: Yes  Speech Therapy Consult: No  Support Systems: Spouse/Significant Other  Confirm Follow Up Transport: Family  The Plan for Transition of Care is Related to the Following Treatment Goals : Home with At [facility]  The Patient and/or Patient Representative was Provided with a Choice of Provider and Agrees with the Discharge Plan?: Yes  Name of the Patient Representative Who was Provided with a Choice of Provider and Agrees with the Discharge Plan: Wife- Jinger Call  [place] of Choice List was Provided with Basic Dialogue that Supports the Patient's Individualized Plan of Care/Goals, Treatment Preferences and Shares the Quality Data Associated with the Providers?: Yes  Discharge Location  Discharge Placement: Home with hospice    Ever Garsia, [address] [facility]

## 2021-12-26 NOTE — PROGRESS NOTES
Problem: Falls - Risk of  Goal: *Absence of Falls  Description: Document Frederick [place] Fall Risk and appropriate interventions in the flowsheet.   Outcome: Progressing Towards Goal  Note: Fall Risk Interventions:  Mobility Interventions: Utilize walker, cane, or other assistive device         Medication Interventions: Patient to call before getting OOB,Teach patient to arise slowly    Elimination Interventions: Call light in reach,Patient to call for help with toileting needs,Urinal in reach    History of Falls Interventions: Room close to nurse's station,Utilize gait belt for transfer/ambulation         Problem: Patient Education: Go to Patient Education Activity  Goal: Patient/Family Education  Outcome: Progressing Towards Goal     Problem: Hypotension  Goal: *Blood pressure within specified parameters  Outcome: Progressing Towards Goal  Goal: *Fluid volume balance  Outcome: Progressing Towards Goal  Goal: *Labs within defined limits  Outcome: Progressing Towards Goal     Problem: Patient Education: Go to Patient Education Activity  Goal: Patient/Family Education  Outcome: Progressing Towards Goal

## 2021-12-26 NOTE — PROGRESS NOTES
Occupational Therapy:    Duplicate orders received. Pt was previously evaluated by OT on 12/23/2021 and is currently on caseload. Per chart, Pt is planning to now D/C home with hospice and [facility] has been delayed several times. OT will continue to follow and Tx according to frequency established in OT POC. The evaluating OT recommended the following DME for D/C: bedside commode, walker: rolling, wheelchair.      Thank you,    Milagro Bennett, OTR/L

## 2021-12-26 NOTE — PROGRESS NOTES
End of Shift Note    Bedside shift change report given to Darryle Hooker, RN (oncoming nurse) by Kiera Han LPN (offgoing nurse). Report included the following information SBAR, Kardex, Intake/Output, MAR and Recent Results    Shift worked:  7p-7:30a     Shift summary and any significant changes:          Concerns for physician to address:       Zone phone for oncoming shift:   [phone]       Activity:  Activity Level: Up with Assistance  Number times ambulated in hallways past shift: 0  Number of times OOB to chair past shift: 0    Cardiac:   Cardiac Monitoring: Yes      Cardiac Rhythm: Sinus Rhythm,Sinus Rufus Hemalatha    Access:   Current line(s): PIV     Genitourinary:   Urinary status: ramirez    Respiratory:   O2 Device: None (Room air)  Chronic home O2 use?: NO  Incentive spirometer at bedside: YES     GI:  Last Bowel Movement Date: 12/22/21  Current diet:  ADULT DIET Regular  Passing flatus: YES  Tolerating current diet: YES       Pain Management:   Patient states pain is manageable on current regimen: YES    Skin:  Pravin Score: 20  Interventions: increase time out of bed, internal/external urinary devices and nutritional support     Patient Safety:  Fall Score:  Total Score: 4  Interventions: assistive device (walker, cane, etc), gripper socks, pt to call before getting OOB and stay with me (per policy)  High Fall Risk: Yes    Length of Stay:  Expected LOS: - - -  Actual LOS: 1      Kiera Han LPN

## 2021-12-26 NOTE — PROGRESS NOTES
End of Shift Note    Bedside shift change report given to PARAMJIT Devine(oncoming nurse) by Winston Barber (offgoing nurse). Report included the following information SBAR, Kardex, Intake/Output, MAR and Recent Results    Shift worked:  Day     Shift summary and any significant changes:     Patient could not be discharged because of transport. BM today. Still has scattered dizziness. Concerns for physician to address:       Zone phone for oncoming shift:   [phone]       Activity:  Activity Level: Up with Assistance  Number times ambulated in hallways past shift: 0  Number of times OOB to chair past shift: 0    Cardiac:   Cardiac Monitoring: Yes      Cardiac Rhythm: Sinus Rhythm    Access:   Current line(s): PIV     Genitourinary:   Urinary status: ramirez    Respiratory:   O2 Device: None (Room air)  Chronic home O2 use?: NO  Incentive spirometer at bedside: YES     GI:  Last Bowel Movement Date: 12/26/21  Current diet:  ADULT DIET Regular  Passing flatus: YES  Tolerating current diet: YES       Pain Management:   Patient states pain is manageable on current regimen: YES    Skin:  Pravin Score: 20  Interventions: increase time out of bed, internal/external urinary devices and nutritional support     Patient Safety:  Fall Score:  Total Score: 4  Interventions: assistive device (walker, cane, etc), gripper socks, pt to call before getting OOB and stay with me (per policy)  High Fall Risk: Yes    Length of Stay:  Expected LOS: - - -  Actual LOS: [address]

## 2021-12-27 NOTE — TELEPHONE ENCOUNTER
[address], [address] [facility], [address]  [address] [facility] [facility] [address], [address] [facility]  [address] [facility]  1945    I spoke with Deya the hospice RN and beacon plan has been discontinued. He will be transported home with hospice this afternoon.     Signed By: Pamela Simon NP     December 27, 2021

## 2021-12-27 NOTE — DISCHARGE SUMMARY
Hospitalist Discharge Summary     Patient ID:  Lito Gandhi  [id number]  60 y.o.  1945  12/22/2021    PCP on record: Evin Godfrey MD    Admit date: 12/22/2021  Discharge date and time: 12/27/2021    DISCHARGE DIAGNOSIS:  Syncope likely due to orthostatic hypotension  Supine hypertension  Lactic acidosis  Recent E. Coli bacteremia  Acute on chronic anemia  LESLIE on CKD 3, baseline Cr 1.3-1.6, currently 1.54  Non-anion gap metabolic acidosis  Neuroendocrine cancer with mets to retroperitoneal lymph node and bones diagnosed 2016  Carcinoid syndrome with diarrhea    Chronic essential hypertension  Osteonecrosis of the maxilla due todenusumab  Recurrent upper teeth infection in September 2020,   Chronic right shoulder pain    CONSULTATIONS:  IP CONSULT TO HEMATOLOGY    Excerpted HPI from H&P of Dr Osbaldo Andujar :  Arnaldo Bland:  Dizziness, syncope     HISTORY OF PRESENT ILLNESS:     Gregory Naqvi is a 77 y.o. male with past medical history significant for neuroendocrine tumor carcinoid, CKD, osteonecrosis, hypertension who presents with syncope. Patient's wife at bedside during the history and physical as well. Patient was recently admitted for sepsis with E. coli bacteremia secondary to urinary tract infection. He was discharged on 12/21 and had been feeling well. This morning, he got up to use the bathroom when he felt dizzy and lightheaded. He admitted to the toilet to sit down. While he was bearing down, patient got even more lightheaded when his wife noticed him slumping over. Patient was eased to the ground did not hit his head. He did have some clear vomitus and was passed out for about 30 seconds. Patient's wife called the ambulance. On arrival to the ED, patient was hypotensive. At the time my exam, patient currently feeling improved and has complaints of his chronic back pain.     Patient and wife are interested in discussing what services would be available to them at home.   They were also interested in potentially hospice care. They are also wondering about patient's chemo medication.     We were asked to admit for work up and evaluation of the above problems. ______________________________________________________________________  DISCHARGE SUMMARY/HOSPITAL COURSE:  for full details see H&P, daily progress notes, labs, consult notes. Syncope likely due to orthostatic hypotension  Supine hypertension  Lactic acidosis  Recent E. Coli bacteremia  Orthostatic vitals are positive, advised the patient to wear contention's stockings,   Continue with Levaquin   -Repeat blood cultures (had been clear upon DC)  Status post IV fluid  -CT of the chest was negative for PE  Lactic acid back to wnl   Systolic blood pressure in the 160s in supine position, restarted low-dose of lisinopril five) patient takes 20 mg of lisinopril, restarted on 5 mg  Daily orthostatic vital signs check  Dc florinef as bp trending up      Acute on chronic anemia  hemoglobin dropped to 7.8 from 11.2, most likely dilutional  Repeat CBC showed hemoglobin 8.1  Iron panel showed iron deficiency. Patient started on p.o. 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 Barbara Richards as outpatient  Hospice consulted, patient accepted to hospice. Patient is clinically stable to go home with hospice.   Persistent orthostatic hypotension, educated patient and family about wearing compression stockings while ambulating    Chronic essential hypertension  Restarted on low-dose lisinopril because of the blood pressure trending up, reassess BP  Patient needs daily BP checks at home     Osteonecrosis of the maxilla due todenusumab  Recurrent upper teeth infection in September 2020, likely related to osteonecrosis  -follow up with [facility]     Chronic right shoulder pain  -continue neurontin 100mg tid.  Has oxycodone prn           _______________________________________________________________________  Patient seen and examined by me on discharge day. Pertinent Findings:  Gen:    Not in distress  Chest: Clear lungs  CVS:   Regular rhythm. No edema  Abd:  Soft, not distended, not tender  Neuro:  Alert, oriented x3  _______________________________________________________________________  DISCHARGE MEDICATIONS:   Current Discharge Medication List      START taking these medications    Details   meclizine (ANTIVERT) 12.5 mg tablet Take 1 Tablet by mouth every eight (8) hours as needed for Dizziness for up to 10 days. Qty: 30 Tablet, Refills: 0  Start date: 12/25/2021, End date: 1/4/2022      ferrous sulfate (IRON) 325 mg (65 mg iron) EC tablet Take 1 Tablet by mouth daily for 30 days. Qty: 30 Tablet, Refills: 0  Start date: 12/25/2021, End date: 1/24/2022         CONTINUE these medications which have CHANGED    Details   lisinopriL (PRINIVIL, ZESTRIL) 5 mg tablet Take 1 Tablet by mouth daily for 30 days. Qty: 30 Tablet, Refills: 0  Start date: 12/26/2021, End date: 1/25/2022      levoFLOXacin (Levaquin) 750 mg tablet Take 1 Tablet by mouth every fourty-eight (48) hours for 5 doses. Qty: 5 Tablet, Refills: 0  Start date: 12/25/2021, End date: 1/3/2022         CONTINUE these medications which have NOT CHANGED    Details   telotristat etiprate 250 mg tab Take 250 mg by mouth three (3) times daily (with meals) for 30 days. Qty: 90 Tablet, Refills: 0      everolimus (Afinitor) 10 mg tab Take 1 Tablet by mouth daily.   Qty: 30 Tablet, Refills: 6    Associated Diagnoses: Neuroendocrine carcinoma metastatic to bone (HCC)      gabapentin (NEURONTIN) 100 mg capsule Take 1 Capsule by mouth three (3) times daily. Max Daily Amount: 300 mg. Qty: 90 Capsule, Refills: 0  Start date: 12/23/2021    Associated Diagnoses: Cervical radiculopathy               Patient Follow Up Instructions: Activity: Activity as tolerated  Diet: Regular Diet  Wound Care: None needed      Follow-up Information     Follow up With Specialties Details Why Kavin  This is your hospice provider. Please contact them directly with questions!  [address] [phone]    Mikal Carrasquillo MD Hematology and Oncology, Internal Medicine, Hematology, Oncology   [address] [address] [address]  [address] [phone]      [facility], [address] [place] [address]  [phone]          ________________________________________________________________    Risk of deterioration: High    Condition at Discharge:  Stable  __________________________________________________________________    Disposition  Home with hospice    ____________________________________________________________________    Code Status: DNR/DNI  ___________________________________________________________________      Total time in minutes spent coordinating this discharge (includes going over instructions, follow-up, prescriptions, and preparing report for sign off to her PCP) :  >30 minutes    Signed:  Gwendolyn Jimenez MD

## 2021-12-27 NOTE — TELEPHONE ENCOUNTER
Female called concerning patient (did not say who she was - maybe wife) who was released from hospital today. She would like a return call. [phone].

## 2021-12-27 NOTE — PROGRESS NOTES
Received notification from bedside RN about patient with regards to: neck pain unrelieved by PRN Roxicodone rated at 7/10, requesting medication to help  VS: /87, HR 64, RR 15, O2 sat 98% on RA    Intervention given: Dilaudid 0.5 mg IV x 1 dose ordered    0834: Notified of 8/10 pain despite receiving Roxicodone at 0548, requesting additional medication for relief  VS: /86, HR 69, RR 16, O2 sat 98% on RA    - Tramadol 50 mg PO x 1 dose ordered

## 2021-12-27 NOTE — HOSPICE
[facility] RN note:  Received desperate call form pt's wife Belkis Gallegos stating that [facility] is unable to admit pt today. Pt discharged this morning via [facility] with was delayed more than 48 hrs. [id number] able to provide emergency admission this afternoon. Discussed pt with Joel Portillo NP for Dr Denisha Funez noting no further treatment to be provided for metastatic Neuroendocrine CA. DME ordered for stat delivery this afternoon. Wife relieved with responsiveness and support. Thank you for the opportunity to care for this pt and family. Please contact hospice at [phone] with any questions or concerns.

## 2021-12-27 NOTE — ROUTINE PROCESS
Discharge AVS reviewed with pt.   Pt is going home with home hospice,  already informed hospice about that, pt is being transported by medical transport [facility]

## 2021-12-27 NOTE — ADT AUTH CERT NOTES
Comments  Comment            Patient Demographics    Patient Name   Shanna Zarate   [id number] Legal Sex   Male    1945 Address   [address] [place] [address] Phone   [phone] (Home)   [phone] (Mobile) *Preferred*     All ADT Orders (ADT stands for Admission Discharge Transfer)  Collapse  Hide  (From admission, onward)        21 0815  INITIAL PHYSICIAN ORDER: INPATIENT Telemetry; Yes; 3. Patient receiving treatment that can only be provided in an inpatient setting (further clarification in H&P documentation) Forrestine Ada)  ONE TIME        Authorizing Provider: Misael Blanchard MD    User who entered the order: Misael Blanchard MD       References:    CLICK HERE-** FAQ-NEW CMS RULES FOR INPATIENT CERTIFICATION **   Question Answer Comment   Status: INPATIENT    Type of Bed Telemetry    Cardiac Monitoring Required? Yes    Inpatient Hospitalization Certified Necessary for the Following Reasons 3. Patient receiving treatment that can only be provided in an inpatient setting (further clarification in H&P documentation)    Admitting Diagnosis Syncope and collapse    Admitting Physician Lynn Culp    Attending Physician Lynn Culp    Estimated Length of Stay 3-4 Midnights    Discharge Plan: Other (Specify)        21 0802   21 1315  INITIAL PHYSICIAN ORDER: OBSERVATION/OUTPATIENT IN A BED OBSERVATION; Telemetry; Yes; syncope  (ADMISSION ORDERS)  ONE TIME        Authorizing Provider: Corazon Sullivan MD    User who entered the order: Corazon Sullivan MD       Question Answer Comment   Patient Class: OBSERVATION    Type of Bed Telemetry    Cardiac Monitoring Required?  Yes    Reason for Observation syncope    Admitting Diagnosis Syncope    Admitting Physician Farrah Snider    Attending Physician ASTRID Vega 1724, Armin Kilpatrick        21 1309

## 2021-12-27 NOTE — PROGRESS NOTES
Ready from CM standpoint. Transition of Care Plan:     RUR: 16% \"mod risk\"  [address]  Follow up appointments: Hospice care  DME needed: None  Transportation at [address] at 9:00 AM  [employer] or means to access home:  Spouse will have access     IM Medicare Letter: Reviewed & signed 12/27  Is patient a BCPI-A Bundle:  N/A                    If yes, was Bundle Letter given?:    Is patient a [place] and connected with the [facility]? N/A  If yes, was [employer] transfer form completed and VA notified? Caregiver Contact: Pt's spouse/mPOA, Tigist Edward) [phone]  Discharge Caregiver contacted prior to discharge? Yes    CM acknowledged discharge. CM reviewed pt's chart. Pt discharging home with [facility] today. Called hospice RN Kaci Churchill, [phone]) to report transport is at bedside. All notified agency via [employer]. CM contacted pt's wife; reports no questions or concerns. Met with pt at bedside to review discharge home; pt verbalized understanding & is agreeable. Reviewed IMM letter with pt. Medicare pt has received, reviewed, and signed 2nd IM letter informing them of their right to appeal the discharge. Signed copy has been placed on pt bedside chart. SMAART tool completed & provided pt with copy. [facility] at bedside to transport pt home. No further needs. Care Management Interventions  PCP Verified by CM: Yes  Palliative Care Criteria Met (RRAT>21 & CHF Dx)?: No  Mode of Transport at Discharge:  Other (see comment) (Wife)  Hospital Transport Time of Discharge: 1400  Transition of Care Consult (CM Consult): Jj: No  Reason Outside [place]: Managed care specific requirement  Discharge Durable Medical Equipment: No  Physical Therapy Consult: Yes  Occupational Therapy Consult: Yes  Speech Therapy Consult: No  Support Systems: Spouse/Significant Other,Child(leandro)  Confirm Follow Up Transport: Family  The Plan for Transition of Care is Related to the Following Treatment Goals : Home with [facility]  The Patient and/or Patient Representative was Provided with a Choice of Provider and Agrees with the Discharge Plan?: Yes  Name of the Patient Representative Who was Provided with a Choice of Provider and Agrees with the Discharge Plan: Tran Juarez  Freedom of Choice List was Provided with Basic Dialogue that Supports the Patient's Individualized Plan of Care/Goals, Treatment Preferences and Shares the Quality Data Associated with the Providers?: Yes  Discharge Location  Discharge Placement: Home with hospice    Lorice Rinne, MSW  Care Management

## 2021-12-27 NOTE — TELEPHONE ENCOUNTER
[address] [place]  [phone]    12/27/2021--This user called and spoke with patient's Wife [facility] (She is on patient's PHI form) and she stated that patient was just discharged from the Hospital today and had to be transported home, as she stated that she could not herself due to Orthostatic Hypotension and even Positional Hypotension. [facility] stated that they had discussed Hospice and thought that's what patient was going to be doing, but then they were told that patient cannot do Hospice if he is still getting treatment. She stated that his last infusion was 12/09 and next one is scheduled for 01/06/22. She stated that she just wanted the Doctor's opinion on this. [facility] also asked if I could speak with the Hospice nurse, that she will be in the home today at 3:00 pm. I let her know that was fine and gave her the number to this office and told them to ask for me. I let them know that I would also let the [address] [place] [address] office know the above information.

## 2021-12-27 NOTE — PROGRESS NOTES
End of Shift Note    Bedside shift change report given to Emanuel Powers RN (oncoming nurse) by Aicha Isabel LPN (offgoing nurse). Report included the following information SBAR, Kardex, Intake/Output, MAR and Recent Results    Shift worked:  7p-7:30a     Shift summary and any significant changes:          Concerns for physician to address:       Zone phone for oncoming shift:   [phone]       Activity:  Activity Level: Up with Assistance  Number times ambulated in hallways past shift: 0  Number of times OOB to chair past shift: 0    Cardiac:   Cardiac Monitoring: Yes      Cardiac Rhythm: Sinus Rhythm    Access:   Current line(s): PIV     Genitourinary:   Urinary status: ramirez    Respiratory:   O2 Device: None (Room air)  Chronic home O2 use?: NO  Incentive spirometer at bedside: YES     GI:  Last Bowel Movement Date: 12/26/21  Current diet:  ADULT DIET Regular  Passing flatus: YES  Tolerating current diet: YES       Pain Management:   Patient states pain is manageable on current regimen: YES    Skin:  Pravin Score: 20  Interventions: increase time out of bed and nutritional support     Patient Safety:  Fall Score:  Total Score: 4  Interventions: assistive device (walker, cane, etc), gripper socks, pt to call before getting OOB and stay with me (per policy)  High Fall Risk: Yes    Length of Stay:  Expected LOS: - - -  Actual LOS: 2      Aicha Isabel LPN

## 2021-12-28 NOTE — HOSPICE
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 Coli bacteremia   Acute on chronic anemia   LESLIE on CKD 3, baseline Cr 1.3-1.6, currently 1.54   Non-anion gap metabolic acidosis   Carcinoid syndrome with diarrhea   Osteonecrosis of the maxilla due todenusumab   Recurrent upper teeth infection in 2020,   Chronic right shoulder pain     Other Diagnoses:   Chronic essential hypertension     Date of Hospice Admission: 2021    Hospice Attending Elected by Patient: Nela Tian  Primary Care Physician: n/a    Admitting RN: Luis Alberto Deal CM: Corey Alegre  : Ericka Mccollum:    Digna Preciado DNR:  Yes      Service: Yes, [place]         Home:did not discuss    Hospice Summary:pt a&o x 4.  lying in recliner. just released from [address] this am.  was supposed to be admitted to [facility] but was not because pt has [place] and was srill receiving treatment. [address] was called to admit. pt no longer able to receive tx as he is unable to get OOB and ambulate. Dr Mady Thakkar discontinued Afinitor as well. pt reports appetite is good. breath sounds clear. bowel sounds active. last BM . Gee patent and intact, using leg bag. pt becomes dizzy and light headed when he attempts to stand. Deya ordered bed to be delivered this afternoon. pt denies pain at this time but it reports it feels like he might have pain soon. he has oxycodone 5 mg that he has never used.   wife tearful during admission    ER Visits/ Hospitalizations in past year: 4+    Onset Date of Hospice Diagnosis:   Summary of Disease Progression Leading to Hospice Diagnosis:  from most recent hospital Artur Perez is a 77 y.o. male with past medical history significant for neuroendocrine tumor carcinoid, CKD, osteonecrosis, hypertension who presents with syncope. Patient's wife at bedside during the history and physical as well. Patient was recently admitted for sepsis with E. coli bacteremia secondary to urinary tract infection. He was discharged on 12/21 and had been feeling well. This morning, he got up to use the bathroom when he felt dizzy and lightheaded. He admitted to the toilet to sit down. While he was bearing down, patient got even more lightheaded when his wife noticed him slumping over. Patient was eased to the ground did not hit his head. He did have some clear vomitus and was passed out for about 30 seconds. Patient's wife called the ambulance. On arrival to the ED, patient was hypotensive. At the time my exam, patient currently feeling improved and has complaints of his chronic back pain. Patient and wife are interested in discussing what services would be available to them at home. They were also interested in potentially hospice care. They are also wondering about patient's chemo medication. Use LCD Guidelines and list features:   ___X_____  A. Disease with distant metastases at presentation OR    ___X_____  B. Progression from an earlier stage of disease to metastatic disease with either:                          ___X_____  1. continued decline in spite of therapy                          ________  2. patient declines further disease directed therapy        SPIRITUAL/Social/Emotional: Joey Mccain______________ contacted, agrees to serve as attending provider for hospice and provided verbal certification of terminal illness with prognosis of 6 months or less life xpectancy. Orders for hospice admission, medications and plan of treatment received. Medication reconciliation completed. Currently this patient has:   Gee        MEDS:  I have reviewed the patient's medication list with MD and identified the following:  Nonformulary medications: none  Unrelated medications: none    IDT communication to include MD, SN, SW, [address] [place] [address] and support team.

## 2021-12-29 NOTE — HOSPICE
Patient rec'd in bed. Wife present. Alert and oriented x 4  Patient with chronic lower back pain. He typically takes oxycodone at bedtime. Pain at visit is 5/10, but defers pain medications at this time. Pain goal is 2  His appetite is good. No bowel movement in 2 days. Digital rectal exam completed and no stool was felt  Patient is currently bedboud with the exception of BSC. PT consult placed for education for wife with transfer. Gee catheter draining yellow urine  All medications reviewed with wife and patient. No refills needed at this time.     catheter cleansing cloths, catheter bag and secure devices ordered from [employer]

## 2021-12-29 NOTE — HOSPICE
Visit made for pt feeling fuzzy when on [facility]. When writer arrived, pt lying in hospital bed. A&O x 4. Denies pain, dyspnea, dizziness. Reports he tried to have a BM on [facility] and started to feel dizzy. No BM. Usually pt has a a BM every other day and he had one 12/26. Refused supp and rectal check. Senna s started nightly. Wife is very nervous,  high risk to call 911. Will need SN visit tomorrow to review meds again and assure her she's doing a good job.   reminded to call hospice for any needs

## 2021-12-30 NOTE — HOSPICE
is visiting for an initial pastoral visit. Bess Banuelos and Sharon shared about their life; his uprbinging as Jainism, joining the [employer], becoming Episcopalian, and service to others. Bess Banuelos and Sharon shared about their coping, hope their find in their marty, and handling their current hardship.  provided an active listening presence, engagement in life review with pt and family, and prayer.

## 2021-12-30 NOTE — HOSPICE
LCSW visited pt and pts wife Frida Wray) for initial psychosocial assessment. Pt was lethargic and oriented x 4. Pt had some reports of pain but reported pts new pain regime has been helping a lot with this. Pts wife stated it took a lot for pt to agree to pain medication as he had fears of dependence. Pt lives with his wife of 21 years (Anniversary 12/16). Pt reported having 3 children but only 1 daughter lives local. Pt and pts wife report pts daughter helps them when she can but her  and daughter both have recent health concerns and this has been addisonia stress on the family. Pt and pts wife reported good support from the neighbor who is a nurse and pt stated that is who they would hire if they needed additional assistance in the home. Pt and pts wife both report having many friend that they could call for support. Pts wife did state that most of their [facility]) was very elderly so they didn't want to burden them with help. Pts wife and pt have been longtime members and have a very strong marty. Pt welcomes  visits as his  also has medical problems and unable to visit a lot. 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 Pt is also a  and services in the [place] during the [address] Pt stated he started the process of applying for VA benefits but it was extremely frustrating the navigate. Pt stated he was assigned to the [facility] clinic. LCSW stated primary SW would assist family with seeing if pt was entitled to any benefits and navigate that system. Pts wife reported some cg exhaustion as she said she has minimal hands on support. Pts wife stated they could not afford private duty aid when cg agencies were reported. LCSW reviewed Medicaid application process and pt stated they would not financially qualify. LCSW discussed [employer] and pt/pts wife stated they might be interested in this to see if pt would qualify.  LCSW will have primary SW follow up on this process. LCSW discussed volunteer services and pts wife would like this. LCSW submitted volunteer referral to  but told family she was on vacation this week. LCSW reviewed pts insurance and educated family that [facility] does offer additional resources for hospice pts and that [facility] was not in network to get those services. LCSW provided a list of hospices that were in network and provided phone number to call insurance to find out more about the services available if they went with a in network hospice. Pt and pts wife stated they did not think they wanted to switch but would call. LCSW educated on respite services available. Pt and pts wife stated they hoped to not use that service but was thankful it was available. Pt was looking forward to the A coming tomorrow. Pts wife had some questions about supplies and LCSW alerted CM who was visiting after LCSW. Pts and pts wife engaged in some life review. Pt talked about being tired and that this has taken a big toll on him. Pt and pts wife stated they hoped pt would get stronger to get additional treatment but know this is not a option with how weak pt is currently. Pt appears accepting of his prognosis. Both pt and pts wife report their marty is strong and they rely heavily on that. Pts wife talked about the loss of her first  23 yrs ago and how she had a very traumatic experience with that hospice. LCSW provided supportive counseling. Pts wife stated her biggest fear was what to do in a emergency. LCSW provided support and explained process of calling hospice and that a nurse could talk her through what to do over the phone and send a nurse if needed. LCSW also provided non emergency phone number as well. LCSW assured pts wife hospice was there to support her and pt through this journey.  LCSW encouraged pts wife to talk about these concerns with CM as well so she can go over medications to give if there was a emergency to ease her mind. Plan for primary SW to follow up with a visit on Thursday for support and to further discuss resource needs. LCSW will continue to monitor and assess needs.

## 2021-12-31 NOTE — HOSPICE
LMSW arrived at the patient's home to complete a routine visit for [facility]. The LMSW was greeted at the patient's wife Caroline Mcclellan at the side door. The home was clean and clutter free. The patient is a 76 y/o  male with a [facility] Dx. Neuroendocrine cancer. The patient was received [facility] getting dressed with assistance from Caroline Koyuk. The patient was alert and oriented x 4. The patient and [facility] provided information for this visit. Patient reported that he needs physical assistance for transfers from the bed to chair or BSC due to orthostatic hypertension. Patient reported that his appetite is good, and he can sleep through the night. Patient's mood was appropriate and congruent with conversation. Caroline Mcclellan provided information on her first 's experience with hospice approximately 30 years ago, which was a horrible experience. LMSW provided reassurance that this would not be the same experience with this patient. Adela's coping skills appeared to be inadequate currently. LMSW provided emotional support and reassurance that hospice is available 24/7 to provide support and answer any questions or concerns. LMSW provided education on home safety for the patient and the wife. The patient was an  in [facility] for 3.5 years. 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 Patient was an  for 20 years. Patient was  twice. The first marriage was to [facility] [employer] for 24 years that ended in a divorce. They have two daughters Ro Sepulveda who lives in [place] and Rena who lives in [place]. Patient has been  to Caroline Mcclellan for 21 years and Caroline Mcclellan has a daughter Augustina Arriaza from a previous relationship but considers the patient her father. Kushal Leal lives in [place]. The patient has three sisters; Rene Staples who lives in [place], Taryn and Ivory who lives in [place]. Patient belongs to the [facility].  Patient reported that he and his wife have good support from their [place] and friends. LMSW provided community resources, emotional support, and assisted with cremation services.

## 2022-01-01 ENCOUNTER — HOME CARE VISIT (OUTPATIENT)
Dept: SCHEDULING | Facility: HOME HEALTH | Age: 77
End: 2022-01-01
Payer: MEDICARE

## 2022-01-01 ENCOUNTER — HOME CARE VISIT (OUTPATIENT)
Dept: HOSPICE | Facility: HOSPICE | Age: 77
End: 2022-01-01
Payer: MEDICARE

## 2022-01-01 ENCOUNTER — TELEPHONE (OUTPATIENT)
Dept: ONCOLOGY | Age: 77
End: 2022-01-01

## 2022-01-01 ENCOUNTER — APPOINTMENT (OUTPATIENT)
Dept: INFUSION THERAPY | Age: 77
End: 2022-01-01

## 2022-01-01 ENCOUNTER — VIRTUAL VISIT (OUTPATIENT)
Dept: PALLATIVE CARE | Age: 77
End: 2022-01-01
Payer: MEDICARE

## 2022-01-01 ENCOUNTER — TELEPHONE (OUTPATIENT)
Dept: PALLATIVE CARE | Age: 77
End: 2022-01-01

## 2022-01-01 ENCOUNTER — DOCUMENTATION ONLY (OUTPATIENT)
Dept: ONCOLOGY | Age: 77
End: 2022-01-01

## 2022-01-01 VITALS
OXYGEN SATURATION: 96 % | SYSTOLIC BLOOD PRESSURE: 138 MMHG | RESPIRATION RATE: 16 BRPM | TEMPERATURE: 98.7 F | HEART RATE: 60 BPM | DIASTOLIC BLOOD PRESSURE: 84 MMHG

## 2022-01-01 VITALS
TEMPERATURE: 96.9 F | HEART RATE: 76 BPM | RESPIRATION RATE: 14 BRPM | SYSTOLIC BLOOD PRESSURE: 102 MMHG | DIASTOLIC BLOOD PRESSURE: 58 MMHG

## 2022-01-01 VITALS
OXYGEN SATURATION: 97 % | TEMPERATURE: 98.8 F | DIASTOLIC BLOOD PRESSURE: 82 MMHG | SYSTOLIC BLOOD PRESSURE: 132 MMHG | RESPIRATION RATE: 18 BRPM | HEART RATE: 68 BPM

## 2022-01-01 DIAGNOSIS — R53.81 DEBILITY: ICD-10-CM

## 2022-01-01 DIAGNOSIS — Z51.5 END OF LIFE CARE: ICD-10-CM

## 2022-01-01 DIAGNOSIS — C7B.8 NEUROENDOCRINE CARCINOMA METASTATIC TO BONE (HCC): Primary | ICD-10-CM

## 2022-01-01 DIAGNOSIS — C7A.8 NEUROENDOCRINE CARCINOMA METASTATIC TO BONE (HCC): Primary | ICD-10-CM

## 2022-01-01 DIAGNOSIS — M54.12 CERVICAL RADICULOPATHY: ICD-10-CM

## 2022-01-01 DIAGNOSIS — I95.1 ORTHOSTATIC HYPOTENSION: ICD-10-CM

## 2022-01-01 PROCEDURE — 0651 HSPC ROUTINE HOME CARE

## 2022-01-01 PROCEDURE — G8432 DEP SCR NOT DOC, RNG: HCPCS | Performed by: INTERNAL MEDICINE

## 2022-01-01 PROCEDURE — G0299 HHS/HOSPICE OF RN EA 15 MIN: HCPCS

## 2022-01-01 PROCEDURE — G0155 HHCP-SVS OF CSW,EA 15 MIN: HCPCS

## 2022-01-01 PROCEDURE — G0156 HHCP-SVS OF AIDE,EA 15 MIN: HCPCS

## 2022-01-01 PROCEDURE — G9718 HOSPICE ANYTIME MSMT PER: HCPCS | Performed by: INTERNAL MEDICINE

## 2022-01-01 PROCEDURE — G8420 CALC BMI NORM PARAMETERS: HCPCS | Performed by: INTERNAL MEDICINE

## 2022-01-01 PROCEDURE — G9710 PT PROV HOSP SRV MSMT PER: HCPCS | Performed by: INTERNAL MEDICINE

## 2022-01-01 PROCEDURE — HOSPICE MEDICATION HC HH HOSPICE MEDICATION

## 2022-01-01 PROCEDURE — 99215 OFFICE O/P EST HI 40 MIN: CPT | Performed by: INTERNAL MEDICINE

## 2022-01-01 PROCEDURE — G8427 DOCREV CUR MEDS BY ELIG CLIN: HCPCS | Performed by: INTERNAL MEDICINE

## 2022-01-01 PROCEDURE — G8536 NO DOC ELDER MAL SCRN: HCPCS | Performed by: INTERNAL MEDICINE

## 2022-01-01 PROCEDURE — G9691 PT HOSP DUR MSMT PERIOD: HCPCS | Performed by: INTERNAL MEDICINE

## 2022-01-05 PROBLEM — Z51.5 HOSPICE CARE: Status: ACTIVE | Noted: 2022-01-01

## 2022-01-09 NOTE — HOSPICE
Hospice prn visit after patient fall at home. Spouse called triage, patient fell from [facility], non-emergent EMS called to home to help patient out of floor. On arrival, patient in bed, dusky color to face and hands, wife distraught. Related that patient had eaten Ukrainian toast today, taken his meds (gabapentin and oxycodone). Later in morning stated he needed to use BSC, once in place he reported dizziness and fell to floor unresponsive. Quickly resumed consciousness but unable to get up. Patient w/ minimal speech during visit but was able to described what appears to be vagal episode with BM and fainting. Once back in bed patient stated he felt better, reports weakness and no desire to get up , reports feeling very cold. Another blanket placed on patient, VS obtained, /58 and faint, no fever although spouse reports patient was recently in hospice for fever/sepsis/UTI. Spouse terrified that patient would have another similar episode. RN provided information on orthostasis, patient confirms he has struggled with this for some time. RN reassured wife that patient doing better now in bed, recommended to not get up, instructed on bedbound care, provided diapers from [facility]. Per wife's request will order bedpan for delivery, also more diapers and wipes and Chux. Wife understands to call hospice for any changes, questions or needs.

## 2022-01-10 NOTE — HOSPICE
Patient sitting up in bed upon arrival for visit; patient's wife Genevieve Santizo and hospice aide present. Patient alert and oriented X4, however patient's wife reports patient has had intermittent confusion recently. Pain currently well managed with PRN Oxycodone. (Patient has taken X1 dose today.)  Patient denies any dyspnea. Decreased appetite; last bowel movement yesterday. Patient's wife concerned with patient's decreased intake. Discussed signs/symptoms of decline and that this is an expected finding. Message sent to hospice dietician for follow up. Ramirez catheter patent and draining; ramirez care reviewed with patient's wife. Skin intact. Patient received bed bath from hospice aide and assisted with changing patient's bed linens. Patient's wife verbalizes concern and confusion related to discontinuation of cancer treatment and abrupt referral to hospice. Active listening and validation of feelings provided during visit. This RN to send message to [facility] regarding possible joint visit next week with hospice MD or NP. Patient's wife also expresses that she feels overwhelmed with caring for patient and is requesting assistance with finding private duty care to help manage patient care. This RN to have hospice SW follow up with patient's wife. [facility] hospice 24/7 for any concerns or change in patient's condition.

## 2022-01-10 NOTE — PROGRESS NOTES
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 Randy Lopez this morning. Hospice to be discharged and pt/spouse would like to see cardiologist for orthostasis and PT/OT at home. SW called [facility] for revocation. [facility] will call so SW can then place [facility] order for PT/OT.      Barriers to Care:     Plan:    Referral:   Home Health referral

## 2022-01-10 NOTE — TELEPHONE ENCOUNTER
[facility]. THey will call after discharged and I will place order with [facility].

## 2022-01-10 NOTE — TELEPHONE ENCOUNTER
Patient's wife called and is requesting to speak with Dr Carletha Scheuermann. States she has some important decisions to make and needs some .

## 2022-01-11 NOTE — HOSPICE
LMSW arrived at the patient's home to complete a routine visit for [facility]. The LMSW was greeted at the sliding doors by the patient's wife Tyler Medley. The home was clean and clutter free. The patient is a 76 y/o  male with a [facility] Dx. Neuroendocrine cancer. The patient was received lying on his bed comfortably. The patient was alert and oriented x 4. The patient and Tyler Medley provided information for this visit. Patient reported that he was not happy with [facility] due to waiting 20 minutes to hear from a RN. Tyler Medley stated that she did not feel emotionally connected to the hospice staff. Tyler Medley reported that they refused the volunteer because they could not understand what he was saying on the phone. Tyler Medley reported that she needs help caring for the patient and that she is overwhelmed and exhausted. LMSW stated that he was trying to connect the patient with a hospice that is in network with Sujey Walter to allow for additional HHA hours which will assist with caregiving and give Tyler Medley a break. Adela's coping skills appeared to be inadequate due to denial. LMSW provided emotional support and reassurance that the transfer of hospice service will take place tomorrow, if the [facility] office can admit. LMSW provided education on home safety for the patient and the wife.

## 2022-01-12 NOTE — HOSPICE
LCSW visited pt and pts wife Brina Patel for a PRN visit to completed updated transfer form for corrected date of 1/14/2022 and support. Pt was alert and oriented x4. Pt had no reports of pain and appeared comfortable. Pt appeared in good spirits and utilized humor through out visit. Pt did talk about how difficult it was to not be able to be active and doing the things he once enjoyed. Pt stated he missed working in the garage and showed LCSW his woodwork. Pt and pts wife engaged in some life review and LCSW provided supportive listening. Pts wife talked about how overwhelming it has been with pts friends telling her pt needs to be more active. Pts wife expressed feeling upset that they were questioning. LCSW provided emotional support and validated feelings. LCSW discussed importance of leaning on her friends and family who provide comfort to her. Pts wife talked about her physical symptoms due to increased stress. LCSW discussed the importance of self care. LCSW encouraged pts wife to call her PCP to talk with him about physical symptoms and possible medications to help ease some of her anxiety. Pts wife and pt stated they did wish to transfer hospice due to some service concerns and also that pts daughter's friend owns [facility]. LCSW discussed service concerns with pt and pts wife and provided support. Pt signed and completed the transfer form. LCSW then faxed this to [facility]. [facility] called during LCSW visit and assured pt and pts wife that they would be out for the transfer on Friday 1/14 and would make sure to get all DME ordered ahead of time. LCSW encouraged pt and pts wife to call with any needs. LCSW will continue to monitor and assess needs.

## 2022-01-12 NOTE — HOSPICE
Patient sitting up in bed upon arrival for visit; patient's wife Denise Carlson present. Patient alert and oriented X4, intermittent confusion recently. Pain currently well managed with PRN Oxycodone. (Patient has been taking approximately every 6 hours.)  Patient denies any dyspnea. Gee catheter patent and draining. Plan for patient to transfer to another hospice this Friday 1/14. [facility] hospice 24/7 for any concerns or change in patient's condition. Medication refilled: Oxycodone via [facility].

## 2022-01-12 NOTE — PATIENT INSTRUCTIONS
Dear Allan Deutsch,     It was a pleasure seeing you today virtually in your home      This is the plan we talked about:    You are currently enrolled in [facility]. You are essentially bedbound due to severe orthostatic hypotension and dizziness which prevents you from getting out of bed. You faint almost every single time you get out of bed especially without assistance. This is affecting your quality of life and may do bedbound. You have had repeated hospitalizations over the last 6 months including bladder infection and severe uncontrolled low and high blood pressure which is rendered you not a candidate for further systemic therapy. You have been in hospice for little over 2 months now but you are starting to wonder whether hospice is the right course of action for you. Your wife is struggling significantly with your care and needs assistance but you are unable to hire anyone nor can you rely on family members to help. You are wondering if physical therapy and Occupational Therapy can help with building some strength so you can get out of bed. 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 While you are in the hospital, several medications were tried to elevate your blood pressure but all ended up resulting in significantly high blood pressure and therefore the measures were abandoned. You are not sure if you would restart treatment towards neuroendocrine carcinoma at this time but you would like to talk with Dr. Sierra Koyanagi and understand your options a bit more. During the process of switching hospice services to [facility] given that you have some personal connection with that group.   We agreed that you will have the medical director for at home care give me a call so I can discuss with them and see if they will allow physical therapy and Occupational Therapy in addition to hospice services. If we can demonstrate that are able to participate in physical therapy and gain some strength back, this may be an argument in your favor to restart treatment with Dr. Jabier Green. Further decisions to be made once the new hospice medical director talks with me.      -------------     1.  Intractable right arm radiculopathy  -You think your pain is about the same overall. Normally it is an ache. Some days it hurts, and some days it doesn't.  -Your pain bothers you the most at night.  -Now you are taking gabapentin 1 in the morning, 1 at lunch, 1 at supper, 1 at bedtime. You found that when you took 2 at bedtime that it made your feel swollen. You are not having any side effects with this regimen of 100 mg four times daily.  -You occasionally take an oxycodone 5 mg at night. We will refill this for you. You are hesitant to take opioids regularly because the idea is scary to you. It does make you sleepy when you take it. You only take it if you have to. You feel you don't reach this need very often. You have never been a medication person.      2.  Repeated oral/tooth infection  -You are going to the oral surgeon tomorrow.  -You don't think your previous courses of antibiotics made a big difference.   -You are still using an anti-bacterial mouthwash.     3.  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    -Dr. Jabier Green started you on Xermelo and this is helping.  -Now you are having about 3 BMs daily on average.  -Antibiotics makes your diarrhea worse.     4.  Water and electrolytes  -You went to the Emergency Department prior to your last visit with us in August. After that ED trip you stopped your metoprolol after that visit because your heart rate was low.  -I recommended following up with your Primary Care Physician about your Blood Pressure--there were no other changes changes to your medication after your most recent visit.  -Keeping yourself hydrated with at least 100 ounces of water a day, lemonade, electrolyte drinks such as Gatorade/Powerade, electrolyte powder mixed in water, etc. is very important every day.     5. Fatigue  -You have chronic fatigue. The intensity waxes and wanes. Working for a day will make you really tired. -You do sleep well.  -You prefer to not take too many medications, so you would like to hold off on medications for now. -Please let me know if you change your mind.     6. Appetite:  -You think your appetite has been really good.     7. Mood:  -Sometimes you can feel lama, but you have good days more than bad days. Sometimes you feel like you have less patience than previously. -You do not take any medication for this.  -You have good support at home with family, friends, Buddhist. -Please let me know if you are interested in discussing medication for your mood. -Please let me know if you would like to speak with our . 8. Dizziness:  -You were prescribed meclizine by Dr. Luc Lyle while in the ED previously. I will refill this for you.  -You find that the meclizine helps very quickly you when you get your random dizzy episodes.

## 2022-01-12 NOTE — PROGRESS NOTES
Palliative Medicine Outpatient Services  Stover: [phone] ([phone])    Patient Name: Mary Canales  YOB: 1945    Date of Current Visit: 01/17/22  Location of Current Visit:    [] [facility] Office  [] [facility] Office  [] [id number]  [] Home  [x]Synchronous real-time A/V virtual visit    Date of Initial Visit: 6/16/2021  Referral from: Dr. Froilan oBrden  Primary Care Physician: Wilmer Clifford MD      SUMMARY:   Mary Canales is a 77y.o. year old with a  history of neuroendocrine carcinoma initial diagnosis in 2016, followed by remission status post treatment, suffered progression of disease in 2018 and has been on treatment since, his most recent progression of disease is to the bones who was referred to Palliative Medicine by Dr. Froilan Borden for management of pain and psychosocial support. .  The patients social history includes lives at home with his wife Saadia Barreto. Has very good friends and family support. Current treatment-Afinitor     PALLIATIVE DIAGNOSES:       ICD-10-CM ICD-9-CM    1. 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 Cervical radiculopathy  M54.12 723.4    3. Orthostatic hypotension  I95.1 458.0    4. Debility  R53.81 799.3    5. End of life care  Z51.5 V66.7           PLAN:   Patient Instructions   Dear Mary Canales,     It was a pleasure seeing you today virtually in your home      This is the plan we talked about:    You are currently enrolled in [facility]. You are essentially bedbound due to severe orthostatic hypotension and dizziness which prevents you from getting out of bed. You faint almost every single time you get out of bed especially without assistance. This is affecting your quality of life and may do bedbound. You have had repeated hospitalizations over the last 6 months including bladder infection and severe uncontrolled low and high blood pressure which is rendered you not a candidate for further systemic therapy.   You have been in hospice for little over 2 months now but you are starting to wonder whether hospice is the right course of action for you. Your wife is struggling significantly with your care and needs assistance but you are unable to hire anyone nor can you rely on family members to help. You are wondering if physical therapy and Occupational Therapy can help with building some strength so you can get out of bed. 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 While you are in the hospital, several medications were tried to elevate your blood pressure but all ended up resulting in significantly high blood pressure and therefore the measures were abandoned. You are not sure if you would restart treatment towards neuroendocrine carcinoma at this time but you would like to talk with Dr. Zoe Fletcher and understand your options a bit more. During the process of switching hospice services to at home care given that you have some personal connection with that group. We agreed that you will have the medical director for at home care give me a call so I can discuss with them and see if they will allow physical therapy and Occupational Therapy in addition to hospice services. If we can demonstrate that are able to participate in physical therapy and gain some strength back, this may be an argument in your favor to restart treatment with Dr. Zoe Fletcher. Further decisions to be made once the new hospice medical director talks with me.      -------------     1.  Intractable right arm radiculopathy  -You think your pain is about the same overall. Normally it is an ache. Some days it hurts, and some days it doesn't.  -Your pain bothers you the most at night.  -Now you are taking gabapentin 1 in the morning, 1 at lunch, 1 at supper, 1 at bedtime. You found that when you took 2 at bedtime that it made your feel swollen. You are not having any side effects with this regimen of 100 mg four times daily.  -You occasionally take an oxycodone 5 mg at night. We will refill this for you. You are hesitant to take opioids regularly because the idea is scary to you. It does make you sleepy when you take it. You only take it if you have to. You feel you don't reach this need very often. You have never been a medication person.      2.  Repeated oral/tooth infection  -You are going to the oral surgeon tomorrow.  -You don't think your previous courses of antibiotics made a big difference. -You are still using an anti-bacterial mouthwash.     3.  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    -Dr. Jabier Green started you on Gonzella Eves and this is helping.  -Now you are having about 3 BMs daily on average.  -Antibiotics makes your diarrhea worse.     4.  Water and electrolytes  -You went to the Emergency Department prior to your last visit with us in August. After that ED trip you stopped your metoprolol after that visit because your heart rate was low.  -I recommended following up with your Primary Care Physician about your Blood Pressure--there were no other changes changes to your medication after your most recent visit.  -Keeping yourself hydrated with at least 100 ounces of water a day, lemonade, electrolyte drinks such as Gatorade/Powerade, electrolyte powder mixed in water, etc. is very important every day.     5. Fatigue  -You have chronic fatigue. The intensity waxes and wanes. Working for a day will make you really tired. -You do sleep well.  -You prefer to not take too many medications, so you would like to hold off on medications for now. -Please let me know if you change your mind.     6. Appetite:  -You think your appetite has been really good.     7. Mood:  -Sometimes you can feel lama, but you have good days more than bad days.  Sometimes you feel like you have less patience than previously. -You do not take any medication for this.  -You have good support at home with family, friends, Religion. -Please let me know if you are interested in discussing medication for your mood. -Please let me know if you would like to speak with our . 8. Dizziness:  -You were prescribed meclizine by Dr. Griselda Ahle while in the ED previously. I will refill this for you.  -You find that the meclizine helps very quickly you when you get your random dizzy episodes. GOALS OF CARE / TREATMENT PREFERENCES:   [====Goals of Care====]  GOALS OF CARE:  Patient / health care proxy stated goals: See Patient Instructions / Summary    TREATMENT PREFERENCES:   Code Status:  [x] Attempt Resuscitation       [] Do Not Attempt Resuscitation    Advance Care Planning:  [x] The [employer] [facility] Interdisciplinary Team has updated the ACP Navigator with Decision Maker and Patient Capacity      Primary Decision MakerTamara Jon - [phone]  Advance Care Planning 12/26/2021   Patient's Healthcare Decision Maker is: Named in scanned ACP document   Confirm Advance Directive Yes, on file   Patient Would Like to Complete Advance Directive -       Other:  (If patient appropriate for POST, consider using PALLPOST smart phrase here)    The palliative care team has discussed with patient / health care proxy about goals of care / treatment preferences for patient.  [====Goals of Care====]     PRESCRIPTIONS GIVEN:     No orders of the defined types were placed in this encounter.           FOLLOW UP:     Future Appointments   Date Time Provider Mark De La Paz   2/8/2022 12:00 PM [facility] PET DOSE 1 SMHRCHPET BREANN TRAVIS   2/8/2022  1:00 PM [facility] R[facility] PET 1 SMHRCHPET CRAIN TRAVIS           PHYSICIANS INVOLVED IN CARE:   Patient Care Team:  Daksha Zuniga MD as PCP - General (Family Medicine)  Daksha Zuniga MD as PCP - [facility] Empaneled Provider  Carlota Cardenas MD as Surgeon (General Surgery)       HISTORY:   Reviewed patient-completed ESAS and advance care planning form. Reviewed patient record in prescription monitoring program.    CHIEF COMPLAINT:   Chief Complaint   Patient presents with    Follow-up    Diarrhea       HPI/SUBJECTIVE:    The patient is: [x] Verbal / [] Nonverbal     Urgent visit today at the request of patient's wife. Significant caregiver burnout. Wife is wondering if hospice has been the right choice. See plan for details    ---    Patient seen along with his wife today. Very pleasant gentleman. Right arm pain started about 3 months ago and it has been progressively worse. Pain starts in the right mid shoulder and radiates down his right arm. He started having tingling and numbness in the right arm over the last 6 weeks. He has been doing physical therapy which somewhat helps. He was started on oxycodone 5 mg but it made him very sleepy and he was very worried about taking opioids and therefore has not been taking it. Has been struggling with severe diarrhea for many many years. He has a bowel movement after every meal as well. He has had 2 spells of dizziness, one resulting in ER visit several months ago and 1 where the rescue squad was called but he recovered by the time they reached his home. Both times, he was outside doing something and called out to his wife for help due to feeling very poorly. He did not lose consciousness but did have word finding difficulty but recovered within a few minutes. Wife did not check the blood pressure when he was down. Last episode was several weeks ago. Admits to not drinking enough water, likes Dr. Stephany Ramos. Has been struggling with repeated oral infections as well. Will call his oromaxillary facial surgeon today.     Clinical Pain Assessment (nonverbal scale for nonverbal patients):   [++++ Clinical Pain Assessment++++]  [++++Pain Severity++++]: Pain: 0  [++++Pain Character++++]:  [++++Pain Duration++++]:  [++++Pain Effect++++]:  [++++Pain Factors++++]:  [++++Pain Frequency++++]:  [++++Pain Location++++]:  [++++ Clinical Pain Assessment++++]       FUNCTIONAL ASSESSMENT:     Palliative Performance Scale (PPS):  PPS: 50       PSYCHOSOCIAL/SPIRITUAL SCREENING:     Any spiritual / Tenriism concerns:  [] Yes /  [x] No    Caregiver Burnout:  [] Yes /  [x] No /  [] No Caregiver Present      Anticipatory grief assessment:   [x] Normal  / [] Maladaptive       ESAS Anxiety: Anxiety: 0    ESAS Depression: Depression: 4       REVIEW OF SYSTEMS:     The following systems were [x] reviewed / [] unable to be reviewed  Systems: constitutional, ears/nose/mouth/throat, respiratory, gastrointestinal, genitourinary, musculoskeletal, integumentary, neurologic, psychiatric, endocrine. Positive findings noted below. Modified ESAS Completed by: provider   Fatigue: 9 Drowsiness: 10   Depression: 4 Pain: 0   Anxiety: 0 Nausea: 5   Anorexia: 5 Dyspnea: 0   Best Well-Bein Constipation: No              PHYSICAL EXAM:     Wt Readings from Last 3 Encounters:   21 160 lb (72.6 kg)   21 159 lb 13.3 oz (72.5 kg)   21 160 lb 8 oz (72.8 kg)     There were no vitals taken for this visit.   Last bowel movement: See Nursing Note    Constitutional    [x] Appears well-developed and well-nourished in no apparent distress    [] Abnormal:  Mental status  [x] Alert and awake  [x] Oriented to person/place/time  [x] Able to follow commands  [] Abnormal:   Eyes  [x] EOM normal   [x] Sclera normal   [x] No visible ocular discharge  [] Abnormal:   HENT  [x] Normocephalic, atraumatic  [x] Mouth/Throat: Moist mucous membranes   [x] External Ears normal  [] Abnormal:  Neck  [x] No visualized mass  [] Abnormal:  Pulmonary/Chest   [x] Respiratory effort normal  [x] No visualized signs of difficulty breathing or respiratory distress  [] Abnormal:  Musculoskeletal  [x] Normal gait with no signs of ataxia  [x] Normal range of motion of neck  [] Abnormal:  Neurological:   [x] No facial asymmetry (Cranial nerve 7 motor function)  [x] No gaze palsy  [] Abnormal:   Skin  [x] No significant exanthematous lesions or discoloration noted on facial skin  [] Abnormal:                                  Psychiatric  [x] Normal affect  [x] No hallucinations  [] Abnormal:    Other pertinent observable physical exam findings:    Due to this being a TeleHealth evaluation, many elements of the physical examination are unable to be assessed. 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    Poor appetite     Unspecified adverse effect of anesthesia     \"hard to put to sleep and slow to wake up-heart rate dropped very low\"      Past Surgical History:   Procedure Laterality Date    COLONOSCOPY N/A 2016    COLONOSCOPY performed by Royce Cuadra MD at [address] [place] [address]  2016         HX ORTHOPAEDIC      knee scope left knee    HX OTHER SURGICAL  2013    excision of scalp cyst     UPPER GI ENDOSCOPY,BIOPSY  2016           Family History   Problem Relation Age of Onset    Cancer Mother     Stroke Sister       History reviewed, no pertinent family history.   Social History     Tobacco Use    Smoking status: Former Smoker     Packs/day: 1.00     Years: 8.00     Pack years: 8.00     Quit date: 1972     Years since quittin.7    Smokeless tobacco: Never Used   Substance Use Topics    Alcohol use: No     No Known Allergies   Current Outpatient Medications   Medication Sig    senna-docusate (Senna Plus) 8.6-50 mg per tablet Take 1 Tablet by mouth daily.  lidocaine 4 % patch 1 Patch by TransDERmal route every twelve (12) hours every twelve (12) hours.  oxyCODONE IR (ROXICODONE) 5 mg immediate release tablet Take 5 mg by mouth every four (4) hours as needed for Pain.  gabapentin (NEURONTIN) 100 mg capsule Take 100 mg by mouth three (3) times daily.  haloperidol (HALDOL) 2 mg/mL oral concentrate Take 1 mg by mouth every four (4) hours as needed for Agitation.  hyoscyamine SL (LEVSIN/SL) 0.125 mg SL tablet Take 0.125 mg by mouth every four (4) hours as needed for Secretions.  prochlorperazine (COMPAZINE) 10 mg tablet Take 10 mg by mouth every six (6) hours as needed for Nausea.  gabapentin (NEURONTIN) 100 mg capsule Take 1 Capsule by mouth three (3) times daily. Max Daily Amount: 300 mg.  telotristat etiprate 250 mg tab Take 250 mg by mouth three (3) times daily (with meals) for 30 days. (Patient taking differently: Take 250 mg by mouth three (3) times daily (with meals). Patient's wife states she gives it to him prn)    meclizine (ANTIVERT) 12.5 mg tablet Take 12.5 mg by mouth three (3) times daily as needed for Dizziness. (Patient not taking: Reported on 1/12/2022)    acetaminophen (TYLENOL) 650 mg suppository Insert 650 mg into rectum every six (6) hours as needed for Fever.  bisacodyL (DULCOLAX) 10 mg supp Insert 10 mg into rectum daily as needed for Constipation.  LORazepam (ATIVAN) 0.5 mg tablet Take 0.5 mg by mouth every six (6) hours as needed for Anxiety. (Patient not taking: Reported on 1/12/2022)    morphine (ROXANOL) 100 mg/5 mL (20 mg/mL) concentrated solution Take 5 mg by mouth every three (3) hours as needed for Pain (if unable to swallow oxycodone).  lisinopriL (PRINIVIL, ZESTRIL) 5 mg tablet Take 1 Tablet by mouth daily for 30 days.  (Patient not taking: Reported on 1/12/2022)    ferrous sulfate (IRON) 325 mg (65 mg iron) EC tablet Take 1 Tablet by mouth daily for 30 days.  everolimus (Afinitor) 10 mg tab Take 1 Tablet by mouth daily. No current facility-administered medications for this visit. LAB and other DATA REVIEWED:     Lab Results   Component Value Date/Time    WBC 9.6 12/25/2021 09:42 AM    HGB 8.1 (L) 12/25/2021 09:42 AM    PLATELET 086 62/22/5392 09:42 AM     Lab Results   Component Value Date/Time    Sodium 142 12/23/2021 03:43 AM    Potassium 3.7 12/23/2021 03:43 AM    Chloride 110 (H) 12/23/2021 03:43 AM    CO2 26 12/23/2021 03:43 AM    BUN 17 12/23/2021 03:43 AM    Creatinine 1.49 (H) 12/23/2021 03:43 AM    Calcium 7.7 (L) 12/23/2021 03:43 AM    Magnesium 1.8 12/23/2021 03:43 AM    Phosphorus 2.6 12/23/2021 03:43 AM      Lab Results   Component Value Date/Time    Alk. phosphatase 169 (H) 12/22/2021 09:34 AM    Protein, total 6.0 (L) 12/22/2021 09:34 AM    Albumin 2.2 (L) 12/22/2021 09:34 AM    Globulin 3.8 12/22/2021 09:34 AM     Lab Results   Component Value Date/Time    INR 1.0 04/29/2020 11:15 PM    Prothrombin time 10.6 04/29/2020 11:15 PM    aPTT 23.9 04/29/2020 11:15 PM      Lab Results   Component Value Date/Time    Iron 26 (L) 12/25/2021 09:42 AM    TIBC 183 (L) 12/25/2021 09:42 AM    Iron % saturation 14 (L) 12/25/2021 09:42 AM    Ferritin 150 12/25/2021 09:42 AM        Detail chart reviewed. Other specialists and referral provider notes reviewed. MRI C- spine, 4/21  IMPRESSION  Scattered osseous metastatic disease is demonstrated at the dens, at C5, at C7  and at T1. Small foci of abnormal marrow signal intensity with associated  fracture or dislocation.     Multilevel degenerative change of the cervical spine was greater on the right.     Moderate to severe right foraminal stenosis at C5-6 and severe right foraminal  stenosis at C6-7.     Additional, less severe degenerative findings are as described in detail above.      CONTROLLED SUBSTANCES SAFETY ASSESSMENT (IF ON CONTROLLED SUBSTANCES):     Reviewed opioid safety handout:  [x] Yes   [] No  24 hour opioid dose >150mg morphine equivalent/day:  [] Yes   [x] No  Benzodiazepines:  [] Yes   [x] No  Sleep apnea:  [] Yes   [x] No  Urine Toxicology Testing within last 6 months:  [] Yes   [x] No  History of or new aberrant medication taking behaviors:  [] Yes   [x] No  Has Narcan been prescribed [x] Yes   [] No          Total time: 70 minutes  Counseling / coordination time: 60 minutes  > 50% counseling / coordination?:  Yes    Please note that this dictation was completed with [employer], the computer voice recognition software. Quite often unanticipated grammatical, syntax, homophones, and other interpretive errors are inadvertently transcribed by the computer software. Please disregard these errors. Please excuse any errors that have escaped final proofreading    Consent:  He and/or health care decision maker is aware that that he may receive a bill for this telehealth service, depending on his insurance coverage, and has provided verbal consent to proceed: Yes    CPT Codes 55876-25139 for Established Patients may apply to this Telehealth VisitTime-based coding, delete if not needed: I spent at least 40 minutes with this established patient, and >50% of the time was spent counseling and/or coordinating care regarding Goals of care discussion, coordination of care, psychosocial support  Pursuant to the emergency declaration under the [employer] and the [facility], 1135 waiver authority and the [employer] and [employer] Act, this Virtual  Visit was conducted, with patient's consent, to reduce the patient's risk of exposure to COVID-19 and provide continuity of care for an established patient. Services were provided through a video synchronous discussion virtually to substitute for in-person clinic visit.

## 2022-02-01 NOTE — TELEPHONE ENCOUNTER
Auth quest for a PET scan - [facility] nurse called re the request for this as the patient is on hospice.      [phone]  Ext: [phone]

## 2022-08-11 NOTE — PROGRESS NOTES
2001 [facility] [address], [address] [facility], [address] [place] [address], [address] [place] [address]  [phone]       Oncology Follow up Note      Patient: Yareli Bonilla MRN: [id number]  SSN: [id number]    YOB: 1945  Age: 77 y.o. Sex: male        Diagnosis:     1. 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 Afinitor 10 mg + Somatuline Depot 120 mg SC - started Afinitor 3/30/2019  2. Somatuline Depot 120 mg SC - 05/2016 - 11/2018; Restarted 3/4/2019 -    (treatment held per patient request since11/16/2018)   3. Xgeva 120mg SC     Subjective:      Yareli Bonilla is a 77 y.o. male with a diagnosis of metastatic neuroendocrine cancer. He started experiencing pain in both groins and the abdomen. A CT of the abdomen showed retroperitoneal adenopathy. CT guided biopsy of the retroperitoneal LN reveals a dx of well differentiated neuroendocrine carcinoma. Bone scan shows disease in the bone. Mr. Vargas Howard received Somatuline and had good control of disease. He took a break from therapy in Nov 2018. He went on an extended vacation until the beginning of February to [facility]. CT showed progression of disease in the nodes. Mr. Vargas Howard resumed therapy with Somatuline with the addition of Afinitor. Symptoms such as bone pains, diarrhea and fatigue improved  He took a break from [address] [place] [address] in October 2019, but repeat scans showed slight progression of disease. He resumed Afinitor in November 2019. Scans show stable disease. He reports hot flashes and continues to have a difficult time gaining weight. His appetite is good. He is experiencing pain in the upper back in the left subscapular area and numbness in the left arm. The numbness in intermittent and is related to certain position of the arm.        Review of Systems:    Constitutional: fatigue, hot flashes  Eyes: negative  Ears, Nose, Diagnostic wire inserted. Mouth, Throat, and Face: negative  Respiratory: negative  Cardiovascular: negative  Gastrointestinal: diarrhea  Genitourinary: urinary incontinence  Integument/Breast: negative  Hematologic/Lymphatic: negative  Musculoskeletal: back pain  Neurological: negative    ROS was pulled in from a previous visit. No changes were made d/t symptoms remain the same. Past Medical History:   Diagnosis Date    Adverse effect of anesthesia     low HR and very slow to wakeup    Cancer ([address] 2016    neuroendocrine, retroperitoneal LN involvement    Diarrhea     Frequent urination     Hypertension     Poor appetite     Unspecified adverse effect of anesthesia     \"hard to put to sleep and slow to wake up-heart rate dropped very low\"     Past Surgical History:   Procedure Laterality Date    COLONOSCOPY N/A 2016    COLONOSCOPY performed by Jocelyne Lewis MD at [address] [place] [address]  2016         HX ORTHOPAEDIC      knee scope left knee    HX OTHER SURGICAL  2013    excision of scalp cyst     UPPER GI ENDOSCOPY,BIOPSY  2016           Family History   Problem Relation Age of Onset    Cancer Mother     Stroke Sister      Social History     Tobacco Use    Smoking status: Former Smoker     Packs/day: 1.00     Years: 8.00     Pack years: 8.00     Quit date: 1972     Years since quittin.9    Smokeless tobacco: Never Used   Substance Use Topics    Alcohol use: No      Prior to Admission medications    Medication Sig Start Date End Date Taking? Authorizing Provider   dexAMETHasone (DECADRON) 0.5 mg/5 mL elixir Take 10 mL by mouth four (4) times daily. Swish x 2 minutes then spit. 21  Yes Isaac Leon MD   everolimus (Afinitor) 10 mg tab Take 1 Tab by mouth daily. 20  Yes Isaac Leon MD   meclizine (ANTIVERT) 25 mg tablet Take 1 Tab by mouth three (3) times daily as needed for Dizziness.  20  Yes Sherren Creek, DO   ondansetron (Zofran ODT) 4 mg disintegrating tablet Take 1 Tab by mouth every eight (8) hours as needed for Nausea. 4/30/20  Yes Wilbur Quintana DO   lisinopril (PRINIVIL, ZESTRIL) 20 mg tablet Take 1 Tab by mouth daily. Patient taking differently: Take 40 mg by mouth daily. 6/19/19  Yes Anastacia Reyes MD   tamsulosin [facility]) 0.4 mg capsule Take 0.4 mg by mouth daily. Yes Provider, Historical          No Known Allergies        Objective:     Visit Vitals  BP (!) 155/98 (BP 1 Location: Left upper arm, BP Patient Position: Sitting)   Pulse 67   Temp 97.5 °F (36.4 °C)   Resp 18   Ht 6' (1.829 m)   Wt 165 lb (74.8 kg)   SpO2 100%   BMI 22.38 kg/m²       Pain Scale: 0 - No pain/10  Pain Location:       Physical Exam:     GENERAL: alert, cooperative, thin  EYE: negative, no pallor or icterus  LYMPHATIC: Cervical, supraclavicular, and axillary nodes normal.   THROAT & NECK: normal and no erythema or exudates noted. LUNG: clear to auscultation bilaterally  HEART: regular rate and rhythm, no murmur  ABDOMEN: soft, non-tender on palpation  EXTREMITIES: no cyanosis or edema  SKIN: Normal. No rash or ecchymosis  NEUROLOGIC: numbness in RUE starting from the scapula to the forearm      Physical exam was pulled in from a previous visit. No changes were made d/t physical exam remains the same. IMAGING:     CT Results (most recent):  Results from Hospital Encounter encounter on 02/17/21   CT ABD PELV W CONT    Narrative EXAM: CT ABD PELV W CONT    INDICATION: restaging of disease endocrine carcinoma    COMPARISON: November 10, 2020     CONTRAST: 100 mL of Isovue-370. TECHNIQUE:   Following the uneventful intravenous administration of contrast, thin axial  images were obtained through the abdomen and pelvis. Coronal and sagittal  reconstructions were generated. Oral contrast was administered.  CT dose  reduction was achieved through use of a standardized protocol tailored for this  examination and automatic exposure control for dose modulation. FINDINGS:   LOWER THORAX: A small interstitial infiltrate at the a right lung base medially  appear more prominent. A diffuse basilar interstitial pattern is also slightly  more prominent. Please correlate clinically. This could be related to some  inflammatory changes. There is chronic lung disease. LIVER: No change  BILIARY TREE: Gallstones within a nondistended gallbladder. . CBD is not dilated. SPLEEN: within normal limits. PANCREAS: No mass or ductal dilatation. ADRENALS: Unremarkable. KIDNEYS: Left renal calcification lower pole unchanged. Prominence of both  ureters. Unchanged. STOMACH: Unremarkable. Mesenteric mass is unchanged. .  Some wall thickening in the cecum and possibly left upper quadrant jejunum  demonstrated. Unknown clinical significance. APPENDIX: Appears normal  PERITONEUM: No ascites or pneumoperitoneum. RETROPERITONEUM: No aortic aneurysm. Retroperitoneal adenopathy unchanged. URINARY BLADDER: No change bladder wall thickening, prostate hypertrophy with  implants. Prominence of the distal left ureter. BONES: Bone metastases unchanged. ABDOMINAL WALL: No mass or hernia. ADDITIONAL COMMENTS: N/A      Impression 1. Small basilar lung infiltrates should be followed up. 2. Stable mesenteric mass and retroperitoneal adenopathy. 3. Stable bone metastases. 4. Some wall thickening in the cecum stable, possible new wall thickening left  upper quadrant jejunum. I reviewed the imaging personally. Stable disease in the retroperitoneal nodes.        Lab Results   Component Value Date/Time    WBC 6.8 04/01/2021 09:00 AM    Hemoglobin (POC) 14.6 12/21/2017 09:10 AM    HGB 10.3 (L) 04/01/2021 09:00 AM    Hematocrit (POC) 32 (L) 04/01/2021 09:04 AM    HCT 33.5 (L) 04/01/2021 09:00 AM    PLATELET 591 87/90/5785 09:00 AM    MCV 75.8 (L) 04/01/2021 09:00 AM       Lab Results   Component Value Date/Time    Sodium 140 03/04/2021 09:02 AM    Potassium 3.5 03/04/2021 09:02 AM Chloride 107 03/04/2021 09:02 AM    CO2 28 03/04/2021 09:02 AM    Anion gap 5 03/04/2021 09:02 AM    Glucose 131 (H) 03/04/2021 09:02 AM    BUN 22 (H) 03/04/2021 09:02 AM    Creatinine 1.51 (H) 03/04/2021 09:02 AM    BUN/Creatinine ratio 15 03/04/2021 09:02 AM    GFR est AA 55 (L) 03/04/2021 09:02 AM    GFR est non-AA 45 (L) 03/04/2021 09:02 AM    Calcium 8.6 03/04/2021 09:02 AM    Bilirubin, total 1.0 04/01/2021 09:00 AM    Alk. phosphatase 77 04/01/2021 09:00 AM    Protein, total 6.6 04/01/2021 09:00 AM    Albumin 3.0 (L) 04/01/2021 09:00 AM    Globulin 3.6 04/01/2021 09:00 AM    A-G Ratio 0.8 (L) 04/01/2021 09:00 AM    ALT (SGPT) 24 04/01/2021 09:00 AM           Assessment:     1. 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 Had excellent disease control. He experienced a number of side effects including hot flashes, weight loss etc  He decided to take a break from therapy. CT showed disease progression in retroperitoneum and abdomen. Restarted Somatuline. Added Afinitor. Flushing, appetite, diarrhea, back pain are all better. Stopped Afinitor in Oct 2019  CT showed progression in retroperitoneal nodes    He is now back on Everolimus 10 mg in addition to Easy Eye treatment   + diarrhea - manageable  + fatigue - manageable, he [place] not [place] these days. + hot flashes - manageable    Weight is stable  Appetite is good    A detailed system by system evaluation of side effect was performed to assess chemotherapy related toxicity. Blood counts are acceptable. Results reviewed with the patient. CT Abd Pelv, NM bone scan (02/2021): Stable disease      2. Bone metastasis    > Denosumab      3. Urinary incontinence    BPH  Managed by Dr. Amos Thomson      4.  Diarrhea    Imodium as needed      5. Orthostasis    Observation      6. HTN, uncontrolled    May need to add Amlodipine to Lisinopril. 7. Anemia from systemic therapy    Observation      8. Numbness in left arm and subscapular pain in the left side    MRI C-spine and T spine      9. HTN, uncontrolled    Add metoprolol 25 mg bid      Plan:       > Continue Afinitor  > Continue Denosumab  > Continue Somatuline  > Imodium as needed  > MRI C and T spine  > Start Metoprolol 25 mg bid  > Follow-up in 2 months      Signed by: Jennell Paget, MD                     April 1, 2021      CC. Harshal Jaeger MD  CC.  Eldora Severin, MD

## 2024-11-23 NOTE — PROGRESS NOTES
Pt arrived to [facility] ambulatory in no acute distress at 0858 for Xgeva/ lanreotide C35 D1. Assessment unremarkable except patient declines Xgeva shot due to ongoing  dental work. Lab work obtained from the right ac Bleeding stopped with 2x2's and papertape. Labs obtained, CBC, CMP, Mg and Phos . Patient Vitals for the past 12 hrs:   Temp Pulse BP SpO2   10/14/21 0858 98.8 °F (37.1 °C) 62 (!) 160/92 99 %       Patient denied having any symptoms of COVID-19, i.e. SOB, coughing, fever, or generally not feeling well. Also denies having been exposed to COVID-19 recently or having had any recent contact with family/friend that has a pending COVID test.     The following medications administered:     Medications Administered     lanreotide (SOMATULINE DEPOT) injection syringe 120 mg     Admin Date  10/14/2021 Action  Given Dose  120 mg Route  SubCUTAneous Administered By  Harvey Cháevz RN              Given to right gluteus solo sub q. Pt tolerated treatment well. IV flushed per policy and removed, 2x2 and bandaid  placed. Pt discharged ambulatory in no acute distress at 0920, accompanied by spouse. Next appointment 11/11/2021 @ 0900. Future Appointments   Date Time Provider Mark Boweni   11/11/2021  9:00 AM [facility] [address] REG   11/11/2021  9:15 AM Talia Rock MD [facility] BS AMB   12/9/2021  9:00 AM [facility] REG   1/3/2022 10:30 AM Nathaniel Mack MD [facility] BS AMB     . Patent